# Patient Record
Sex: MALE | Race: WHITE | NOT HISPANIC OR LATINO | Employment: FULL TIME | URBAN - METROPOLITAN AREA
[De-identification: names, ages, dates, MRNs, and addresses within clinical notes are randomized per-mention and may not be internally consistent; named-entity substitution may affect disease eponyms.]

---

## 2017-02-17 ENCOUNTER — HOSPITAL ENCOUNTER (OUTPATIENT)
Dept: ONCOLOGY | Facility: CLINIC | Age: 67
Discharge: HOME | End: 2017-02-17
Attending: INTERNAL MEDICINE | Admitting: INTERNAL MEDICINE

## 2017-03-07 ENCOUNTER — HOSPITAL ENCOUNTER (OUTPATIENT)
Dept: ONCOLOGY | Facility: CLINIC | Age: 67
Discharge: HOME | End: 2017-03-07
Attending: INTERNAL MEDICINE | Admitting: INTERNAL MEDICINE

## 2017-04-04 ENCOUNTER — HOSPITAL ENCOUNTER (OUTPATIENT)
Dept: ONCOLOGY | Facility: CLINIC | Age: 67
Discharge: HOME | End: 2017-04-04
Attending: INTERNAL MEDICINE | Admitting: INTERNAL MEDICINE

## 2017-05-05 ENCOUNTER — HOSPITAL ENCOUNTER (OUTPATIENT)
Dept: ONCOLOGY | Facility: CLINIC | Age: 67
Discharge: HOME | End: 2017-05-05
Attending: NURSE PRACTITIONER | Admitting: NURSE PRACTITIONER

## 2017-05-05 LAB
ALBUMIN SERPL-MCNC: 4.1 G/DL (ref 3.5–4.8)
ALBUMIN/GLOB SERPL: 1.8 {RATIO} (ref 1–1.7)
ALP SERPL-CCNC: 80 IU/L (ref 32–91)
ALT SERPL-CCNC: 55 IU/L (ref 17–63)
ANION GAP SERPL CALC-SCNC: 15.1 MMOL/L (ref 10–20)
AST SERPL-CCNC: 32 IU/L (ref 15–41)
BILIRUB SERPL-MCNC: 1.1 MG/DL (ref 0.3–1.2)
BUN SERPL-MCNC: 32 MG/DL (ref 8–20)
BUN/CREAT SERPL: 22.9 (ref 6.2–20.3)
CALCIUM SERPL-MCNC: 9.5 MG/DL (ref 8.9–10.3)
CHLORIDE SERPL-SCNC: 104 MMOL/L (ref 101–111)
CONV CO2: 25 MMOL/L (ref 22–32)
CONV TOTAL PROTEIN: 6.4 G/DL (ref 6.1–7.9)
CREAT UR-MCNC: 1.4 MG/DL (ref 0.7–1.2)
GLOBULIN UR ELPH-MCNC: 2.3 G/DL (ref 2.5–3.8)
GLUCOSE SERPL-MCNC: 93 MG/DL (ref 65–99)
POTASSIUM SERPL-SCNC: 4.1 MMOL/L (ref 3.6–5.1)
SODIUM SERPL-SCNC: 140 MMOL/L (ref 136–144)

## 2017-08-04 ENCOUNTER — HOSPITAL ENCOUNTER (OUTPATIENT)
Dept: ONCOLOGY | Facility: CLINIC | Age: 67
Discharge: HOME | End: 2017-08-04
Attending: NURSE PRACTITIONER | Admitting: NURSE PRACTITIONER

## 2017-08-11 ENCOUNTER — HOSPITAL ENCOUNTER (OUTPATIENT)
Dept: ONCOLOGY | Facility: CLINIC | Age: 67
Discharge: HOME | End: 2017-08-11
Attending: INTERNAL MEDICINE | Admitting: INTERNAL MEDICINE

## 2017-08-18 ENCOUNTER — HOSPITAL ENCOUNTER (OUTPATIENT)
Dept: ONCOLOGY | Facility: CLINIC | Age: 67
Discharge: HOME | End: 2017-08-18
Attending: INTERNAL MEDICINE | Admitting: INTERNAL MEDICINE

## 2017-11-09 ENCOUNTER — HOSPITAL ENCOUNTER (OUTPATIENT)
Dept: ONCOLOGY | Facility: CLINIC | Age: 67
Setting detail: INFUSION SERIES
Discharge: HOME | End: 2017-11-09
Attending: INTERNAL MEDICINE | Admitting: INTERNAL MEDICINE

## 2017-11-09 ENCOUNTER — CLINICAL SUPPORT (OUTPATIENT)
Dept: ONCOLOGY | Facility: HOSPITAL | Age: 67
End: 2017-11-09

## 2017-11-09 ENCOUNTER — HOSPITAL ENCOUNTER (OUTPATIENT)
Dept: ONCOLOGY | Facility: HOSPITAL | Age: 67
Discharge: HOME | End: 2017-11-09
Attending: INTERNAL MEDICINE | Admitting: INTERNAL MEDICINE

## 2017-11-09 NOTE — PROGRESS NOTES
PATIENTS ONCOLOGY RECORD LOCATED IN [facility]      Subjective     Name:  MELVIN DUNCAN JR     Date:  2017  Address:  [address] [place] [address] [place] [facility]  Home: [phone]  :  1950 AGE:  67 y.o.        RECORDS OBTAINED:  Patients Oncology Record is located in [facility]

## 2017-11-16 ENCOUNTER — HOSPITAL ENCOUNTER (OUTPATIENT)
Dept: ONCOLOGY | Facility: CLINIC | Age: 67
Setting detail: INFUSION SERIES
Discharge: HOME | End: 2017-11-16
Attending: INTERNAL MEDICINE | Admitting: INTERNAL MEDICINE

## 2017-11-16 ENCOUNTER — HOSPITAL ENCOUNTER (OUTPATIENT)
Dept: ONCOLOGY | Facility: HOSPITAL | Age: 67
Discharge: HOME | End: 2017-11-16
Attending: INTERNAL MEDICINE | Admitting: INTERNAL MEDICINE

## 2017-11-16 ENCOUNTER — CLINICAL SUPPORT (OUTPATIENT)
Dept: ONCOLOGY | Facility: HOSPITAL | Age: 67
End: 2017-11-16

## 2017-11-16 NOTE — PROGRESS NOTES
PATIENTS ONCOLOGY RECORD LOCATED IN [facility]      Subjective     Name:  MELVIN DUNCAN JR     Date:  2017  Address:  [address] [place] [address] [place] [facility]  Home: [phone]  :  1950 AGE:  67 y.o.        RECORDS OBTAINED:  Patients Oncology Record is located in [facility]

## 2017-11-21 ENCOUNTER — HOSPITAL ENCOUNTER (OUTPATIENT)
Dept: ONCOLOGY | Facility: HOSPITAL | Age: 67
Discharge: HOME | End: 2017-11-21
Attending: INTERNAL MEDICINE | Admitting: INTERNAL MEDICINE

## 2017-11-21 ENCOUNTER — HOSPITAL ENCOUNTER (OUTPATIENT)
Dept: ONCOLOGY | Facility: CLINIC | Age: 67
Setting detail: INFUSION SERIES
Discharge: HOME | End: 2017-11-21
Attending: INTERNAL MEDICINE | Admitting: INTERNAL MEDICINE

## 2017-11-21 ENCOUNTER — CLINICAL SUPPORT (OUTPATIENT)
Dept: ONCOLOGY | Facility: HOSPITAL | Age: 67
End: 2017-11-21

## 2017-11-21 NOTE — PROGRESS NOTES
PATIENTS ONCOLOGY RECORD LOCATED IN [facility]      Subjective     Name:  MELVIN DUNCAN JR     Date:  2017  Address:  [address] [place] [address] [place] [facility]  Home: [phone]  :  1950 AGE:  67 y.o.        RECORDS OBTAINED:  Patients Oncology Record is located in [facility]

## 2017-12-21 ENCOUNTER — HOSPITAL ENCOUNTER (OUTPATIENT)
Dept: ONCOLOGY | Facility: CLINIC | Age: 67
Setting detail: INFUSION SERIES
Discharge: HOME | End: 2017-12-21
Attending: INTERNAL MEDICINE | Admitting: INTERNAL MEDICINE

## 2017-12-21 ENCOUNTER — HOSPITAL ENCOUNTER (OUTPATIENT)
Dept: ONCOLOGY | Facility: HOSPITAL | Age: 67
Discharge: HOME | End: 2017-12-21
Attending: INTERNAL MEDICINE | Admitting: INTERNAL MEDICINE

## 2017-12-21 ENCOUNTER — CLINICAL SUPPORT (OUTPATIENT)
Dept: ONCOLOGY | Facility: HOSPITAL | Age: 67
End: 2017-12-21

## 2017-12-21 NOTE — PROGRESS NOTES
PATIENTS ONCOLOGY RECORD LOCATED IN [facility]      Subjective     Name:  MELVIN DUNCAN JR     Date:  2017  Address:  [address] [place] [address] [place] [facility]  Home: [phone]  :  1950 AGE:  67 y.o.        RECORDS OBTAINED:  Patients Oncology Record is located in [facility]

## 2018-04-19 ENCOUNTER — HOSPITAL ENCOUNTER (OUTPATIENT)
Dept: ONCOLOGY | Facility: CLINIC | Age: 68
Setting detail: INFUSION SERIES
Discharge: HOME | End: 2018-04-19
Attending: INTERNAL MEDICINE | Admitting: INTERNAL MEDICINE

## 2018-04-19 ENCOUNTER — CLINICAL SUPPORT (OUTPATIENT)
Dept: ONCOLOGY | Facility: HOSPITAL | Age: 68
End: 2018-04-19

## 2018-04-19 NOTE — PROGRESS NOTES
PATIENTS ONCOLOGY RECORD LOCATED IN [facility]      Subjective     Name:  MELVIN DUNCAN JR     Date:  2018  Address:  [address] [place] [address] [place] [facility]  Home: [phone]  :  1950 AGE:  68 y.o.        RECORDS OBTAINED:  Patients Oncology Record is located in [facility]

## 2018-04-26 ENCOUNTER — HOSPITAL ENCOUNTER (OUTPATIENT)
Dept: ONCOLOGY | Facility: CLINIC | Age: 68
Setting detail: INFUSION SERIES
Discharge: HOME | End: 2018-04-26
Attending: INTERNAL MEDICINE | Admitting: INTERNAL MEDICINE

## 2018-04-26 ENCOUNTER — CLINICAL SUPPORT (OUTPATIENT)
Dept: ONCOLOGY | Facility: HOSPITAL | Age: 68
End: 2018-04-26

## 2018-05-03 ENCOUNTER — CLINICAL SUPPORT (OUTPATIENT)
Dept: ONCOLOGY | Facility: HOSPITAL | Age: 68
End: 2018-05-03

## 2018-05-03 ENCOUNTER — HOSPITAL ENCOUNTER (OUTPATIENT)
Dept: ONCOLOGY | Facility: CLINIC | Age: 68
Setting detail: INFUSION SERIES
Discharge: HOME | End: 2018-05-03
Attending: INTERNAL MEDICINE | Admitting: INTERNAL MEDICINE

## 2018-05-03 NOTE — PROGRESS NOTES
PATIENTS ONCOLOGY RECORD LOCATED IN [facility]      Subjective     Name:  MELVIN DUNCAN JR     Date:  2018  Address:  [address] [place] [address] [place] [facility]  Home: [phone]  :  1950 AGE:  68 y.o.        RECORDS OBTAINED:  Patients Oncology Record is located in [facility]

## 2018-05-22 ENCOUNTER — HOSPITAL ENCOUNTER (OUTPATIENT)
Dept: ONCOLOGY | Facility: CLINIC | Age: 68
Setting detail: INFUSION SERIES
Discharge: HOME | End: 2018-05-22
Attending: NURSE PRACTITIONER | Admitting: NURSE PRACTITIONER

## 2018-05-22 ENCOUNTER — HOSPITAL ENCOUNTER (OUTPATIENT)
Dept: ONCOLOGY | Facility: HOSPITAL | Age: 68
Discharge: HOME | End: 2018-05-22
Attending: NURSE PRACTITIONER | Admitting: NURSE PRACTITIONER

## 2018-05-22 ENCOUNTER — CLINICAL SUPPORT (OUTPATIENT)
Dept: ONCOLOGY | Facility: HOSPITAL | Age: 68
End: 2018-05-22

## 2018-05-22 LAB
ALBUMIN SERPL-MCNC: 3.9 G/DL (ref 3.5–4.8)
ALBUMIN/GLOB SERPL: 1.6 {RATIO} (ref 1–1.7)
ALP SERPL-CCNC: 79 IU/L (ref 32–91)
ALT SERPL-CCNC: 37 IU/L (ref 17–63)
ANION GAP SERPL CALC-SCNC: 11.2 MMOL/L (ref 10–20)
AST SERPL-CCNC: 28 IU/L (ref 15–41)
BILIRUB SERPL-MCNC: 1 MG/DL (ref 0.3–1.2)
BUN SERPL-MCNC: 28 MG/DL (ref 8–20)
BUN/CREAT SERPL: 18.7 (ref 6.2–20.3)
CALCIUM SERPL-MCNC: 9.3 MG/DL (ref 8.9–10.3)
CHLORIDE SERPL-SCNC: 105 MMOL/L (ref 101–111)
CONV CO2: 23 MMOL/L (ref 22–32)
CONV TOTAL PROTEIN: 6.3 G/DL (ref 6.1–7.9)
CREAT UR-MCNC: 1.5 MG/DL (ref 0.7–1.2)
GLOBULIN UR ELPH-MCNC: 2.4 G/DL (ref 2.5–3.8)
GLUCOSE SERPL-MCNC: 114 MG/DL (ref 65–99)
POTASSIUM SERPL-SCNC: 4.2 MMOL/L (ref 3.6–5.1)
SODIUM SERPL-SCNC: 135 MMOL/L (ref 136–144)

## 2018-05-22 NOTE — PROGRESS NOTES
PATIENTS ONCOLOGY RECORD LOCATED IN [facility]      Subjective     Name:  MELVIN DUNCAN JR     Date:  2018  Address:  [address] [place] [address] [place] [facility]  Home: [phone]  :  1950 AGE:  68 y.o.        RECORDS OBTAINED:  Patients Oncology Record is located in [facility]

## 2018-06-19 ENCOUNTER — CLINICAL SUPPORT (OUTPATIENT)
Dept: ONCOLOGY | Facility: HOSPITAL | Age: 68
End: 2018-06-19

## 2018-06-19 ENCOUNTER — HOSPITAL ENCOUNTER (OUTPATIENT)
Dept: ONCOLOGY | Facility: CLINIC | Age: 68
Setting detail: INFUSION SERIES
Discharge: HOME | End: 2018-06-19
Attending: INTERNAL MEDICINE | Admitting: INTERNAL MEDICINE

## 2018-06-19 NOTE — PROGRESS NOTES
PATIENTS ONCOLOGY RECORD LOCATED IN [facility]      Subjective     Name:  MELVIN DUNCAN JR     Date:  2018  Address:  [address] [place] [address] [place] [facility]  Home: [phone]  :  1950 AGE:  68 y.o.        RECORDS OBTAINED:  Patients Oncology Record is located in [facility]

## 2018-07-19 ENCOUNTER — HOSPITAL ENCOUNTER (OUTPATIENT)
Dept: ONCOLOGY | Facility: CLINIC | Age: 68
Setting detail: INFUSION SERIES
Discharge: HOME | End: 2018-07-19
Attending: INTERNAL MEDICINE | Admitting: INTERNAL MEDICINE

## 2018-07-19 ENCOUNTER — CLINICAL SUPPORT (OUTPATIENT)
Dept: ONCOLOGY | Facility: HOSPITAL | Age: 68
End: 2018-07-19

## 2018-07-19 NOTE — PROGRESS NOTES
PATIENTS ONCOLOGY RECORD LOCATED IN [facility]      Subjective     Name:  MELVIN DUNCAN JR     Date:  2018  Address:  [address] [place] [address] [place] [facility]  Home: [phone]  :  1950 AGE:  68 y.o.        RECORDS OBTAINED:  Patients Oncology Record is located in [facility]

## 2018-07-31 ENCOUNTER — HOSPITAL ENCOUNTER (OUTPATIENT)
Dept: ONCOLOGY | Facility: CLINIC | Age: 68
Setting detail: INFUSION SERIES
Discharge: HOME | End: 2018-07-31
Attending: INTERNAL MEDICINE | Admitting: INTERNAL MEDICINE

## 2018-07-31 ENCOUNTER — CLINICAL SUPPORT (OUTPATIENT)
Dept: ONCOLOGY | Facility: HOSPITAL | Age: 68
End: 2018-07-31

## 2018-07-31 NOTE — PROGRESS NOTES
PATIENTS ONCOLOGY RECORD LOCATED IN [facility]      Subjective     Name:  MELVIN DUNCAN JR     Date:  2018  Address:  [address] [place] [address] [place] [facility]  Home: [phone]  :  1950 AGE:  68 y.o.        RECORDS OBTAINED:  Patients Oncology Record is located in [facility]

## 2018-08-07 ENCOUNTER — HOSPITAL ENCOUNTER (OUTPATIENT)
Dept: ONCOLOGY | Facility: CLINIC | Age: 68
Setting detail: INFUSION SERIES
Discharge: HOME | End: 2018-08-07
Attending: INTERNAL MEDICINE | Admitting: INTERNAL MEDICINE

## 2018-08-07 ENCOUNTER — CLINICAL SUPPORT (OUTPATIENT)
Dept: ONCOLOGY | Facility: HOSPITAL | Age: 68
End: 2018-08-07

## 2018-08-07 NOTE — PROGRESS NOTES
PATIENTS ONCOLOGY RECORD LOCATED IN [facility]      Subjective     Name:  MELVIN DUNCAN JR     Date:  2018  Address:  [address] [place] [address] [place] [facility]  Home: [phone]  :  1950 AGE:  68 y.o.        RECORDS OBTAINED:  Patients Oncology Record is located in [facility]

## 2018-09-04 ENCOUNTER — CLINICAL SUPPORT (OUTPATIENT)
Dept: ONCOLOGY | Facility: HOSPITAL | Age: 68
End: 2018-09-04

## 2018-09-04 ENCOUNTER — HOSPITAL ENCOUNTER (OUTPATIENT)
Dept: ONCOLOGY | Facility: CLINIC | Age: 68
Setting detail: INFUSION SERIES
Discharge: HOME | End: 2018-09-04
Attending: INTERNAL MEDICINE | Admitting: INTERNAL MEDICINE

## 2018-09-04 NOTE — PROGRESS NOTES
PATIENTS ONCOLOGY RECORD LOCATED IN [facility]      Subjective     Name:  MELVIN DUNCAN JR     Date:  2018  Address:  [address] [place] [address] [place] [facility]  Home: [phone]  :  1950 AGE:  68 y.o.        RECORDS OBTAINED:  Patients Oncology Record is located in [facility]

## 2018-09-13 ENCOUNTER — HOSPITAL ENCOUNTER (OUTPATIENT)
Dept: ONCOLOGY | Facility: CLINIC | Age: 68
Setting detail: INFUSION SERIES
Discharge: HOME | End: 2018-09-13
Attending: INTERNAL MEDICINE | Admitting: INTERNAL MEDICINE

## 2018-09-13 ENCOUNTER — CLINICAL SUPPORT (OUTPATIENT)
Dept: ONCOLOGY | Facility: HOSPITAL | Age: 68
End: 2018-09-13

## 2018-09-18 ENCOUNTER — HOSPITAL ENCOUNTER (OUTPATIENT)
Dept: ONCOLOGY | Facility: CLINIC | Age: 68
Setting detail: INFUSION SERIES
Discharge: HOME | End: 2018-09-18
Attending: INTERNAL MEDICINE | Admitting: INTERNAL MEDICINE

## 2018-09-18 ENCOUNTER — CLINICAL SUPPORT (OUTPATIENT)
Dept: ONCOLOGY | Facility: HOSPITAL | Age: 68
End: 2018-09-18

## 2018-09-18 NOTE — PROGRESS NOTES
PATIENTS ONCOLOGY RECORD LOCATED IN [facility]      Subjective     Name:  MELVIN DUNCAN JR     Date:  2018  Address:  [address] [place] [address] [place] [facility]  Home: [phone]  :  1950 AGE:  68 y.o.        RECORDS OBTAINED:  Patients Oncology Record is located in [email or web address]]

## 2018-09-21 ENCOUNTER — HOSPITAL ENCOUNTER (OUTPATIENT)
Dept: CARDIOLOGY | Facility: HOSPITAL | Age: 68
Discharge: HOME | End: 2018-09-21
Attending: INTERNAL MEDICINE | Admitting: INTERNAL MEDICINE

## 2018-10-16 ENCOUNTER — HOSPITAL ENCOUNTER (OUTPATIENT)
Dept: ONCOLOGY | Facility: CLINIC | Age: 68
Setting detail: INFUSION SERIES
Discharge: HOME | End: 2018-10-16
Attending: INTERNAL MEDICINE | Admitting: INTERNAL MEDICINE

## 2018-10-16 ENCOUNTER — CLINICAL SUPPORT (OUTPATIENT)
Dept: ONCOLOGY | Facility: HOSPITAL | Age: 68
End: 2018-10-16

## 2018-10-16 NOTE — PROGRESS NOTES
PATIENTS ONCOLOGY RECORD LOCATED IN [facility]      Subjective     Name:  MELVIN DUNCAN JR     Date:  10/16/2018  Address:  [address] [place] [address] [place] [facility]  Home: [phone]  :  1950 AGE:  68 y.o.        RECORDS OBTAINED:  Patients Oncology Record is located in [facility]

## 2018-12-18 ENCOUNTER — CLINICAL SUPPORT (OUTPATIENT)
Dept: ONCOLOGY | Facility: HOSPITAL | Age: 68
End: 2018-12-18

## 2018-12-18 ENCOUNTER — HOSPITAL ENCOUNTER (OUTPATIENT)
Dept: ONCOLOGY | Facility: CLINIC | Age: 68
Setting detail: INFUSION SERIES
Discharge: HOME | End: 2018-12-18
Attending: INTERNAL MEDICINE | Admitting: INTERNAL MEDICINE

## 2018-12-18 NOTE — PROGRESS NOTES
PATIENTS ONCOLOGY RECORD LOCATED IN [facility]      Subjective     Name:  MELVIN DUNCAN JR     Date:  2018  Address:  [facility] [place] [facility]  Home: [unfilled]  :  1950 AGE:  68 y.o.        RECORDS OBTAINED:  Patients Oncology Record is located in [facility]

## 2019-01-15 ENCOUNTER — CLINICAL SUPPORT (OUTPATIENT)
Dept: ONCOLOGY | Facility: HOSPITAL | Age: 69
End: 2019-01-15

## 2019-01-15 ENCOUNTER — HOSPITAL ENCOUNTER (OUTPATIENT)
Dept: ONCOLOGY | Facility: CLINIC | Age: 69
Setting detail: INFUSION SERIES
Discharge: HOME | End: 2019-01-15
Attending: INTERNAL MEDICINE | Admitting: INTERNAL MEDICINE

## 2019-01-15 NOTE — PROGRESS NOTES
PATIENTS ONCOLOGY RECORD LOCATED IN [facility]      Subjective     Name:  MELVIN DUNCAN JR     Date:  01/15/2019  Address:  [address] [place] [address] [place] [facility]  Home: [unfilled]  :  1950 AGE:  68 y.o.        RECORDS OBTAINED:  Patients Oncology Record is located in [facility]

## 2019-01-22 ENCOUNTER — HOSPITAL ENCOUNTER (OUTPATIENT)
Dept: ONCOLOGY | Facility: CLINIC | Age: 69
Setting detail: INFUSION SERIES
Discharge: HOME | End: 2019-01-22
Attending: INTERNAL MEDICINE | Admitting: INTERNAL MEDICINE

## 2019-01-22 ENCOUNTER — CLINICAL SUPPORT (OUTPATIENT)
Dept: ONCOLOGY | Facility: HOSPITAL | Age: 69
End: 2019-01-22

## 2019-01-22 NOTE — PROGRESS NOTES
PATIENTS ONCOLOGY RECORD LOCATED IN [facility]      Subjective     Name:  MELVIN DUNCAN JR     Date:  2019  Address:  [facility] [place] [facility]  Home: [unfilled]  :  1950 AGE:  68 y.o.        RECORDS OBTAINED:  Patients Oncology Record is located in [facility]

## 2019-01-29 ENCOUNTER — HOSPITAL ENCOUNTER (OUTPATIENT)
Dept: ONCOLOGY | Facility: CLINIC | Age: 69
Setting detail: INFUSION SERIES
Discharge: HOME | End: 2019-01-29
Attending: INTERNAL MEDICINE | Admitting: INTERNAL MEDICINE

## 2019-01-29 ENCOUNTER — CLINICAL SUPPORT (OUTPATIENT)
Dept: ONCOLOGY | Facility: HOSPITAL | Age: 69
End: 2019-01-29

## 2019-01-29 NOTE — PROGRESS NOTES
PATIENTS ONCOLOGY RECORD LOCATED IN [facility]      Subjective     Name:  MELVIN DUNCAN JR     Date:  2019  Address:  [address] [place] [address] [place] [facility]  Home: [unfilled]  :  1950 AGE:  68 y.o.        RECORDS OBTAINED:  Patients Oncology Record is located in [facility]

## 2019-02-25 ENCOUNTER — HOSPITAL ENCOUNTER (OUTPATIENT)
Dept: ONCOLOGY | Facility: CLINIC | Age: 69
Setting detail: INFUSION SERIES
Discharge: HOME | End: 2019-02-25
Attending: INTERNAL MEDICINE | Admitting: INTERNAL MEDICINE

## 2019-02-25 ENCOUNTER — CLINICAL SUPPORT (OUTPATIENT)
Dept: ONCOLOGY | Facility: HOSPITAL | Age: 69
End: 2019-02-25

## 2019-02-25 NOTE — PROGRESS NOTES
PATIENTS ONCOLOGY RECORD LOCATED IN [facility]      Subjective     Name:  MELVIN DUNCAN JR     Date:  2019  Address:  [address] [place] [address] [place] [facility]  Home: [unfilled]  :  1950 AGE:  68 y.o.        RECORDS OBTAINED:  Patients Oncology Record is located in [facility]

## 2019-03-25 ENCOUNTER — CLINICAL SUPPORT (OUTPATIENT)
Dept: ONCOLOGY | Facility: HOSPITAL | Age: 69
End: 2019-03-25

## 2019-03-25 ENCOUNTER — HOSPITAL ENCOUNTER (OUTPATIENT)
Dept: ONCOLOGY | Facility: CLINIC | Age: 69
Setting detail: INFUSION SERIES
Discharge: HOME | End: 2019-03-25
Attending: INTERNAL MEDICINE | Admitting: INTERNAL MEDICINE

## 2019-03-25 NOTE — PROGRESS NOTES
PATIENTS ONCOLOGY RECORD LOCATED IN [facility]      Subjective     Name:  MELVIN DUNCAN JR     Date:  2019  Address:  [address] [place] [address] [place] [facility]  Home: [unfilled]  :  1950 AGE:  69 y.o.        RECORDS OBTAINED:  Patients Oncology Record is located in [facility]

## 2019-04-22 ENCOUNTER — CLINICAL SUPPORT (OUTPATIENT)
Dept: ONCOLOGY | Facility: HOSPITAL | Age: 69
End: 2019-04-22

## 2019-04-22 ENCOUNTER — HOSPITAL ENCOUNTER (OUTPATIENT)
Dept: ONCOLOGY | Facility: CLINIC | Age: 69
Setting detail: INFUSION SERIES
Discharge: HOME | End: 2019-04-22
Attending: INTERNAL MEDICINE | Admitting: INTERNAL MEDICINE

## 2019-04-22 NOTE — PROGRESS NOTES
PATIENTS ONCOLOGY RECORD LOCATED IN [facility]      Subjective     Name:  MELVIN DUNCAN JR     Date:  2019  Address:  [facility] [place] [facility]  Home: [unfilled]  :  1950 AGE:  69 y.o.        RECORDS OBTAINED:  Patients Oncology Record is located in [facility]

## 2019-04-29 ENCOUNTER — CLINICAL SUPPORT (OUTPATIENT)
Dept: ONCOLOGY | Facility: HOSPITAL | Age: 69
End: 2019-04-29

## 2019-04-29 ENCOUNTER — HOSPITAL ENCOUNTER (OUTPATIENT)
Dept: ONCOLOGY | Facility: CLINIC | Age: 69
Setting detail: INFUSION SERIES
Discharge: HOME | End: 2019-04-29
Attending: INTERNAL MEDICINE | Admitting: INTERNAL MEDICINE

## 2019-04-29 NOTE — PROGRESS NOTES
PATIENTS ONCOLOGY RECORD LOCATED IN [facility]      Subjective     Name:  MELVIN DUNCAN JR     Date:  2019  Address:  [address] [place] [address] [place] [facility]  Home: [unfilled]  :  1950 AGE:  69 y.o.        RECORDS OBTAINED:  Patients Oncology Record is located in [facility]

## 2019-05-02 ENCOUNTER — CLINICAL SUPPORT (OUTPATIENT)
Dept: ONCOLOGY | Facility: HOSPITAL | Age: 69
End: 2019-05-02

## 2019-05-02 ENCOUNTER — HOSPITAL ENCOUNTER (OUTPATIENT)
Dept: ONCOLOGY | Facility: HOSPITAL | Age: 69
Discharge: HOME | End: 2019-05-02
Attending: INTERNAL MEDICINE | Admitting: INTERNAL MEDICINE

## 2019-05-02 ENCOUNTER — HOSPITAL ENCOUNTER (OUTPATIENT)
Dept: ONCOLOGY | Facility: CLINIC | Age: 69
Setting detail: INFUSION SERIES
Discharge: HOME | End: 2019-05-02
Attending: INTERNAL MEDICINE | Admitting: INTERNAL MEDICINE

## 2019-05-02 NOTE — PROGRESS NOTES
PATIENTS ONCOLOGY RECORD LOCATED IN [facility]      Subjective     Name:  MELVIN DUNCAN JR     Date:  2019  Address:  [address] [place] [address] [place] [facility]  Home: [unfilled]  :  1950 AGE:  69 y.o.        RECORDS OBTAINED:  Patients Oncology Record is located in [facility]

## 2019-06-06 ENCOUNTER — HOSPITAL ENCOUNTER (OUTPATIENT)
Dept: ONCOLOGY | Facility: CLINIC | Age: 69
Setting detail: INFUSION SERIES
Discharge: HOME | End: 2019-06-06
Attending: INTERNAL MEDICINE | Admitting: INTERNAL MEDICINE

## 2019-06-27 RX ORDER — CYANOCOBALAMIN/FOLIC AC/VIT B6 1-2.2-25MG
TABLET ORAL
Qty: 60 TABLET | Refills: 1 | Status: SHIPPED | OUTPATIENT
Start: 2019-06-27 | End: 2019-09-05 | Stop reason: SDUPTHER

## 2019-07-05 DIAGNOSIS — I82.5Y3 CHRONIC DEEP VEIN THROMBOSIS (DVT) OF PROXIMAL VEIN OF BOTH LOWER EXTREMITIES (HCC): ICD-10-CM

## 2019-07-05 DIAGNOSIS — I26.99 PULMONARY THROMBOSIS (HCC): Primary | ICD-10-CM

## 2019-07-09 ENCOUNTER — LAB (OUTPATIENT)
Dept: LAB | Facility: HOSPITAL | Age: 69
End: 2019-07-09

## 2019-07-09 ENCOUNTER — HOSPITAL ENCOUNTER (OUTPATIENT)
Dept: ONCOLOGY | Facility: HOSPITAL | Age: 69
Discharge: HOME | End: 2019-07-09
Admitting: NURSE PRACTITIONER

## 2019-07-09 VITALS
WEIGHT: 205 LBS | BODY MASS INDEX: 32.95 KG/M2 | SYSTOLIC BLOOD PRESSURE: 76 MMHG | TEMPERATURE: 97.8 F | HEIGHT: 66 IN | RESPIRATION RATE: 18 BRPM | DIASTOLIC BLOOD PRESSURE: 58 MMHG | HEART RATE: 76 BPM

## 2019-07-09 DIAGNOSIS — I82.5Y3 CHRONIC DEEP VEIN THROMBOSIS (DVT) OF PROXIMAL VEIN OF BOTH LOWER EXTREMITIES (HCC): Primary | ICD-10-CM

## 2019-07-09 DIAGNOSIS — I26.99 PULMONARY THROMBOSIS (HCC): ICD-10-CM

## 2019-07-09 DIAGNOSIS — I82.5Y3 CHRONIC DEEP VEIN THROMBOSIS (DVT) OF PROXIMAL VEIN OF BOTH LOWER EXTREMITIES (HCC): ICD-10-CM

## 2019-07-09 LAB
INR PPP: 1.4
PROTHROMBIN TIME: 17.3 SECONDS (ref 11–15)

## 2019-07-09 PROCEDURE — 85610 PROTHROMBIN TIME: CPT

## 2019-07-16 ENCOUNTER — LAB (OUTPATIENT)
Dept: LAB | Facility: HOSPITAL | Age: 69
End: 2019-07-16

## 2019-07-16 ENCOUNTER — HOSPITAL ENCOUNTER (OUTPATIENT)
Dept: ONCOLOGY | Facility: HOSPITAL | Age: 69
Discharge: HOME | End: 2019-07-16
Admitting: NURSE PRACTITIONER

## 2019-07-16 VITALS
HEART RATE: 58 BPM | BODY MASS INDEX: 33.59 KG/M2 | WEIGHT: 209 LBS | HEIGHT: 66 IN | DIASTOLIC BLOOD PRESSURE: 66 MMHG | TEMPERATURE: 98 F | SYSTOLIC BLOOD PRESSURE: 117 MMHG | RESPIRATION RATE: 18 BRPM

## 2019-07-16 DIAGNOSIS — I82.5Y3 CHRONIC DEEP VEIN THROMBOSIS (DVT) OF PROXIMAL VEIN OF BOTH LOWER EXTREMITIES (HCC): ICD-10-CM

## 2019-07-16 DIAGNOSIS — I26.99 PULMONARY THROMBOSIS (HCC): ICD-10-CM

## 2019-07-16 DIAGNOSIS — I26.99 PULMONARY THROMBOSIS (HCC): Primary | ICD-10-CM

## 2019-07-16 LAB
INR PPP: 3.3
PROTHROMBIN TIME: 42.1 SECONDS (ref 11–15)

## 2019-07-16 PROCEDURE — 85610 PROTHROMBIN TIME: CPT

## 2019-07-23 ENCOUNTER — LAB (OUTPATIENT)
Dept: LAB | Facility: HOSPITAL | Age: 69
End: 2019-07-23

## 2019-07-23 ENCOUNTER — HOSPITAL ENCOUNTER (OUTPATIENT)
Dept: ONCOLOGY | Facility: HOSPITAL | Age: 69
Discharge: HOME | End: 2019-07-23

## 2019-07-23 VITALS
RESPIRATION RATE: 18 BRPM | BODY MASS INDEX: 33.11 KG/M2 | DIASTOLIC BLOOD PRESSURE: 97 MMHG | SYSTOLIC BLOOD PRESSURE: 131 MMHG | HEART RATE: 61 BPM | TEMPERATURE: 97.9 F | WEIGHT: 206 LBS | HEIGHT: 66 IN

## 2019-07-23 DIAGNOSIS — I26.99 PULMONARY THROMBOSIS (HCC): Primary | ICD-10-CM

## 2019-07-23 DIAGNOSIS — I26.99 PULMONARY THROMBOSIS (HCC): ICD-10-CM

## 2019-07-23 DIAGNOSIS — I82.5Y3 CHRONIC DEEP VEIN THROMBOSIS (DVT) OF PROXIMAL VEIN OF BOTH LOWER EXTREMITIES (HCC): ICD-10-CM

## 2019-07-23 LAB
INR PPP: 5.9
PROTHROMBIN TIME: 61.7 SECONDS (ref 11–15)

## 2019-07-23 PROCEDURE — 85610 PROTHROMBIN TIME: CPT

## 2019-07-23 RX ORDER — WARFARIN SODIUM 1 MG/1
TABLET ORAL
Qty: 90 TABLET | Refills: 2 | Status: SHIPPED | OUTPATIENT
Start: 2019-07-23 | End: 2019-11-22 | Stop reason: SDUPTHER

## 2019-07-25 ENCOUNTER — LAB (OUTPATIENT)
Dept: LAB | Facility: HOSPITAL | Age: 69
End: 2019-07-25

## 2019-07-25 ENCOUNTER — HOSPITAL ENCOUNTER (OUTPATIENT)
Dept: ONCOLOGY | Facility: HOSPITAL | Age: 69
Discharge: HOME | End: 2019-07-25
Admitting: NURSE PRACTITIONER

## 2019-07-25 VITALS
HEART RATE: 68 BPM | SYSTOLIC BLOOD PRESSURE: 128 MMHG | RESPIRATION RATE: 18 BRPM | TEMPERATURE: 98 F | DIASTOLIC BLOOD PRESSURE: 64 MMHG | WEIGHT: 203 LBS | HEIGHT: 66 IN | BODY MASS INDEX: 32.62 KG/M2

## 2019-07-25 DIAGNOSIS — I82.5Y3 CHRONIC DEEP VEIN THROMBOSIS (DVT) OF PROXIMAL VEIN OF BOTH LOWER EXTREMITIES (HCC): ICD-10-CM

## 2019-07-25 DIAGNOSIS — I26.99 PULMONARY THROMBOSIS (HCC): ICD-10-CM

## 2019-07-25 DIAGNOSIS — I26.99 PULMONARY THROMBOSIS (HCC): Primary | ICD-10-CM

## 2019-07-25 LAB
INR PPP: 2
PROTHROMBIN TIME: 24.1 SECONDS (ref 11–15)

## 2019-07-25 PROCEDURE — 85610 PROTHROMBIN TIME: CPT

## 2019-08-01 ENCOUNTER — HOSPITAL ENCOUNTER (OUTPATIENT)
Dept: ONCOLOGY | Facility: HOSPITAL | Age: 69
Discharge: HOME | End: 2019-08-01
Admitting: NURSE PRACTITIONER

## 2019-08-01 ENCOUNTER — LAB (OUTPATIENT)
Dept: LAB | Facility: HOSPITAL | Age: 69
End: 2019-08-01

## 2019-08-01 VITALS
RESPIRATION RATE: 18 BRPM | HEIGHT: 66 IN | HEART RATE: 115 BPM | TEMPERATURE: 98 F | SYSTOLIC BLOOD PRESSURE: 131 MMHG | WEIGHT: 211 LBS | BODY MASS INDEX: 33.91 KG/M2 | DIASTOLIC BLOOD PRESSURE: 68 MMHG

## 2019-08-01 DIAGNOSIS — I26.99 PULMONARY THROMBOSIS (HCC): ICD-10-CM

## 2019-08-01 DIAGNOSIS — I26.99 PULMONARY THROMBOSIS (HCC): Primary | ICD-10-CM

## 2019-08-01 DIAGNOSIS — I82.5Y3 CHRONIC DEEP VEIN THROMBOSIS (DVT) OF PROXIMAL VEIN OF BOTH LOWER EXTREMITIES (HCC): ICD-10-CM

## 2019-08-01 LAB
INR PPP: 3.4
PROTHROMBIN TIME: 38.2 SECONDS (ref 11–15)

## 2019-08-01 PROCEDURE — 85610 PROTHROMBIN TIME: CPT

## 2019-08-08 ENCOUNTER — LAB (OUTPATIENT)
Dept: LAB | Facility: HOSPITAL | Age: 69
End: 2019-08-08

## 2019-08-08 ENCOUNTER — HOSPITAL ENCOUNTER (OUTPATIENT)
Dept: ONCOLOGY | Facility: HOSPITAL | Age: 69
Discharge: HOME | End: 2019-08-08
Admitting: NURSE PRACTITIONER

## 2019-08-08 VITALS
HEART RATE: 76 BPM | RESPIRATION RATE: 18 BRPM | WEIGHT: 206 LBS | TEMPERATURE: 98 F | HEIGHT: 66 IN | DIASTOLIC BLOOD PRESSURE: 70 MMHG | BODY MASS INDEX: 33.11 KG/M2 | SYSTOLIC BLOOD PRESSURE: 110 MMHG

## 2019-08-08 DIAGNOSIS — I26.99 PULMONARY THROMBOSIS (HCC): Primary | ICD-10-CM

## 2019-08-08 DIAGNOSIS — I26.99 PULMONARY THROMBOSIS (HCC): ICD-10-CM

## 2019-08-08 DIAGNOSIS — I82.5Y3 CHRONIC DEEP VEIN THROMBOSIS (DVT) OF PROXIMAL VEIN OF BOTH LOWER EXTREMITIES (HCC): ICD-10-CM

## 2019-08-08 LAB
INR PPP: 2.5
PROTHROMBIN TIME: 30.6 SECONDS (ref 11–15)

## 2019-08-08 PROCEDURE — 85610 PROTHROMBIN TIME: CPT

## 2019-08-15 ENCOUNTER — HOSPITAL ENCOUNTER (OUTPATIENT)
Dept: ONCOLOGY | Facility: HOSPITAL | Age: 69
Discharge: HOME | End: 2019-08-15
Admitting: NURSE PRACTITIONER

## 2019-08-15 ENCOUNTER — LAB (OUTPATIENT)
Dept: LAB | Facility: HOSPITAL | Age: 69
End: 2019-08-15

## 2019-08-15 VITALS
HEART RATE: 78 BPM | WEIGHT: 207 LBS | BODY MASS INDEX: 33.27 KG/M2 | DIASTOLIC BLOOD PRESSURE: 76 MMHG | HEIGHT: 66 IN | SYSTOLIC BLOOD PRESSURE: 117 MMHG | TEMPERATURE: 97.8 F | RESPIRATION RATE: 18 BRPM

## 2019-08-15 DIAGNOSIS — I82.5Y3 CHRONIC DEEP VEIN THROMBOSIS (DVT) OF PROXIMAL VEIN OF BOTH LOWER EXTREMITIES (HCC): ICD-10-CM

## 2019-08-15 DIAGNOSIS — I26.99 PULMONARY THROMBOSIS (HCC): Primary | ICD-10-CM

## 2019-08-15 DIAGNOSIS — I26.99 PULMONARY THROMBOSIS (HCC): ICD-10-CM

## 2019-08-15 LAB
INR PPP: 2.1
PROTHROMBIN TIME: 25.9 SECONDS (ref 11–15)

## 2019-08-15 PROCEDURE — 85610 PROTHROMBIN TIME: CPT

## 2019-08-29 RX ORDER — WARFARIN SODIUM 5 MG/1
TABLET ORAL
Qty: 30 TABLET | Refills: 1 | Status: SHIPPED | OUTPATIENT
Start: 2019-08-29 | End: 2019-11-22 | Stop reason: SDUPTHER

## 2019-08-30 PROBLEM — D69.6 DECREASED PLATELET COUNT (HCC): Status: ACTIVE | Noted: 2019-08-30

## 2019-08-30 PROBLEM — N42.31 PIN (PROSTATIC INTRAEPITHELIAL NEOPLASIA): Status: ACTIVE | Noted: 2019-08-30

## 2019-08-30 PROBLEM — E72.11 HYPERHOMOCYSTEINEMIA: Status: ACTIVE | Noted: 2019-08-30

## 2019-08-30 PROBLEM — I82.501 CHRONIC DEEP VEIN THROMBOSIS (DVT) OF RIGHT LOWER EXTREMITY (HCC): Status: ACTIVE | Noted: 2019-08-30

## 2019-08-30 PROBLEM — N18.30 CKD (CHRONIC KIDNEY DISEASE), STAGE III: Status: ACTIVE | Noted: 2019-08-30

## 2019-08-30 PROBLEM — I26.99 PULMONARY EMBOLI: Chronic | Status: ACTIVE | Noted: 2019-08-30

## 2019-08-30 PROBLEM — R79.1 ELEVATED FACTOR VIII LEVEL: Status: ACTIVE | Noted: 2019-08-30

## 2019-08-30 PROBLEM — D75.1 POLYCYTHEMIA: Status: ACTIVE | Noted: 2019-08-30

## 2019-08-30 PROBLEM — I47.1 SVT (SUPRAVENTRICULAR TACHYCARDIA): Status: ACTIVE | Noted: 2019-08-30

## 2019-08-30 PROBLEM — I47.10 SVT (SUPRAVENTRICULAR TACHYCARDIA): Status: ACTIVE | Noted: 2019-08-30

## 2019-08-30 PROBLEM — I26.99 PULMONARY EMBOLI (HCC): Status: ACTIVE | Noted: 2019-08-30

## 2019-08-30 PROBLEM — Z15.89 HETEROZYGOUS MTHFR MUTATION C677T: Status: ACTIVE | Noted: 2019-08-30

## 2019-09-05 ENCOUNTER — HOSPITAL ENCOUNTER (OUTPATIENT)
Dept: ONCOLOGY | Facility: HOSPITAL | Age: 69
Discharge: HOME | End: 2019-09-05
Admitting: NURSE PRACTITIONER

## 2019-09-05 ENCOUNTER — APPOINTMENT (OUTPATIENT)
Dept: LAB | Facility: HOSPITAL | Age: 69
End: 2019-09-05

## 2019-09-05 ENCOUNTER — OFFICE VISIT (OUTPATIENT)
Dept: ONCOLOGY | Facility: CLINIC | Age: 69
End: 2019-09-05

## 2019-09-05 VITALS
DIASTOLIC BLOOD PRESSURE: 67 MMHG | RESPIRATION RATE: 20 BRPM | BODY MASS INDEX: 33.46 KG/M2 | HEIGHT: 66 IN | HEART RATE: 60 BPM | SYSTOLIC BLOOD PRESSURE: 123 MMHG | WEIGHT: 208.2 LBS | TEMPERATURE: 97.8 F

## 2019-09-05 VITALS
SYSTOLIC BLOOD PRESSURE: 111 MMHG | HEART RATE: 50 BPM | DIASTOLIC BLOOD PRESSURE: 76 MMHG | RESPIRATION RATE: 18 BRPM | TEMPERATURE: 98.3 F | HEIGHT: 66 IN | WEIGHT: 208.1 LBS | BODY MASS INDEX: 33.44 KG/M2

## 2019-09-05 DIAGNOSIS — I26.99 OTHER PULMONARY EMBOLISM WITHOUT ACUTE COR PULMONALE, UNSPECIFIED CHRONICITY (HCC): Primary | ICD-10-CM

## 2019-09-05 DIAGNOSIS — L98.9 LESION OF NECK: ICD-10-CM

## 2019-09-05 DIAGNOSIS — Z15.89 HETEROZYGOUS MTHFR MUTATION C677T: ICD-10-CM

## 2019-09-05 DIAGNOSIS — D75.1 POLYCYTHEMIA: ICD-10-CM

## 2019-09-05 DIAGNOSIS — N18.30 CKD (CHRONIC KIDNEY DISEASE), STAGE III (HCC): ICD-10-CM

## 2019-09-05 DIAGNOSIS — E72.11 HYPERHOMOCYSTEINEMIA (HCC): ICD-10-CM

## 2019-09-05 DIAGNOSIS — I82.5Y1 CHRONIC DEEP VEIN THROMBOSIS (DVT) OF PROXIMAL VEIN OF RIGHT LOWER EXTREMITY (HCC): ICD-10-CM

## 2019-09-05 DIAGNOSIS — I47.1 SVT (SUPRAVENTRICULAR TACHYCARDIA) (HCC): ICD-10-CM

## 2019-09-05 DIAGNOSIS — I26.99 PULMONARY THROMBOSIS (HCC): ICD-10-CM

## 2019-09-05 DIAGNOSIS — D69.6 THROMBOCYTOPENIA (HCC): ICD-10-CM

## 2019-09-05 DIAGNOSIS — D69.6 DECREASED PLATELET COUNT (HCC): ICD-10-CM

## 2019-09-05 DIAGNOSIS — N42.31 PIN (PROSTATIC INTRAEPITHELIAL NEOPLASIA): ICD-10-CM

## 2019-09-05 DIAGNOSIS — R79.1 ELEVATED FACTOR VIII LEVEL: ICD-10-CM

## 2019-09-05 DIAGNOSIS — I82.5Y3 CHRONIC DEEP VEIN THROMBOSIS (DVT) OF PROXIMAL VEIN OF BOTH LOWER EXTREMITIES (HCC): ICD-10-CM

## 2019-09-05 LAB
ALBUMIN SERPL-MCNC: 3.8 G/DL (ref 3.5–4.8)
ALBUMIN/GLOB SERPL: 1.5 G/DL (ref 1–1.7)
ALP SERPL-CCNC: 91 U/L (ref 32–91)
ALT SERPL W P-5'-P-CCNC: 43 U/L (ref 17–63)
ANION GAP SERPL CALCULATED.3IONS-SCNC: 12.7 MMOL/L (ref 5–15)
AST SERPL-CCNC: 26 U/L (ref 15–41)
BASOPHILS # BLD AUTO: 0.03 10*3/MM3 (ref 0–0.2)
BASOPHILS NFR BLD AUTO: 0.4 % (ref 0–1.5)
BILIRUB SERPL-MCNC: 1 MG/DL (ref 0.3–1.2)
BUN BLD-MCNC: 24 MG/DL (ref 8–20)
BUN/CREAT SERPL: 16 (ref 6.2–20.3)
CALCIUM SPEC-SCNC: 9.4 MG/DL (ref 8.9–10.3)
CHLORIDE SERPL-SCNC: 108 MMOL/L (ref 101–111)
CO2 SERPL-SCNC: 26 MMOL/L (ref 22–32)
CREAT BLD-MCNC: 1.5 MG/DL (ref 0.7–1.2)
DEPRECATED RDW RBC AUTO: 44.7 FL (ref 37–54)
EOSINOPHIL # BLD AUTO: 0.16 10*3/MM3 (ref 0–0.4)
EOSINOPHIL NFR BLD AUTO: 2.3 % (ref 0.3–6.2)
ERYTHROCYTE [DISTWIDTH] IN BLOOD BY AUTOMATED COUNT: 13.7 % (ref 12.3–15.4)
GFR SERPL CREATININE-BSD FRML MDRD: 46 ML/MIN/1.73
GLOBULIN UR ELPH-MCNC: 2.6 GM/DL (ref 2.5–3.8)
GLUCOSE BLD-MCNC: 100 MG/DL (ref 65–99)
HCT VFR BLD AUTO: 47.4 % (ref 37.5–51)
HGB BLD-MCNC: 16.1 G/DL (ref 13–17.7)
INR PPP: 3
LYMPHOCYTES # BLD AUTO: 1.39 10*3/MM3 (ref 0.7–3.1)
LYMPHOCYTES NFR BLD AUTO: 20.3 % (ref 19.6–45.3)
MCH RBC QN AUTO: 31.3 PG (ref 26.6–33)
MCHC RBC AUTO-ENTMCNC: 34 G/DL (ref 31.5–35.7)
MCV RBC AUTO: 92 FL (ref 79–97)
MONOCYTES # BLD AUTO: 0.71 10*3/MM3 (ref 0.1–0.9)
MONOCYTES NFR BLD AUTO: 10.4 % (ref 5–12)
NEUTROPHILS # BLD AUTO: 4.55 10*3/MM3 (ref 1.7–7)
NEUTROPHILS NFR BLD AUTO: 66.6 % (ref 42.7–76)
PLATELET # BLD AUTO: 120 10*3/MM3 (ref 140–450)
PMV BLD AUTO: 10.4 FL (ref 6–12)
POTASSIUM BLD-SCNC: 4.7 MMOL/L (ref 3.6–5.1)
PROT SERPL-MCNC: 6.4 G/DL (ref 6.1–7.9)
RBC # BLD AUTO: 5.15 10*6/MM3 (ref 4.14–5.8)
SODIUM BLD-SCNC: 142 MMOL/L (ref 136–144)
WBC NRBC COR # BLD: 6.84 10*3/MM3 (ref 3.4–10.8)

## 2019-09-05 PROCEDURE — 80053 COMPREHEN METABOLIC PANEL: CPT | Performed by: NURSE PRACTITIONER

## 2019-09-05 PROCEDURE — 85610 PROTHROMBIN TIME: CPT | Performed by: NURSE PRACTITIONER

## 2019-09-05 PROCEDURE — 99213 OFFICE O/P EST LOW 20 MIN: CPT | Performed by: NURSE PRACTITIONER

## 2019-09-05 PROCEDURE — 85025 COMPLETE CBC W/AUTO DIFF WBC: CPT | Performed by: NURSE PRACTITIONER

## 2019-09-05 PROCEDURE — 36415 COLL VENOUS BLD VENIPUNCTURE: CPT | Performed by: NURSE PRACTITIONER

## 2019-09-05 RX ORDER — HYDRALAZINE HYDROCHLORIDE 10 MG/1
TABLET, FILM COATED ORAL
COMMUNITY
Start: 2014-09-24

## 2019-09-05 RX ORDER — LISINOPRIL AND HYDROCHLOROTHIAZIDE 20; 12.5 MG/1; MG/1
TABLET ORAL
COMMUNITY
Start: 2014-09-24

## 2019-09-05 RX ORDER — ATORVASTATIN CALCIUM 20 MG/1
TABLET, FILM COATED ORAL
COMMUNITY
Start: 2014-09-24

## 2019-10-03 ENCOUNTER — LAB (OUTPATIENT)
Dept: LAB | Facility: HOSPITAL | Age: 69
End: 2019-10-03

## 2019-10-03 ENCOUNTER — HOSPITAL ENCOUNTER (OUTPATIENT)
Dept: ONCOLOGY | Facility: HOSPITAL | Age: 69
Discharge: HOME | End: 2019-10-03
Admitting: NURSE PRACTITIONER

## 2019-10-03 VITALS
HEART RATE: 64 BPM | RESPIRATION RATE: 18 BRPM | TEMPERATURE: 98 F | SYSTOLIC BLOOD PRESSURE: 131 MMHG | WEIGHT: 205 LBS | HEIGHT: 66 IN | DIASTOLIC BLOOD PRESSURE: 91 MMHG | BODY MASS INDEX: 32.95 KG/M2

## 2019-10-03 DIAGNOSIS — I26.99 PULMONARY THROMBOSIS (HCC): Primary | ICD-10-CM

## 2019-10-03 DIAGNOSIS — I82.5Y3 CHRONIC DEEP VEIN THROMBOSIS (DVT) OF PROXIMAL VEIN OF BOTH LOWER EXTREMITIES (HCC): ICD-10-CM

## 2019-10-03 DIAGNOSIS — I26.99 PULMONARY THROMBOSIS (HCC): ICD-10-CM

## 2019-10-03 LAB
INR PPP: 3
INR PPP: 3 (ref 2–3)

## 2019-10-03 PROCEDURE — 85610 PROTHROMBIN TIME: CPT | Performed by: INTERNAL MEDICINE

## 2019-10-03 PROCEDURE — 85610 PROTHROMBIN TIME: CPT

## 2019-10-03 PROCEDURE — 36416 COLLJ CAPILLARY BLOOD SPEC: CPT | Performed by: INTERNAL MEDICINE

## 2019-10-30 ENCOUNTER — TELEPHONE (OUTPATIENT)
Dept: ONCOLOGY | Facility: CLINIC | Age: 69
End: 2019-10-30

## 2019-10-30 NOTE — TELEPHONE ENCOUNTER
Eun with Dr. Lord office called stating that pt has melanoma on his chest and needs surgery. Asking if okay to hold Coumadin 5 days prior to surgery and if Lovenox bridge is needed. Discussed with Dr. Arredondo. Informed Eun that it is okay for pt to hold Coumadin 5 days prior to surgery and Lovenox bridge is not needed and she v/u. mh

## 2019-10-31 ENCOUNTER — LAB (OUTPATIENT)
Dept: LAB | Facility: HOSPITAL | Age: 69
End: 2019-10-31

## 2019-10-31 ENCOUNTER — HOSPITAL ENCOUNTER (OUTPATIENT)
Dept: ONCOLOGY | Facility: HOSPITAL | Age: 69
Discharge: HOME | End: 2019-10-31
Admitting: NURSE PRACTITIONER

## 2019-10-31 VITALS
TEMPERATURE: 97.8 F | DIASTOLIC BLOOD PRESSURE: 92 MMHG | HEIGHT: 66 IN | WEIGHT: 210 LBS | BODY MASS INDEX: 33.75 KG/M2 | RESPIRATION RATE: 18 BRPM | SYSTOLIC BLOOD PRESSURE: 122 MMHG | HEART RATE: 73 BPM

## 2019-10-31 DIAGNOSIS — I82.5Y3 CHRONIC DEEP VEIN THROMBOSIS (DVT) OF PROXIMAL VEIN OF BOTH LOWER EXTREMITIES (HCC): ICD-10-CM

## 2019-10-31 DIAGNOSIS — I26.99 PULMONARY THROMBOSIS (HCC): Primary | ICD-10-CM

## 2019-10-31 DIAGNOSIS — I26.99 PULMONARY THROMBOSIS (HCC): ICD-10-CM

## 2019-10-31 LAB
INR PPP: 3.3
INR PPP: 3.3 (ref 2–3)

## 2019-10-31 PROCEDURE — 85610 PROTHROMBIN TIME: CPT

## 2019-10-31 PROCEDURE — 36416 COLLJ CAPILLARY BLOOD SPEC: CPT

## 2019-11-07 ENCOUNTER — LAB (OUTPATIENT)
Dept: LAB | Facility: HOSPITAL | Age: 69
End: 2019-11-07

## 2019-11-07 ENCOUNTER — HOSPITAL ENCOUNTER (OUTPATIENT)
Dept: ONCOLOGY | Facility: HOSPITAL | Age: 69
Discharge: HOME | End: 2019-11-07
Admitting: NURSE PRACTITIONER

## 2019-11-07 VITALS
HEART RATE: 89 BPM | TEMPERATURE: 97.7 F | RESPIRATION RATE: 18 BRPM | SYSTOLIC BLOOD PRESSURE: 123 MMHG | WEIGHT: 214 LBS | DIASTOLIC BLOOD PRESSURE: 89 MMHG | BODY MASS INDEX: 34.39 KG/M2 | HEIGHT: 66 IN

## 2019-11-07 DIAGNOSIS — I82.5Y3 CHRONIC DEEP VEIN THROMBOSIS (DVT) OF PROXIMAL VEIN OF BOTH LOWER EXTREMITIES (HCC): ICD-10-CM

## 2019-11-07 DIAGNOSIS — I82.5Y3 CHRONIC DEEP VEIN THROMBOSIS (DVT) OF PROXIMAL VEIN OF BOTH LOWER EXTREMITIES (HCC): Primary | ICD-10-CM

## 2019-11-07 DIAGNOSIS — I26.99 PULMONARY THROMBOSIS (HCC): ICD-10-CM

## 2019-11-07 LAB
INR PPP: 3.3
INR PPP: 3.3 (ref 2–3)

## 2019-11-07 PROCEDURE — 85610 PROTHROMBIN TIME: CPT

## 2019-11-07 PROCEDURE — 36416 COLLJ CAPILLARY BLOOD SPEC: CPT

## 2019-11-18 ENCOUNTER — HOSPITAL ENCOUNTER (OUTPATIENT)
Dept: ONCOLOGY | Facility: HOSPITAL | Age: 69
Discharge: HOME | End: 2019-11-18
Admitting: NURSE PRACTITIONER

## 2019-11-18 ENCOUNTER — LAB (OUTPATIENT)
Dept: LAB | Facility: HOSPITAL | Age: 69
End: 2019-11-18

## 2019-11-18 VITALS — BODY MASS INDEX: 34.72 KG/M2 | RESPIRATION RATE: 18 BRPM | HEIGHT: 66 IN | TEMPERATURE: 97.9 F | WEIGHT: 216 LBS

## 2019-11-18 DIAGNOSIS — I82.5Y3 CHRONIC DEEP VEIN THROMBOSIS (DVT) OF PROXIMAL VEIN OF BOTH LOWER EXTREMITIES (HCC): ICD-10-CM

## 2019-11-18 DIAGNOSIS — I26.99 PULMONARY THROMBOSIS (HCC): ICD-10-CM

## 2019-11-18 DIAGNOSIS — I82.5Y3 CHRONIC DEEP VEIN THROMBOSIS (DVT) OF PROXIMAL VEIN OF BOTH LOWER EXTREMITIES (HCC): Primary | ICD-10-CM

## 2019-11-18 LAB
INR PPP: 1.3
INR PPP: 1.3 (ref 2–3)
INR PPP: 1.3 (ref 2–3)

## 2019-11-18 PROCEDURE — 36416 COLLJ CAPILLARY BLOOD SPEC: CPT

## 2019-11-18 PROCEDURE — 85610 PROTHROMBIN TIME: CPT

## 2019-11-22 ENCOUNTER — LAB (OUTPATIENT)
Dept: LAB | Facility: HOSPITAL | Age: 69
End: 2019-11-22

## 2019-11-22 ENCOUNTER — HOSPITAL ENCOUNTER (OUTPATIENT)
Dept: ONCOLOGY | Facility: HOSPITAL | Age: 69
Discharge: HOME | End: 2019-11-22
Admitting: NURSE PRACTITIONER

## 2019-11-22 VITALS
SYSTOLIC BLOOD PRESSURE: 108 MMHG | DIASTOLIC BLOOD PRESSURE: 73 MMHG | RESPIRATION RATE: 18 BRPM | HEART RATE: 73 BPM | TEMPERATURE: 97.8 F | WEIGHT: 214 LBS | HEIGHT: 66 IN | BODY MASS INDEX: 34.39 KG/M2

## 2019-11-22 DIAGNOSIS — I26.99 PULMONARY THROMBOSIS (HCC): ICD-10-CM

## 2019-11-22 DIAGNOSIS — I82.5Y3 CHRONIC DEEP VEIN THROMBOSIS (DVT) OF PROXIMAL VEIN OF BOTH LOWER EXTREMITIES (HCC): Primary | ICD-10-CM

## 2019-11-22 DIAGNOSIS — I82.5Y3 CHRONIC DEEP VEIN THROMBOSIS (DVT) OF PROXIMAL VEIN OF BOTH LOWER EXTREMITIES (HCC): ICD-10-CM

## 2019-11-22 LAB — INR PPP: 2

## 2019-11-22 PROCEDURE — 85610 PROTHROMBIN TIME: CPT

## 2019-11-22 RX ORDER — WARFARIN SODIUM 5 MG/1
TABLET ORAL
Qty: 30 TABLET | Refills: 0 | Status: SHIPPED | OUTPATIENT
Start: 2019-11-22 | End: 2019-12-27

## 2019-11-22 RX ORDER — WARFARIN SODIUM 1 MG/1
TABLET ORAL
Qty: 90 TABLET | Refills: 2 | Status: SHIPPED | OUTPATIENT
Start: 2019-11-22 | End: 2020-04-28 | Stop reason: SDUPTHER

## 2019-11-27 ENCOUNTER — LAB (OUTPATIENT)
Dept: LAB | Facility: HOSPITAL | Age: 69
End: 2019-11-27

## 2019-11-27 ENCOUNTER — HOSPITAL ENCOUNTER (OUTPATIENT)
Dept: ONCOLOGY | Facility: HOSPITAL | Age: 69
Discharge: HOME | End: 2019-11-27
Admitting: NURSE PRACTITIONER

## 2019-11-27 VITALS
TEMPERATURE: 97.7 F | RESPIRATION RATE: 18 BRPM | HEIGHT: 66 IN | HEART RATE: 61 BPM | WEIGHT: 218 LBS | BODY MASS INDEX: 35.03 KG/M2 | DIASTOLIC BLOOD PRESSURE: 78 MMHG | SYSTOLIC BLOOD PRESSURE: 119 MMHG

## 2019-11-27 DIAGNOSIS — I26.99 PULMONARY THROMBOSIS (HCC): ICD-10-CM

## 2019-11-27 DIAGNOSIS — I82.5Y3 CHRONIC DEEP VEIN THROMBOSIS (DVT) OF PROXIMAL VEIN OF BOTH LOWER EXTREMITIES (HCC): ICD-10-CM

## 2019-11-27 DIAGNOSIS — I82.5Y3 CHRONIC DEEP VEIN THROMBOSIS (DVT) OF PROXIMAL VEIN OF BOTH LOWER EXTREMITIES (HCC): Primary | ICD-10-CM

## 2019-11-27 LAB — INR PPP: 3.1

## 2019-11-27 PROCEDURE — 85610 PROTHROMBIN TIME: CPT

## 2019-12-05 ENCOUNTER — HOSPITAL ENCOUNTER (OUTPATIENT)
Dept: ONCOLOGY | Facility: HOSPITAL | Age: 69
Discharge: HOME | End: 2019-12-05
Admitting: NURSE PRACTITIONER

## 2019-12-05 ENCOUNTER — LAB (OUTPATIENT)
Dept: LAB | Facility: HOSPITAL | Age: 69
End: 2019-12-05

## 2019-12-05 VITALS
TEMPERATURE: 97.8 F | HEIGHT: 66 IN | WEIGHT: 215 LBS | SYSTOLIC BLOOD PRESSURE: 114 MMHG | BODY MASS INDEX: 34.55 KG/M2 | HEART RATE: 61 BPM | DIASTOLIC BLOOD PRESSURE: 76 MMHG | RESPIRATION RATE: 18 BRPM

## 2019-12-05 DIAGNOSIS — I82.5Y3 CHRONIC DEEP VEIN THROMBOSIS (DVT) OF PROXIMAL VEIN OF BOTH LOWER EXTREMITIES (HCC): Primary | ICD-10-CM

## 2019-12-05 DIAGNOSIS — I26.99 PULMONARY THROMBOSIS (HCC): ICD-10-CM

## 2019-12-05 DIAGNOSIS — I82.5Y3 CHRONIC DEEP VEIN THROMBOSIS (DVT) OF PROXIMAL VEIN OF BOTH LOWER EXTREMITIES (HCC): ICD-10-CM

## 2019-12-05 LAB — INR PPP: 2.5

## 2019-12-05 PROCEDURE — 85610 PROTHROMBIN TIME: CPT

## 2019-12-12 ENCOUNTER — APPOINTMENT (OUTPATIENT)
Dept: LAB | Facility: HOSPITAL | Age: 69
End: 2019-12-12

## 2019-12-12 ENCOUNTER — HOSPITAL ENCOUNTER (OUTPATIENT)
Dept: ONCOLOGY | Facility: HOSPITAL | Age: 69
Discharge: HOME | End: 2019-12-12
Admitting: NURSE PRACTITIONER

## 2019-12-12 DIAGNOSIS — I82.5Y3 CHRONIC DEEP VEIN THROMBOSIS (DVT) OF PROXIMAL VEIN OF BOTH LOWER EXTREMITIES (HCC): ICD-10-CM

## 2019-12-12 DIAGNOSIS — I26.99 PULMONARY THROMBOSIS (HCC): ICD-10-CM

## 2019-12-12 LAB
INR PPP: 1.6
INR PPP: 1.6 (ref 0.9–1.1)

## 2019-12-12 PROCEDURE — 85610 PROTHROMBIN TIME: CPT

## 2019-12-12 PROCEDURE — 85610 PROTHROMBIN TIME: CPT | Performed by: NURSE PRACTITIONER

## 2019-12-12 PROCEDURE — 36416 COLLJ CAPILLARY BLOOD SPEC: CPT

## 2019-12-19 ENCOUNTER — LAB (OUTPATIENT)
Dept: LAB | Facility: HOSPITAL | Age: 69
End: 2019-12-19

## 2019-12-19 ENCOUNTER — HOSPITAL ENCOUNTER (OUTPATIENT)
Dept: ONCOLOGY | Facility: HOSPITAL | Age: 69
Discharge: HOME | End: 2019-12-19
Admitting: NURSE PRACTITIONER

## 2019-12-19 VITALS — BODY MASS INDEX: 34.72 KG/M2 | TEMPERATURE: 98 F | WEIGHT: 216 LBS | RESPIRATION RATE: 18 BRPM | HEIGHT: 66 IN

## 2019-12-19 DIAGNOSIS — I82.5Y3 CHRONIC DEEP VEIN THROMBOSIS (DVT) OF PROXIMAL VEIN OF BOTH LOWER EXTREMITIES (HCC): ICD-10-CM

## 2019-12-19 DIAGNOSIS — I26.99 PULMONARY THROMBOSIS (HCC): Primary | ICD-10-CM

## 2019-12-19 DIAGNOSIS — I26.99 PULMONARY THROMBOSIS (HCC): ICD-10-CM

## 2019-12-19 LAB — INR PPP: 2.2

## 2019-12-19 PROCEDURE — 85610 PROTHROMBIN TIME: CPT

## 2019-12-26 ENCOUNTER — HOSPITAL ENCOUNTER (OUTPATIENT)
Dept: ONCOLOGY | Facility: HOSPITAL | Age: 69
Discharge: HOME | End: 2019-12-26
Admitting: NURSE PRACTITIONER

## 2019-12-26 ENCOUNTER — LAB (OUTPATIENT)
Dept: LAB | Facility: HOSPITAL | Age: 69
End: 2019-12-26

## 2019-12-26 VITALS
TEMPERATURE: 97.6 F | DIASTOLIC BLOOD PRESSURE: 88 MMHG | WEIGHT: 218 LBS | HEIGHT: 66 IN | BODY MASS INDEX: 35.03 KG/M2 | SYSTOLIC BLOOD PRESSURE: 146 MMHG | HEART RATE: 65 BPM | RESPIRATION RATE: 18 BRPM

## 2019-12-26 DIAGNOSIS — I26.99 PULMONARY THROMBOSIS (HCC): ICD-10-CM

## 2019-12-26 DIAGNOSIS — I82.5Y3 CHRONIC DEEP VEIN THROMBOSIS (DVT) OF PROXIMAL VEIN OF BOTH LOWER EXTREMITIES (HCC): ICD-10-CM

## 2019-12-26 DIAGNOSIS — I26.99 PULMONARY THROMBOSIS (HCC): Primary | ICD-10-CM

## 2019-12-26 LAB — INR PPP: 2.2

## 2019-12-26 PROCEDURE — 85610 PROTHROMBIN TIME: CPT

## 2019-12-27 RX ORDER — WARFARIN SODIUM 5 MG/1
TABLET ORAL
Qty: 30 TABLET | Refills: 0 | Status: SHIPPED | OUTPATIENT
Start: 2019-12-27 | End: 2020-02-04

## 2020-01-08 DIAGNOSIS — I82.5Y3 CHRONIC DEEP VEIN THROMBOSIS (DVT) OF PROXIMAL VEIN OF BOTH LOWER EXTREMITIES (HCC): Primary | ICD-10-CM

## 2020-01-08 DIAGNOSIS — Z15.89 HETEROZYGOUS MTHFR MUTATION C677T: ICD-10-CM

## 2020-01-08 NOTE — PROGRESS NOTES
Hematology/Oncology Outpatient Follow Up    PATIENT NAME:Jett Doe  :1950  MRN: [id number]  PRIMARY CARE PHYSICIAN: Malachi Davenport MD  REFERRING PHYSICIAN: Malachi Davenport MD    Chief Complaint   Patient presents with   • Follow-up        HISTORY OF PRESENT ILLNESS:   1. Bilateral PE, right lower extremity DVT and bilateral SVTs diagnosis established in 2014 and MTHFR C677T heterozygosity diagnosis established and 2014.  · The patient presented to [facility] on 2014 where was admitted through 2014 with complaints of chest pain and shortness of air.  He was seen per EMS and had associated diaphoresis as well as some low sternal chest pressure.  His troponin was elevated and cardiology was consulted.  He was started on nitroglycerin drip and heparin drip.  He was given diuresis and admitted to the [facility].  It was planned for him to undergo a cardiac catheterization.  A VQ scan was done to evaluate for bilateral pulmonary emboli and renal is consulted for his elevated creatinine.  The VQ scan showed multiple abnormalities in both lungs with a high probably for pulmonary emboli.  His chest x-ray showed no acute cardiopulmonary abnormality.  We were consulted for his Thrombopenia and his pulmonology emboli.  His risk factors included obesity, sedentary lifestyle, history of stroke and hyperlipidemia.  He has been on Plavix and aspirin as an outpatient and when his pulmonary emboli were found, his heparin was changed to renal dust Lovenox therapy 1 mg/kg per day.  He was not started on Coumadin initially with the plan that cardiology would perform a cardiac catheterization shortly after admission.  A thrombophilia workup was performed and results were; his AT3 was 89.9% normal, protein C was 101.4% normal and protein S was 101% normal, Fibrinogen 416 normal, factor V Leiden screen is negative, prothrombin gene mutation is negative.  MTHFR showed heterozygosity  for C677 T mutation, but negative for [id number] mutation.  Phosphate lipid antibodies were done.  Beta II glycoprotein is negative.  Phosphatidylserine antibody is negative.  Cardiolipin antibodies are all negative.  Bilateral lower extremity Dopplers was performed that showed a right lower extremity DVT and bilateral SVTs in his small saphenous vein.  On 08/20/2014, an IVC filter was placed with plans to hold starting his Coumadinization until after his cardiac catheterization was performed, which was going to be scheduled as an outpatient per Dr. Jurado.  It was noted that he had thrombocytopenia on admission with the platelet count of 125,000.  His platelet count slowly felt to 92,000 and it discharge with [id number].  His LFTs were noted to be abnormal with an AST of 88 and ALT of 103.  His coags were normal with PT of 11.9, INR 1.1 and PTT 35.5.  A thrombocytopenia workup was performed as seen below.  TSH of 1.3, His LFTs are normalized prior to discharge.  Nephrology was consulted for his AMADEO with a creatinine of 2.0 on admission.  Dr. Howard performed some renal studies.  His urinalysis was normal.  His renal ultrasound showed a 2 cm mass extended of the upper pole of the right kidney, which appeared to be solid with recommendations follow up with the CT or an MRI of his abdomen.  There was no hydronephrosis to indicate obstructive uropathy and the urinary bladder was unremarkable.  His creatinine improved with medication adjustments and was 1.4 and discharged with plans to perform an MRI of his afternoon when his creatinine improved to further evaluate his right renal mass.  An echocardiogram was performed that showed 45-55% ejection fraction and no other abnormalities were identified.  · 8/17/15 - Comprehensive metabolic panel with creatinine 1.6.  · 9/8/14 - patient was seen in the office as a hospital follow-up. Homocysteine level 18.4 (H)  · 9/10/14 - orders written to start Folgard daily due to the elevated  homocystine level.  · 10/7/14 - The patient reports that he has not started taking the Folgard and did not remember having a prescription called into the pharmacy.  Advised the patient again to start taking Folgard daily and gave the patient a prescription today  · 11/13/14 - homocystine 17.7 (H)  · 11/19/14 - advised patient to increase Folgard 2 twice a day.  · 8/17/15 - Homocysteine 16.8 (5-12).   · 3/8/16 - Patient does not recall taking Folgard. Prescription written for Folgard 1 p.o. q. d.  Patient claims that he has not taken Coumadin for three days as he was out of town. INR today 1.4. Instructed to resume Coumadin at same dose and follow INR protocol.   · 6/30/16 - Comprehensive metabolic panel with creatinine 1.3 (0.7-1.2). Serum homocysteine 12.2 (5-12).     · 3/7/17 - INR 2.7 on 8 mg of Warfarin daily. Patient asked to increase Folgard to twice a day.   · 5/5/17 - INR 2.4, therapeutic. Patient is to continue the INR protocol and continue with Folgard one tablet twice daily as well as Coumadin 8 mg daily. Homocysteine 11.2 normal (5.0-12.0).   · 8/4/17 - INR today is 1.6. Patient states that he missed his dose of Coumadin Monday and Tuesday of this week. He denied any diet changes. I will leave the patient on his current dose of 8 mg daily and have him return to the office for an INR in one week as he had missed two consecutive doses of Coumadin earlier. He is to continue to follow the INR protocol and he is to continue the Folgard one tablet twice daily as it is controlling his homocysteine level.   · 11/9/17 - Patient claims to be missing occasional Warfarin doses. Counseled.   · 5/22/18 - Hemoglobin 16.2, MCV 89.6, hematocrit 45.9. Serum homocysteine 14.5 (<15).    · 9/18/18 - Discussed possibly stopping Coumadin if workup negative. PT PTT 30.5 and 36.5 with correction of PTT on 1:1 mix with normal plasma. Lupus anticoagulant absent. Factor VIII activity 125 (). D-dimer >0.22 (<0.45).      · 9/21/18 - Venous Doppler study bilateral lower extremity with chronic deep venous thrombosis involving the right popliteal vein. Chronic superficial venous thrombosis involving the right small saphenous vein and a lymph node noted in the right groin.   · 12/18/18 - Discussed venous Doppler results. Recommended continuation of anticoagulation either with Coumadin or maintenance DOAC’s or Aspirin alone, which I did not recommend. Patient decided to continue on Warfarin.   ? 5/2/2019- homocystine 8.5 (N), factor VIII level 133% (H).  2. Polycythemia diagnosis established in January 2015.  · 1/14/15 - Discussed with the patient the need for a bone marrow biopsy along with additional lab work for further evaluation of the thrombocytopenia and polycythemia.  The patient states that he does not wish to proceed with the blood work or the bone marrow biopsy at this time. Hemoglobin 17.0  · 3/7/17 - WBC 7.17, hemoglobin 17.2, platelet count 120,000. Discussed bone marrow evaluation again with the patient. He will think about it.   · 12/18/18 - WBC 10.4 with 84% neutrophils, 9% lymphocytes, 6% monocytes, hemoglobin 16.6, hematocrit 47.1, platelet count 121,000. BCR/ABL negative. [employer] NGS JAK2 panel negative.   ? 9/5/2019- hemoglobin 16.1, hematocrit 47.4.  3. Thrombocytopenia diagnosis established in October 2014  · The patient presented to [facility] on August 19, 2014 where was admitted through 08/22/2014 with complaints of chest pain and shortness of air.  It was noted that he had thrombocytopenia on admission with the platelet count of 125,000.  His platelet count slowly fell to 92,000 and it discharge with 12,000.  PT 11.9, INR 1.1, PTT 35.5.  A thrombocytopenia workup was performed that showed a folate of greater than 24.8 (H), and vitamin B12 331(N), , CMV, IgM was negative and EBV IgM was positive.  TAMIA screen is negative.  Platelet antibody screen is negative.  His PF4 was negative at  0.035.  It was noted that in the past, he has had some slightly low platelet counts in 125-140 range.    · 9/8/14 - platelet count 265,000.  EBV IgG positive, EBV IgM negative.  · 10/7/14 - platelet count 101,000.  · 11/17/14 - reviewed medication list for thrombocytopenia.  Metoprolol was known to cause thrombocytopenia purpura.  Lisinopril was known to rarely cause thrombocytopenia and less than 0.01% of the patients.  Plavix was known to cause thrombotic thrombocytopenia purpura (TTP) and thrombocytopenia and less than 1% of the patients.  Crestor was known to cause TTP and thrombocytopenia.  ? 9/5/2019-patient reports moderate alcohol use-16 ounces beer 3 times per week.  4. Elevated LFTs and liver mass diagnosis established in September 2014.  · 9/8/14 - ALT 52 (H), AST 29 (N),   · 9/10/14 - advised the patient to increase his hydration and we’ll order a CT scan of the abdomen to be done for further evaluation of the elevated LFTs.  · 9/16/14 - ALT 49 (H), AST 29 (N)  · 9/23/14 - CT of the abdomen showed no evidence of renal mass.  There was an approximately 2 cm non-cystic appearing hypodense lesion on the posterior medial aspect of the right hepatic lobe.  This was in close proximity to the upper pole of the right kidney.  This could conceivably represent the mass seen on ultrasound study.  However it was definitely raising from the liver and not the upper pole of the right kidney.  · 10/7/14 - Will order a MRI for evaluation of the liver mass as well as lab work for further evaluation of the elevated LFTs.  · 10/7/14 - Hepatitis screening negative, A1AT 133 (N), AFP 9.2 (H), ceruloplasmin 22 (N), ferritin 133.9 (N),   · 10/13/14 - MRI of the abdomen showed liver lesion within the posterior right lobe of the liver is most consistent with hemangioma   · 8/17/15 - ALT 38 (N), AST 26 (N), alkaline phosphatase 26 (N). Elevated LFT’s resolved.   · 3/8/16 - AFP 8 (0-9).    · 5/5/17 - CMP revealed an AST of 32  normal (15-41), ALT 55 normal (17-63), creatinine 1.4 high (0.7-1.2) and BUN 32 high (8-20). Otherwise within normal limits.      · 11/9/17 - Patient claims his PCP has recommended evaluation for his high liver enzymes by a liver doctor, but he has not followed up on it.     Past Medical History:   Diagnosis Date   • Cardiomyopathy (CMS/HCC) 2012   • CVA (cerebral vascular accident) (CMS/HCC) 2012   • Hyperlipemia 2012   • Hypertension 2012       Past Surgical History:   Procedure Laterality Date   • TURP / TRANSURETHRAL INCISION / DRAINAGE PROSTATE  2016         Current Outpatient Medications:   •  atorvastatin (LIPITOR) 20 MG tablet, ATORVASTATIN CALCIUM 20 MG TABS, Disp: , Rfl:   •  clopidogrel (PLAVIX) 75 MG tablet, Take 75 mg by mouth Daily., Disp: , Rfl:   •  FOLBEE 2.5-25-1 MG tablet tablet, TAKE ONE TABLET BY MOUTH TWICE A DAY, Disp: 60 tablet, Rfl: 0  •  folic acid-vit B6-vit B12 (FOLGARD) 2.2-25-1 MG tablet tablet, Take 1 tablet by mouth Daily., Disp: , Rfl:   •  hydrALAZINE (APRESOLINE) 10 MG tablet, HYDRALAZINE HCL 10 MG TABS, Disp: , Rfl:   •  lisinopril-hydrochlorothiazide (PRINZIDE,ZESTORETIC) 20-12.5 MG per tablet, LISINOPRIL-HYDROCHLOROTHIAZIDE 20-12.5 MG TABS, Disp: , Rfl:   •  metoprolol tartrate (LOPRESSOR) 25 MG tablet, METOPROLOL TARTRATE 25 MG TABS, Disp: , Rfl:   •  warfarin (COUMADIN) 1 MG tablet, TAKE THREE TABLETS BY MOUTH DAILY AS DIRECTED BY DR. AMADOR, Disp: 90 tablet, Rfl: 2  •  warfarin (COUMADIN) 5 MG tablet, TAKE ONE TABLET BY MOUTH DAILY, Disp: 30 tablet, Rfl: 0  •  WARFARIN SODIUM PO, Take  by mouth Daily. Alternating 6mg and 7mg, Disp: , Rfl:     No Known Allergies    Family History   Problem Relation Age of Onset   • Lung cancer Mother        Cancer-related family history includes Lung cancer in his mother.    Social History     Tobacco Use   • Smoking status: Never Smoker   • Smokeless tobacco: Current User     Types: Chew   Substance Use Topics   • Alcohol use: Yes      "Frequency: 4 or more times a week     Drinks per session: 1 or 2   • Drug use: No       I have reviewed the history of present illness, past medical history, family history, social history, lab results, all notes and other records since the patient was last seen on 1/6/2020.    SUBJECTIVE:  The patient is here for a follow up appointment.  The patient states that since starting the Folbee that he has noticed a rash on his shoulders.  He states that he is currently alternating 7mg and 6mg of coumadin every other day.  The patient denies any signs of blood loss.          REVIEW OF SYSTEMS:  Review of Systems   Constitutional: Negative for chills and fever.   HENT: Negative for ear pain, mouth sores, nosebleeds and sore throat.    Eyes: Negative for photophobia and visual disturbance.   Respiratory: Negative for wheezing and stridor.    Cardiovascular: Negative for chest pain and palpitations.   Gastrointestinal: Negative for abdominal pain, diarrhea, nausea and vomiting.   Endocrine: Negative for cold intolerance and heat intolerance.   Genitourinary: Negative for dysuria and hematuria.   Musculoskeletal: Negative for joint swelling and neck stiffness.   Skin: Positive for rash (on shoulders). Negative for color change.   Neurological: Negative for seizures and syncope.   Hematological: Negative for adenopathy.        No obvious bleeding   Psychiatric/Behavioral: Negative for agitation, confusion and hallucinations.       OBJECTIVE:    Vitals:    01/09/20 0816   BP: 107/68   Pulse: (!) 49   Resp: 18   Temp: 97.7 °F (36.5 °C)   Weight: 98.7 kg (217 lb 9.6 oz)   Height: 167.6 cm (66\")   PainSc: 0-No pain       ECOG  (0) Fully active, able to carry on all predisease performance without restriction    Physical Exam   Constitutional: He is oriented to person, place, and time. No distress.   HENT:   Head: Normocephalic and atraumatic.   Eyes: Conjunctivae and EOM are normal. Right eye exhibits no discharge. Left eye " exhibits no discharge. No scleral icterus.   Neck: Normal range of motion. Neck supple. No thyromegaly present.   Cardiovascular: Normal rate, regular rhythm and normal heart sounds. Exam reveals no gallop and no friction rub.   Pulmonary/Chest: Effort normal. No stridor. No respiratory distress. He has no wheezes.   Abdominal: Soft. Bowel sounds are normal. He exhibits no mass. There is no tenderness. There is no rebound and no guarding.   Musculoskeletal: Normal range of motion. He exhibits no tenderness.   Lymphadenopathy:     He has no cervical adenopathy.   Neurological: He is alert and oriented to person, place, and time. He exhibits normal muscle tone.   Skin: Skin is warm. No rash noted. He is not diaphoretic. No erythema.   Psychiatric: He has a normal mood and affect. His behavior is normal.   Nursing note and vitals reviewed.      RECENT LABS  WBC   Date Value Ref Range Status   01/09/2020 6.37 3.40 - 10.80 10*3/mm3 Final     RBC   Date Value Ref Range Status   01/09/2020 5.17 4.14 - 5.80 10*6/mm3 Final     Hemoglobin   Date Value Ref Range Status   01/09/2020 16.4 13.0 - 17.7 g/dL Final     Hematocrit   Date Value Ref Range Status   01/09/2020 47.2 37.5 - 51.0 % Final     MCV   Date Value Ref Range Status   01/09/2020 91.3 79.0 - 97.0 fL Final     MCH   Date Value Ref Range Status   01/09/2020 31.7 26.6 - 33.0 pg Final     MCHC   Date Value Ref Range Status   01/09/2020 34.7 31.5 - 35.7 g/dL Final     RDW   Date Value Ref Range Status   01/09/2020 13.9 12.3 - 15.4 % Final     RDW-SD   Date Value Ref Range Status   01/09/2020 44.8 37.0 - 54.0 fl Final     MPV   Date Value Ref Range Status   01/09/2020 9.9 6.0 - 12.0 fL Final     Platelets   Date Value Ref Range Status   01/09/2020 122 (L) 140 - 450 10*3/mm3 Final     Neutrophil %   Date Value Ref Range Status   01/09/2020 69.4 42.7 - 76.0 % Final     Lymphocyte %   Date Value Ref Range Status   01/09/2020 17.1 (L) 19.6 - 45.3 % Final     Monocyte %   Date  Value Ref Range Status   01/09/2020 9.4 5.0 - 12.0 % Final     Eosinophil %   Date Value Ref Range Status   01/09/2020 3.6 0.3 - 6.2 % Final     Basophil %   Date Value Ref Range Status   01/09/2020 0.5 0.0 - 1.5 % Final     Neutrophils, Absolute   Date Value Ref Range Status   01/09/2020 4.42 1.70 - 7.00 10*3/mm3 Final     Lymphocytes, Absolute   Date Value Ref Range Status   01/09/2020 1.09 0.70 - 3.10 10*3/mm3 Final     Monocytes, Absolute   Date Value Ref Range Status   01/09/2020 0.60 0.10 - 0.90 10*3/mm3 Final     Eosinophils, Absolute   Date Value Ref Range Status   01/09/2020 0.23 0.00 - 0.40 10*3/mm3 Final     Basophils, Absolute   Date Value Ref Range Status   01/09/2020 0.03 0.00 - 0.20 10*3/mm3 Final       Lab Results   Component Value Date    GLUCOSE 100 (H) 09/05/2019    BUN 24 (H) 09/05/2019    CREATININE 1.50 (H) 09/05/2019    EGFRIFNONA 46 (L) 09/05/2019    BCR 16.0 09/05/2019    K 4.7 09/05/2019    CO2 26.0 09/05/2019    CALCIUM 9.4 09/05/2019    ALBUMIN 3.80 09/05/2019    LABIL2 1.6 05/22/2018    AST 26 09/05/2019    ALT 43 09/05/2019         Assessment/Plan     Hyperhomocysteinemia (CMS/HCC)    Other pulmonary embolism without acute cor pulmonale, unspecified chronicity (CMS/HCC)      ASSESSMENT:    1. Bilateral Pulmonary emboli  2. Chronic DVT of proximal vein of right lower extremity  3. SVT  4. Hyperhomocysteinemia  5. Heterozygous MTHFR mutation  6. CKD, stage III  7. Elevated Factor VIII level  8. Polycythemia  9. Thrombocytopenia: Chronic  10. Lesion of the Neck being followed by Dr. Kapoor: Diagnosed with skin cancer    He denies heavy alcohol use so likely not contributing to his thrombocytopenia.  His hemoglobin is stable and normal at 16.  Factor VIII level remains elevated homocystine level normal on folgard.  Ordered CMP to monitor his CKD and LFTs.      PLANS:     Continue Coumadin 7 mg 5 days a week and 6 mg  For 2 days. INR in one week.   Continue Folgard  Daily  Homocysteine level  today  Follow up with Dr. Gee.  Follow up with  Dr. Kapoor.         I have reviewed labs results, imaging, vitals, and medications with the patient today. Will follow up in 4  months with me.    Patient verbalized understanding and is in agreement of the above plan.    Part of this document was scribed by Lauryn Guillaume RN, BSN.

## 2020-01-09 ENCOUNTER — HOSPITAL ENCOUNTER (OUTPATIENT)
Dept: ONCOLOGY | Facility: HOSPITAL | Age: 70
Discharge: HOME | End: 2020-01-09
Admitting: NURSE PRACTITIONER

## 2020-01-09 ENCOUNTER — OFFICE VISIT (OUTPATIENT)
Dept: LAB | Facility: HOSPITAL | Age: 70
End: 2020-01-09

## 2020-01-09 ENCOUNTER — OFFICE VISIT (OUTPATIENT)
Dept: ONCOLOGY | Facility: CLINIC | Age: 70
End: 2020-01-09

## 2020-01-09 VITALS
RESPIRATION RATE: 18 BRPM | DIASTOLIC BLOOD PRESSURE: 68 MMHG | TEMPERATURE: 97.7 F | WEIGHT: 217.6 LBS | BODY MASS INDEX: 34.97 KG/M2 | HEIGHT: 66 IN | HEART RATE: 49 BPM | SYSTOLIC BLOOD PRESSURE: 107 MMHG

## 2020-01-09 DIAGNOSIS — Z86.711 PERSONAL HISTORY OF PULMONARY EMBOLISM: ICD-10-CM

## 2020-01-09 DIAGNOSIS — I26.99 OTHER PULMONARY EMBOLISM WITHOUT ACUTE COR PULMONALE, UNSPECIFIED CHRONICITY (HCC): Chronic | ICD-10-CM

## 2020-01-09 DIAGNOSIS — Z15.89 HETEROZYGOUS MTHFR MUTATION C677T: ICD-10-CM

## 2020-01-09 DIAGNOSIS — I82.5Y3 CHRONIC DEEP VEIN THROMBOSIS (DVT) OF PROXIMAL VEIN OF BOTH LOWER EXTREMITIES (HCC): Primary | ICD-10-CM

## 2020-01-09 DIAGNOSIS — E72.11 HYPERHOMOCYSTEINEMIA (HCC): Primary | ICD-10-CM

## 2020-01-09 DIAGNOSIS — I26.99 PULMONARY THROMBOSIS (HCC): ICD-10-CM

## 2020-01-09 DIAGNOSIS — D69.6 THROMBOCYTOPENIA (HCC): ICD-10-CM

## 2020-01-09 DIAGNOSIS — I82.5Y3 CHRONIC DEEP VEIN THROMBOSIS (DVT) OF PROXIMAL VEIN OF BOTH LOWER EXTREMITIES (HCC): ICD-10-CM

## 2020-01-09 LAB
BASOPHILS # BLD AUTO: 0.03 10*3/MM3 (ref 0–0.2)
BASOPHILS NFR BLD AUTO: 0.5 % (ref 0–1.5)
DEPRECATED RDW RBC AUTO: 44.8 FL (ref 37–54)
EOSINOPHIL # BLD AUTO: 0.23 10*3/MM3 (ref 0–0.4)
EOSINOPHIL NFR BLD AUTO: 3.6 % (ref 0.3–6.2)
ERYTHROCYTE [DISTWIDTH] IN BLOOD BY AUTOMATED COUNT: 13.9 % (ref 12.3–15.4)
HCT VFR BLD AUTO: 47.2 % (ref 37.5–51)
HCYS SERPL-MCNC: 9.7 UMOL/L (ref 0–15)
HGB BLD-MCNC: 16.4 G/DL (ref 13–17.7)
INR PPP: 1.6
LYMPHOCYTES # BLD AUTO: 1.09 10*3/MM3 (ref 0.7–3.1)
LYMPHOCYTES NFR BLD AUTO: 17.1 % (ref 19.6–45.3)
MCH RBC QN AUTO: 31.7 PG (ref 26.6–33)
MCHC RBC AUTO-ENTMCNC: 34.7 G/DL (ref 31.5–35.7)
MCV RBC AUTO: 91.3 FL (ref 79–97)
MONOCYTES # BLD AUTO: 0.6 10*3/MM3 (ref 0.1–0.9)
MONOCYTES NFR BLD AUTO: 9.4 % (ref 5–12)
NEUTROPHILS # BLD AUTO: 4.42 10*3/MM3 (ref 1.7–7)
NEUTROPHILS NFR BLD AUTO: 69.4 % (ref 42.7–76)
PLATELET # BLD AUTO: 122 10*3/MM3 (ref 140–450)
PMV BLD AUTO: 9.9 FL (ref 6–12)
RBC # BLD AUTO: 5.17 10*6/MM3 (ref 4.14–5.8)
VIT B12 BLD-MCNC: 982 PG/ML (ref 211–946)
WBC NRBC COR # BLD: 6.37 10*3/MM3 (ref 3.4–10.8)

## 2020-01-09 PROCEDURE — 83090 ASSAY OF HOMOCYSTEINE: CPT | Performed by: INTERNAL MEDICINE

## 2020-01-09 PROCEDURE — 85025 COMPLETE CBC W/AUTO DIFF WBC: CPT

## 2020-01-09 PROCEDURE — 99214 OFFICE O/P EST MOD 30 MIN: CPT | Performed by: INTERNAL MEDICINE

## 2020-01-09 PROCEDURE — 85610 PROTHROMBIN TIME: CPT

## 2020-01-09 PROCEDURE — 82607 VITAMIN B-12: CPT | Performed by: NURSE PRACTITIONER

## 2020-01-09 PROCEDURE — 36415 COLL VENOUS BLD VENIPUNCTURE: CPT

## 2020-01-09 RX ORDER — CLOPIDOGREL BISULFATE 75 MG/1
75 TABLET ORAL DAILY
COMMUNITY

## 2020-01-09 RX ORDER — CYANOCOBALAMIN/FOLIC AC/VIT B6 1-2.5-25MG
TABLET ORAL
Qty: 60 TABLET | Refills: 0 | Status: SHIPPED | OUTPATIENT
Start: 2020-01-09 | End: 2020-02-24 | Stop reason: SDUPTHER

## 2020-01-16 ENCOUNTER — HOSPITAL ENCOUNTER (OUTPATIENT)
Dept: ONCOLOGY | Facility: HOSPITAL | Age: 70
Discharge: HOME | End: 2020-01-16
Admitting: NURSE PRACTITIONER

## 2020-01-16 ENCOUNTER — LAB (OUTPATIENT)
Dept: LAB | Facility: HOSPITAL | Age: 70
End: 2020-01-16

## 2020-01-16 VITALS
WEIGHT: 217 LBS | SYSTOLIC BLOOD PRESSURE: 114 MMHG | BODY MASS INDEX: 34.87 KG/M2 | HEART RATE: 57 BPM | HEIGHT: 66 IN | RESPIRATION RATE: 18 BRPM | DIASTOLIC BLOOD PRESSURE: 71 MMHG | TEMPERATURE: 97.9 F

## 2020-01-16 DIAGNOSIS — I82.5Y3 CHRONIC DEEP VEIN THROMBOSIS (DVT) OF PROXIMAL VEIN OF BOTH LOWER EXTREMITIES (HCC): ICD-10-CM

## 2020-01-16 DIAGNOSIS — I26.99 PULMONARY THROMBOSIS (HCC): ICD-10-CM

## 2020-01-16 DIAGNOSIS — Z15.89 HETEROZYGOUS MTHFR MUTATION C677T: Primary | ICD-10-CM

## 2020-01-16 LAB — INR PPP: 1.6

## 2020-01-16 PROCEDURE — 85610 PROTHROMBIN TIME: CPT

## 2020-01-17 ENCOUNTER — TELEPHONE (OUTPATIENT)
Dept: ONCOLOGY | Facility: CLINIC | Age: 70
End: 2020-01-17

## 2020-01-17 NOTE — TELEPHONE ENCOUNTER
----- Message from Lauryn Guillaume RN sent at 1/15/2020  3:16 PM EST -----      ----- Message -----  From: Shweta Ayala MD  Sent: 1/15/2020  11:00 AM EST  To: Lauryn Guillaume RN    Let's make sure patient is following up with dermatology/ surgeon for melanoma in situ for complete surgical excision.

## 2020-01-23 ENCOUNTER — HOSPITAL ENCOUNTER (OUTPATIENT)
Dept: ONCOLOGY | Facility: HOSPITAL | Age: 70
Discharge: HOME | End: 2020-01-23
Admitting: NURSE PRACTITIONER

## 2020-01-23 ENCOUNTER — LAB (OUTPATIENT)
Dept: LAB | Facility: HOSPITAL | Age: 70
End: 2020-01-23

## 2020-01-23 VITALS — WEIGHT: 220 LBS | BODY MASS INDEX: 35.36 KG/M2 | TEMPERATURE: 98 F | HEIGHT: 66 IN | RESPIRATION RATE: 18 BRPM

## 2020-01-23 DIAGNOSIS — I26.99 PULMONARY THROMBOSIS (HCC): ICD-10-CM

## 2020-01-23 DIAGNOSIS — Z79.01 LONG TERM (CURRENT) USE OF ANTICOAGULANTS: Primary | ICD-10-CM

## 2020-01-23 DIAGNOSIS — I82.5Y3 CHRONIC DEEP VEIN THROMBOSIS (DVT) OF PROXIMAL VEIN OF BOTH LOWER EXTREMITIES (HCC): ICD-10-CM

## 2020-01-23 LAB — INR PPP: 1.8

## 2020-01-23 PROCEDURE — 85610 PROTHROMBIN TIME: CPT

## 2020-01-30 ENCOUNTER — HOSPITAL ENCOUNTER (OUTPATIENT)
Dept: ONCOLOGY | Facility: HOSPITAL | Age: 70
Discharge: HOME | End: 2020-01-30
Admitting: NURSE PRACTITIONER

## 2020-01-30 ENCOUNTER — LAB (OUTPATIENT)
Dept: LAB | Facility: HOSPITAL | Age: 70
End: 2020-01-30

## 2020-01-30 VITALS
HEART RATE: 74 BPM | DIASTOLIC BLOOD PRESSURE: 65 MMHG | TEMPERATURE: 97.5 F | WEIGHT: 215 LBS | SYSTOLIC BLOOD PRESSURE: 140 MMHG | HEIGHT: 66 IN | RESPIRATION RATE: 18 BRPM | BODY MASS INDEX: 34.55 KG/M2

## 2020-01-30 DIAGNOSIS — I82.5Y3 CHRONIC DEEP VEIN THROMBOSIS (DVT) OF PROXIMAL VEIN OF BOTH LOWER EXTREMITIES (HCC): ICD-10-CM

## 2020-01-30 DIAGNOSIS — Z79.01 LONG TERM (CURRENT) USE OF ANTICOAGULANTS: Primary | ICD-10-CM

## 2020-01-30 DIAGNOSIS — I26.99 PULMONARY THROMBOSIS (HCC): ICD-10-CM

## 2020-01-30 LAB — INR PPP: 1.9

## 2020-01-30 PROCEDURE — 85610 PROTHROMBIN TIME: CPT

## 2020-02-04 DIAGNOSIS — I26.99 OTHER PULMONARY EMBOLISM WITHOUT ACUTE COR PULMONALE, UNSPECIFIED CHRONICITY (HCC): Primary | Chronic | ICD-10-CM

## 2020-02-04 RX ORDER — WARFARIN SODIUM 5 MG/1
TABLET ORAL
Qty: 30 TABLET | Refills: 0 | Status: SHIPPED | OUTPATIENT
Start: 2020-02-04 | End: 2020-03-23

## 2020-02-06 ENCOUNTER — HOSPITAL ENCOUNTER (OUTPATIENT)
Dept: ONCOLOGY | Facility: HOSPITAL | Age: 70
Discharge: HOME | End: 2020-02-06
Admitting: NURSE PRACTITIONER

## 2020-02-06 ENCOUNTER — LAB (OUTPATIENT)
Dept: LAB | Facility: HOSPITAL | Age: 70
End: 2020-02-06

## 2020-02-06 VITALS
HEART RATE: 100 BPM | RESPIRATION RATE: 18 BRPM | TEMPERATURE: 98 F | DIASTOLIC BLOOD PRESSURE: 73 MMHG | SYSTOLIC BLOOD PRESSURE: 146 MMHG | BODY MASS INDEX: 34.87 KG/M2 | WEIGHT: 217 LBS | HEIGHT: 66 IN

## 2020-02-06 DIAGNOSIS — I82.5Y3 CHRONIC DEEP VEIN THROMBOSIS (DVT) OF PROXIMAL VEIN OF BOTH LOWER EXTREMITIES (HCC): ICD-10-CM

## 2020-02-06 DIAGNOSIS — Z79.01 LONG TERM (CURRENT) USE OF ANTICOAGULANTS: Primary | ICD-10-CM

## 2020-02-06 DIAGNOSIS — I26.99 PULMONARY THROMBOSIS (HCC): ICD-10-CM

## 2020-02-06 LAB — INR PPP: 2.1

## 2020-02-06 PROCEDURE — 85610 PROTHROMBIN TIME: CPT

## 2020-02-06 PROCEDURE — G0463 HOSPITAL OUTPT CLINIC VISIT: HCPCS

## 2020-02-11 ENCOUNTER — TELEPHONE (OUTPATIENT)
Dept: ONCOLOGY | Facility: CLINIC | Age: 70
End: 2020-02-11

## 2020-02-11 NOTE — TELEPHONE ENCOUNTER
Received call from the patient that he received a certified letter.  Chart reviewed.  Patient states that he is seeing his dermatologist and surgeon regularly.

## 2020-02-13 ENCOUNTER — HOSPITAL ENCOUNTER (OUTPATIENT)
Dept: ONCOLOGY | Facility: HOSPITAL | Age: 70
Discharge: HOME | End: 2020-02-13
Admitting: NURSE PRACTITIONER

## 2020-02-13 ENCOUNTER — LAB (OUTPATIENT)
Dept: LAB | Facility: HOSPITAL | Age: 70
End: 2020-02-13

## 2020-02-13 VITALS
TEMPERATURE: 97.7 F | RESPIRATION RATE: 18 BRPM | DIASTOLIC BLOOD PRESSURE: 83 MMHG | BODY MASS INDEX: 35.2 KG/M2 | HEART RATE: 80 BPM | HEIGHT: 66 IN | SYSTOLIC BLOOD PRESSURE: 127 MMHG | WEIGHT: 219 LBS

## 2020-02-13 DIAGNOSIS — Z15.89 HETEROZYGOUS MTHFR MUTATION C677T: ICD-10-CM

## 2020-02-13 DIAGNOSIS — I26.99 PULMONARY THROMBOSIS (HCC): ICD-10-CM

## 2020-02-13 DIAGNOSIS — I82.5Y3 CHRONIC DEEP VEIN THROMBOSIS (DVT) OF PROXIMAL VEIN OF BOTH LOWER EXTREMITIES (HCC): ICD-10-CM

## 2020-02-13 DIAGNOSIS — Z79.01 LONG TERM (CURRENT) USE OF ANTICOAGULANTS: Primary | ICD-10-CM

## 2020-02-13 LAB — INR PPP: 2.1

## 2020-02-13 PROCEDURE — 85610 PROTHROMBIN TIME: CPT

## 2020-02-13 PROCEDURE — 36416 COLLJ CAPILLARY BLOOD SPEC: CPT

## 2020-02-24 RX ORDER — CYANOCOBALAMIN/FOLIC AC/VIT B6 1-2.2-25MG
TABLET ORAL
Qty: 60 TABLET | Refills: 2 | Status: SHIPPED | OUTPATIENT
Start: 2020-02-24 | End: 2020-06-22

## 2020-03-12 ENCOUNTER — HOSPITAL ENCOUNTER (OUTPATIENT)
Dept: ONCOLOGY | Facility: HOSPITAL | Age: 70
Discharge: HOME | End: 2020-03-12
Admitting: NURSE PRACTITIONER

## 2020-03-12 ENCOUNTER — LAB (OUTPATIENT)
Dept: LAB | Facility: HOSPITAL | Age: 70
End: 2020-03-12

## 2020-03-12 VITALS
DIASTOLIC BLOOD PRESSURE: 84 MMHG | HEIGHT: 66 IN | WEIGHT: 217 LBS | RESPIRATION RATE: 18 BRPM | SYSTOLIC BLOOD PRESSURE: 130 MMHG | TEMPERATURE: 97.8 F | BODY MASS INDEX: 34.87 KG/M2 | HEART RATE: 80 BPM

## 2020-03-12 DIAGNOSIS — I26.99 PULMONARY THROMBOSIS (HCC): ICD-10-CM

## 2020-03-12 DIAGNOSIS — I82.5Y3 CHRONIC DEEP VEIN THROMBOSIS (DVT) OF PROXIMAL VEIN OF BOTH LOWER EXTREMITIES (HCC): ICD-10-CM

## 2020-03-12 DIAGNOSIS — Z79.01 LONG TERM (CURRENT) USE OF ANTICOAGULANTS: Primary | ICD-10-CM

## 2020-03-12 LAB — INR PPP: 1.9

## 2020-03-12 PROCEDURE — 85610 PROTHROMBIN TIME: CPT

## 2020-03-12 PROCEDURE — 36416 COLLJ CAPILLARY BLOOD SPEC: CPT

## 2020-03-18 ENCOUNTER — TELEPHONE (OUTPATIENT)
Dept: ONCOLOGY | Facility: HOSPITAL | Age: 70
End: 2020-03-18

## 2020-03-19 ENCOUNTER — HOSPITAL ENCOUNTER (OUTPATIENT)
Dept: ONCOLOGY | Facility: HOSPITAL | Age: 70
Discharge: HOME | End: 2020-03-19
Admitting: NURSE PRACTITIONER

## 2020-03-19 ENCOUNTER — LAB (OUTPATIENT)
Dept: LAB | Facility: HOSPITAL | Age: 70
End: 2020-03-19

## 2020-03-19 VITALS
HEART RATE: 80 BPM | TEMPERATURE: 97.8 F | RESPIRATION RATE: 18 BRPM | WEIGHT: 223 LBS | HEIGHT: 66 IN | SYSTOLIC BLOOD PRESSURE: 130 MMHG | BODY MASS INDEX: 35.84 KG/M2 | DIASTOLIC BLOOD PRESSURE: 84 MMHG

## 2020-03-19 DIAGNOSIS — I26.99 PULMONARY THROMBOSIS (HCC): ICD-10-CM

## 2020-03-19 DIAGNOSIS — Z79.01 LONG TERM (CURRENT) USE OF ANTICOAGULANTS: Primary | ICD-10-CM

## 2020-03-19 DIAGNOSIS — I82.5Y3 CHRONIC DEEP VEIN THROMBOSIS (DVT) OF PROXIMAL VEIN OF BOTH LOWER EXTREMITIES (HCC): ICD-10-CM

## 2020-03-19 LAB — INR PPP: 1.4

## 2020-03-19 PROCEDURE — 85610 PROTHROMBIN TIME: CPT

## 2020-03-19 PROCEDURE — 36416 COLLJ CAPILLARY BLOOD SPEC: CPT

## 2020-03-21 DIAGNOSIS — I26.99 OTHER PULMONARY EMBOLISM WITHOUT ACUTE COR PULMONALE, UNSPECIFIED CHRONICITY (HCC): Chronic | ICD-10-CM

## 2020-03-23 RX ORDER — WARFARIN SODIUM 5 MG/1
TABLET ORAL
Qty: 30 TABLET | Refills: 0 | Status: SHIPPED | OUTPATIENT
Start: 2020-03-23 | End: 2020-04-22

## 2020-03-25 ENCOUNTER — TELEPHONE (OUTPATIENT)
Dept: ONCOLOGY | Facility: HOSPITAL | Age: 70
End: 2020-03-25

## 2020-03-26 ENCOUNTER — LAB (OUTPATIENT)
Dept: LAB | Facility: HOSPITAL | Age: 70
End: 2020-03-26

## 2020-03-26 ENCOUNTER — HOSPITAL ENCOUNTER (OUTPATIENT)
Dept: ONCOLOGY | Facility: HOSPITAL | Age: 70
Discharge: HOME | End: 2020-03-26
Admitting: NURSE PRACTITIONER

## 2020-03-26 VITALS — BODY MASS INDEX: 35.68 KG/M2 | TEMPERATURE: 97.4 F | RESPIRATION RATE: 18 BRPM | HEIGHT: 66 IN | WEIGHT: 222 LBS

## 2020-03-26 DIAGNOSIS — Z79.01 LONG TERM (CURRENT) USE OF ANTICOAGULANTS: Primary | ICD-10-CM

## 2020-03-26 DIAGNOSIS — I82.5Y3 CHRONIC DEEP VEIN THROMBOSIS (DVT) OF PROXIMAL VEIN OF BOTH LOWER EXTREMITIES (HCC): ICD-10-CM

## 2020-03-26 DIAGNOSIS — I26.99 PULMONARY THROMBOSIS (HCC): ICD-10-CM

## 2020-03-26 LAB — INR PPP: 2.6

## 2020-03-26 PROCEDURE — 85610 PROTHROMBIN TIME: CPT

## 2020-03-26 PROCEDURE — 36416 COLLJ CAPILLARY BLOOD SPEC: CPT

## 2020-04-02 ENCOUNTER — HOSPITAL ENCOUNTER (OUTPATIENT)
Dept: ONCOLOGY | Facility: HOSPITAL | Age: 70
Discharge: HOME | End: 2020-04-02
Admitting: NURSE PRACTITIONER

## 2020-04-02 ENCOUNTER — LAB (OUTPATIENT)
Dept: LAB | Facility: HOSPITAL | Age: 70
End: 2020-04-02

## 2020-04-02 VITALS
WEIGHT: 221 LBS | HEIGHT: 66 IN | BODY MASS INDEX: 35.52 KG/M2 | HEART RATE: 80 BPM | RESPIRATION RATE: 18 BRPM | DIASTOLIC BLOOD PRESSURE: 86 MMHG | SYSTOLIC BLOOD PRESSURE: 132 MMHG

## 2020-04-02 DIAGNOSIS — I26.99 PULMONARY THROMBOSIS (HCC): ICD-10-CM

## 2020-04-02 DIAGNOSIS — Z79.01 LONG TERM (CURRENT) USE OF ANTICOAGULANTS: Primary | ICD-10-CM

## 2020-04-02 DIAGNOSIS — I82.5Y3 CHRONIC DEEP VEIN THROMBOSIS (DVT) OF PROXIMAL VEIN OF BOTH LOWER EXTREMITIES (HCC): ICD-10-CM

## 2020-04-02 LAB — INR PPP: 2.8

## 2020-04-02 PROCEDURE — 36416 COLLJ CAPILLARY BLOOD SPEC: CPT

## 2020-04-02 PROCEDURE — 85610 PROTHROMBIN TIME: CPT

## 2020-04-22 DIAGNOSIS — I26.99 OTHER PULMONARY EMBOLISM WITHOUT ACUTE COR PULMONALE, UNSPECIFIED CHRONICITY (HCC): Chronic | ICD-10-CM

## 2020-04-22 RX ORDER — WARFARIN SODIUM 5 MG/1
TABLET ORAL
Qty: 30 TABLET | Refills: 0 | Status: SHIPPED | OUTPATIENT
Start: 2020-04-22 | End: 2020-06-08

## 2020-04-28 RX ORDER — WARFARIN SODIUM 1 MG/1
1 TABLET ORAL 3 TIMES DAILY
Qty: 90 TABLET | Refills: 2 | Status: SHIPPED | OUTPATIENT
Start: 2020-04-28 | End: 2020-10-05

## 2020-05-01 ENCOUNTER — TELEPHONE (OUTPATIENT)
Dept: ONCOLOGY | Facility: CLINIC | Age: 70
End: 2020-05-01

## 2020-05-04 NOTE — TELEPHONE ENCOUNTER
Tried to return patient's call. No answer, left vm to call back.     Trying to switch patients appointment to tele/video visit. Would also need to change time or day so he can still come in as planned for INR.

## 2020-05-05 DIAGNOSIS — E72.11 HYPERHOMOCYSTEINEMIA (HCC): Primary | ICD-10-CM

## 2020-05-07 ENCOUNTER — APPOINTMENT (OUTPATIENT)
Dept: ONCOLOGY | Facility: HOSPITAL | Age: 70
End: 2020-05-07

## 2020-05-07 ENCOUNTER — LAB (OUTPATIENT)
Dept: LAB | Facility: HOSPITAL | Age: 70
End: 2020-05-07

## 2020-05-07 ENCOUNTER — OFFICE VISIT (OUTPATIENT)
Dept: ONCOLOGY | Facility: CLINIC | Age: 70
End: 2020-05-07

## 2020-05-07 ENCOUNTER — HOSPITAL ENCOUNTER (OUTPATIENT)
Dept: ONCOLOGY | Facility: HOSPITAL | Age: 70
Discharge: HOME | End: 2020-05-07
Admitting: NURSE PRACTITIONER

## 2020-05-07 ENCOUNTER — APPOINTMENT (OUTPATIENT)
Dept: LAB | Facility: HOSPITAL | Age: 70
End: 2020-05-07

## 2020-05-07 VITALS
DIASTOLIC BLOOD PRESSURE: 73 MMHG | HEART RATE: 62 BPM | SYSTOLIC BLOOD PRESSURE: 123 MMHG | TEMPERATURE: 98.7 F | RESPIRATION RATE: 16 BRPM

## 2020-05-07 DIAGNOSIS — I26.99 OTHER PULMONARY EMBOLISM WITHOUT ACUTE COR PULMONALE, UNSPECIFIED CHRONICITY (HCC): Primary | ICD-10-CM

## 2020-05-07 DIAGNOSIS — E72.11 HYPERHOMOCYSTEINEMIA (HCC): ICD-10-CM

## 2020-05-07 DIAGNOSIS — I26.99 PULMONARY THROMBOSIS (HCC): ICD-10-CM

## 2020-05-07 DIAGNOSIS — I82.501 CHRONIC DEEP VEIN THROMBOSIS (DVT) OF RIGHT LOWER EXTREMITY, UNSPECIFIED VEIN (HCC): Primary | ICD-10-CM

## 2020-05-07 DIAGNOSIS — I82.5Y3 CHRONIC DEEP VEIN THROMBOSIS (DVT) OF PROXIMAL VEIN OF BOTH LOWER EXTREMITIES (HCC): ICD-10-CM

## 2020-05-07 DIAGNOSIS — Z86.718 PERSONAL HISTORY OF OTHER VENOUS THROMBOSIS AND EMBOLISM: ICD-10-CM

## 2020-05-07 LAB
BASOPHILS # BLD AUTO: 0.03 10*3/MM3 (ref 0–0.2)
BASOPHILS NFR BLD AUTO: 0.4 % (ref 0–1.5)
DEPRECATED RDW RBC AUTO: 44.7 FL (ref 37–54)
EOSINOPHIL # BLD AUTO: 0.16 10*3/MM3 (ref 0–0.4)
EOSINOPHIL NFR BLD AUTO: 2 % (ref 0.3–6.2)
ERYTHROCYTE [DISTWIDTH] IN BLOOD BY AUTOMATED COUNT: 13.9 % (ref 12.3–15.4)
HCT VFR BLD AUTO: 46.8 % (ref 37.5–51)
HGB BLD-MCNC: 16.6 G/DL (ref 13–17.7)
INR PPP: 1.7
LYMPHOCYTES # BLD AUTO: 1.39 10*3/MM3 (ref 0.7–3.1)
LYMPHOCYTES NFR BLD AUTO: 17.1 % (ref 19.6–45.3)
MCH RBC QN AUTO: 32 PG (ref 26.6–33)
MCHC RBC AUTO-ENTMCNC: 35.5 G/DL (ref 31.5–35.7)
MCV RBC AUTO: 90.2 FL (ref 79–97)
MONOCYTES # BLD AUTO: 0.72 10*3/MM3 (ref 0.1–0.9)
MONOCYTES NFR BLD AUTO: 8.9 % (ref 5–12)
NEUTROPHILS # BLD AUTO: 5.83 10*3/MM3 (ref 1.7–7)
NEUTROPHILS NFR BLD AUTO: 71.6 % (ref 42.7–76)
PLATELET # BLD AUTO: 118 10*3/MM3 (ref 140–450)
PMV BLD AUTO: 10.6 FL (ref 6–12)
RBC # BLD AUTO: 5.19 10*6/MM3 (ref 4.14–5.8)
WBC NRBC COR # BLD: 8.13 10*3/MM3 (ref 3.4–10.8)

## 2020-05-07 PROCEDURE — 36416 COLLJ CAPILLARY BLOOD SPEC: CPT

## 2020-05-07 PROCEDURE — 85025 COMPLETE CBC W/AUTO DIFF WBC: CPT

## 2020-05-07 PROCEDURE — 99441 PR PHYS/QHP TELEPHONE EVALUATION 5-10 MIN: CPT | Performed by: INTERNAL MEDICINE

## 2020-05-07 PROCEDURE — 85610 PROTHROMBIN TIME: CPT

## 2020-05-07 NOTE — PROGRESS NOTES
Hematology/Oncology Outpatient Follow Up    PATIENT NAME:Jett Doe  :1950  MRN: [id number]  PRIMARY CARE PHYSICIAN: Malachi Davenport MD  REFERRING PHYSICIAN: Malachi Davenport MD    Chief Complaint   Patient presents with   • Follow-up     Thrombophilia     You have chosen to receive care through a telephone visit. Do you consent to use a telephone visit for your medical care today? Yes    HISTORY OF PRESENT ILLNESS:     1. Bilateral PE, right lower extremity DVT and bilateral SVTs diagnosis established in 2014 and MTHFR C677T heterozygosity diagnosis established and 2014.  · The patient presented to [facility] on 2014 where was admitted through 2014 with complaints of chest pain and shortness of air.  He was seen per EMS and had associated diaphoresis as well as some low sternal chest pressure.  His troponin was elevated and cardiology was consulted.  He was started on nitroglycerin drip and heparin drip.  He was given diuresis and admitted to the [facility]U.  It was planned for him to undergo a cardiac catheterization.  A VQ scan was done to evaluate for bilateral pulmonary emboli and renal is consulted for his elevated creatinine.  The VQ scan showed multiple abnormalities in both lungs with a high probably for pulmonary emboli.  His chest x-ray showed no acute cardiopulmonary abnormality.  We were consulted for his Thrombopenia and his pulmonology emboli.  His risk factors included obesity, sedentary lifestyle, history of stroke and hyperlipidemia.  He has been on Plavix and aspirin as an outpatient and when his pulmonary emboli were found, his heparin was changed to renal dust Lovenox therapy 1 mg/kg per day.  He was not started on Coumadin initially with the plan that cardiology would perform a cardiac catheterization shortly after admission.  A thrombophilia workup was performed and results were; his AT3 was 89.9% normal, protein C was 101.4% normal and  protein S was 101% normal, Fibrinogen 416 normal, factor V Leiden screen is negative, prothrombin gene mutation is negative.  MTHFR showed heterozygosity for C677 T mutation, but negative for [id number] mutation.  Phosphate lipid antibodies were done.  Beta II glycoprotein is negative.  Phosphatidylserine antibody is negative.  Cardiolipin antibodies are all negative.  Bilateral lower extremity Dopplers was performed that showed a right lower extremity DVT and bilateral SVTs in his small saphenous vein.  On 08/20/2014, an IVC filter was placed with plans to hold starting his Coumadinization until after his cardiac catheterization was performed, which was going to be scheduled as an outpatient per Dr. Jurado.  It was noted that he had thrombocytopenia on admission with the platelet count of 125,000.  His platelet count slowly felt to 92,000 and it discharge with [id number].  His LFTs were noted to be abnormal with an AST of 88 and ALT of 103.  His coags were normal with PT of 11.9, INR 1.1 and PTT 35.5.  A thrombocytopenia workup was performed as seen below.  TSH of 1.3, His LFTs are normalized prior to discharge.  Nephrology was consulted for his AMADEO with a creatinine of 2.0 on admission.  Dr. Howard performed some renal studies.  His urinalysis was normal.  His renal ultrasound showed a 2 cm mass extended of the upper pole of the right kidney, which appeared to be solid with recommendations follow up with the CT or an MRI of his abdomen.  There was no hydronephrosis to indicate obstructive uropathy and the urinary bladder was unremarkable.  His creatinine improved with medication adjustments and was 1.4 and discharged with plans to perform an MRI of his afternoon when his creatinine improved to further evaluate his right renal mass.  An echocardiogram was performed that showed 45-55% ejection fraction and no other abnormalities were identified.  · 8/17/15 - Comprehensive metabolic panel with creatinine 1.6.  · 9/8/14 -  patient was seen in the office as a hospital follow-up. Homocysteine level 18.4 (H)  · 9/10/14 - orders written to start Folgard daily due to the elevated homocystine level.  · 10/7/14 - The patient reports that he has not started taking the Folgard and did not remember having a prescription called into the pharmacy.  Advised the patient again to start taking Folgard daily and gave the patient a prescription today  · 11/13/14 - homocystine 17.7 (H)  · 11/19/14 - advised patient to increase Folgard 2 twice a day.  · 8/17/15 - Homocysteine 16.8 (5-12).   · 3/8/16 - Patient does not recall taking Folgard. Prescription written for Folgard 1 p.o. q. d.  Patient claims that he has not taken Coumadin for three days as he was out of town. INR today 1.4. Instructed to resume Coumadin at same dose and follow INR protocol.   · 6/30/16 - Comprehensive metabolic panel with creatinine 1.3 (0.7-1.2). Serum homocysteine 12.2 (5-12).     · 3/7/17 - INR 2.7 on 8 mg of Warfarin daily. Patient asked to increase Folgard to twice a day.   · 5/5/17 - INR 2.4, therapeutic. Patient is to continue the INR protocol and continue with Folgard one tablet twice daily as well as Coumadin 8 mg daily. Homocysteine 11.2 normal (5.0-12.0).   · 8/4/17 - INR today is 1.6. Patient states that he missed his dose of Coumadin Monday and Tuesday of this week. He denied any diet changes. I will leave the patient on his current dose of 8 mg daily and have him return to the office for an INR in one week as he had missed two consecutive doses of Coumadin earlier. He is to continue to follow the INR protocol and he is to continue the Folgard one tablet twice daily as it is controlling his homocysteine level.   · 11/9/17 - Patient claims to be missing occasional Warfarin doses. Counseled.   · 5/22/18 - Hemoglobin 16.2, MCV 89.6, hematocrit 45.9. Serum homocysteine 14.5 (<15).    · 9/18/18 - Discussed possibly stopping Coumadin if workup negative. PT PTT 30.5 and  36.5 with correction of PTT on 1:1 mix with normal plasma. Lupus anticoagulant absent. Factor VIII activity 125 (). D-dimer >0.22 (<0.45).     · 9/21/18 - Venous Doppler study bilateral lower extremity with chronic deep venous thrombosis involving the right popliteal vein. Chronic superficial venous thrombosis involving the right small saphenous vein and a lymph node noted in the right groin.   · 12/18/18 - Discussed venous Doppler results. Recommended continuation of anticoagulation either with Coumadin or maintenance DOAC’s or Aspirin alone, which I did not recommend. Patient decided to continue on Warfarin.   ? 5/2/2019- homocystine 8.5 (N), factor VIII level 133% (H).  2. Polycythemia diagnosis established in January 2015.  · 1/14/15 - Discussed with the patient the need for a bone marrow biopsy along with additional lab work for further evaluation of the thrombocytopenia and polycythemia.  The patient states that he does not wish to proceed with the blood work or the bone marrow biopsy at this time. Hemoglobin 17.0  · 3/7/17 - WBC 7.17, hemoglobin 17.2, platelet count 120,000. Discussed bone marrow evaluation again with the patient. He will think about it.   · 12/18/18 - WBC 10.4 with 84% neutrophils, 9% lymphocytes, 6% monocytes, hemoglobin 16.6, hematocrit 47.1, platelet count 121,000. BCR/ABL negative. [employer] NGS JAK2 panel negative.   ? 9/5/2019- hemoglobin 16.1, hematocrit 47.4.  3. Thrombocytopenia diagnosis established in October 2014  · The patient presented to [facility] on August 19, 2014 where was admitted through 08/22/2014 with complaints of chest pain and shortness of air.  It was noted that he had thrombocytopenia on admission with the platelet count of 125,000.  His platelet count slowly fell to 92,000 and it discharge with 12,000.  PT 11.9, INR 1.1, PTT 35.5.  A thrombocytopenia workup was performed that showed a folate of greater than 24.8 (H), and vitamin B12 331(N), MMA  126, CMV, IgM was negative and EBV IgM was positive.  TAMIA screen is negative.  Platelet antibody screen is negative.  His PF4 was negative at 0.035.  It was noted that in the past, he has had some slightly low platelet counts in 125-140 range.    · 9/8/14 - platelet count 265,000.  EBV IgG positive, EBV IgM negative.  · 10/7/14 - platelet count 101,000.  · 11/17/14 - reviewed medication list for thrombocytopenia.  Metoprolol was known to cause thrombocytopenia purpura.  Lisinopril was known to rarely cause thrombocytopenia and less than 0.01% of the patients.  Plavix was known to cause thrombotic thrombocytopenia purpura (TTP) and thrombocytopenia and less than 1% of the patients.  Crestor was known to cause TTP and thrombocytopenia.  ? 9/5/2019-patient reports moderate alcohol use-16 ounces beer 3 times per week.  4. Elevated LFTs and liver mass diagnosis established in September 2014.  · 9/8/14 - ALT 52 (H), AST 29 (N),   · 9/10/14 - advised the patient to increase his hydration and we’ll order a CT scan of the abdomen to be done for further evaluation of the elevated LFTs.  · 9/16/14 - ALT 49 (H), AST 29 (N)  · 9/23/14 - CT of the abdomen showed no evidence of renal mass.  There was an approximately 2 cm non-cystic appearing hypodense lesion on the posterior medial aspect of the right hepatic lobe.  This was in close proximity to the upper pole of the right kidney.  This could conceivably represent the mass seen on ultrasound study.  However it was definitely raising from the liver and not the upper pole of the right kidney.  · 10/7/14 - Will order a MRI for evaluation of the liver mass as well as lab work for further evaluation of the elevated LFTs.  · 10/7/14 - Hepatitis screening negative, A1AT 133 (N), AFP 9.2 (H), ceruloplasmin 22 (N), ferritin 133.9 (N),   · 10/13/14 - MRI of the abdomen showed liver lesion within the posterior right lobe of the liver is most consistent with hemangioma   · 8/17/15 - ALT 38  (N), AST 26 (N), alkaline phosphatase 26 (N). Elevated LFT’s resolved.   · 3/8/16 - AFP 8 (0-9).    · 5/5/17 - CMP revealed an AST of 32 normal (15-41), ALT 55 normal (17-63), creatinine 1.4 high (0.7-1.2) and BUN 32 high (8-20). Otherwise within normal limits.      · 11/9/17 - Patient claims his PCP has recommended evaluation for his high liver enzymes by a liver doctor, but he has not followed up on it.     Past Medical History:   Diagnosis Date   • Cardiomyopathy (CMS/HCC) 2012   • CVA (cerebral vascular accident) (CMS/HCC) 2012   • Hyperlipemia 2012   • Hypertension 2012       Past Surgical History:   Procedure Laterality Date   • TURP / TRANSURETHRAL INCISION / DRAINAGE PROSTATE  2016         Current Outpatient Medications:   •  atorvastatin (LIPITOR) 20 MG tablet, ATORVASTATIN CALCIUM 20 MG TABS, Disp: , Rfl:   •  clopidogrel (PLAVIX) 75 MG tablet, Take 75 mg by mouth Daily., Disp: , Rfl:   •  hydrALAZINE (APRESOLINE) 10 MG tablet, HYDRALAZINE HCL 10 MG TABS, Disp: , Rfl:   •  lisinopril-hydrochlorothiazide (PRINZIDE,ZESTORETIC) 20-12.5 MG per tablet, LISINOPRIL-HYDROCHLOROTHIAZIDE 20-12.5 MG TABS, Disp: , Rfl:   •  metoprolol tartrate (LOPRESSOR) 25 MG tablet, METOPROLOL TARTRATE 25 MG TABS, Disp: , Rfl:   •  VIRT-ALYCIA 2.2-25-1 MG tablet tablet, TAKE ONE TABLET BY MOUTH TWICE A DAY, Disp: 60 tablet, Rfl: 2  •  warfarin (COUMADIN) 1 MG tablet, Take 1 tablet by mouth 3 (Three) Times a Day., Disp: 90 tablet, Rfl: 2  •  warfarin (COUMADIN) 5 MG tablet, TAKE ONE TABLET BY MOUTH DAILY, Disp: 30 tablet, Rfl: 0  •  WARFARIN SODIUM PO, Take  by mouth Daily. Alternating 6mg and 7mg, Disp: , Rfl:     No Known Allergies    Family History   Problem Relation Age of Onset   • Lung cancer Mother        Cancer-related family history includes Lung cancer in his mother.    Social History     Tobacco Use   • Smoking status: Never Smoker   • Smokeless tobacco: Current User     Types: Chew   Substance Use Topics   • Alcohol use:  Yes     Frequency: 4 or more times a week     Drinks per session: 1 or 2   • Drug use: No       I have reviewed and confirmed the accuracy of the patient's history: as entered by the MA/LPN/RN. Shweta Ayala MD 05/07/20     SUBJECTIVE:    The patient is here for a follow up appointment. Feels well. No infections. INR noted. No bleeding issues.  There are no planned surgeries          REVIEW OF SYSTEMS:  Review of Systems   Constitutional: Negative for chills and fever.   HENT: Negative for ear pain, mouth sores, nosebleeds and sore throat.    Eyes: Negative for photophobia and visual disturbance.   Respiratory: Negative for wheezing and stridor.    Cardiovascular: Negative for chest pain and palpitations.   Gastrointestinal: Negative for abdominal pain, diarrhea, nausea and vomiting.   Endocrine: Negative for cold intolerance and heat intolerance.   Genitourinary: Negative for dysuria and hematuria.   Musculoskeletal: Negative for joint swelling and neck stiffness.   Skin: Negative for color change and rash.   Neurological: Negative for seizures and syncope.   Hematological: Negative for adenopathy.        No obvious bleeding   Psychiatric/Behavioral: Negative for agitation, confusion and hallucinations.       OBJECTIVE:    There were no vitals filed for this visit.    ECOG  (0) Fully active, able to carry on all predisease performance without restriction    Physical Exam   Constitutional: He is oriented to person, place, and time.   Neurological: He is alert and oriented to person, place, and time.   Psychiatric: He has a normal mood and affect. His behavior is normal. Judgment and thought content normal.       RECENT LABS  WBC   Date Value Ref Range Status   05/07/2020 8.13 3.40 - 10.80 10*3/mm3 Final     RBC   Date Value Ref Range Status   05/07/2020 5.19 4.14 - 5.80 10*6/mm3 Final     Hemoglobin   Date Value Ref Range Status   05/07/2020 16.6 13.0 - 17.7 g/dL Final     Hematocrit   Date Value Ref Range  Status   05/07/2020 46.8 37.5 - 51.0 % Final     MCV   Date Value Ref Range Status   05/07/2020 90.2 79.0 - 97.0 fL Final     MCH   Date Value Ref Range Status   05/07/2020 32.0 26.6 - 33.0 pg Final     MCHC   Date Value Ref Range Status   05/07/2020 35.5 31.5 - 35.7 g/dL Final     RDW   Date Value Ref Range Status   05/07/2020 13.9 12.3 - 15.4 % Final     RDW-SD   Date Value Ref Range Status   05/07/2020 44.7 37.0 - 54.0 fl Final     MPV   Date Value Ref Range Status   05/07/2020 10.6 6.0 - 12.0 fL Final     Platelets   Date Value Ref Range Status   05/07/2020 118 (L) 140 - 450 10*3/mm3 Final     Neutrophil %   Date Value Ref Range Status   05/07/2020 71.6 42.7 - 76.0 % Final     Lymphocyte %   Date Value Ref Range Status   05/07/2020 17.1 (L) 19.6 - 45.3 % Final     Monocyte %   Date Value Ref Range Status   05/07/2020 8.9 5.0 - 12.0 % Final     Eosinophil %   Date Value Ref Range Status   05/07/2020 2.0 0.3 - 6.2 % Final     Basophil %   Date Value Ref Range Status   05/07/2020 0.4 0.0 - 1.5 % Final     Neutrophils, Absolute   Date Value Ref Range Status   05/07/2020 5.83 1.70 - 7.00 10*3/mm3 Final     Lymphocytes, Absolute   Date Value Ref Range Status   05/07/2020 1.39 0.70 - 3.10 10*3/mm3 Final     Monocytes, Absolute   Date Value Ref Range Status   05/07/2020 0.72 0.10 - 0.90 10*3/mm3 Final     Eosinophils, Absolute   Date Value Ref Range Status   05/07/2020 0.16 0.00 - 0.40 10*3/mm3 Final     Basophils, Absolute   Date Value Ref Range Status   05/07/2020 0.03 0.00 - 0.20 10*3/mm3 Final       Lab Results   Component Value Date    GLUCOSE 100 (H) 09/05/2019    BUN 24 (H) 09/05/2019    CREATININE 1.50 (H) 09/05/2019    EGFRIFNONA 46 (L) 09/05/2019    BCR 16.0 09/05/2019    K 4.7 09/05/2019    CO2 26.0 09/05/2019    CALCIUM 9.4 09/05/2019    ALBUMIN 3.80 09/05/2019    LABIL2 1.6 05/22/2018    AST 26 09/05/2019    ALT 43 09/05/2019         Assessment/Plan     There are no diagnoses linked to this  encounter.    ASSESSMENT:    1. Bilateral Pulmonary emboli: Ongoing management  2. Chronic DVT of proximal vein of right lower extremity  3. SVT  4. Hyperhomocysteinemia: Ongoing management  5. Heterozygous MTHFR mutation  6. CKD, stage III  7. Elevated Factor VIII level  8. Polycythemia: Stable  9. Thrombocytopenia: Chronic: Stable  10. Lesion of the Neck being followed by Dr. Kapoor: Diagnosed with skin cancer    He denies heavy alcohol use so likely not contributing to his thrombocytopenia.  His hemoglobin is stable and normal at 16.  Factor VIII level remains elevated homocystine level normal on folgard.  Ordered CMP to monitor his CKD and LFTs.      PLANS:     Continue Coumadin .  His dose was adjusted today.  Patient will follow-up in the Coumadin clinic  Continue Folgard  Daily  Homocysteine level with his next INR in 1 week           I have reviewed labs results, imaging, vitals, and medications with the patient today. Will follow up in 4  months with me.    Patient verbalized understanding and is in agreement of the above plan.    I spent more than 10 minutes discussing with patient management recommendations, reviewing imaging studies, lab results, progress notes and formulating management decisions.  Time was also spent answering all his/ her questions to the best of my abilities.

## 2020-05-07 NOTE — PROGRESS NOTES
Patient here for INR and is 1.7 today with a range of 2-3. We will increase his dose from 7mg to 8mg daily and recheck in 1 week. Patient currently takes a 5mg and 2mg. He has 1mg tabs to add to that to equal 8mg.

## 2020-05-14 ENCOUNTER — HOSPITAL ENCOUNTER (OUTPATIENT)
Dept: ONCOLOGY | Facility: HOSPITAL | Age: 70
Discharge: HOME | End: 2020-05-14
Admitting: NURSE PRACTITIONER

## 2020-05-14 ENCOUNTER — LAB (OUTPATIENT)
Dept: LAB | Facility: HOSPITAL | Age: 70
End: 2020-05-14

## 2020-05-14 VITALS
HEIGHT: 66 IN | DIASTOLIC BLOOD PRESSURE: 81 MMHG | RESPIRATION RATE: 18 BRPM | HEART RATE: 62 BPM | SYSTOLIC BLOOD PRESSURE: 109 MMHG | BODY MASS INDEX: 34.72 KG/M2 | WEIGHT: 216 LBS | TEMPERATURE: 97.1 F

## 2020-05-14 DIAGNOSIS — E72.11 HYPERHOMOCYSTEINEMIA (HCC): ICD-10-CM

## 2020-05-14 DIAGNOSIS — I82.501 CHRONIC DEEP VEIN THROMBOSIS (DVT) OF RIGHT LOWER EXTREMITY, UNSPECIFIED VEIN (HCC): ICD-10-CM

## 2020-05-14 DIAGNOSIS — Z86.718 PERSONAL HISTORY OF OTHER VENOUS THROMBOSIS AND EMBOLISM: ICD-10-CM

## 2020-05-14 DIAGNOSIS — Z79.01 LONG TERM (CURRENT) USE OF ANTICOAGULANTS: Primary | ICD-10-CM

## 2020-05-14 DIAGNOSIS — I26.99 PULMONARY THROMBOSIS (HCC): ICD-10-CM

## 2020-05-14 DIAGNOSIS — I82.5Y3 CHRONIC DEEP VEIN THROMBOSIS (DVT) OF PROXIMAL VEIN OF BOTH LOWER EXTREMITIES (HCC): ICD-10-CM

## 2020-05-14 LAB
BASOPHILS # BLD AUTO: 0.03 10*3/MM3 (ref 0–0.2)
BASOPHILS NFR BLD AUTO: 0.5 % (ref 0–1.5)
DEPRECATED RDW RBC AUTO: 45.5 FL (ref 37–54)
EOSINOPHIL # BLD AUTO: 0.15 10*3/MM3 (ref 0–0.4)
EOSINOPHIL NFR BLD AUTO: 2.3 % (ref 0.3–6.2)
ERYTHROCYTE [DISTWIDTH] IN BLOOD BY AUTOMATED COUNT: 14 % (ref 12.3–15.4)
HCT VFR BLD AUTO: 49.4 % (ref 37.5–51)
HCYS SERPL-MCNC: 11.7 UMOL/L (ref 0–15)
HGB BLD-MCNC: 17.4 G/DL (ref 13–17.7)
INR PPP: 2.8
LYMPHOCYTES # BLD AUTO: 1.16 10*3/MM3 (ref 0.7–3.1)
LYMPHOCYTES NFR BLD AUTO: 17.4 % (ref 19.6–45.3)
MCH RBC QN AUTO: 31.7 PG (ref 26.6–33)
MCHC RBC AUTO-ENTMCNC: 35.2 G/DL (ref 31.5–35.7)
MCV RBC AUTO: 90 FL (ref 79–97)
MONOCYTES # BLD AUTO: 0.74 10*3/MM3 (ref 0.1–0.9)
MONOCYTES NFR BLD AUTO: 11.1 % (ref 5–12)
NEUTROPHILS # BLD AUTO: 4.57 10*3/MM3 (ref 1.7–7)
NEUTROPHILS NFR BLD AUTO: 68.7 % (ref 42.7–76)
PLATELET # BLD AUTO: 133 10*3/MM3 (ref 140–450)
PMV BLD AUTO: 10 FL (ref 6–12)
RBC # BLD AUTO: 5.49 10*6/MM3 (ref 4.14–5.8)
WBC NRBC COR # BLD: 6.65 10*3/MM3 (ref 3.4–10.8)

## 2020-05-14 PROCEDURE — 36415 COLL VENOUS BLD VENIPUNCTURE: CPT

## 2020-05-14 PROCEDURE — 85025 COMPLETE CBC W/AUTO DIFF WBC: CPT

## 2020-05-14 PROCEDURE — 36416 COLLJ CAPILLARY BLOOD SPEC: CPT

## 2020-05-14 PROCEDURE — 83090 ASSAY OF HOMOCYSTEINE: CPT | Performed by: NURSE PRACTITIONER

## 2020-05-14 PROCEDURE — 85610 PROTHROMBIN TIME: CPT

## 2020-05-21 ENCOUNTER — HOSPITAL ENCOUNTER (OUTPATIENT)
Dept: ONCOLOGY | Facility: HOSPITAL | Age: 70
Discharge: HOME | End: 2020-05-21
Admitting: NURSE PRACTITIONER

## 2020-05-21 ENCOUNTER — LAB (OUTPATIENT)
Dept: LAB | Facility: HOSPITAL | Age: 70
End: 2020-05-21

## 2020-05-21 VITALS
RESPIRATION RATE: 18 BRPM | HEART RATE: 90 BPM | HEIGHT: 66 IN | WEIGHT: 215 LBS | DIASTOLIC BLOOD PRESSURE: 70 MMHG | TEMPERATURE: 97.5 F | BODY MASS INDEX: 34.55 KG/M2 | SYSTOLIC BLOOD PRESSURE: 129 MMHG

## 2020-05-21 DIAGNOSIS — Z79.01 LONG TERM (CURRENT) USE OF ANTICOAGULANTS: Primary | ICD-10-CM

## 2020-05-21 DIAGNOSIS — I82.5Y3 CHRONIC DEEP VEIN THROMBOSIS (DVT) OF PROXIMAL VEIN OF BOTH LOWER EXTREMITIES (HCC): ICD-10-CM

## 2020-05-21 DIAGNOSIS — I26.99 PULMONARY THROMBOSIS (HCC): ICD-10-CM

## 2020-05-21 LAB — INR PPP: 2.2

## 2020-05-21 PROCEDURE — 85610 PROTHROMBIN TIME: CPT

## 2020-05-21 PROCEDURE — 36416 COLLJ CAPILLARY BLOOD SPEC: CPT

## 2020-06-08 DIAGNOSIS — I26.99 OTHER PULMONARY EMBOLISM WITHOUT ACUTE COR PULMONALE, UNSPECIFIED CHRONICITY (HCC): Chronic | ICD-10-CM

## 2020-06-08 RX ORDER — WARFARIN SODIUM 5 MG/1
TABLET ORAL
Qty: 30 TABLET | Refills: 0 | Status: SHIPPED | OUTPATIENT
Start: 2020-06-08 | End: 2020-07-08

## 2020-06-18 ENCOUNTER — HOSPITAL ENCOUNTER (OUTPATIENT)
Dept: ONCOLOGY | Facility: HOSPITAL | Age: 70
Setting detail: INFUSION SERIES
Discharge: HOME | End: 2020-06-18

## 2020-06-18 ENCOUNTER — LAB (OUTPATIENT)
Dept: LAB | Facility: HOSPITAL | Age: 70
End: 2020-06-18

## 2020-06-18 VITALS
WEIGHT: 215 LBS | DIASTOLIC BLOOD PRESSURE: 78 MMHG | RESPIRATION RATE: 18 BRPM | TEMPERATURE: 97.1 F | BODY MASS INDEX: 34.55 KG/M2 | HEART RATE: 73 BPM | SYSTOLIC BLOOD PRESSURE: 128 MMHG | HEIGHT: 66 IN

## 2020-06-18 DIAGNOSIS — Z79.01 LONG TERM (CURRENT) USE OF ANTICOAGULANTS: Primary | ICD-10-CM

## 2020-06-18 DIAGNOSIS — I82.5Y3 CHRONIC DEEP VEIN THROMBOSIS (DVT) OF PROXIMAL VEIN OF BOTH LOWER EXTREMITIES (HCC): ICD-10-CM

## 2020-06-18 DIAGNOSIS — I26.99 PULMONARY THROMBOSIS (HCC): ICD-10-CM

## 2020-06-18 LAB — INR PPP: 2.4

## 2020-06-18 PROCEDURE — 36416 COLLJ CAPILLARY BLOOD SPEC: CPT

## 2020-06-18 PROCEDURE — 85610 PROTHROMBIN TIME: CPT

## 2020-06-22 RX ORDER — CYANOCOBALAMIN/FOLIC AC/VIT B6 1-2.2-25MG
TABLET ORAL
Qty: 60 TABLET | Refills: 1 | Status: SHIPPED | OUTPATIENT
Start: 2020-06-22 | End: 2020-09-14

## 2020-07-08 DIAGNOSIS — I26.99 OTHER PULMONARY EMBOLISM WITHOUT ACUTE COR PULMONALE, UNSPECIFIED CHRONICITY (HCC): Chronic | ICD-10-CM

## 2020-07-08 RX ORDER — WARFARIN SODIUM 5 MG/1
TABLET ORAL
Qty: 30 TABLET | Refills: 0 | Status: SHIPPED | OUTPATIENT
Start: 2020-07-08 | End: 2020-08-13

## 2020-07-16 ENCOUNTER — HOSPITAL ENCOUNTER (OUTPATIENT)
Dept: ONCOLOGY | Facility: HOSPITAL | Age: 70
Setting detail: INFUSION SERIES
Discharge: HOME | End: 2020-07-16

## 2020-07-16 ENCOUNTER — LAB (OUTPATIENT)
Dept: LAB | Facility: HOSPITAL | Age: 70
End: 2020-07-16

## 2020-07-16 DIAGNOSIS — I26.99 PULMONARY THROMBOSIS (HCC): Primary | ICD-10-CM

## 2020-07-16 DIAGNOSIS — Z79.01 LONG TERM (CURRENT) USE OF ANTICOAGULANTS: Primary | ICD-10-CM

## 2020-07-16 LAB — INR PPP: 2

## 2020-07-16 PROCEDURE — 85610 PROTHROMBIN TIME: CPT

## 2020-07-16 PROCEDURE — 36416 COLLJ CAPILLARY BLOOD SPEC: CPT

## 2020-08-13 ENCOUNTER — LAB (OUTPATIENT)
Dept: LAB | Facility: HOSPITAL | Age: 70
End: 2020-08-13

## 2020-08-13 ENCOUNTER — HOSPITAL ENCOUNTER (OUTPATIENT)
Dept: ONCOLOGY | Facility: HOSPITAL | Age: 70
Setting detail: INFUSION SERIES
Discharge: HOME | End: 2020-08-13

## 2020-08-13 DIAGNOSIS — Z79.01 LONG TERM (CURRENT) USE OF ANTICOAGULANTS: Primary | ICD-10-CM

## 2020-08-13 DIAGNOSIS — I26.99 OTHER PULMONARY EMBOLISM WITHOUT ACUTE COR PULMONALE, UNSPECIFIED CHRONICITY (HCC): Chronic | ICD-10-CM

## 2020-08-13 DIAGNOSIS — I26.99 PULMONARY THROMBOSIS (HCC): Primary | ICD-10-CM

## 2020-08-13 LAB — INR PPP: 3.6

## 2020-08-13 PROCEDURE — 36416 COLLJ CAPILLARY BLOOD SPEC: CPT

## 2020-08-13 PROCEDURE — 85610 PROTHROMBIN TIME: CPT

## 2020-08-13 RX ORDER — WARFARIN SODIUM 5 MG/1
TABLET ORAL
Qty: 30 TABLET | Refills: 0 | Status: SHIPPED | OUTPATIENT
Start: 2020-08-13 | End: 2020-10-05

## 2020-08-17 ENCOUNTER — HOSPITAL ENCOUNTER (OUTPATIENT)
Dept: ONCOLOGY | Facility: HOSPITAL | Age: 70
Setting detail: INFUSION SERIES
Discharge: HOME | End: 2020-08-17

## 2020-08-17 ENCOUNTER — LAB (OUTPATIENT)
Dept: LAB | Facility: HOSPITAL | Age: 70
End: 2020-08-17

## 2020-08-17 DIAGNOSIS — I82.501 CHRONIC DEEP VEIN THROMBOSIS (DVT) OF RIGHT LOWER EXTREMITY, UNSPECIFIED VEIN (HCC): Primary | ICD-10-CM

## 2020-08-17 DIAGNOSIS — I26.99 PULMONARY THROMBOSIS (HCC): Primary | ICD-10-CM

## 2020-08-17 LAB — INR PPP: 3.2

## 2020-08-17 PROCEDURE — 85610 PROTHROMBIN TIME: CPT

## 2020-08-17 PROCEDURE — 36416 COLLJ CAPILLARY BLOOD SPEC: CPT

## 2020-08-25 ENCOUNTER — LAB (OUTPATIENT)
Dept: LAB | Facility: HOSPITAL | Age: 70
End: 2020-08-25

## 2020-08-25 ENCOUNTER — HOSPITAL ENCOUNTER (OUTPATIENT)
Dept: ONCOLOGY | Facility: HOSPITAL | Age: 70
Setting detail: INFUSION SERIES
Discharge: HOME | End: 2020-08-25

## 2020-08-25 DIAGNOSIS — I82.501 CHRONIC DEEP VEIN THROMBOSIS (DVT) OF RIGHT LOWER EXTREMITY, UNSPECIFIED VEIN (HCC): Primary | ICD-10-CM

## 2020-08-25 DIAGNOSIS — I26.99 PULMONARY THROMBOSIS (HCC): Primary | ICD-10-CM

## 2020-08-25 LAB — INR PPP: 2.6

## 2020-08-25 PROCEDURE — 85610 PROTHROMBIN TIME: CPT

## 2020-08-25 PROCEDURE — 36416 COLLJ CAPILLARY BLOOD SPEC: CPT

## 2020-08-25 NOTE — PROGRESS NOTES
Pt is here for INR and is 2.6.  Pt states that he has been taking 6.5 mg daily and not 6 mg daily.  Instructed pt to go ahead and continue 6.5 mg daily as he is in range today.  Pt scheduled for next Tuesday 9/1/2020.  Pt v/u

## 2020-09-01 ENCOUNTER — HOSPITAL ENCOUNTER (OUTPATIENT)
Dept: ONCOLOGY | Facility: HOSPITAL | Age: 70
Setting detail: INFUSION SERIES
Discharge: HOME | End: 2020-09-01

## 2020-09-01 ENCOUNTER — LAB (OUTPATIENT)
Dept: LAB | Facility: HOSPITAL | Age: 70
End: 2020-09-01

## 2020-09-01 DIAGNOSIS — I26.99 PULMONARY THROMBOSIS (HCC): Primary | ICD-10-CM

## 2020-09-01 DIAGNOSIS — Z79.01 LONG TERM (CURRENT) USE OF ANTICOAGULANTS: Primary | ICD-10-CM

## 2020-09-01 LAB — INR PPP: 2.9

## 2020-09-01 PROCEDURE — 85610 PROTHROMBIN TIME: CPT

## 2020-09-01 PROCEDURE — 36416 COLLJ CAPILLARY BLOOD SPEC: CPT

## 2020-09-14 RX ORDER — CYANOCOBALAMIN/FOLIC AC/VIT B6 1-2.2-25MG
TABLET ORAL
Qty: 60 TABLET | Refills: 0 | Status: SHIPPED | OUTPATIENT
Start: 2020-09-14 | End: 2020-10-29

## 2020-09-29 ENCOUNTER — HOSPITAL ENCOUNTER (OUTPATIENT)
Dept: ONCOLOGY | Facility: HOSPITAL | Age: 70
Setting detail: INFUSION SERIES
Discharge: HOME | End: 2020-09-29

## 2020-09-29 ENCOUNTER — APPOINTMENT (OUTPATIENT)
Dept: LAB | Facility: HOSPITAL | Age: 70
End: 2020-09-29

## 2020-09-29 DIAGNOSIS — I26.99 PULMONARY THROMBOSIS (HCC): Primary | ICD-10-CM

## 2020-09-29 DIAGNOSIS — Z79.01 LONG TERM (CURRENT) USE OF ANTICOAGULANTS: Primary | ICD-10-CM

## 2020-09-29 LAB — INR PPP: 1.4

## 2020-09-29 PROCEDURE — 85610 PROTHROMBIN TIME: CPT

## 2020-09-29 PROCEDURE — 36416 COLLJ CAPILLARY BLOOD SPEC: CPT

## 2020-10-02 ENCOUNTER — LAB (OUTPATIENT)
Dept: LAB | Facility: HOSPITAL | Age: 70
End: 2020-10-02

## 2020-10-02 ENCOUNTER — HOSPITAL ENCOUNTER (OUTPATIENT)
Dept: ONCOLOGY | Facility: HOSPITAL | Age: 70
Setting detail: INFUSION SERIES
Discharge: HOME | End: 2020-10-02

## 2020-10-02 DIAGNOSIS — I26.99 PULMONARY THROMBOSIS (HCC): Primary | ICD-10-CM

## 2020-10-02 DIAGNOSIS — Z79.01 LONG TERM (CURRENT) USE OF ANTICOAGULANTS: Primary | ICD-10-CM

## 2020-10-02 LAB — INR PPP: 2.4

## 2020-10-02 PROCEDURE — 36416 COLLJ CAPILLARY BLOOD SPEC: CPT

## 2020-10-02 PROCEDURE — 85610 PROTHROMBIN TIME: CPT

## 2020-10-04 DIAGNOSIS — I26.99 OTHER PULMONARY EMBOLISM WITHOUT ACUTE COR PULMONALE, UNSPECIFIED CHRONICITY (HCC): Chronic | ICD-10-CM

## 2020-10-05 RX ORDER — WARFARIN SODIUM 1 MG/1
1 TABLET ORAL NIGHTLY
Qty: 90 TABLET | Refills: 1 | Status: SHIPPED | OUTPATIENT
Start: 2020-10-05 | End: 2021-02-12

## 2020-10-05 RX ORDER — WARFARIN SODIUM 5 MG/1
5 TABLET ORAL NIGHTLY
Qty: 30 TABLET | Refills: 0 | Status: SHIPPED | OUTPATIENT
Start: 2020-10-05 | End: 2020-11-09

## 2020-10-05 NOTE — TELEPHONE ENCOUNTER
Must make appt with MD for further refills.   Message sent to  to call pt.   Electronically signed by SOLOMON Parker, 10/05/20, 8:44 AM EDT.

## 2020-10-08 ENCOUNTER — LAB (OUTPATIENT)
Dept: LAB | Facility: HOSPITAL | Age: 70
End: 2020-10-08

## 2020-10-08 ENCOUNTER — HOSPITAL ENCOUNTER (OUTPATIENT)
Dept: ONCOLOGY | Facility: HOSPITAL | Age: 70
Setting detail: INFUSION SERIES
Discharge: HOME | End: 2020-10-08

## 2020-10-08 DIAGNOSIS — Z79.01 LONG TERM (CURRENT) USE OF ANTICOAGULANTS: Primary | ICD-10-CM

## 2020-10-08 DIAGNOSIS — E72.11 HYPERHOMOCYSTEINEMIA (HCC): ICD-10-CM

## 2020-10-08 DIAGNOSIS — I26.99 PULMONARY THROMBOSIS (HCC): Primary | ICD-10-CM

## 2020-10-08 LAB — INR PPP: 1.5

## 2020-10-08 PROCEDURE — 36416 COLLJ CAPILLARY BLOOD SPEC: CPT

## 2020-10-08 PROCEDURE — 85610 PROTHROMBIN TIME: CPT

## 2020-10-08 NOTE — PROGRESS NOTES
Patient is here for INR check, increased by one mg and we will recheck next Thursday. Patient states he has plenty of tablets at home.

## 2020-10-15 ENCOUNTER — LAB (OUTPATIENT)
Dept: LAB | Facility: HOSPITAL | Age: 70
End: 2020-10-15

## 2020-10-15 ENCOUNTER — HOSPITAL ENCOUNTER (OUTPATIENT)
Dept: ONCOLOGY | Facility: HOSPITAL | Age: 70
Setting detail: INFUSION SERIES
Discharge: HOME | End: 2020-10-15

## 2020-10-15 DIAGNOSIS — I82.5Y3 CHRONIC DEEP VEIN THROMBOSIS (DVT) OF PROXIMAL VEIN OF BOTH LOWER EXTREMITIES (HCC): Primary | ICD-10-CM

## 2020-10-15 LAB — INR PPP: 2.6

## 2020-10-15 PROCEDURE — 36416 COLLJ CAPILLARY BLOOD SPEC: CPT

## 2020-10-15 PROCEDURE — 85610 PROTHROMBIN TIME: CPT

## 2020-10-22 ENCOUNTER — LAB (OUTPATIENT)
Dept: LAB | Facility: HOSPITAL | Age: 70
End: 2020-10-22

## 2020-10-22 ENCOUNTER — HOSPITAL ENCOUNTER (OUTPATIENT)
Dept: ONCOLOGY | Facility: HOSPITAL | Age: 70
Setting detail: INFUSION SERIES
Discharge: HOME | End: 2020-10-22

## 2020-10-22 DIAGNOSIS — Z79.01 LONG TERM (CURRENT) USE OF ANTICOAGULANTS: Primary | ICD-10-CM

## 2020-10-22 DIAGNOSIS — I82.5Y3 CHRONIC DEEP VEIN THROMBOSIS (DVT) OF PROXIMAL VEIN OF BOTH LOWER EXTREMITIES (HCC): Primary | ICD-10-CM

## 2020-10-22 LAB — INR PPP: 1.8

## 2020-10-22 PROCEDURE — 85610 PROTHROMBIN TIME: CPT

## 2020-10-22 PROCEDURE — 36416 COLLJ CAPILLARY BLOOD SPEC: CPT

## 2020-10-26 NOTE — PROGRESS NOTES
Hematology/Oncology Outpatient Follow Up    PATIENT NAME:Jett Doe  :1950  MRN: [id number]  PRIMARY CARE PHYSICIAN: Malachi Davenport MD  REFERRING PHYSICIAN: Malachi Davenport MD    Chief Complaint   Patient presents with   • Follow-up     chronic deep vein thrombosis of right lower extremity         HISTORY OF PRESENT ILLNESS:     1. Bilateral PE, right lower extremity DVT and bilateral SVTs diagnosis established in 2014 and MTHFR C677T heterozygosity diagnosis established and 2014.  · The patient presented to [facility] on 2014 where was admitted through 2014 with complaints of chest pain and shortness of air.  He was seen per EMS and had associated diaphoresis as well as some low sternal chest pressure.  His troponin was elevated and cardiology was consulted.  He was started on nitroglycerin drip and heparin drip.  He was given diuresis and admitted to the CBCU.  It was planned for him to undergo a cardiac catheterization.  A VQ scan was done to evaluate for bilateral pulmonary emboli and renal is consulted for his elevated creatinine.  The VQ scan showed multiple abnormalities in both lungs with a high probably for pulmonary emboli.  His chest x-ray showed no acute cardiopulmonary abnormality.  We were consulted for his Thrombopenia and his pulmonology emboli.  His risk factors included obesity, sedentary lifestyle, history of stroke and hyperlipidemia.  He has been on Plavix and aspirin as an outpatient and when his pulmonary emboli were found, his heparin was changed to renal dust Lovenox therapy 1 mg/kg per day.  He was not started on Coumadin initially with the plan that cardiology would perform a cardiac catheterization shortly after admission.  A thrombophilia workup was performed and results were; his AT3 was 89.9% normal, protein C was 101.4% normal and protein S was 101% normal, Fibrinogen 416 normal, factor V Leiden screen is negative,  prothrombin gene mutation is negative.  MTHFR showed heterozygosity for C677 T mutation, but negative for G4851D mutation.  Phosphate lipid antibodies were done.  Beta II glycoprotein is negative.  Phosphatidylserine antibody is negative.  Cardiolipin antibodies are all negative.  Bilateral lower extremity Dopplers was performed that showed a right lower extremity DVT and bilateral SVTs in his small saphenous vein.  On 08/20/2014, an IVC filter was placed with plans to hold starting his Coumadinization until after his cardiac catheterization was performed, which was going to be scheduled as an outpatient per Dr. Jurado.  It was noted that he had thrombocytopenia on admission with the platelet count of 125,000.  His platelet count slowly felt to 92,000 and it discharge with [id number].  His LFTs were noted to be abnormal with an AST of 88 and ALT of 103.  His coags were normal with PT of 11.9, INR 1.1 and PTT 35.5.  A thrombocytopenia workup was performed as seen below.  TSH of 1.3, His LFTs are normalized prior to discharge.  Nephrology was consulted for his AMADEO with a creatinine of 2.0 on admission.  Dr. Howard performed some renal studies.  His urinalysis was normal.  His renal ultrasound showed a 2 cm mass extended of the upper pole of the right kidney, which appeared to be solid with recommendations follow up with the CT or an MRI of his abdomen.  There was no hydronephrosis to indicate obstructive uropathy and the urinary bladder was unremarkable.  His creatinine improved with medication adjustments and was 1.4 and discharged with plans to perform an MRI of his afternoon when his creatinine improved to further evaluate his right renal mass.  An echocardiogram was performed that showed 45-55% ejection fraction and no other abnormalities were identified.  · 8/17/15 - Comprehensive metabolic panel with creatinine 1.6.  · 9/8/14 - patient was seen in the office as a hospital follow-up. Homocysteine level 18.4  (H)  · 9/10/14 - orders written to start Folgard daily due to the elevated homocystine level.  · 10/7/14 - The patient reports that he has not started taking the Folgard and did not remember having a prescription called into the pharmacy.  Advised the patient again to start taking Folgard daily and gave the patient a prescription today  · 11/13/14 - homocystine 17.7 (H)  · 11/19/14 - advised patient to increase Folgard 2 twice a day.  · 8/17/15 - Homocysteine 16.8 (5-12).   · 3/8/16 - Patient does not recall taking Folgard. Prescription written for Folgard 1 p.o. q. d.  Patient claims that he has not taken Coumadin for three days as he was out of town. INR today 1.4. Instructed to resume Coumadin at same dose and follow INR protocol.   · 6/30/16 - Comprehensive metabolic panel with creatinine 1.3 (0.7-1.2). Serum homocysteine 12.2 (5-12).     · 3/7/17 - INR 2.7 on 8 mg of Warfarin daily. Patient asked to increase Folgard to twice a day.   · 5/5/17 - INR 2.4, therapeutic. Patient is to continue the INR protocol and continue with Folgard one tablet twice daily as well as Coumadin 8 mg daily. Homocysteine 11.2 normal (5.0-12.0).   · 8/4/17 - INR today is 1.6. Patient states that he missed his dose of Coumadin Monday and Tuesday of this week. He denied any diet changes. I will leave the patient on his current dose of 8 mg daily and have him return to the office for an INR in one week as he had missed two consecutive doses of Coumadin earlier. He is to continue to follow the INR protocol and he is to continue the Folgard one tablet twice daily as it is controlling his homocysteine level.   · 11/9/17 - Patient claims to be missing occasional Warfarin doses. Counseled.   · 5/22/18 - Hemoglobin 16.2, MCV 89.6, hematocrit 45.9. Serum homocysteine 14.5 (<15).    · 9/18/18 - Discussed possibly stopping Coumadin if workup negative. PT PTT 30.5 and 36.5 with correction of PTT on 1:1 mix with normal plasma. Lupus anticoagulant  absent. Factor VIII activity 125 (). D-dimer >0.22 (<0.45).     · 9/21/18 - Venous Doppler study bilateral lower extremity with chronic deep venous thrombosis involving the right popliteal vein. Chronic superficial venous thrombosis involving the right small saphenous vein and a lymph node noted in the right groin.   · 12/18/18 - Discussed venous Doppler results. Recommended continuation of anticoagulation either with Coumadin or maintenance DOAC’s or Aspirin alone, which I did not recommend. Patient decided to continue on Warfarin.   ? 5/2/2019- homocystine 8.5 (N), factor VIII level 133% (H).  2. Polycythemia diagnosis established in January 2015.  · 1/14/15 - Discussed with the patient the need for a bone marrow biopsy along with additional lab work for further evaluation of the thrombocytopenia and polycythemia.  The patient states that he does not wish to proceed with the blood work or the bone marrow biopsy at this time. Hemoglobin 17.0  · 3/7/17 - WBC 7.17, hemoglobin 17.2, platelet count 120,000. Discussed bone marrow evaluation again with the patient. He will think about it.   · 12/18/18 - WBC 10.4 with 84% neutrophils, 9% lymphocytes, 6% monocytes, hemoglobin 16.6, hematocrit 47.1, platelet count 121,000. BCR/ABL negative. [employer] NGS JAK2 panel negative.   ? 9/5/2019- hemoglobin 16.1, hematocrit 47.4.  3. Thrombocytopenia diagnosis established in October 2014  · The patient presented to [facility] on August 19, 2014 where was admitted through 08/22/2014 with complaints of chest pain and shortness of air.  It was noted that he had thrombocytopenia on admission with the platelet count of 125,000.  His platelet count slowly fell to 92,000 and it discharge with 12,000.  PT 11.9, INR 1.1, PTT 35.5.  A thrombocytopenia workup was performed that showed a folate of greater than 24.8 (H), and vitamin B12 331(N), , CMV, IgM was negative and EBV IgM was positive.  TAMIA screen is negative.   Platelet antibody screen is negative.  His PF4 was negative at 0.035.  It was noted that in the past, he has had some slightly low platelet counts in 125-140 range.    · 9/8/14 - platelet count 265,000.  EBV IgG positive, EBV IgM negative.  · 10/7/14 - platelet count 101,000.  · 11/17/14 - reviewed medication list for thrombocytopenia.  Metoprolol was known to cause thrombocytopenia purpura.  Lisinopril was known to rarely cause thrombocytopenia and less than 0.01% of the patients.  Plavix was known to cause thrombotic thrombocytopenia purpura (TTP) and thrombocytopenia and less than 1% of the patients.  Crestor was known to cause TTP and thrombocytopenia.  ? 9/5/2019-patient reports moderate alcohol use-16 ounces beer 3 times per week.  4. Elevated LFTs and liver mass diagnosis established in September 2014.  · 9/8/14 - ALT 52 (H), AST 29 (N),   · 9/10/14 - advised the patient to increase his hydration and we’ll order a CT scan of the abdomen to be done for further evaluation of the elevated LFTs.  · 9/16/14 - ALT 49 (H), AST 29 (N)  · 9/23/14 - CT of the abdomen showed no evidence of renal mass.  There was an approximately 2 cm non-cystic appearing hypodense lesion on the posterior medial aspect of the right hepatic lobe.  This was in close proximity to the upper pole of the right kidney.  This could conceivably represent the mass seen on ultrasound study.  However it was definitely raising from the liver and not the upper pole of the right kidney.  · 10/7/14 - Will order a MRI for evaluation of the liver mass as well as lab work for further evaluation of the elevated LFTs.  · 10/7/14 - Hepatitis screening negative, A1AT 133 (N), AFP 9.2 (H), ceruloplasmin 22 (N), ferritin 133.9 (N),   · 10/13/14 - MRI of the abdomen showed liver lesion within the posterior right lobe of the liver is most consistent with hemangioma   · 8/17/15 - ALT 38 (N), AST 26 (N), alkaline phosphatase 26 (N). Elevated LFT’s resolved.    · 3/8/16 - AFP 8 (0-9).    · 5/5/17 - CMP revealed an AST of 32 normal (15-41), ALT 55 normal (17-63), creatinine 1.4 high (0.7-1.2) and BUN 32 high (8-20). Otherwise within normal limits.      · 11/9/17 - Patient claims his PCP has recommended evaluation for his high liver enzymes by a liver doctor, but he has not followed up on it.     Past Medical History:   Diagnosis Date   • Cardiomyopathy (CMS/HCC) 2012   • CVA (cerebral vascular accident) (CMS/HCC) 2012   • Hyperlipemia 2012   • Hypertension 2012       Past Surgical History:   Procedure Laterality Date   • TURP / TRANSURETHRAL INCISION / DRAINAGE PROSTATE  2016         Current Outpatient Medications:   •  atorvastatin (LIPITOR) 20 MG tablet, ATORVASTATIN CALCIUM 20 MG TABS, Disp: , Rfl:   •  clopidogrel (PLAVIX) 75 MG tablet, Take 75 mg by mouth Daily., Disp: , Rfl:   •  hydrALAZINE (APRESOLINE) 10 MG tablet, HYDRALAZINE HCL 10 MG TABS, Disp: , Rfl:   •  lisinopril-hydrochlorothiazide (PRINZIDE,ZESTORETIC) 20-12.5 MG per tablet, LISINOPRIL-HYDROCHLOROTHIAZIDE 20-12.5 MG TABS, Disp: , Rfl:   •  metoprolol tartrate (LOPRESSOR) 25 MG tablet, METOPROLOL TARTRATE 25 MG TABS, Disp: , Rfl:   •  Virt-Verito 2.2-25-1 MG tablet tablet, TAKE ONE TABLET BY MOUTH TWICE A DAY, Disp: 60 tablet, Rfl: 0  •  warfarin (COUMADIN) 1 MG tablet, Take 1 tablet by mouth Every Night. Or as directed.  Must make appt with MD for further refills., Disp: 90 tablet, Rfl: 1  •  warfarin (COUMADIN) 5 MG tablet, Take 1 tablet by mouth Every Night. Or as directed. Must make appt with MD for further refills., Disp: 30 tablet, Rfl: 0  •  WARFARIN SODIUM PO, Take  by mouth Daily. Taking 7 mg daily 10/28/20, Disp: , Rfl:     No Known Allergies    Family History   Problem Relation Age of Onset   • Lung cancer Mother        Cancer-related family history includes Lung cancer in his mother.    Social History     Tobacco Use   • Smoking status: Never Smoker   • Smokeless tobacco: Current User      "Types: Chew   Substance Use Topics   • Alcohol use: Yes     Frequency: 4 or more times a week     Drinks per session: 1 or 2   • Drug use: No       I have reviewed and confirmed the accuracy of the patient's history: as entered by the MA/TAQUERIA/JEREMY. Shweta Ayala MD 10/28/20     SUBJECTIVE:    The patient is here for a follow up appointment.  Patient does not have any specific complaints today there are no bleeding issues.        REVIEW OF SYSTEMS:  Review of Systems   Constitutional: Negative for chills and fever.   HENT: Negative for ear pain, mouth sores, nosebleeds and sore throat.    Eyes: Negative for photophobia and visual disturbance.   Respiratory: Negative for wheezing and stridor.    Cardiovascular: Negative for chest pain and palpitations.   Gastrointestinal: Negative for abdominal pain, diarrhea, nausea and vomiting.   Endocrine: Negative for cold intolerance and heat intolerance.   Genitourinary: Negative for dysuria and hematuria.   Musculoskeletal: Negative for joint swelling and neck stiffness.   Skin: Negative for color change and rash.   Neurological: Negative for seizures and syncope.   Hematological: Negative for adenopathy.        No obvious bleeding   Psychiatric/Behavioral: Negative for agitation, confusion and hallucinations.   I have reviewed and confirmed the accuracy of the ROS as documented by the MA/ALLISONN/RN Shweta Ayala MD      OBJECTIVE:    Vitals:    10/28/20 1202   BP: 113/73   Pulse: 60   Resp: 18   Temp: 97.1 °F (36.2 °C)   TempSrc: Temporal   Weight: 98.9 kg (218 lb)   Height: 167 cm (65.75\")   PainSc: 0-No pain       ECOG  (0) Fully active, able to carry on all predisease performance without restriction    Physical Exam   Constitutional: He is oriented to person, place, and time. No distress.   HENT:   Head: Normocephalic and atraumatic.   Eyes: Conjunctivae are normal. Right eye exhibits no discharge. Left eye exhibits no discharge. No scleral icterus.   Neck: " Normal range of motion. Neck supple. No thyromegaly present.   Cardiovascular: Normal rate, regular rhythm and normal heart sounds. Exam reveals no gallop and no friction rub.   Pulmonary/Chest: Effort normal. No stridor. No respiratory distress. He has no wheezes.   Abdominal: Soft. Bowel sounds are normal. He exhibits no mass. There is no abdominal tenderness. There is no rebound and no guarding.   Musculoskeletal: Normal range of motion. No tenderness.   Lymphadenopathy:     He has no cervical adenopathy.   Neurological: He is alert and oriented to person, place, and time. He exhibits normal muscle tone.   Skin: Skin is warm. No rash noted. He is not diaphoretic. No erythema.   Psychiatric: His behavior is normal. Judgment and thought content normal.   Nursing note and vitals reviewed.    I have reexamined the patient and the results are consistent with the previously documented exam. Shwetascott Ayala MD       RECENT LABS  WBC   Date Value Ref Range Status   10/28/2020 7.69 3.40 - 10.80 10*3/mm3 Final     RBC   Date Value Ref Range Status   10/28/2020 5.26 4.14 - 5.80 10*6/mm3 Final     Hemoglobin   Date Value Ref Range Status   10/28/2020 16.6 13.0 - 17.7 g/dL Final     Hematocrit   Date Value Ref Range Status   10/28/2020 47.6 37.5 - 51.0 % Final     MCV   Date Value Ref Range Status   10/28/2020 90.5 79.0 - 97.0 fL Final     MCH   Date Value Ref Range Status   10/28/2020 31.6 26.6 - 33.0 pg Final     MCHC   Date Value Ref Range Status   10/28/2020 34.9 31.5 - 35.7 g/dL Final     RDW   Date Value Ref Range Status   10/28/2020 14.4 12.3 - 15.4 % Final     RDW-SD   Date Value Ref Range Status   10/28/2020 46.3 37.0 - 54.0 fl Final     MPV   Date Value Ref Range Status   10/28/2020 10.5 6.0 - 12.0 fL Final     Platelets   Date Value Ref Range Status   10/28/2020 111 (L) 140 - 450 10*3/mm3 Final     Neutrophil %   Date Value Ref Range Status   10/28/2020 69.3 42.7 - 76.0 % Final     Lymphocyte %   Date Value  Ref Range Status   10/28/2020 17.6 (L) 19.6 - 45.3 % Final     Monocyte %   Date Value Ref Range Status   10/28/2020 9.9 5.0 - 12.0 % Final     Eosinophil %   Date Value Ref Range Status   10/28/2020 2.7 0.3 - 6.2 % Final     Basophil %   Date Value Ref Range Status   10/28/2020 0.5 0.0 - 1.5 % Final     Neutrophils, Absolute   Date Value Ref Range Status   10/28/2020 5.33 1.70 - 7.00 10*3/mm3 Final     Lymphocytes, Absolute   Date Value Ref Range Status   10/28/2020 1.35 0.70 - 3.10 10*3/mm3 Final     Monocytes, Absolute   Date Value Ref Range Status   10/28/2020 0.76 0.10 - 0.90 10*3/mm3 Final     Eosinophils, Absolute   Date Value Ref Range Status   10/28/2020 0.21 0.00 - 0.40 10*3/mm3 Final     Basophils, Absolute   Date Value Ref Range Status   10/28/2020 0.04 0.00 - 0.20 10*3/mm3 Final       Lab Results   Component Value Date    GLUCOSE 100 (H) 09/05/2019    BUN 24 (H) 09/05/2019    CREATININE 1.50 (H) 09/05/2019    EGFRIFNONA 46 (L) 09/05/2019    BCR 16.0 09/05/2019    K 4.7 09/05/2019    CO2 26.0 09/05/2019    CALCIUM 9.4 09/05/2019    ALBUMIN 3.80 09/05/2019    LABIL2 1.6 05/22/2018    AST 26 09/05/2019    ALT 43 09/05/2019           ASSESSMENT:    1. Bilateral Pulmonary emboli: Ongoing management on anticoagulation therapy  2. Chronic DVT of proximal vein of right lower extremity  3. SVT  4. Hyperhomocysteinemia: Reviewed homocystine level  5. Heterozygous MTHFR mutation  6. CKD, stage III  7. Elevated Factor VIII level  8. Polycythemia: Reviewed  9. Thrombocytopenia: Chronic: Reviewed  10. Lesion of the Neck being followed by Dr. Kapoor: Diagnosed with skin cancer    He denies heavy alcohol use so likely not contributing to his thrombocytopenia.  His hemoglobin is stable and normal at 16.  Factor VIII level remains elevated homocystine level normal on folgard.       PLANS:     Continue Coumadin .  His dose was adjusted today.  Patient will follow-up in the Coumadin clinic in 1 week  Continue Folgard   Daily  Homocysteine level 5/14/2020 was normal at 11.7  Follow-up 4 months           I have reviewed labs results, imaging, vitals, and medications with the patient today. Will follow up in 4  months with me.    Patient verbalized understanding and is in agreement of the above plan.

## 2020-10-28 ENCOUNTER — ANTICOAGULATION VISIT (OUTPATIENT)
Dept: ONCOLOGY | Facility: HOSPITAL | Age: 70
End: 2020-10-28

## 2020-10-28 ENCOUNTER — LAB (OUTPATIENT)
Dept: LAB | Facility: HOSPITAL | Age: 70
End: 2020-10-28

## 2020-10-28 ENCOUNTER — OFFICE VISIT (OUTPATIENT)
Dept: ONCOLOGY | Facility: CLINIC | Age: 70
End: 2020-10-28

## 2020-10-28 VITALS
HEART RATE: 60 BPM | WEIGHT: 218 LBS | DIASTOLIC BLOOD PRESSURE: 73 MMHG | HEIGHT: 66 IN | SYSTOLIC BLOOD PRESSURE: 113 MMHG | RESPIRATION RATE: 18 BRPM | TEMPERATURE: 97.1 F | BODY MASS INDEX: 35.03 KG/M2

## 2020-10-28 DIAGNOSIS — E72.11 HYPERHOMOCYSTEINEMIA (HCC): Primary | ICD-10-CM

## 2020-10-28 DIAGNOSIS — I82.5Y3 CHRONIC DEEP VEIN THROMBOSIS (DVT) OF PROXIMAL VEIN OF BOTH LOWER EXTREMITIES (HCC): Primary | ICD-10-CM

## 2020-10-28 DIAGNOSIS — E72.11 HYPERHOMOCYSTEINEMIA (HCC): ICD-10-CM

## 2020-10-28 LAB
BASOPHILS # BLD AUTO: 0.04 10*3/MM3 (ref 0–0.2)
BASOPHILS NFR BLD AUTO: 0.5 % (ref 0–1.5)
DEPRECATED RDW RBC AUTO: 46.3 FL (ref 37–54)
EOSINOPHIL # BLD AUTO: 0.21 10*3/MM3 (ref 0–0.4)
EOSINOPHIL NFR BLD AUTO: 2.7 % (ref 0.3–6.2)
ERYTHROCYTE [DISTWIDTH] IN BLOOD BY AUTOMATED COUNT: 14.4 % (ref 12.3–15.4)
HCT VFR BLD AUTO: 47.6 % (ref 37.5–51)
HGB BLD-MCNC: 16.6 G/DL (ref 13–17.7)
INR PPP: 1.9
LYMPHOCYTES # BLD AUTO: 1.35 10*3/MM3 (ref 0.7–3.1)
LYMPHOCYTES NFR BLD AUTO: 17.6 % (ref 19.6–45.3)
MCH RBC QN AUTO: 31.6 PG (ref 26.6–33)
MCHC RBC AUTO-ENTMCNC: 34.9 G/DL (ref 31.5–35.7)
MCV RBC AUTO: 90.5 FL (ref 79–97)
MONOCYTES # BLD AUTO: 0.76 10*3/MM3 (ref 0.1–0.9)
MONOCYTES NFR BLD AUTO: 9.9 % (ref 5–12)
NEUTROPHILS NFR BLD AUTO: 5.33 10*3/MM3 (ref 1.7–7)
NEUTROPHILS NFR BLD AUTO: 69.3 % (ref 42.7–76)
PLATELET # BLD AUTO: 111 10*3/MM3 (ref 140–450)
PMV BLD AUTO: 10.5 FL (ref 6–12)
RBC # BLD AUTO: 5.26 10*6/MM3 (ref 4.14–5.8)
WBC # BLD AUTO: 7.69 10*3/MM3 (ref 3.4–10.8)

## 2020-10-28 PROCEDURE — 99214 OFFICE O/P EST MOD 30 MIN: CPT | Performed by: INTERNAL MEDICINE

## 2020-10-28 PROCEDURE — 85610 PROTHROMBIN TIME: CPT

## 2020-10-28 PROCEDURE — 85025 COMPLETE CBC W/AUTO DIFF WBC: CPT

## 2020-10-29 RX ORDER — CYANOCOBALAMIN/FOLIC AC/VIT B6 1-2.2-25MG
TABLET ORAL
Qty: 60 TABLET | Refills: 5 | Status: SHIPPED | OUTPATIENT
Start: 2020-10-29 | End: 2021-06-29

## 2020-11-03 ENCOUNTER — HOSPITAL ENCOUNTER (OUTPATIENT)
Dept: ONCOLOGY | Facility: HOSPITAL | Age: 70
Setting detail: INFUSION SERIES
Discharge: HOME | End: 2020-11-03

## 2020-11-03 ENCOUNTER — LAB (OUTPATIENT)
Dept: LAB | Facility: HOSPITAL | Age: 70
End: 2020-11-03

## 2020-11-03 DIAGNOSIS — I82.5Y3 CHRONIC DEEP VEIN THROMBOSIS (DVT) OF PROXIMAL VEIN OF BOTH LOWER EXTREMITIES (HCC): Primary | ICD-10-CM

## 2020-11-03 DIAGNOSIS — Z79.01 LONG TERM (CURRENT) USE OF ANTICOAGULANTS: Primary | ICD-10-CM

## 2020-11-03 LAB
INR PPP: 3.2
INR PPP: 3.2 (ref 0.9–1.1)

## 2020-11-03 PROCEDURE — 36416 COLLJ CAPILLARY BLOOD SPEC: CPT

## 2020-11-03 PROCEDURE — 85610 PROTHROMBIN TIME: CPT

## 2020-11-09 DIAGNOSIS — I26.99 OTHER PULMONARY EMBOLISM WITHOUT ACUTE COR PULMONALE, UNSPECIFIED CHRONICITY (HCC): Chronic | ICD-10-CM

## 2020-11-09 RX ORDER — WARFARIN SODIUM 5 MG/1
5 TABLET ORAL NIGHTLY
Qty: 30 TABLET | Refills: 5 | Status: SHIPPED | OUTPATIENT
Start: 2020-11-09 | End: 2021-06-04

## 2020-11-10 ENCOUNTER — LAB (OUTPATIENT)
Dept: LAB | Facility: HOSPITAL | Age: 70
End: 2020-11-10

## 2020-11-10 ENCOUNTER — HOSPITAL ENCOUNTER (OUTPATIENT)
Dept: ONCOLOGY | Facility: HOSPITAL | Age: 70
Setting detail: INFUSION SERIES
Discharge: HOME | End: 2020-11-10

## 2020-11-10 DIAGNOSIS — Z79.01 LONG TERM (CURRENT) USE OF ANTICOAGULANTS: Primary | ICD-10-CM

## 2020-11-10 DIAGNOSIS — E72.11 HYPERHOMOCYSTEINEMIA (HCC): ICD-10-CM

## 2020-11-10 DIAGNOSIS — I26.99 PULMONARY THROMBOSIS (HCC): Primary | ICD-10-CM

## 2020-11-10 LAB — INR PPP: 4.8

## 2020-11-10 PROCEDURE — 85610 PROTHROMBIN TIME: CPT

## 2020-11-10 PROCEDURE — 36416 COLLJ CAPILLARY BLOOD SPEC: CPT

## 2020-11-12 ENCOUNTER — LAB (OUTPATIENT)
Dept: LAB | Facility: HOSPITAL | Age: 70
End: 2020-11-12

## 2020-11-12 ENCOUNTER — TELEPHONE (OUTPATIENT)
Dept: ONCOLOGY | Facility: CLINIC | Age: 70
End: 2020-11-12

## 2020-11-12 ENCOUNTER — HOSPITAL ENCOUNTER (OUTPATIENT)
Dept: ONCOLOGY | Facility: HOSPITAL | Age: 70
Setting detail: INFUSION SERIES
Discharge: HOME | End: 2020-11-12

## 2020-11-12 DIAGNOSIS — I82.5Y3 CHRONIC DEEP VEIN THROMBOSIS (DVT) OF PROXIMAL VEIN OF BOTH LOWER EXTREMITIES (HCC): Primary | ICD-10-CM

## 2020-11-12 DIAGNOSIS — Z79.01 LONG TERM (CURRENT) USE OF ANTICOAGULANTS: Primary | ICD-10-CM

## 2020-11-12 LAB — INR PPP: 2.5

## 2020-11-12 PROCEDURE — 85610 PROTHROMBIN TIME: CPT

## 2020-11-12 PROCEDURE — 36416 COLLJ CAPILLARY BLOOD SPEC: CPT

## 2020-11-19 ENCOUNTER — APPOINTMENT (OUTPATIENT)
Dept: ONCOLOGY | Facility: HOSPITAL | Age: 70
End: 2020-11-19

## 2020-11-19 ENCOUNTER — LAB (OUTPATIENT)
Dept: LAB | Facility: HOSPITAL | Age: 70
End: 2020-11-19

## 2020-11-19 ENCOUNTER — APPOINTMENT (OUTPATIENT)
Dept: LAB | Facility: HOSPITAL | Age: 70
End: 2020-11-19

## 2020-11-19 ENCOUNTER — HOSPITAL ENCOUNTER (OUTPATIENT)
Dept: ONCOLOGY | Facility: HOSPITAL | Age: 70
Setting detail: INFUSION SERIES
Discharge: HOME | End: 2020-11-19

## 2020-11-19 DIAGNOSIS — I82.5Y3 CHRONIC DEEP VEIN THROMBOSIS (DVT) OF PROXIMAL VEIN OF BOTH LOWER EXTREMITIES (HCC): Primary | ICD-10-CM

## 2020-11-19 DIAGNOSIS — Z79.01 LONG TERM (CURRENT) USE OF ANTICOAGULANTS: Primary | ICD-10-CM

## 2020-11-19 LAB — INR PPP: 2.9

## 2020-11-19 PROCEDURE — G0463 HOSPITAL OUTPT CLINIC VISIT: HCPCS

## 2020-11-19 PROCEDURE — 85610 PROTHROMBIN TIME: CPT

## 2020-11-19 NOTE — PROGRESS NOTES
Patient is here for INR. Therapeutic two weeks in a row. Will recheck in one month. Patient states he has plenty of tablets at home.

## 2020-12-17 ENCOUNTER — HOSPITAL ENCOUNTER (OUTPATIENT)
Dept: ONCOLOGY | Facility: HOSPITAL | Age: 70
Setting detail: INFUSION SERIES
Discharge: HOME | End: 2020-12-17

## 2020-12-17 ENCOUNTER — LAB (OUTPATIENT)
Dept: LAB | Facility: HOSPITAL | Age: 70
End: 2020-12-17

## 2020-12-17 DIAGNOSIS — Z79.01 LONG TERM (CURRENT) USE OF ANTICOAGULANTS: Primary | ICD-10-CM

## 2020-12-17 DIAGNOSIS — E72.11 HYPERHOMOCYSTEINEMIA (HCC): ICD-10-CM

## 2020-12-17 DIAGNOSIS — I82.5Y3 CHRONIC DEEP VEIN THROMBOSIS (DVT) OF PROXIMAL VEIN OF BOTH LOWER EXTREMITIES (HCC): Primary | ICD-10-CM

## 2020-12-17 LAB
BASOPHILS # BLD AUTO: 0.02 10*3/MM3 (ref 0–0.2)
BASOPHILS NFR BLD AUTO: 0.3 % (ref 0–1.5)
DEPRECATED RDW RBC AUTO: 44.2 FL (ref 37–54)
EOSINOPHIL # BLD AUTO: 0.19 10*3/MM3 (ref 0–0.4)
EOSINOPHIL NFR BLD AUTO: 3.2 % (ref 0.3–6.2)
ERYTHROCYTE [DISTWIDTH] IN BLOOD BY AUTOMATED COUNT: 13.7 % (ref 12.3–15.4)
HCT VFR BLD AUTO: 46.9 % (ref 37.5–51)
HGB BLD-MCNC: 16.6 G/DL (ref 13–17.7)
INR PPP: 3.5
LYMPHOCYTES # BLD AUTO: 1.25 10*3/MM3 (ref 0.7–3.1)
LYMPHOCYTES NFR BLD AUTO: 20.9 % (ref 19.6–45.3)
MCH RBC QN AUTO: 31.7 PG (ref 26.6–33)
MCHC RBC AUTO-ENTMCNC: 35.4 G/DL (ref 31.5–35.7)
MCV RBC AUTO: 89.7 FL (ref 79–97)
MONOCYTES # BLD AUTO: 0.56 10*3/MM3 (ref 0.1–0.9)
MONOCYTES NFR BLD AUTO: 9.3 % (ref 5–12)
NEUTROPHILS NFR BLD AUTO: 3.97 10*3/MM3 (ref 1.7–7)
NEUTROPHILS NFR BLD AUTO: 66.3 % (ref 42.7–76)
PLATELET # BLD AUTO: 103 10*3/MM3 (ref 140–450)
PMV BLD AUTO: 10 FL (ref 6–12)
RBC # BLD AUTO: 5.23 10*6/MM3 (ref 4.14–5.8)
WBC # BLD AUTO: 5.99 10*3/MM3 (ref 3.4–10.8)

## 2020-12-17 PROCEDURE — 85025 COMPLETE CBC W/AUTO DIFF WBC: CPT

## 2020-12-17 PROCEDURE — 85610 PROTHROMBIN TIME: CPT

## 2020-12-17 PROCEDURE — 36416 COLLJ CAPILLARY BLOOD SPEC: CPT

## 2020-12-29 ENCOUNTER — HOSPITAL ENCOUNTER (OUTPATIENT)
Dept: ONCOLOGY | Facility: HOSPITAL | Age: 70
Setting detail: INFUSION SERIES
Discharge: HOME | End: 2020-12-29

## 2020-12-29 ENCOUNTER — LAB (OUTPATIENT)
Dept: LAB | Facility: HOSPITAL | Age: 70
End: 2020-12-29

## 2020-12-29 DIAGNOSIS — Z79.01 LONG TERM (CURRENT) USE OF ANTICOAGULANTS: Primary | ICD-10-CM

## 2020-12-29 DIAGNOSIS — I82.5Y3 CHRONIC DEEP VEIN THROMBOSIS (DVT) OF PROXIMAL VEIN OF BOTH LOWER EXTREMITIES (HCC): Primary | ICD-10-CM

## 2020-12-29 LAB — INR PPP: 2.4

## 2020-12-29 PROCEDURE — 36416 COLLJ CAPILLARY BLOOD SPEC: CPT

## 2020-12-29 PROCEDURE — 85610 PROTHROMBIN TIME: CPT

## 2021-01-26 ENCOUNTER — HOSPITAL ENCOUNTER (OUTPATIENT)
Dept: ONCOLOGY | Facility: HOSPITAL | Age: 71
Setting detail: INFUSION SERIES
Discharge: HOME | End: 2021-01-26

## 2021-01-26 ENCOUNTER — LAB (OUTPATIENT)
Dept: LAB | Facility: HOSPITAL | Age: 71
End: 2021-01-26

## 2021-01-26 DIAGNOSIS — D69.6 THROMBOCYTOPENIA (HCC): ICD-10-CM

## 2021-01-26 DIAGNOSIS — Z79.01 LONG TERM (CURRENT) USE OF ANTICOAGULANTS: Primary | ICD-10-CM

## 2021-01-26 DIAGNOSIS — I82.5Y3 CHRONIC DEEP VEIN THROMBOSIS (DVT) OF PROXIMAL VEIN OF BOTH LOWER EXTREMITIES (HCC): Primary | ICD-10-CM

## 2021-01-26 DIAGNOSIS — I26.99 PULMONARY THROMBOSIS (HCC): ICD-10-CM

## 2021-01-26 LAB — INR PPP: 1.6

## 2021-01-26 PROCEDURE — 36416 COLLJ CAPILLARY BLOOD SPEC: CPT

## 2021-01-26 PROCEDURE — 85610 PROTHROMBIN TIME: CPT

## 2021-02-02 ENCOUNTER — LAB (OUTPATIENT)
Dept: LAB | Facility: HOSPITAL | Age: 71
End: 2021-02-02

## 2021-02-02 ENCOUNTER — HOSPITAL ENCOUNTER (OUTPATIENT)
Dept: ONCOLOGY | Facility: HOSPITAL | Age: 71
Setting detail: INFUSION SERIES
Discharge: HOME | End: 2021-02-02

## 2021-02-02 DIAGNOSIS — D69.6 THROMBOCYTOPENIA (HCC): ICD-10-CM

## 2021-02-02 DIAGNOSIS — I82.5Y3 CHRONIC DEEP VEIN THROMBOSIS (DVT) OF PROXIMAL VEIN OF BOTH LOWER EXTREMITIES (HCC): ICD-10-CM

## 2021-02-02 DIAGNOSIS — Z79.01 LONG TERM (CURRENT) USE OF ANTICOAGULANTS: Primary | ICD-10-CM

## 2021-02-02 DIAGNOSIS — I26.99 PULMONARY THROMBOSIS (HCC): ICD-10-CM

## 2021-02-02 LAB — INR PPP: 2.7

## 2021-02-02 PROCEDURE — 85610 PROTHROMBIN TIME: CPT

## 2021-02-02 PROCEDURE — 36416 COLLJ CAPILLARY BLOOD SPEC: CPT

## 2021-02-09 ENCOUNTER — LAB (OUTPATIENT)
Dept: LAB | Facility: HOSPITAL | Age: 71
End: 2021-02-09

## 2021-02-09 ENCOUNTER — HOSPITAL ENCOUNTER (OUTPATIENT)
Dept: ONCOLOGY | Facility: HOSPITAL | Age: 71
Setting detail: INFUSION SERIES
Discharge: HOME | End: 2021-02-09

## 2021-02-09 DIAGNOSIS — Z79.01 LONG TERM (CURRENT) USE OF ANTICOAGULANTS: Primary | ICD-10-CM

## 2021-02-09 DIAGNOSIS — I82.5Y3 CHRONIC DEEP VEIN THROMBOSIS (DVT) OF PROXIMAL VEIN OF BOTH LOWER EXTREMITIES (HCC): ICD-10-CM

## 2021-02-09 DIAGNOSIS — D69.6 THROMBOCYTOPENIA (HCC): ICD-10-CM

## 2021-02-09 DIAGNOSIS — I26.99 PULMONARY THROMBOSIS (HCC): ICD-10-CM

## 2021-02-09 LAB — INR PPP: 2.4

## 2021-02-09 PROCEDURE — 36416 COLLJ CAPILLARY BLOOD SPEC: CPT

## 2021-02-09 PROCEDURE — 85610 PROTHROMBIN TIME: CPT

## 2021-02-12 RX ORDER — WARFARIN SODIUM 1 MG/1
TABLET ORAL
Qty: 90 TABLET | Refills: 0 | Status: SHIPPED | OUTPATIENT
Start: 2021-02-12 | End: 2021-04-09 | Stop reason: SDUPTHER

## 2021-02-19 ENCOUNTER — TELEPHONE (OUTPATIENT)
Dept: ONCOLOGY | Facility: CLINIC | Age: 71
End: 2021-02-19

## 2021-02-19 NOTE — TELEPHONE ENCOUNTER
CALLED TO PUSH  PATIENT APPT BACK PER [facility] TEAM, WAS TRYING TO GIVE DATE 04/06 @ 1143 IF PATIENT CALLS BACK CAN WE GIVE THIS APPT AND TIME TO PATIENT

## 2021-03-01 NOTE — PROGRESS NOTES
Hematology/Oncology Outpatient Follow Up    PATIENT NAME:Jett Doe  :1950  MRN: [id number]  PRIMARY CARE PHYSICIAN: Malachi Davenport MD  REFERRING PHYSICIAN: Malachi Davenport MD    Chief Complaint   Patient presents with   • Appointment     Hyperhomocysteinemia (CMS/HCC)   • Follow-up         HISTORY OF PRESENT ILLNESS:     1. Bilateral PE, right lower extremity DVT and bilateral SVTs diagnosis established in 2014 and MTHFR C677T heterozygosity diagnosis established and 2014.  · The patient presented to [facility] on 2014 where was admitted through 2014 with complaints of chest pain and shortness of air.  He was seen per EMS and had associated diaphoresis as well as some low sternal chest pressure.  His troponin was elevated and cardiology was consulted.  He was started on nitroglycerin drip and heparin drip.  He was given diuresis and admitted to the CBCU.  It was planned for him to undergo a cardiac catheterization.  A VQ scan was done to evaluate for bilateral pulmonary emboli and renal is consulted for his elevated creatinine.  The VQ scan showed multiple abnormalities in both lungs with a high probably for pulmonary emboli.  His chest x-ray showed no acute cardiopulmonary abnormality.  We were consulted for his Thrombopenia and his pulmonology emboli.  His risk factors included obesity, sedentary lifestyle, history of stroke and hyperlipidemia.  He has been on Plavix and aspirin as an outpatient and when his pulmonary emboli were found, his heparin was changed to renal dust Lovenox therapy 1 mg/kg per day.  He was not started on Coumadin initially with the plan that cardiology would perform a cardiac catheterization shortly after admission.  A thrombophilia workup was performed and results were; his AT3 was 89.9% normal, protein C was 101.4% normal and protein S was 101% normal, Fibrinogen 416 normal, factor V Leiden screen is negative, prothrombin  gene mutation is negative.  MTHFR showed heterozygosity for C677 T mutation, but negative for [id number] mutation.  Phosphate lipid antibodies were done.  Beta II glycoprotein is negative.  Phosphatidylserine antibody is negative.  Cardiolipin antibodies are all negative.  Bilateral lower extremity Dopplers was performed that showed a right lower extremity DVT and bilateral SVTs in his small saphenous vein.  On 08/20/2014, an IVC filter was placed with plans to hold starting his Coumadinization until after his cardiac catheterization was performed, which was going to be scheduled as an outpatient per Dr. Jurado.  It was noted that he had thrombocytopenia on admission with the platelet count of 125,000.  His platelet count slowly felt to 92,000 and it discharge with 23095.  His LFTs were noted to be abnormal with an AST of 88 and ALT of 103.  His coags were normal with PT of 11.9, INR 1.1 and PTT 35.5.  A thrombocytopenia workup was performed as seen below.  TSH of 1.3, His LFTs are normalized prior to discharge.  Nephrology was consulted for his AMADEO with a creatinine of 2.0 on admission.  Dr. Howard performed some renal studies.  His urinalysis was normal.  His renal ultrasound showed a 2 cm mass extended of the upper pole of the right kidney, which appeared to be solid with recommendations follow up with the CT or an MRI of his abdomen.  There was no hydronephrosis to indicate obstructive uropathy and the urinary bladder was unremarkable.  His creatinine improved with medication adjustments and was 1.4 and discharged with plans to perform an MRI of his afternoon when his creatinine improved to further evaluate his right renal mass.  An echocardiogram was performed that showed 45-55% ejection fraction and no other abnormalities were identified.  · 8/17/15 - Comprehensive metabolic panel with creatinine 1.6.  · 9/8/14 - patient was seen in the office as a hospital follow-up. Homocysteine level 18.4 (H)  · 9/10/14 - orders  written to start Folgard daily due to the elevated homocystine level.  · 10/7/14 - The patient reports that he has not started taking the Folgard and did not remember having a prescription called into the pharmacy.  Advised the patient again to start taking Folgard daily and gave the patient a prescription today  · 11/13/14 - homocystine 17.7 (H)  · 11/19/14 - advised patient to increase Folgard 2 twice a day.  · 8/17/15 - Homocysteine 16.8 (5-12).   · 3/8/16 - Patient does not recall taking Folgard. Prescription written for Folgard 1 p.o. q. d.  Patient claims that he has not taken Coumadin for three days as he was out of town. INR today 1.4. Instructed to resume Coumadin at same dose and follow INR protocol.   · 6/30/16 - Comprehensive metabolic panel with creatinine 1.3 (0.7-1.2). Serum homocysteine 12.2 (5-12).     · 3/7/17 - INR 2.7 on 8 mg of Warfarin daily. Patient asked to increase Folgard to twice a day.   · 5/5/17 - INR 2.4, therapeutic. Patient is to continue the INR protocol and continue with Folgard one tablet twice daily as well as Coumadin 8 mg daily. Homocysteine 11.2 normal (5.0-12.0).   · 8/4/17 - INR today is 1.6. Patient states that he missed his dose of Coumadin Monday and Tuesday of this week. He denied any diet changes. I will leave the patient on his current dose of 8 mg daily and have him return to the office for an INR in one week as he had missed two consecutive doses of Coumadin earlier. He is to continue to follow the INR protocol and he is to continue the Folgard one tablet twice daily as it is controlling his homocysteine level.   · 11/9/17 - Patient claims to be missing occasional Warfarin doses. Counseled.   · 5/22/18 - Hemoglobin 16.2, MCV 89.6, hematocrit 45.9. Serum homocysteine 14.5 (<15).    · 9/18/18 - Discussed possibly stopping Coumadin if workup negative. PT PTT 30.5 and 36.5 with correction of PTT on 1:1 mix with normal plasma. Lupus anticoagulant absent. Factor VIII  activity 125 (). D-dimer >0.22 (<0.45).     · 9/21/18 - Venous Doppler study bilateral lower extremity with chronic deep venous thrombosis involving the right popliteal vein. Chronic superficial venous thrombosis involving the right small saphenous vein and a lymph node noted in the right groin.   · 12/18/18 - Discussed venous Doppler results. Recommended continuation of anticoagulation either with Coumadin or maintenance DOAC’s or Aspirin alone, which I did not recommend. Patient decided to continue on Warfarin.   ? 5/2/2019- homocystine 8.5 (N), factor VIII level 133% (H).  2. Polycythemia diagnosis established in January 2015.  · 1/14/15 - Discussed with the patient the need for a bone marrow biopsy along with additional lab work for further evaluation of the thrombocytopenia and polycythemia.  The patient states that he does not wish to proceed with the blood work or the bone marrow biopsy at this time. Hemoglobin 17.0  · 3/7/17 - WBC 7.17, hemoglobin 17.2, platelet count 120,000. Discussed bone marrow evaluation again with the patient. He will think about it.   · 12/18/18 - WBC 10.4 with 84% neutrophils, 9% lymphocytes, 6% monocytes, hemoglobin 16.6, hematocrit 47.1, platelet count 121,000. BCR/ABL negative. [employer] NGS JAK2 panel negative.   ? 9/5/2019- hemoglobin 16.1, hematocrit 47.4.  3. Thrombocytopenia diagnosis established in October 2014  · The patient presented to [facility] on August 19, 2014 where was admitted through 08/22/2014 with complaints of chest pain and shortness of air.  It was noted that he had thrombocytopenia on admission with the platelet count of 125,000.  His platelet count slowly fell to 92,000 and it discharge with 12,000.  PT 11.9, INR 1.1, PTT 35.5.  A thrombocytopenia workup was performed that showed a folate of greater than 24.8 (H), and vitamin B12 331(N), , CMV, IgM was negative and EBV IgM was positive.  TAMIA screen is negative.  Platelet antibody  screen is negative.  His PF4 was negative at 0.035.  It was noted that in the past, he has had some slightly low platelet counts in 125-140 range.    · 9/8/14 - platelet count 265,000.  EBV IgG positive, EBV IgM negative.  · 10/7/14 - platelet count 101,000.  · 11/17/14 - reviewed medication list for thrombocytopenia.  Metoprolol was known to cause thrombocytopenia purpura.  Lisinopril was known to rarely cause thrombocytopenia and less than 0.01% of the patients.  Plavix was known to cause thrombotic thrombocytopenia purpura (TTP) and thrombocytopenia and less than 1% of the patients.  Crestor was known to cause TTP and thrombocytopenia.  ? 9/5/2019-patient reports moderate alcohol use-16 ounces beer 3 times per week.  4. Elevated LFTs and liver mass diagnosis established in September 2014.  · 9/8/14 - ALT 52 (H), AST 29 (N),   · 9/10/14 - advised the patient to increase his hydration and we’ll order a CT scan of the abdomen to be done for further evaluation of the elevated LFTs.  · 9/16/14 - ALT 49 (H), AST 29 (N)  · 9/23/14 - CT of the abdomen showed no evidence of renal mass.  There was an approximately 2 cm non-cystic appearing hypodense lesion on the posterior medial aspect of the right hepatic lobe.  This was in close proximity to the upper pole of the right kidney.  This could conceivably represent the mass seen on ultrasound study.  However it was definitely raising from the liver and not the upper pole of the right kidney.  · 10/7/14 - Will order a MRI for evaluation of the liver mass as well as lab work for further evaluation of the elevated LFTs.  · 10/7/14 - Hepatitis screening negative, A1AT 133 (N), AFP 9.2 (H), ceruloplasmin 22 (N), ferritin 133.9 (N),   · 10/13/14 - MRI of the abdomen showed liver lesion within the posterior right lobe of the liver is most consistent with hemangioma   · 8/17/15 - ALT 38 (N), AST 26 (N), alkaline phosphatase 26 (N). Elevated LFT’s resolved.   · 3/8/16 - AFP 8 (0-9).     · 5/5/17 - CMP revealed an AST of 32 normal (15-41), ALT 55 normal (17-63), creatinine 1.4 high (0.7-1.2) and BUN 32 high (8-20). Otherwise within normal limits.      · 11/9/17 - Patient claims his PCP has recommended evaluation for his high liver enzymes by a liver doctor, but he has not followed up on it.     Past Medical History:   Diagnosis Date   • Cardiomyopathy (CMS/HCC) 2012   • CVA (cerebral vascular accident) (CMS/HCC) 2012   • Hyperlipemia 2012   • Hypertension 2012       Past Surgical History:   Procedure Laterality Date   • TURP / TRANSURETHRAL INCISION / DRAINAGE PROSTATE  2016         Current Outpatient Medications:   •  atorvastatin (LIPITOR) 20 MG tablet, ATORVASTATIN CALCIUM 20 MG TABS, Disp: , Rfl:   •  clopidogrel (PLAVIX) 75 MG tablet, Take 75 mg by mouth Daily., Disp: , Rfl:   •  hydrALAZINE (APRESOLINE) 10 MG tablet, HYDRALAZINE HCL 10 MG TABS, Disp: , Rfl:   •  lisinopril-hydrochlorothiazide (PRINZIDE,ZESTORETIC) 20-12.5 MG per tablet, LISINOPRIL-HYDROCHLOROTHIAZIDE 20-12.5 MG TABS, Disp: , Rfl:   •  metoprolol tartrate (LOPRESSOR) 25 MG tablet, METOPROLOL TARTRATE 25 MG TABS, Disp: , Rfl:   •  Virt-Verito 2.2-25-1 MG tablet tablet, TAKE ONE TABLET BY MOUTH TWICE A DAY, Disp: 60 tablet, Rfl: 5  •  warfarin (COUMADIN) 1 MG tablet, TAKE ONE TABLET BY MOUTH ONCE NIGHTLY OR AS DIRECTED   *MUST MAKE APPOINTMENT FOR FURTHER REFILLS*, Disp: 90 tablet, Rfl: 0  •  warfarin (COUMADIN) 5 MG tablet, Take 1 tablet by mouth Every Night. At 1700 or as directed., Disp: 30 tablet, Rfl: 5  •  WARFARIN SODIUM PO, Take  by mouth Daily. Taking 7 mg daily 10/28/20, Disp: , Rfl:     No Known Allergies    Family History   Problem Relation Age of Onset   • Lung cancer Mother        Cancer-related family history includes Lung cancer in his mother.    Social History     Tobacco Use   • Smoking status: Never Smoker   • Smokeless tobacco: Current User     Types: Chew   Substance Use Topics   • Alcohol use: Yes      "Frequency: 4 or more times a week     Drinks per session: 1 or 2   • Drug use: No       I have reviewed and confirmed the accuracy of the patient's history: as entered by the MA/TAQUERIA/JEREMY. Shweta Ayala MD 03/02/21     SUBJECTIVE:    The patient is here for a follow up appointment.She has no new issues.        REVIEW OF SYSTEMS:  Review of Systems   Constitutional: Negative for chills and fever.   HENT: Negative for ear pain, mouth sores, nosebleeds and sore throat.    Eyes: Negative for photophobia and visual disturbance.   Respiratory: Negative for wheezing and stridor.    Cardiovascular: Negative for chest pain and palpitations.   Gastrointestinal: Negative for abdominal pain, diarrhea, nausea and vomiting.   Endocrine: Negative for cold intolerance and heat intolerance.   Genitourinary: Negative for dysuria and hematuria.   Musculoskeletal: Negative for joint swelling and neck stiffness.   Skin: Negative for color change and rash.   Neurological: Negative for seizures and syncope.   Hematological: Negative for adenopathy.        No obvious bleeding   Psychiatric/Behavioral: Negative for agitation, confusion and hallucinations.   I have reviewed and confirmed the accuracy of the ROS as documented by the MA/ALLISONN/RN Shweta Ayala MD      OBJECTIVE:    Vitals:    03/02/21 0913   BP: 110/70   Pulse: 69   Resp: 20   Temp: 97.1 °F (36.2 °C)   Weight: 10.2 kg (22 lb 6.4 oz)   Height: 167 cm (65.75\")   PainSc: 0-No pain       ECOG  (0) Fully active, able to carry on all predisease performance without restriction    Physical Exam   Constitutional: He is oriented to person, place, and time. No distress.   HENT:   Head: Normocephalic and atraumatic.   Eyes: Conjunctivae are normal. Right eye exhibits no discharge. Left eye exhibits no discharge. No scleral icterus.   Neck: Normal range of motion. Neck supple. No thyromegaly present.   Cardiovascular: Normal rate, regular rhythm and normal heart sounds. Exam " reveals no gallop and no friction rub.   Pulmonary/Chest: Effort normal. No stridor. No respiratory distress. He has no wheezes.   Abdominal: Soft. Bowel sounds are normal. He exhibits no mass. There is no abdominal tenderness. There is no rebound and no guarding.   Musculoskeletal: Normal range of motion. No tenderness.   Lymphadenopathy:     He has no cervical adenopathy.   Neurological: He is alert and oriented to person, place, and time. He exhibits normal muscle tone.   Skin: Skin is warm. No rash noted. He is not diaphoretic. No erythema.   Psychiatric: His behavior is normal. Judgment and thought content normal.   Nursing note and vitals reviewed.    I have reexamined the patient and the results are consistent with the previously documented exam. Shweta Ayala MD       RECENT LABS    WBC   Date Value Ref Range Status   03/02/2021 8.19 3.40 - 10.80 10*3/mm3 Final     RBC   Date Value Ref Range Status   03/02/2021 5.52 4.14 - 5.80 10*6/mm3 Final     Hemoglobin   Date Value Ref Range Status   03/02/2021 17.2 13.0 - 17.7 g/dL Final     Hematocrit   Date Value Ref Range Status   03/02/2021 49.6 37.5 - 51.0 % Final     MCV   Date Value Ref Range Status   03/02/2021 89.9 79.0 - 97.0 fL Final     MCH   Date Value Ref Range Status   03/02/2021 31.2 26.6 - 33.0 pg Final     MCHC   Date Value Ref Range Status   03/02/2021 34.7 31.5 - 35.7 g/dL Final     RDW   Date Value Ref Range Status   03/02/2021 13.7 12.3 - 15.4 % Final     RDW-SD   Date Value Ref Range Status   03/02/2021 43.9 37.0 - 54.0 fl Final     MPV   Date Value Ref Range Status   03/02/2021 10.8 6.0 - 12.0 fL Final     Platelets   Date Value Ref Range Status   03/02/2021 105 (L) 140 - 450 10*3/mm3 Final     Neutrophil %   Date Value Ref Range Status   03/02/2021 74.9 42.7 - 76.0 % Final     Lymphocyte %   Date Value Ref Range Status   03/02/2021 14.8 (L) 19.6 - 45.3 % Final     Monocyte %   Date Value Ref Range Status   03/02/2021 7.3 5.0 - 12.0 %  Final     Eosinophil %   Date Value Ref Range Status   03/02/2021 2.6 0.3 - 6.2 % Final     Basophil %   Date Value Ref Range Status   03/02/2021 0.4 0.0 - 1.5 % Final     Neutrophils, Absolute   Date Value Ref Range Status   03/02/2021 6.14 1.70 - 7.00 10*3/mm3 Final     Lymphocytes, Absolute   Date Value Ref Range Status   03/02/2021 1.21 0.70 - 3.10 10*3/mm3 Final     Monocytes, Absolute   Date Value Ref Range Status   03/02/2021 0.60 0.10 - 0.90 10*3/mm3 Final     Eosinophils, Absolute   Date Value Ref Range Status   03/02/2021 0.21 0.00 - 0.40 10*3/mm3 Final     Basophils, Absolute   Date Value Ref Range Status   03/02/2021 0.03 0.00 - 0.20 10*3/mm3 Final       Lab Results   Component Value Date    GLUCOSE 100 (H) 09/05/2019    BUN 24 (H) 09/05/2019    CREATININE 1.50 (H) 09/05/2019    EGFRIFNONA 46 (L) 09/05/2019    BCR 16.0 09/05/2019    K 4.7 09/05/2019    CO2 26.0 09/05/2019    CALCIUM 9.4 09/05/2019    ALBUMIN 3.80 09/05/2019    LABIL2 1.6 05/22/2018    AST 26 09/05/2019    ALT 43 09/05/2019           ASSESSMENT:    1. Bilateral Pulmonary emboli: Ongoing management on anticoagulation therapy.  Pratima is on warfarin  2. Chronic DVT of proximal vein of right lower extremity  3. SVT  4. Hyperhomocysteinemia: Reviewed homocystine level  5. Heterozygous MTHFR mutation  6. CKD, stage III  7. Elevated Factor VIII level  8. Polycythemia: Reviewed  9. Thrombocytopenia: Chronic: Stable  10. Lesion of the Neck being followed by Dr. Kapoor: Diagnosed with skin cancer    He denies heavy alcohol use so likely not contributing to his thrombocytopenia.  His hemoglobin is stable and normal at 16.  Factor VIII level remains elevated homocystine level normal on folgard.       PLANS:     Continue Coumadin .  Repeat PT/INR in 3 weeks.  If stable then once a month  Continue Folgard  Daily  Homocysteine level 5/14/2020 was normal at 11.7  Follow-up 4 months.  Plan to check a fasting homocystine level at the next visit  All  questions answered           I have reviewed labs results, imaging, vitals, and medications with the patient today. Will follow up in 4  months with me.    Patient verbalized understanding and is in agreement of the above plan.

## 2021-03-02 ENCOUNTER — LAB (OUTPATIENT)
Dept: LAB | Facility: HOSPITAL | Age: 71
End: 2021-03-02

## 2021-03-02 ENCOUNTER — OFFICE VISIT (OUTPATIENT)
Dept: ONCOLOGY | Facility: CLINIC | Age: 71
End: 2021-03-02

## 2021-03-02 VITALS
DIASTOLIC BLOOD PRESSURE: 70 MMHG | WEIGHT: 22.4 LBS | TEMPERATURE: 97.1 F | RESPIRATION RATE: 20 BRPM | SYSTOLIC BLOOD PRESSURE: 110 MMHG | BODY MASS INDEX: 3.6 KG/M2 | HEART RATE: 69 BPM | HEIGHT: 66 IN

## 2021-03-02 DIAGNOSIS — E72.11 HYPERHOMOCYSTEINEMIA (HCC): ICD-10-CM

## 2021-03-02 DIAGNOSIS — I26.99 OTHER PULMONARY EMBOLISM WITHOUT ACUTE COR PULMONALE, UNSPECIFIED CHRONICITY (HCC): ICD-10-CM

## 2021-03-02 DIAGNOSIS — I26.99 OTHER PULMONARY EMBOLISM WITHOUT ACUTE COR PULMONALE, UNSPECIFIED CHRONICITY (HCC): Primary | ICD-10-CM

## 2021-03-02 LAB
BASOPHILS # BLD AUTO: 0.03 10*3/MM3 (ref 0–0.2)
BASOPHILS NFR BLD AUTO: 0.4 % (ref 0–1.5)
DEPRECATED RDW RBC AUTO: 43.9 FL (ref 37–54)
EOSINOPHIL # BLD AUTO: 0.21 10*3/MM3 (ref 0–0.4)
EOSINOPHIL NFR BLD AUTO: 2.6 % (ref 0.3–6.2)
ERYTHROCYTE [DISTWIDTH] IN BLOOD BY AUTOMATED COUNT: 13.7 % (ref 12.3–15.4)
HCT VFR BLD AUTO: 49.6 % (ref 37.5–51)
HGB BLD-MCNC: 17.2 G/DL (ref 13–17.7)
INR PPP: 2.7
LYMPHOCYTES # BLD AUTO: 1.21 10*3/MM3 (ref 0.7–3.1)
LYMPHOCYTES NFR BLD AUTO: 14.8 % (ref 19.6–45.3)
MCH RBC QN AUTO: 31.2 PG (ref 26.6–33)
MCHC RBC AUTO-ENTMCNC: 34.7 G/DL (ref 31.5–35.7)
MCV RBC AUTO: 89.9 FL (ref 79–97)
MONOCYTES # BLD AUTO: 0.6 10*3/MM3 (ref 0.1–0.9)
MONOCYTES NFR BLD AUTO: 7.3 % (ref 5–12)
NEUTROPHILS NFR BLD AUTO: 6.14 10*3/MM3 (ref 1.7–7)
NEUTROPHILS NFR BLD AUTO: 74.9 % (ref 42.7–76)
PLATELET # BLD AUTO: 105 10*3/MM3 (ref 140–450)
PMV BLD AUTO: 10.8 FL (ref 6–12)
RBC # BLD AUTO: 5.52 10*6/MM3 (ref 4.14–5.8)
WBC # BLD AUTO: 8.19 10*3/MM3 (ref 3.4–10.8)

## 2021-03-02 PROCEDURE — 85610 PROTHROMBIN TIME: CPT

## 2021-03-02 PROCEDURE — 85025 COMPLETE CBC W/AUTO DIFF WBC: CPT

## 2021-03-02 PROCEDURE — 99213 OFFICE O/P EST LOW 20 MIN: CPT | Performed by: INTERNAL MEDICINE

## 2021-03-19 ENCOUNTER — TELEPHONE (OUTPATIENT)
Dept: ONCOLOGY | Facility: HOSPITAL | Age: 71
End: 2021-03-19

## 2021-03-23 ENCOUNTER — LAB (OUTPATIENT)
Dept: LAB | Facility: HOSPITAL | Age: 71
End: 2021-03-23

## 2021-03-23 ENCOUNTER — HOSPITAL ENCOUNTER (OUTPATIENT)
Dept: ONCOLOGY | Facility: HOSPITAL | Age: 71
Setting detail: INFUSION SERIES
Discharge: HOME | End: 2021-03-23

## 2021-03-23 DIAGNOSIS — E72.11 HYPERHOMOCYSTEINEMIA (HCC): ICD-10-CM

## 2021-03-23 DIAGNOSIS — Z79.01 LONG TERM (CURRENT) USE OF ANTICOAGULANTS: Primary | ICD-10-CM

## 2021-03-23 DIAGNOSIS — I26.99 OTHER PULMONARY EMBOLISM WITHOUT ACUTE COR PULMONALE, UNSPECIFIED CHRONICITY (HCC): ICD-10-CM

## 2021-03-23 LAB — INR PPP: 1.2

## 2021-03-23 PROCEDURE — 85610 PROTHROMBIN TIME: CPT

## 2021-03-23 PROCEDURE — 36416 COLLJ CAPILLARY BLOOD SPEC: CPT

## 2021-03-23 NOTE — PROGRESS NOTES
Pt states that he forgot to take all his medications last night.  He asked to continue the 7.5mg as the level has been good for a couple months.  Advised him that was fine as long as the INR is rechecked on Friday.  He v/u, apt card given

## 2021-03-26 ENCOUNTER — LAB (OUTPATIENT)
Dept: LAB | Facility: HOSPITAL | Age: 71
End: 2021-03-26

## 2021-03-26 ENCOUNTER — HOSPITAL ENCOUNTER (OUTPATIENT)
Dept: ONCOLOGY | Facility: HOSPITAL | Age: 71
Setting detail: INFUSION SERIES
Discharge: HOME | End: 2021-03-26

## 2021-03-26 DIAGNOSIS — I82.5Y3 CHRONIC DEEP VEIN THROMBOSIS (DVT) OF PROXIMAL VEIN OF BOTH LOWER EXTREMITIES (HCC): ICD-10-CM

## 2021-03-26 DIAGNOSIS — D69.6 THROMBOCYTOPENIA (HCC): ICD-10-CM

## 2021-03-26 DIAGNOSIS — Z79.01 LONG TERM (CURRENT) USE OF ANTICOAGULANTS: Primary | ICD-10-CM

## 2021-03-26 DIAGNOSIS — I26.99 PULMONARY THROMBOSIS (HCC): ICD-10-CM

## 2021-03-26 LAB — INR PPP: 1.7

## 2021-03-26 PROCEDURE — 85610 PROTHROMBIN TIME: CPT

## 2021-03-26 PROCEDURE — 36416 COLLJ CAPILLARY BLOOD SPEC: CPT

## 2021-03-30 ENCOUNTER — HOSPITAL ENCOUNTER (OUTPATIENT)
Dept: ONCOLOGY | Facility: HOSPITAL | Age: 71
Setting detail: INFUSION SERIES
Discharge: HOME | End: 2021-03-30

## 2021-03-30 ENCOUNTER — LAB (OUTPATIENT)
Dept: LAB | Facility: HOSPITAL | Age: 71
End: 2021-03-30

## 2021-03-30 DIAGNOSIS — Z79.01 LONG TERM (CURRENT) USE OF ANTICOAGULANTS: Primary | ICD-10-CM

## 2021-03-30 DIAGNOSIS — I26.99 PULMONARY THROMBOSIS (HCC): ICD-10-CM

## 2021-03-30 DIAGNOSIS — D69.6 THROMBOCYTOPENIA (HCC): ICD-10-CM

## 2021-03-30 DIAGNOSIS — I82.5Y3 CHRONIC DEEP VEIN THROMBOSIS (DVT) OF PROXIMAL VEIN OF BOTH LOWER EXTREMITIES (HCC): ICD-10-CM

## 2021-03-30 LAB — INR PPP: 2.3

## 2021-03-30 PROCEDURE — 85610 PROTHROMBIN TIME: CPT

## 2021-03-30 PROCEDURE — 36416 COLLJ CAPILLARY BLOOD SPEC: CPT

## 2021-03-30 NOTE — PROGRESS NOTES
Patient INR is within range today, will recheck in two weeks. Patient states he has plenty of tablets at home, next appt given.

## 2021-04-10 RX ORDER — WARFARIN SODIUM 1 MG/1
1 TABLET ORAL NIGHTLY
Qty: 30 TABLET | Refills: 0 | Status: SHIPPED | OUTPATIENT
Start: 2021-04-10 | End: 2021-05-07

## 2021-04-13 ENCOUNTER — LAB (OUTPATIENT)
Dept: LAB | Facility: HOSPITAL | Age: 71
End: 2021-04-13

## 2021-04-13 ENCOUNTER — HOSPITAL ENCOUNTER (OUTPATIENT)
Dept: ONCOLOGY | Facility: HOSPITAL | Age: 71
Setting detail: INFUSION SERIES
Discharge: HOME | End: 2021-04-13

## 2021-04-13 DIAGNOSIS — D69.6 THROMBOCYTOPENIA (HCC): ICD-10-CM

## 2021-04-13 DIAGNOSIS — Z79.01 LONG TERM (CURRENT) USE OF ANTICOAGULANTS: Primary | ICD-10-CM

## 2021-04-13 DIAGNOSIS — I82.5Y3 CHRONIC DEEP VEIN THROMBOSIS (DVT) OF PROXIMAL VEIN OF BOTH LOWER EXTREMITIES (HCC): ICD-10-CM

## 2021-04-13 DIAGNOSIS — I26.99 PULMONARY THROMBOSIS (HCC): ICD-10-CM

## 2021-04-13 LAB — INR PPP: 1.9

## 2021-04-13 PROCEDURE — 85610 PROTHROMBIN TIME: CPT

## 2021-04-13 PROCEDURE — 36416 COLLJ CAPILLARY BLOOD SPEC: CPT

## 2021-04-20 ENCOUNTER — LAB (OUTPATIENT)
Dept: LAB | Facility: HOSPITAL | Age: 71
End: 2021-04-20

## 2021-04-20 ENCOUNTER — HOSPITAL ENCOUNTER (OUTPATIENT)
Dept: ONCOLOGY | Facility: HOSPITAL | Age: 71
Setting detail: INFUSION SERIES
Discharge: HOME | End: 2021-04-20

## 2021-04-20 DIAGNOSIS — Z79.01 LONG TERM (CURRENT) USE OF ANTICOAGULANTS: Primary | ICD-10-CM

## 2021-04-20 DIAGNOSIS — I26.99 PULMONARY THROMBOSIS (HCC): ICD-10-CM

## 2021-04-20 DIAGNOSIS — D69.6 THROMBOCYTOPENIA (HCC): ICD-10-CM

## 2021-04-20 DIAGNOSIS — I82.5Y3 CHRONIC DEEP VEIN THROMBOSIS (DVT) OF PROXIMAL VEIN OF BOTH LOWER EXTREMITIES (HCC): ICD-10-CM

## 2021-04-20 LAB — INR PPP: 2.6

## 2021-04-20 PROCEDURE — 85610 PROTHROMBIN TIME: CPT

## 2021-04-20 PROCEDURE — 36416 COLLJ CAPILLARY BLOOD SPEC: CPT

## 2021-04-28 DIAGNOSIS — Z79.01 LONG TERM (CURRENT) USE OF ANTICOAGULANTS: Primary | ICD-10-CM

## 2021-05-07 RX ORDER — WARFARIN SODIUM 1 MG/1
TABLET ORAL
Qty: 30 TABLET | Refills: 0 | Status: SHIPPED | OUTPATIENT
Start: 2021-05-07 | End: 2021-05-28

## 2021-05-18 ENCOUNTER — HOSPITAL ENCOUNTER (OUTPATIENT)
Dept: ONCOLOGY | Facility: HOSPITAL | Age: 71
Setting detail: INFUSION SERIES
Discharge: HOME | End: 2021-05-18

## 2021-05-18 ENCOUNTER — LAB (OUTPATIENT)
Dept: LAB | Facility: HOSPITAL | Age: 71
End: 2021-05-18

## 2021-05-18 DIAGNOSIS — I82.5Y3 CHRONIC DEEP VEIN THROMBOSIS (DVT) OF PROXIMAL VEIN OF BOTH LOWER EXTREMITIES (HCC): ICD-10-CM

## 2021-05-18 DIAGNOSIS — Z79.01 LONG TERM (CURRENT) USE OF ANTICOAGULANTS: Primary | ICD-10-CM

## 2021-05-18 DIAGNOSIS — D69.6 THROMBOCYTOPENIA (HCC): ICD-10-CM

## 2021-05-18 DIAGNOSIS — I26.99 PULMONARY THROMBOSIS (HCC): ICD-10-CM

## 2021-05-18 LAB
BASOPHILS # BLD AUTO: 0.03 10*3/MM3 (ref 0–0.2)
BASOPHILS NFR BLD AUTO: 0.4 % (ref 0–1.5)
DEPRECATED RDW RBC AUTO: 44.1 FL (ref 37–54)
EOSINOPHIL # BLD AUTO: 0.14 10*3/MM3 (ref 0–0.4)
EOSINOPHIL NFR BLD AUTO: 1.9 % (ref 0.3–6.2)
ERYTHROCYTE [DISTWIDTH] IN BLOOD BY AUTOMATED COUNT: 13.5 % (ref 12.3–15.4)
HCT VFR BLD AUTO: 48.1 % (ref 37.5–51)
HGB BLD-MCNC: 16.5 G/DL (ref 13–17.7)
INR PPP: 1.6 (ref 0.8–1.2)
LYMPHOCYTES # BLD AUTO: 1.29 10*3/MM3 (ref 0.7–3.1)
LYMPHOCYTES NFR BLD AUTO: 17.6 % (ref 19.6–45.3)
MCH RBC QN AUTO: 31.5 PG (ref 26.6–33)
MCHC RBC AUTO-ENTMCNC: 34.3 G/DL (ref 31.5–35.7)
MCV RBC AUTO: 91.8 FL (ref 79–97)
MONOCYTES # BLD AUTO: 0.7 10*3/MM3 (ref 0.1–0.9)
MONOCYTES NFR BLD AUTO: 9.5 % (ref 5–12)
NEUTROPHILS NFR BLD AUTO: 5.17 10*3/MM3 (ref 1.7–7)
NEUTROPHILS NFR BLD AUTO: 70.6 % (ref 42.7–76)
PLATELET # BLD AUTO: 120 10*3/MM3 (ref 140–450)
PMV BLD AUTO: 9.9 FL (ref 6–12)
RBC # BLD AUTO: 5.24 10*6/MM3 (ref 4.14–5.8)
WBC # BLD AUTO: 7.33 10*3/MM3 (ref 3.4–10.8)

## 2021-05-18 PROCEDURE — 85025 COMPLETE CBC W/AUTO DIFF WBC: CPT

## 2021-05-18 PROCEDURE — 85610 PROTHROMBIN TIME: CPT

## 2021-05-18 PROCEDURE — 36416 COLLJ CAPILLARY BLOOD SPEC: CPT

## 2021-05-18 NOTE — PROGRESS NOTES
Pt here today for lab collection. INR 1.6 today. Pt reports having difficulty getting warfarin refilled. He states he missed taking warfarin for 2-3 days due to running out of medication. Pt to increase warfarin to 8 mg daily and return for INR check in 1 week. He verbalized understanding.

## 2021-05-24 DIAGNOSIS — Z79.01 LONG TERM (CURRENT) USE OF ANTICOAGULANTS: ICD-10-CM

## 2021-05-25 ENCOUNTER — HOSPITAL ENCOUNTER (OUTPATIENT)
Dept: ONCOLOGY | Facility: HOSPITAL | Age: 71
Setting detail: INFUSION SERIES
Discharge: HOME | End: 2021-05-25

## 2021-05-25 ENCOUNTER — LAB (OUTPATIENT)
Dept: LAB | Facility: HOSPITAL | Age: 71
End: 2021-05-25

## 2021-05-25 DIAGNOSIS — D69.6 THROMBOCYTOPENIA (HCC): ICD-10-CM

## 2021-05-25 DIAGNOSIS — I82.5Y3 CHRONIC DEEP VEIN THROMBOSIS (DVT) OF PROXIMAL VEIN OF BOTH LOWER EXTREMITIES (HCC): ICD-10-CM

## 2021-05-25 DIAGNOSIS — I26.99 PULMONARY THROMBOSIS (HCC): ICD-10-CM

## 2021-05-25 LAB — INR PPP: 1.7 (ref 0.8–1.2)

## 2021-05-25 PROCEDURE — 36416 COLLJ CAPILLARY BLOOD SPEC: CPT

## 2021-05-25 PROCEDURE — 85610 PROTHROMBIN TIME: CPT

## 2021-05-25 NOTE — PROGRESS NOTES
Pt's INR 1.7.  Pt is currently on 8 mg daily.  Instructed pt to increase to 9 mg daily and recheck in one week.  Pt scheduled for Tuesday 6/1 at 0900.  Pt v/u.

## 2021-05-28 RX ORDER — WARFARIN SODIUM 1 MG/1
TABLET ORAL
Qty: 30 TABLET | Refills: 0 | Status: SHIPPED | OUTPATIENT
Start: 2021-05-28 | End: 2021-09-07 | Stop reason: SDUPTHER

## 2021-06-01 ENCOUNTER — HOSPITAL ENCOUNTER (OUTPATIENT)
Dept: ONCOLOGY | Facility: HOSPITAL | Age: 71
Setting detail: INFUSION SERIES
Discharge: HOME | End: 2021-06-01

## 2021-06-01 ENCOUNTER — LAB (OUTPATIENT)
Dept: LAB | Facility: HOSPITAL | Age: 71
End: 2021-06-01

## 2021-06-01 DIAGNOSIS — Z79.01 LONG TERM (CURRENT) USE OF ANTICOAGULANTS: Primary | ICD-10-CM

## 2021-06-01 DIAGNOSIS — I82.5Y3 CHRONIC DEEP VEIN THROMBOSIS (DVT) OF PROXIMAL VEIN OF BOTH LOWER EXTREMITIES (HCC): ICD-10-CM

## 2021-06-01 DIAGNOSIS — I26.99 PULMONARY THROMBOSIS (HCC): ICD-10-CM

## 2021-06-01 DIAGNOSIS — D69.6 THROMBOCYTOPENIA (HCC): ICD-10-CM

## 2021-06-01 LAB — INR PPP: 1.7 (ref 0.8–1.2)

## 2021-06-01 PROCEDURE — 85610 PROTHROMBIN TIME: CPT

## 2021-06-01 PROCEDURE — 36416 COLLJ CAPILLARY BLOOD SPEC: CPT

## 2021-06-01 NOTE — PROGRESS NOTES
Pt's INR today is 1.7.  Pt currently taking 9 mg daily.  Pt was 1.7 last week and was increased to 9 mg.  Pt states that he missed his dose on Sunday and had a pizza w/ green peppers.  Instructed pt to increase to 10 mg daily and recheck in one week.  Pt scheduled for 6/8 at 0915.  Instructed pt to call back today if he needs refills on his medication.  Pt v/u.

## 2021-06-04 DIAGNOSIS — I26.99 OTHER PULMONARY EMBOLISM WITHOUT ACUTE COR PULMONALE, UNSPECIFIED CHRONICITY (HCC): Chronic | ICD-10-CM

## 2021-06-04 RX ORDER — WARFARIN SODIUM 5 MG/1
TABLET ORAL
Qty: 30 TABLET | Refills: 4 | Status: SHIPPED | OUTPATIENT
Start: 2021-06-04 | End: 2021-09-07 | Stop reason: SDUPTHER

## 2021-06-08 ENCOUNTER — LAB (OUTPATIENT)
Dept: LAB | Facility: HOSPITAL | Age: 71
End: 2021-06-08

## 2021-06-08 ENCOUNTER — HOSPITAL ENCOUNTER (OUTPATIENT)
Dept: ONCOLOGY | Facility: HOSPITAL | Age: 71
Setting detail: INFUSION SERIES
Discharge: HOME | End: 2021-06-08

## 2021-06-08 DIAGNOSIS — I82.5Y3 CHRONIC DEEP VEIN THROMBOSIS (DVT) OF PROXIMAL VEIN OF BOTH LOWER EXTREMITIES (HCC): ICD-10-CM

## 2021-06-08 DIAGNOSIS — D69.6 THROMBOCYTOPENIA (HCC): ICD-10-CM

## 2021-06-08 DIAGNOSIS — Z79.01 LONG TERM (CURRENT) USE OF ANTICOAGULANTS: Primary | ICD-10-CM

## 2021-06-08 DIAGNOSIS — I26.99 PULMONARY THROMBOSIS (HCC): ICD-10-CM

## 2021-06-08 LAB
BASOPHILS # BLD AUTO: 0.04 10*3/MM3 (ref 0–0.2)
BASOPHILS NFR BLD AUTO: 0.6 % (ref 0–1.5)
DEPRECATED RDW RBC AUTO: 45.3 FL (ref 37–54)
EOSINOPHIL # BLD AUTO: 0.17 10*3/MM3 (ref 0–0.4)
EOSINOPHIL NFR BLD AUTO: 2.5 % (ref 0.3–6.2)
ERYTHROCYTE [DISTWIDTH] IN BLOOD BY AUTOMATED COUNT: 13.7 % (ref 12.3–15.4)
HCT VFR BLD AUTO: 47.5 % (ref 37.5–51)
HGB BLD-MCNC: 16.8 G/DL (ref 13–17.7)
INR PPP: 3 (ref 0.8–1.2)
LYMPHOCYTES # BLD AUTO: 1.34 10*3/MM3 (ref 0.7–3.1)
LYMPHOCYTES NFR BLD AUTO: 19.5 % (ref 19.6–45.3)
MCH RBC QN AUTO: 32.2 PG (ref 26.6–33)
MCHC RBC AUTO-ENTMCNC: 35.4 G/DL (ref 31.5–35.7)
MCV RBC AUTO: 91 FL (ref 79–97)
MONOCYTES # BLD AUTO: 0.69 10*3/MM3 (ref 0.1–0.9)
MONOCYTES NFR BLD AUTO: 10 % (ref 5–12)
NEUTROPHILS NFR BLD AUTO: 4.64 10*3/MM3 (ref 1.7–7)
NEUTROPHILS NFR BLD AUTO: 67.4 % (ref 42.7–76)
PLATELET # BLD AUTO: 120 10*3/MM3 (ref 140–450)
PMV BLD AUTO: 10.5 FL (ref 6–12)
RBC # BLD AUTO: 5.22 10*6/MM3 (ref 4.14–5.8)
WBC # BLD AUTO: 6.88 10*3/MM3 (ref 3.4–10.8)

## 2021-06-08 PROCEDURE — 85610 PROTHROMBIN TIME: CPT

## 2021-06-08 PROCEDURE — 36416 COLLJ CAPILLARY BLOOD SPEC: CPT

## 2021-06-08 PROCEDURE — 85025 COMPLETE CBC W/AUTO DIFF WBC: CPT

## 2021-06-08 NOTE — PROGRESS NOTES
Pt here for INR visit. He states that he has only been taking 8mg coumadin daily. I advised pt to continue dose and we will recheck INR in 1 week. Pt verbalized understanding.

## 2021-06-11 ENCOUNTER — TELEPHONE (OUTPATIENT)
Dept: ONCOLOGY | Facility: HOSPITAL | Age: 71
End: 2021-06-11

## 2021-06-11 NOTE — TELEPHONE ENCOUNTER
Pt called in stating that he has been waiting a week for his Coumadin 1 mg tablets to be refilled and he states that the pharmacy has sent over several requests and not heard anything back.  I called the pharmacy and gave the okay to refill pt's 1 mg tablets.  Called pt back and let him know that his prescription has been called in.  He v/u.

## 2021-06-15 ENCOUNTER — LAB (OUTPATIENT)
Dept: LAB | Facility: HOSPITAL | Age: 71
End: 2021-06-15

## 2021-06-15 ENCOUNTER — HOSPITAL ENCOUNTER (OUTPATIENT)
Dept: ONCOLOGY | Facility: HOSPITAL | Age: 71
Setting detail: INFUSION SERIES
Discharge: HOME | End: 2021-06-15

## 2021-06-15 DIAGNOSIS — D69.6 THROMBOCYTOPENIA (HCC): ICD-10-CM

## 2021-06-15 DIAGNOSIS — I26.99 PULMONARY THROMBOSIS (HCC): ICD-10-CM

## 2021-06-15 DIAGNOSIS — I82.5Y3 CHRONIC DEEP VEIN THROMBOSIS (DVT) OF PROXIMAL VEIN OF BOTH LOWER EXTREMITIES (HCC): ICD-10-CM

## 2021-06-15 DIAGNOSIS — Z79.01 LONG TERM (CURRENT) USE OF ANTICOAGULANTS: Primary | ICD-10-CM

## 2021-06-15 LAB — INR PPP: 3.8 (ref 0.8–1.2)

## 2021-06-15 PROCEDURE — 85610 PROTHROMBIN TIME: CPT

## 2021-06-15 PROCEDURE — 36416 COLLJ CAPILLARY BLOOD SPEC: CPT

## 2021-06-15 NOTE — PROGRESS NOTES
Pt here for INR and his level is 3.8 he was advised to decrease coumadin by 1 mg and retest next week.appt set up

## 2021-06-22 ENCOUNTER — LAB (OUTPATIENT)
Dept: LAB | Facility: HOSPITAL | Age: 71
End: 2021-06-22

## 2021-06-22 ENCOUNTER — HOSPITAL ENCOUNTER (OUTPATIENT)
Dept: ONCOLOGY | Facility: HOSPITAL | Age: 71
Setting detail: INFUSION SERIES
Discharge: HOME | End: 2021-06-22

## 2021-06-22 ENCOUNTER — APPOINTMENT (OUTPATIENT)
Dept: ONCOLOGY | Facility: HOSPITAL | Age: 71
End: 2021-06-22

## 2021-06-22 DIAGNOSIS — D69.6 THROMBOCYTOPENIA (HCC): ICD-10-CM

## 2021-06-22 DIAGNOSIS — I82.5Y3 CHRONIC DEEP VEIN THROMBOSIS (DVT) OF PROXIMAL VEIN OF BOTH LOWER EXTREMITIES (HCC): ICD-10-CM

## 2021-06-22 DIAGNOSIS — I26.99 PULMONARY THROMBOSIS (HCC): ICD-10-CM

## 2021-06-22 DIAGNOSIS — Z79.01 LONG TERM (CURRENT) USE OF ANTICOAGULANTS: Primary | ICD-10-CM

## 2021-06-22 LAB — INR PPP: 3.8 (ref 0.8–1.2)

## 2021-06-22 PROCEDURE — 85610 PROTHROMBIN TIME: CPT

## 2021-06-22 PROCEDURE — 36416 COLLJ CAPILLARY BLOOD SPEC: CPT

## 2021-06-22 NOTE — PROGRESS NOTES
Patient here for INR. He was 3.8 last week and was told to decrease dose to 7mg. Rechecked to day and he was still 3.8. I instructed patient to hold his coumadin for 2 nights and we will recheck his INR Thursday. Patient verbalized understanding and appt given.

## 2021-06-24 ENCOUNTER — HOSPITAL ENCOUNTER (OUTPATIENT)
Dept: ONCOLOGY | Facility: HOSPITAL | Age: 71
Setting detail: INFUSION SERIES
Discharge: HOME | End: 2021-06-24

## 2021-06-24 ENCOUNTER — LAB (OUTPATIENT)
Dept: LAB | Facility: HOSPITAL | Age: 71
End: 2021-06-24

## 2021-06-24 DIAGNOSIS — Z79.01 LONG TERM (CURRENT) USE OF ANTICOAGULANTS: Primary | ICD-10-CM

## 2021-06-24 DIAGNOSIS — D69.6 THROMBOCYTOPENIA (HCC): ICD-10-CM

## 2021-06-24 DIAGNOSIS — I82.5Y3 CHRONIC DEEP VEIN THROMBOSIS (DVT) OF PROXIMAL VEIN OF BOTH LOWER EXTREMITIES (HCC): ICD-10-CM

## 2021-06-24 DIAGNOSIS — I26.99 PULMONARY THROMBOSIS (HCC): ICD-10-CM

## 2021-06-24 LAB — INR PPP: 1.7 (ref 0.8–1.2)

## 2021-06-24 PROCEDURE — 85610 PROTHROMBIN TIME: CPT

## 2021-06-24 PROCEDURE — 36416 COLLJ CAPILLARY BLOOD SPEC: CPT

## 2021-06-25 ENCOUNTER — TELEPHONE (OUTPATIENT)
Dept: ONCOLOGY | Facility: CLINIC | Age: 71
End: 2021-06-25

## 2021-06-25 NOTE — TELEPHONE ENCOUNTER
Received Surgery clearance request from [facility] for pt and Dr. Ayala has a few questions before clearing him for surgery.    1.  Does he still have an IVC filter?  2. Has he taken any Lovenox in the past to bridge for any procedures?  3. What Doctor placed him on Plavix?    I called pt and left message to call us back regarding surgery clearance.

## 2021-06-29 RX ORDER — CYANOCOBALAMIN/FOLIC AC/VIT B6 1-2.2-25MG
TABLET ORAL
Qty: 60 TABLET | Refills: 4 | Status: SHIPPED | OUTPATIENT
Start: 2021-06-29 | End: 2022-01-03

## 2021-07-01 ENCOUNTER — HOSPITAL ENCOUNTER (OUTPATIENT)
Dept: ONCOLOGY | Facility: HOSPITAL | Age: 71
Setting detail: INFUSION SERIES
Discharge: HOME | End: 2021-07-01

## 2021-07-01 ENCOUNTER — LAB (OUTPATIENT)
Dept: LAB | Facility: HOSPITAL | Age: 71
End: 2021-07-01

## 2021-07-01 DIAGNOSIS — I82.5Y3 CHRONIC DEEP VEIN THROMBOSIS (DVT) OF PROXIMAL VEIN OF BOTH LOWER EXTREMITIES (HCC): ICD-10-CM

## 2021-07-01 DIAGNOSIS — I26.99 PULMONARY THROMBOSIS (HCC): ICD-10-CM

## 2021-07-01 DIAGNOSIS — D69.6 THROMBOCYTOPENIA (HCC): ICD-10-CM

## 2021-07-01 DIAGNOSIS — Z79.01 LONG TERM (CURRENT) USE OF ANTICOAGULANTS: Primary | ICD-10-CM

## 2021-07-01 LAB — INR PPP: 1.4 (ref 0.8–1.2)

## 2021-07-01 PROCEDURE — 36416 COLLJ CAPILLARY BLOOD SPEC: CPT

## 2021-07-01 PROCEDURE — 85610 PROTHROMBIN TIME: CPT

## 2021-07-01 NOTE — PROGRESS NOTES
Pt here today for INR check, 1.40. Pt states he's been taking warfarin 7 mg daily for the past week. Advised pt to start taking warfarin 8 mg daily and return in 1 week for INR check. Pt verbalized understanding.

## 2021-07-07 NOTE — PROGRESS NOTES
Hematology/Oncology Outpatient Follow Up    PATIENT NAME:Jett Doe  :1950  MRN: [id number]  PRIMARY CARE PHYSICIAN: Malachi Davenport MD  REFERRING PHYSICIAN: Malachi Davenport MD    Chief Complaint   Patient presents with   • Follow-up     Other pulmonary embolism without acute cor pulmonale         HISTORY OF PRESENT ILLNESS:     1. Bilateral PE, right lower extremity DVT and bilateral SVTs diagnosis established in 2014 and MTHFR C677T heterozygosity diagnosis established and 2014.  · The patient presented to [facility] on 2014 where was admitted through 2014 with complaints of chest pain and shortness of air.  He was seen per EMS and had associated diaphoresis as well as some low sternal chest pressure.  His troponin was elevated and cardiology was consulted.  He was started on nitroglycerin drip and heparin drip.  He was given diuresis and admitted to the CBCU.  It was planned for him to undergo a cardiac catheterization.  A VQ scan was done to evaluate for bilateral pulmonary emboli and renal is consulted for his elevated creatinine.  The VQ scan showed multiple abnormalities in both lungs with a high probably for pulmonary emboli.  His chest x-ray showed no acute cardiopulmonary abnormality.  We were consulted for his Thrombopenia and his pulmonology emboli.  His risk factors included obesity, sedentary lifestyle, history of stroke and hyperlipidemia.  He has been on Plavix and aspirin as an outpatient and when his pulmonary emboli were found, his heparin was changed to renal dust Lovenox therapy 1 mg/kg per day.  He was not started on Coumadin initially with the plan that cardiology would perform a cardiac catheterization shortly after admission.  A thrombophilia workup was performed and results were; his AT3 was 89.9% normal, protein C was 101.4% normal and protein S was 101% normal, Fibrinogen 416 normal, factor V Leiden screen is negative,  prothrombin gene mutation is negative.  MTHFR showed heterozygosity for C677 T mutation, but negative for U7176P mutation.  Phosphate lipid antibodies were done.  Beta II glycoprotein is negative.  Phosphatidylserine antibody is negative.  Cardiolipin antibodies are all negative.  Bilateral lower extremity Dopplers was performed that showed a right lower extremity DVT and bilateral SVTs in his small saphenous vein.  On 08/20/2014, an IVC filter was placed with plans to hold starting his Coumadinization until after his cardiac catheterization was performed, which was going to be scheduled as an outpatient per Dr. Jurado.  It was noted that he had thrombocytopenia on admission with the platelet count of 125,000.  His platelet count slowly felt to 92,000 and it discharge with [id number].  His LFTs were noted to be abnormal with an AST of 88 and ALT of 103.  His coags were normal with PT of 11.9, INR 1.1 and PTT 35.5.  A thrombocytopenia workup was performed as seen below.  TSH of 1.3, His LFTs are normalized prior to discharge.  Nephrology was consulted for his AMADEO with a creatinine of 2.0 on admission.  Dr. Howard performed some renal studies.  His urinalysis was normal.  His renal ultrasound showed a 2 cm mass extended of the upper pole of the right kidney, which appeared to be solid with recommendations follow up with the CT or an MRI of his abdomen.  There was no hydronephrosis to indicate obstructive uropathy and the urinary bladder was unremarkable.  His creatinine improved with medication adjustments and was 1.4 and discharged with plans to perform an MRI of his afternoon when his creatinine improved to further evaluate his right renal mass.  An echocardiogram was performed that showed 45-55% ejection fraction and no other abnormalities were identified.  · 8/17/15 - Comprehensive metabolic panel with creatinine 1.6.  · 9/8/14 - patient was seen in the office as a hospital follow-up. Homocysteine level 18.4  (H)  · 9/10/14 - orders written to start Folgard daily due to the elevated homocystine level.  · 10/7/14 - The patient reports that he has not started taking the Folgard and did not remember having a prescription called into the pharmacy.  Advised the patient again to start taking Folgard daily and gave the patient a prescription today  · 11/13/14 - homocystine 17.7 (H)  · 11/19/14 - advised patient to increase Folgard 2 twice a day.  · 8/17/15 - Homocysteine 16.8 (5-12).   · 3/8/16 - Patient does not recall taking Folgard. Prescription written for Folgard 1 p.o. q. d.  Patient claims that he has not taken Coumadin for three days as he was out of town. INR today 1.4. Instructed to resume Coumadin at same dose and follow INR protocol.   · 6/30/16 - Comprehensive metabolic panel with creatinine 1.3 (0.7-1.2). Serum homocysteine 12.2 (5-12).     · 3/7/17 - INR 2.7 on 8 mg of Warfarin daily. Patient asked to increase Folgard to twice a day.   · 5/5/17 - INR 2.4, therapeutic. Patient is to continue the INR protocol and continue with Folgard one tablet twice daily as well as Coumadin 8 mg daily. Homocysteine 11.2 normal (5.0-12.0).   · 8/4/17 - INR today is 1.6. Patient states that he missed his dose of Coumadin Monday and Tuesday of this week. He denied any diet changes. I will leave the patient on his current dose of 8 mg daily and have him return to the office for an INR in one week as he had missed two consecutive doses of Coumadin earlier. He is to continue to follow the INR protocol and he is to continue the Folgard one tablet twice daily as it is controlling his homocysteine level.   · 11/9/17 - Patient claims to be missing occasional Warfarin doses. Counseled.   · 5/22/18 - Hemoglobin 16.2, MCV 89.6, hematocrit 45.9. Serum homocysteine 14.5 (<15).    · 9/18/18 - Discussed possibly stopping Coumadin if workup negative. PT PTT 30.5 and 36.5 with correction of PTT on 1:1 mix with normal plasma. Lupus anticoagulant  absent. Factor VIII activity 125 (). D-dimer >0.22 (<0.45).     · 9/21/18 - Venous Doppler study bilateral lower extremity with chronic deep venous thrombosis involving the right popliteal vein. Chronic superficial venous thrombosis involving the right small saphenous vein and a lymph node noted in the right groin.   · 12/18/18 - Discussed venous Doppler results. Recommended continuation of anticoagulation either with Coumadin or maintenance DOAC’s or Aspirin alone, which I did not recommend. Patient decided to continue on Warfarin.   ? 5/2/2019- homocystine 8.5 (N), factor VIII level 133% (H).  ? 7/8/21 Coumadin and Folgard daily.  No missed doses. Factor VIII and Homocysteine level ordered.    2. Polycythemia diagnosis established in January 2015.  · 1/14/15 - Discussed with the patient the need for a bone marrow biopsy along with additional lab work for further evaluation of the thrombocytopenia and polycythemia.  The patient states that he does not wish to proceed with the blood work or the bone marrow biopsy at this time. Hemoglobin 17.0  · 3/7/17 - WBC 7.17, hemoglobin 17.2, platelet count 120,000. Discussed bone marrow evaluation again with the patient. He will think about it.   · 12/18/18 - WBC 10.4 with 84% neutrophils, 9% lymphocytes, 6% monocytes, hemoglobin 16.6, hematocrit 47.1, platelet count 121,000. BCR/ABL negative. [employer] NGS JAK2 panel negative.   ? 9/5/2019- hemoglobin 16.1, hematocrit 47.4.  ? 7/8/21 Hgb 16.3, Hct 47.6    3. Thrombocytopenia diagnosis established in October 2014  · The patient presented to [facility] on August 19, 2014 where was admitted through 08/22/2014 with complaints of chest pain and shortness of air.  It was noted that he had thrombocytopenia on admission with the platelet count of 125,000.  His platelet count slowly fell to 92,000 and it discharge with 12,000.  PT 11.9, INR 1.1, PTT 35.5.  A thrombocytopenia workup was performed that showed a folate  of greater than 24.8 (H), and vitamin B12 331(N), , CMV, IgM was negative and EBV IgM was positive.  TAMIA screen is negative.  Platelet antibody screen is negative.  His PF4 was negative at 0.035.  It was noted that in the past, he has had some slightly low platelet counts in 125-140 range.    · 9/8/14 - platelet count 265,000.  EBV IgG positive, EBV IgM negative.  · 10/7/14 - platelet count 101,000.  · 11/17/14 - reviewed medication list for thrombocytopenia.  Metoprolol was known to cause thrombocytopenia purpura.  Lisinopril was known to rarely cause thrombocytopenia and less than 0.01% of the patients.  Plavix was known to cause thrombotic thrombocytopenia purpura (TTP) and thrombocytopenia and less than 1% of the patients.  Crestor was known to cause TTP and thrombocytopenia.  ? 9/5/2019-patient reports moderate alcohol use-16 ounces beer 3 times per week.  ? Platelets 116    4. Elevated LFTs and liver mass diagnosis established in September 2014.  · 9/8/14 - ALT 52 (H), AST 29 (N),   · 9/10/14 - advised the patient to increase his hydration and we’ll order a CT scan of the abdomen to be done for further evaluation of the elevated LFTs.  · 9/16/14 - ALT 49 (H), AST 29 (N)  · 9/23/14 - CT of the abdomen showed no evidence of renal mass.  There was an approximately 2 cm non-cystic appearing hypodense lesion on the posterior medial aspect of the right hepatic lobe.  This was in close proximity to the upper pole of the right kidney.  This could conceivably represent the mass seen on ultrasound study.  However it was definitely raising from the liver and not the upper pole of the right kidney.  · 10/7/14 - Will order a MRI for evaluation of the liver mass as well as lab work for further evaluation of the elevated LFTs.  · 10/7/14 - Hepatitis screening negative, A1AT 133 (N), AFP 9.2 (H), ceruloplasmin 22 (N), ferritin 133.9 (N),   · 10/13/14 - MRI of the abdomen showed liver lesion within the posterior right  lobe of the liver is most consistent with hemangioma   · 8/17/15 - ALT 38 (N), AST 26 (N), alkaline phosphatase 26 (N). Elevated LFT’s resolved.   · 3/8/16 - AFP 8 (0-9).    · 5/5/17 - CMP revealed an AST of 32 normal (15-41), ALT 55 normal (17-63), creatinine 1.4 high (0.7-1.2) and BUN 32 high (8-20). Otherwise within normal limits.      · 11/9/17 - Patient claims his PCP has recommended evaluation for his high liver enzymes by a liver doctor, but he has not followed up on it.   · 7/6/21 CMP drawn    History of present illness was reviewed and is unchanged from the previous visit. 07/08/21        Past Medical History:   Diagnosis Date   • Cardiomyopathy (CMS/HCC) 2012   • CVA (cerebral vascular accident) (CMS/HCC) 2012   • Hyperlipemia 2012   • Hypertension 2012       Past Surgical History:   Procedure Laterality Date   • TURP / TRANSURETHRAL INCISION / DRAINAGE PROSTATE  2016         Current Outpatient Medications:   •  atorvastatin (LIPITOR) 20 MG tablet, ATORVASTATIN CALCIUM 20 MG TABS, Disp: , Rfl:   •  clopidogrel (PLAVIX) 75 MG tablet, Take 75 mg by mouth Daily., Disp: , Rfl:   •  hydrALAZINE (APRESOLINE) 10 MG tablet, HYDRALAZINE HCL 10 MG TABS, Disp: , Rfl:   •  lisinopril-hydrochlorothiazide (PRINZIDE,ZESTORETIC) 20-12.5 MG per tablet, LISINOPRIL-HYDROCHLOROTHIAZIDE 20-12.5 MG TABS, Disp: , Rfl:   •  metoprolol tartrate (LOPRESSOR) 25 MG tablet, METOPROLOL TARTRATE 25 MG TABS, Disp: , Rfl:   •  Virt-Verito 2.2-25-1 MG tablet tablet, TAKE ONE TABLET BY MOUTH TWICE A DAY, Disp: 60 tablet, Rfl: 4  •  warfarin (COUMADIN) 1 MG tablet, TAKE ONE TABLET BY MOUTH DAILY AT 5:00PM, Disp: 30 tablet, Rfl: 0  •  warfarin (COUMADIN) 5 MG tablet, TAKE ONE TABLET BY MOUTH ONCE NIGHTLY AT 5 IN THE EVENING OR AS DIRECTED, Disp: 30 tablet, Rfl: 4  •  WARFARIN SODIUM PO, Take  by mouth Daily. Taking 7 mg daily 10/28/20, Disp: , Rfl:     No Known Allergies    Family History   Problem Relation Age of Onset   • Lung cancer Mother   "      Cancer-related family history includes Lung cancer in his mother.    Social History     Tobacco Use   • Smoking status: Never Smoker   • Smokeless tobacco: Current User     Types: Chew   Substance Use Topics   • Alcohol use: Yes   • Drug use: No       I have reviewed and confirmed the accuracy of the patient's history: as entered by the SHAWNA/JEREMY. SOLOMON Dorsey 07/08/21     SUBJECTIVE:    The pateint presents today for follow up visit.  He denies pain, chest pain, or SOB.  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  He denies bleeding.  He states that he bruises more easily. Informed of thinning skin as we age and coumadin blood thinning contributes to this problem.  The patient has no issues or pain with RLE where he had clot previously.He has no other complaints.        REVIEW OF SYSTEMS:  Review of Systems   Constitutional: Negative for chills and fever.   HENT: Negative for ear pain, mouth sores, nosebleeds and sore throat.    Eyes: Negative for photophobia and visual disturbance.   Respiratory: Negative for wheezing and stridor.    Cardiovascular: Negative for chest pain and palpitations.   Gastrointestinal: Negative for abdominal pain, diarrhea, nausea and vomiting.   Endocrine: Negative for cold intolerance and heat intolerance.   Genitourinary: Negative for dysuria and hematuria.   Musculoskeletal: Negative for joint swelling and neck stiffness.   Skin: Negative for color change and rash.   Neurological: Negative for seizures and syncope.   Hematological: Negative for adenopathy.        No obvious bleeding   Psychiatric/Behavioral: Negative for agitation, confusion and hallucinations.   I have reviewed and confirmed the accuracy of the ROS as documented by the SHAWNA/JEREMY Hodge, APRN      OBJECTIVE:    Vitals:    07/08/21 0916   BP: 131/88   Pulse: 71   Resp: 18   Weight: 101 kg (223 lb)   Height: 165.1 cm (65\")   PainSc: 0-No pain "       ECOG  (0) Fully active, able to carry on all predisease performance without restriction    Physical Exam   Constitutional: He is oriented to person, place, and time. No distress.   HENT:   Head: Normocephalic and atraumatic.   Eyes: Conjunctivae are normal. Right eye exhibits no discharge. Left eye exhibits no discharge. No scleral icterus.   Neck: No thyromegaly present.   Cardiovascular: Normal rate, regular rhythm and normal heart sounds. Exam reveals no gallop and no friction rub.   Pulmonary/Chest: Effort normal. No stridor. No respiratory distress. He has no wheezes.   Abdominal: Soft. Bowel sounds are normal. He exhibits no mass. There is no abdominal tenderness. There is no rebound and no guarding.   Musculoskeletal: Normal range of motion. No tenderness.   Lymphadenopathy:     He has no cervical adenopathy.   Neurological: He is alert and oriented to person, place, and time. He exhibits normal muscle tone.   Skin: Skin is warm and dry. Bruising (bilateral forearms) noted. No rash noted. He is not diaphoretic. No erythema.   Psychiatric: His behavior is normal. Judgment and thought content normal.   Nursing note and vitals reviewed.    I have reexamined the patient and the results are consistent with the previously documented exam. Lydia Hodge, SOLOMON       RECENT LABS    WBC   Date Value Ref Range Status   07/08/2021 6.70 3.40 - 10.80 10*3/mm3 Final     RBC   Date Value Ref Range Status   07/08/2021 5.13 4.14 - 5.80 10*6/mm3 Final     Hemoglobin   Date Value Ref Range Status   07/08/2021 16.3 13.0 - 17.7 g/dL Final     Hematocrit   Date Value Ref Range Status   07/08/2021 47.6 37.5 - 51.0 % Final     MCV   Date Value Ref Range Status   07/08/2021 92.8 79.0 - 97.0 fL Final     MCH   Date Value Ref Range Status   07/08/2021 31.8 26.6 - 33.0 pg Final     MCHC   Date Value Ref Range Status   07/08/2021 34.2 31.5 - 35.7 g/dL Final     RDW   Date Value Ref Range Status   07/08/2021 13.9 12.3 - 15.4 %  Final     RDW-SD   Date Value Ref Range Status   07/08/2021 45.6 37.0 - 54.0 fl Final     MPV   Date Value Ref Range Status   07/08/2021 9.8 6.0 - 12.0 fL Final     Platelets   Date Value Ref Range Status   07/08/2021 116 (L) 140 - 450 10*3/mm3 Final     Neutrophil %   Date Value Ref Range Status   07/08/2021 66.5 42.7 - 76.0 % Final     Lymphocyte %   Date Value Ref Range Status   07/08/2021 20.1 19.6 - 45.3 % Final     Monocyte %   Date Value Ref Range Status   07/08/2021 9.6 5.0 - 12.0 % Final     Eosinophil %   Date Value Ref Range Status   07/08/2021 3.4 0.3 - 6.2 % Final     Basophil %   Date Value Ref Range Status   07/08/2021 0.4 0.0 - 1.5 % Final     Neutrophils, Absolute   Date Value Ref Range Status   07/08/2021 4.45 1.70 - 7.00 10*3/mm3 Final     Lymphocytes, Absolute   Date Value Ref Range Status   07/08/2021 1.35 0.70 - 3.10 10*3/mm3 Final     Monocytes, Absolute   Date Value Ref Range Status   07/08/2021 0.64 0.10 - 0.90 10*3/mm3 Final     Eosinophils, Absolute   Date Value Ref Range Status   07/08/2021 0.23 0.00 - 0.40 10*3/mm3 Final     Basophils, Absolute   Date Value Ref Range Status   07/08/2021 0.03 0.00 - 0.20 10*3/mm3 Final       Lab Results   Component Value Date    GLUCOSE 100 (H) 09/05/2019    BUN 24 (H) 09/05/2019    CREATININE 1.50 (H) 09/05/2019    EGFRIFNONA 46 (L) 09/05/2019    BCR 16.0 09/05/2019    K 4.7 09/05/2019    CO2 26.0 09/05/2019    CALCIUM 9.4 09/05/2019    ALBUMIN 3.80 09/05/2019    LABIL2 1.6 05/22/2018    AST 26 09/05/2019    ALT 43 09/05/2019           ASSESSMENT:    1. Bilateral Pulmonary emboli: Ongoing management on anticoagulation therapy.  Continue warfarin, no missed doses.  2. Chronic DVT of proximal vein of right lower extremity: Good cap refill, no pain or edema  3. SVT  4. Hyperhomocysteinemia: Reviewed homocystine level: Homocysteine level drawn today, continues Folgard with no missed doses.  5. Heterozygous MTHFR mutation  6. CKD, stage III  7. Elevated  Factor VIII level: Factor VIII rechecked today   8. Polycythemia: CBC reviewed with patient. Hgb 16.3, Hct 47.6  9. Thrombocytopenia: Chronic: Stable at 116 today  10. Lesion of the Neck being followed by Dr. Kapoor: Diagnosed with skin cancer    His hemoglobin is stable and normal at 16.3 today  Factor VIII level drawn, homocysteine level drawn. The patient states he is taking his Coumadin and Folgard daily.      PLANS:     Continue Coumadin .  Repeat PT/INR in 1 week and then monthly if stable on Coumadin 8 mg.    Continue Folgard  Daily  Homocysteine level today  Follow-up 4 months with NP.  Plan recheck of homocysteine level next visit if elevated today  All questions answered      Discussion:  The patient presents today for follow up.  He states that he feels well. No dizziness, SOB, chest pain, swelling or pain in extremities. He denies fatigue and states that appetite is good. Discussed importance of Coumadin daily and INR checks.  The patient verbalized understanding and states he takes Folgard and Coumadin daily without missing any doses. Informed to go to call the clinic or go to ER for any bleeding. Bruising noted on bilateral forearms. Discussed with the patient about thinning skin as we age, and easier bruising with blood thinner. He denies bleeding or any other problems at this time. The patient verbalizes intent to call for any bleeding. The patient verbalizes understanding of medication regimen, and agrees with plan of care.       Electronically signed by SOLOMON Dorsey, 07/08/21, 2:31 PM EDT.         I have reviewed labs results, imaging, vitals, and medications with the patient today. Will follow up in 4  months with NP.    Patient verbalized understanding and is in agreement of the above plan.

## 2021-07-08 ENCOUNTER — LAB (OUTPATIENT)
Dept: LAB | Facility: HOSPITAL | Age: 71
End: 2021-07-08

## 2021-07-08 ENCOUNTER — HOSPITAL ENCOUNTER (OUTPATIENT)
Dept: ONCOLOGY | Facility: HOSPITAL | Age: 71
Setting detail: INFUSION SERIES
Discharge: HOME | End: 2021-07-08

## 2021-07-08 ENCOUNTER — OFFICE VISIT (OUTPATIENT)
Dept: ONCOLOGY | Facility: CLINIC | Age: 71
End: 2021-07-08

## 2021-07-08 VITALS
BODY MASS INDEX: 37.15 KG/M2 | HEIGHT: 65 IN | WEIGHT: 223 LBS | SYSTOLIC BLOOD PRESSURE: 131 MMHG | HEART RATE: 71 BPM | RESPIRATION RATE: 18 BRPM | DIASTOLIC BLOOD PRESSURE: 88 MMHG

## 2021-07-08 DIAGNOSIS — I82.5Y3 CHRONIC DEEP VEIN THROMBOSIS (DVT) OF PROXIMAL VEIN OF BOTH LOWER EXTREMITIES (HCC): ICD-10-CM

## 2021-07-08 DIAGNOSIS — D69.6 THROMBOCYTOPENIA (HCC): ICD-10-CM

## 2021-07-08 DIAGNOSIS — I26.99 OTHER PULMONARY EMBOLISM WITHOUT ACUTE COR PULMONALE, UNSPECIFIED CHRONICITY (HCC): Primary | ICD-10-CM

## 2021-07-08 DIAGNOSIS — E72.11 HYPERHOMOCYSTEINEMIA (HCC): ICD-10-CM

## 2021-07-08 DIAGNOSIS — Z86.718 PERSONAL HISTORY OF OTHER VENOUS THROMBOSIS AND EMBOLISM: ICD-10-CM

## 2021-07-08 DIAGNOSIS — I26.99 OTHER PULMONARY EMBOLISM WITHOUT ACUTE COR PULMONALE, UNSPECIFIED CHRONICITY (HCC): ICD-10-CM

## 2021-07-08 DIAGNOSIS — I26.99 PULMONARY THROMBOSIS (HCC): ICD-10-CM

## 2021-07-08 LAB
BASOPHILS # BLD AUTO: 0.03 10*3/MM3 (ref 0–0.2)
BASOPHILS NFR BLD AUTO: 0.4 % (ref 0–1.5)
DEPRECATED RDW RBC AUTO: 45.6 FL (ref 37–54)
EOSINOPHIL # BLD AUTO: 0.23 10*3/MM3 (ref 0–0.4)
EOSINOPHIL NFR BLD AUTO: 3.4 % (ref 0.3–6.2)
ERYTHROCYTE [DISTWIDTH] IN BLOOD BY AUTOMATED COUNT: 13.9 % (ref 12.3–15.4)
HCT VFR BLD AUTO: 47.6 % (ref 37.5–51)
HCYS SERPL-MCNC: 11.6 UMOL/L (ref 0–15)
HGB BLD-MCNC: 16.3 G/DL (ref 13–17.7)
INR PPP: 2.3 (ref 0.8–1.2)
LYMPHOCYTES # BLD AUTO: 1.35 10*3/MM3 (ref 0.7–3.1)
LYMPHOCYTES NFR BLD AUTO: 20.1 % (ref 19.6–45.3)
MCH RBC QN AUTO: 31.8 PG (ref 26.6–33)
MCHC RBC AUTO-ENTMCNC: 34.2 G/DL (ref 31.5–35.7)
MCV RBC AUTO: 92.8 FL (ref 79–97)
MONOCYTES # BLD AUTO: 0.64 10*3/MM3 (ref 0.1–0.9)
MONOCYTES NFR BLD AUTO: 9.6 % (ref 5–12)
NEUTROPHILS NFR BLD AUTO: 4.45 10*3/MM3 (ref 1.7–7)
NEUTROPHILS NFR BLD AUTO: 66.5 % (ref 42.7–76)
PLATELET # BLD AUTO: 116 10*3/MM3 (ref 140–450)
PMV BLD AUTO: 9.8 FL (ref 6–12)
RBC # BLD AUTO: 5.13 10*6/MM3 (ref 4.14–5.8)
WBC # BLD AUTO: 6.7 10*3/MM3 (ref 3.4–10.8)

## 2021-07-08 PROCEDURE — 36415 COLL VENOUS BLD VENIPUNCTURE: CPT | Performed by: NURSE PRACTITIONER

## 2021-07-08 PROCEDURE — 85610 PROTHROMBIN TIME: CPT

## 2021-07-08 PROCEDURE — 83090 ASSAY OF HOMOCYSTEINE: CPT | Performed by: NURSE PRACTITIONER

## 2021-07-08 PROCEDURE — 85025 COMPLETE CBC W/AUTO DIFF WBC: CPT

## 2021-07-08 PROCEDURE — 85240 CLOT FACTOR VIII AHG 1 STAGE: CPT | Performed by: NURSE PRACTITIONER

## 2021-07-08 PROCEDURE — 99214 OFFICE O/P EST MOD 30 MIN: CPT | Performed by: NURSE PRACTITIONER

## 2021-07-09 ENCOUNTER — TELEPHONE (OUTPATIENT)
Dept: ONCOLOGY | Facility: CLINIC | Age: 71
End: 2021-07-09

## 2021-07-09 LAB — FACT VIII ACT/NOR PPP: 227 % ACTIVITY (ref 70–160)

## 2021-07-09 NOTE — TELEPHONE ENCOUNTER
Attempted to call pt to let him know that his homocysteine is normal. No answer or identifying VM. Callback number left.

## 2021-07-09 NOTE — TELEPHONE ENCOUNTER
----- Message from SOLOMON Dorsey sent at 7/9/2021  8:16 AM EDT -----  Regarding: normal lab homocysteine  Please notify Mr. Doe that his homocysteine level is normal.    TY    Electronically signed by SOLOMON Dorsey, 07/09/21, 8:17 AM EDT.

## 2021-07-13 ENCOUNTER — DOCUMENTATION (OUTPATIENT)
Dept: ONCOLOGY | Facility: HOSPITAL | Age: 71
End: 2021-07-13

## 2021-07-14 ENCOUNTER — LAB (OUTPATIENT)
Dept: LAB | Facility: HOSPITAL | Age: 71
End: 2021-07-14

## 2021-07-14 ENCOUNTER — HOSPITAL ENCOUNTER (OUTPATIENT)
Dept: ONCOLOGY | Facility: HOSPITAL | Age: 71
Setting detail: INFUSION SERIES
Discharge: HOME | End: 2021-07-14

## 2021-07-14 DIAGNOSIS — Z79.01 LONG TERM (CURRENT) USE OF ANTICOAGULANTS: Primary | ICD-10-CM

## 2021-07-14 DIAGNOSIS — Z86.718 PERSONAL HISTORY OF OTHER VENOUS THROMBOSIS AND EMBOLISM: ICD-10-CM

## 2021-07-14 DIAGNOSIS — E72.11 HYPERHOMOCYSTEINEMIA (HCC): ICD-10-CM

## 2021-07-14 DIAGNOSIS — I26.99 OTHER PULMONARY EMBOLISM WITHOUT ACUTE COR PULMONALE, UNSPECIFIED CHRONICITY (HCC): ICD-10-CM

## 2021-07-14 LAB — INR PPP: 2.6 (ref 0.8–1.2)

## 2021-07-14 PROCEDURE — 85610 PROTHROMBIN TIME: CPT

## 2021-07-14 PROCEDURE — 36416 COLLJ CAPILLARY BLOOD SPEC: CPT

## 2021-07-15 ENCOUNTER — TELEPHONE (OUTPATIENT)
Dept: ONCOLOGY | Facility: CLINIC | Age: 71
End: 2021-07-15

## 2021-07-15 ENCOUNTER — APPOINTMENT (OUTPATIENT)
Dept: LAB | Facility: HOSPITAL | Age: 71
End: 2021-07-15

## 2021-07-15 ENCOUNTER — APPOINTMENT (OUTPATIENT)
Dept: ONCOLOGY | Facility: HOSPITAL | Age: 71
End: 2021-07-15

## 2021-08-11 ENCOUNTER — TELEPHONE (OUTPATIENT)
Dept: ONCOLOGY | Facility: CLINIC | Age: 71
End: 2021-08-11

## 2021-08-11 NOTE — TELEPHONE ENCOUNTER
Caller: Jett Doe    Relationship to patient: Self    Best call back number:  [phone]    Chief complaint: NEEDS TO R/S LAB AND RN REVIEW     Type of visit: LAB AND RN REVIEW    Requested date:  NEXT TUES 08/17 OR NEXT THUR 08/19    If rescheduling, when is the original appointment: 08/12    Additional notes:       CAN LEAVE VOICEMAIL IF NO ANSWER

## 2021-08-12 ENCOUNTER — APPOINTMENT (OUTPATIENT)
Dept: LAB | Facility: HOSPITAL | Age: 71
End: 2021-08-12

## 2021-08-12 ENCOUNTER — APPOINTMENT (OUTPATIENT)
Dept: ONCOLOGY | Facility: HOSPITAL | Age: 71
End: 2021-08-12

## 2021-08-12 ENCOUNTER — TELEPHONE (OUTPATIENT)
Dept: ONCOLOGY | Facility: HOSPITAL | Age: 71
End: 2021-08-12

## 2021-08-13 ENCOUNTER — TELEPHONE (OUTPATIENT)
Dept: ONCOLOGY | Facility: CLINIC | Age: 71
End: 2021-08-13

## 2021-08-13 NOTE — TELEPHONE ENCOUNTER
Attempted to contact patient regarding lab/RN review. No answer.  LMV asking patient to call back.

## 2021-08-19 ENCOUNTER — DOCUMENTATION (OUTPATIENT)
Dept: ONCOLOGY | Facility: HOSPITAL | Age: 71
End: 2021-08-19

## 2021-08-24 ENCOUNTER — HOSPITAL ENCOUNTER (OUTPATIENT)
Dept: ONCOLOGY | Facility: HOSPITAL | Age: 71
Setting detail: INFUSION SERIES
Discharge: HOME | End: 2021-08-24

## 2021-08-24 ENCOUNTER — LAB (OUTPATIENT)
Dept: LAB | Facility: HOSPITAL | Age: 71
End: 2021-08-24

## 2021-08-24 DIAGNOSIS — I26.99 PULMONARY THROMBOSIS (HCC): ICD-10-CM

## 2021-08-24 DIAGNOSIS — Z79.01 LONG TERM (CURRENT) USE OF ANTICOAGULANTS: Primary | ICD-10-CM

## 2021-08-24 DIAGNOSIS — I82.5Y3 CHRONIC DEEP VEIN THROMBOSIS (DVT) OF PROXIMAL VEIN OF BOTH LOWER EXTREMITIES (HCC): ICD-10-CM

## 2021-08-24 DIAGNOSIS — D69.6 THROMBOCYTOPENIA (HCC): ICD-10-CM

## 2021-08-24 LAB — INR PPP: 1.9 (ref 0.8–1.2)

## 2021-08-24 PROCEDURE — 36416 COLLJ CAPILLARY BLOOD SPEC: CPT

## 2021-08-24 PROCEDURE — 85610 PROTHROMBIN TIME: CPT

## 2021-08-24 NOTE — PROGRESS NOTES
Pt states he missed a dose last Wednesday, I advised him to continue same dose and recheck in 1 week, pt verbalized understanding.

## 2021-08-25 ENCOUNTER — TELEPHONE (OUTPATIENT)
Dept: ONCOLOGY | Facility: CLINIC | Age: 71
End: 2021-08-25

## 2021-08-25 NOTE — TELEPHONE ENCOUNTER
Called Rachael to confirm it was warfarin clearance and directions she was wanting and advised her to address it as that and not cardiac clearance.

## 2021-08-25 NOTE — TELEPHONE ENCOUNTER
Caller: PAULINA    Relationship:     Best call back number: [phone]    What form or medical record are you requesting: CARDIAC CLEARANCE    Who is requesting this form or medical record from you: [facility]    How would you like to receive the form or medical records (pick-up, mail, fax): FAX  If fax, what is the fax number: [phone]  If mail, what is the address:   If pick-up, provide patient with address and location details    Timeframe paperwork needed: ASAP    Additional notes:

## 2021-08-31 ENCOUNTER — HOSPITAL ENCOUNTER (OUTPATIENT)
Dept: ONCOLOGY | Facility: HOSPITAL | Age: 71
Setting detail: INFUSION SERIES
Discharge: HOME | End: 2021-08-31

## 2021-08-31 ENCOUNTER — LAB (OUTPATIENT)
Dept: LAB | Facility: HOSPITAL | Age: 71
End: 2021-08-31

## 2021-08-31 DIAGNOSIS — D69.6 THROMBOCYTOPENIA (HCC): ICD-10-CM

## 2021-08-31 DIAGNOSIS — I26.99 PULMONARY THROMBOSIS (HCC): ICD-10-CM

## 2021-08-31 DIAGNOSIS — Z79.01 LONG TERM (CURRENT) USE OF ANTICOAGULANTS: Primary | ICD-10-CM

## 2021-08-31 DIAGNOSIS — I82.5Y3 CHRONIC DEEP VEIN THROMBOSIS (DVT) OF PROXIMAL VEIN OF BOTH LOWER EXTREMITIES (HCC): ICD-10-CM

## 2021-08-31 LAB — INR PPP: 3.9 (ref 0.8–1.2)

## 2021-08-31 PROCEDURE — 36416 COLLJ CAPILLARY BLOOD SPEC: CPT

## 2021-08-31 PROCEDURE — 85610 PROTHROMBIN TIME: CPT

## 2021-08-31 NOTE — PROGRESS NOTES
Pt advised to hold dose today and tomorrow, than restart back on 7.5mg daily. Pt verbalized understanding. Will recheck INR in 1 week.

## 2021-09-07 ENCOUNTER — HOSPITAL ENCOUNTER (OUTPATIENT)
Dept: ONCOLOGY | Facility: HOSPITAL | Age: 71
Setting detail: INFUSION SERIES
Discharge: HOME | End: 2021-09-07

## 2021-09-07 ENCOUNTER — LAB (OUTPATIENT)
Dept: LAB | Facility: HOSPITAL | Age: 71
End: 2021-09-07

## 2021-09-07 DIAGNOSIS — I82.5Y3 CHRONIC DEEP VEIN THROMBOSIS (DVT) OF PROXIMAL VEIN OF BOTH LOWER EXTREMITIES (HCC): ICD-10-CM

## 2021-09-07 DIAGNOSIS — Z79.01 LONG TERM (CURRENT) USE OF ANTICOAGULANTS: Primary | ICD-10-CM

## 2021-09-07 DIAGNOSIS — D69.6 THROMBOCYTOPENIA (HCC): ICD-10-CM

## 2021-09-07 DIAGNOSIS — I26.99 OTHER PULMONARY EMBOLISM WITHOUT ACUTE COR PULMONALE, UNSPECIFIED CHRONICITY (HCC): Chronic | ICD-10-CM

## 2021-09-07 DIAGNOSIS — I26.99 PULMONARY THROMBOSIS (HCC): ICD-10-CM

## 2021-09-07 DIAGNOSIS — Z79.01 LONG TERM (CURRENT) USE OF ANTICOAGULANTS: ICD-10-CM

## 2021-09-07 LAB — INR PPP: 2.4 (ref 0.8–1.2)

## 2021-09-07 PROCEDURE — 36416 COLLJ CAPILLARY BLOOD SPEC: CPT

## 2021-09-07 PROCEDURE — 85610 PROTHROMBIN TIME: CPT

## 2021-09-07 RX ORDER — WARFARIN SODIUM 5 MG/1
TABLET ORAL
Qty: 30 TABLET | Refills: 3 | Status: SHIPPED | OUTPATIENT
Start: 2021-09-07 | End: 2021-11-11 | Stop reason: SDUPTHER

## 2021-09-07 RX ORDER — WARFARIN SODIUM 1 MG/1
TABLET ORAL
Qty: 30 TABLET | Refills: 3 | Status: SHIPPED | OUTPATIENT
Start: 2021-09-07 | End: 2021-11-11 | Stop reason: SDUPTHER

## 2021-09-07 NOTE — PROGRESS NOTES
INR 2.4 today. Pt to continue to take warfarin 7.5 mg daily and return in 1 week for INR check. He brought in multivitamins that he takes daily so that I could view ingredients. Asked pt to hold multivitamin this week due to it containing vitamin K. Pt verbalized understanding. Will send refills for warfarin 5 mg and 1 mg tablets per pt request.

## 2021-09-14 ENCOUNTER — LAB (OUTPATIENT)
Dept: LAB | Facility: HOSPITAL | Age: 71
End: 2021-09-14

## 2021-09-14 ENCOUNTER — HOSPITAL ENCOUNTER (OUTPATIENT)
Dept: ONCOLOGY | Facility: HOSPITAL | Age: 71
Setting detail: INFUSION SERIES
Discharge: HOME | End: 2021-09-14

## 2021-09-14 DIAGNOSIS — Z79.01 LONG TERM (CURRENT) USE OF ANTICOAGULANTS: Primary | ICD-10-CM

## 2021-09-14 DIAGNOSIS — I26.99 PULMONARY THROMBOSIS (HCC): ICD-10-CM

## 2021-09-14 DIAGNOSIS — I82.5Y3 CHRONIC DEEP VEIN THROMBOSIS (DVT) OF PROXIMAL VEIN OF BOTH LOWER EXTREMITIES (HCC): ICD-10-CM

## 2021-09-14 DIAGNOSIS — D69.6 THROMBOCYTOPENIA (HCC): ICD-10-CM

## 2021-09-14 LAB — INR PPP: 3 (ref 0.8–1.2)

## 2021-09-14 PROCEDURE — 36416 COLLJ CAPILLARY BLOOD SPEC: CPT

## 2021-09-14 PROCEDURE — 85610 PROTHROMBIN TIME: CPT

## 2021-10-12 ENCOUNTER — LAB (OUTPATIENT)
Dept: LAB | Facility: HOSPITAL | Age: 71
End: 2021-10-12

## 2021-10-12 ENCOUNTER — HOSPITAL ENCOUNTER (OUTPATIENT)
Dept: ONCOLOGY | Facility: HOSPITAL | Age: 71
Setting detail: INFUSION SERIES
Discharge: HOME | End: 2021-10-12

## 2021-10-12 DIAGNOSIS — Z79.01 LONG TERM (CURRENT) USE OF ANTICOAGULANTS: Primary | ICD-10-CM

## 2021-10-12 DIAGNOSIS — I82.5Y3 CHRONIC DEEP VEIN THROMBOSIS (DVT) OF PROXIMAL VEIN OF BOTH LOWER EXTREMITIES (HCC): ICD-10-CM

## 2021-10-12 DIAGNOSIS — I26.99 PULMONARY THROMBOSIS (HCC): ICD-10-CM

## 2021-10-12 DIAGNOSIS — D69.6 THROMBOCYTOPENIA (HCC): ICD-10-CM

## 2021-10-12 LAB — INR PPP: 2 (ref 0.8–1.2)

## 2021-10-12 PROCEDURE — 36416 COLLJ CAPILLARY BLOOD SPEC: CPT

## 2021-10-12 PROCEDURE — 85610 PROTHROMBIN TIME: CPT

## 2021-11-11 ENCOUNTER — HOSPITAL ENCOUNTER (OUTPATIENT)
Dept: ONCOLOGY | Facility: HOSPITAL | Age: 71
Setting detail: INFUSION SERIES
Discharge: HOME | End: 2021-11-11

## 2021-11-11 ENCOUNTER — LAB (OUTPATIENT)
Dept: LAB | Facility: HOSPITAL | Age: 71
End: 2021-11-11

## 2021-11-11 ENCOUNTER — OFFICE VISIT (OUTPATIENT)
Dept: ONCOLOGY | Facility: CLINIC | Age: 71
End: 2021-11-11

## 2021-11-11 VITALS
HEIGHT: 65 IN | OXYGEN SATURATION: 95 % | SYSTOLIC BLOOD PRESSURE: 128 MMHG | WEIGHT: 229.8 LBS | BODY MASS INDEX: 38.29 KG/M2 | DIASTOLIC BLOOD PRESSURE: 84 MMHG | HEART RATE: 55 BPM | TEMPERATURE: 96.8 F

## 2021-11-11 DIAGNOSIS — Z79.01 LONG TERM (CURRENT) USE OF ANTICOAGULANTS: Primary | ICD-10-CM

## 2021-11-11 DIAGNOSIS — I26.99 OTHER PULMONARY EMBOLISM WITHOUT ACUTE COR PULMONALE, UNSPECIFIED CHRONICITY (HCC): Chronic | ICD-10-CM

## 2021-11-11 DIAGNOSIS — L57.8 SUN-DAMAGED SKIN: ICD-10-CM

## 2021-11-11 DIAGNOSIS — I26.99 PULMONARY THROMBOSIS (HCC): ICD-10-CM

## 2021-11-11 DIAGNOSIS — D69.6 THROMBOCYTOPENIA (HCC): ICD-10-CM

## 2021-11-11 DIAGNOSIS — I82.5Y3 CHRONIC DEEP VEIN THROMBOSIS (DVT) OF PROXIMAL VEIN OF BOTH LOWER EXTREMITIES (HCC): ICD-10-CM

## 2021-11-11 LAB
ALBUMIN SERPL-MCNC: 3.9 G/DL (ref 3.5–5.2)
ALBUMIN/GLOB SERPL: 1.6 G/DL
ALP SERPL-CCNC: 119 U/L (ref 39–117)
ALT SERPL W P-5'-P-CCNC: 42 U/L (ref 1–41)
ANION GAP SERPL CALCULATED.3IONS-SCNC: 13 MMOL/L (ref 5–15)
AST SERPL-CCNC: 27 U/L (ref 1–40)
BASOPHILS # BLD AUTO: 0.03 10*3/MM3 (ref 0–0.2)
BASOPHILS NFR BLD AUTO: 0.4 % (ref 0–1.5)
BILIRUB SERPL-MCNC: 0.4 MG/DL (ref 0–1.2)
BUN SERPL-MCNC: 14 MG/DL (ref 8–23)
BUN/CREAT SERPL: 12.7 (ref 7–25)
CALCIUM SPEC-SCNC: 8.4 MG/DL (ref 8.6–10.5)
CHLORIDE SERPL-SCNC: 105 MMOL/L (ref 98–107)
CO2 SERPL-SCNC: 22 MMOL/L (ref 22–29)
CREAT SERPL-MCNC: 1.1 MG/DL (ref 0.76–1.27)
DEPRECATED RDW RBC AUTO: 45.4 FL (ref 37–54)
EOSINOPHIL # BLD AUTO: 0.22 10*3/MM3 (ref 0–0.4)
EOSINOPHIL NFR BLD AUTO: 2.9 % (ref 0.3–6.2)
ERYTHROCYTE [DISTWIDTH] IN BLOOD BY AUTOMATED COUNT: 14.1 % (ref 12.3–15.4)
GFR SERPL CREATININE-BSD FRML MDRD: 66 ML/MIN/1.73
GLOBULIN UR ELPH-MCNC: 2.4 GM/DL
GLUCOSE SERPL-MCNC: 112 MG/DL (ref 65–99)
HCT VFR BLD AUTO: 46.3 % (ref 37.5–51)
HGB BLD-MCNC: 16.6 G/DL (ref 13–17.7)
INR PPP: 2 (ref 0.8–1.2)
LYMPHOCYTES # BLD AUTO: 1.41 10*3/MM3 (ref 0.7–3.1)
LYMPHOCYTES NFR BLD AUTO: 18.4 % (ref 19.6–45.3)
MCH RBC QN AUTO: 32.6 PG (ref 26.6–33)
MCHC RBC AUTO-ENTMCNC: 35.9 G/DL (ref 31.5–35.7)
MCV RBC AUTO: 91 FL (ref 79–97)
MONOCYTES # BLD AUTO: 0.61 10*3/MM3 (ref 0.1–0.9)
MONOCYTES NFR BLD AUTO: 8 % (ref 5–12)
NEUTROPHILS NFR BLD AUTO: 5.4 10*3/MM3 (ref 1.7–7)
NEUTROPHILS NFR BLD AUTO: 70.3 % (ref 42.7–76)
PLATELET # BLD AUTO: 129 10*3/MM3 (ref 140–450)
PMV BLD AUTO: 9.8 FL (ref 6–12)
POTASSIUM SERPL-SCNC: 4.2 MMOL/L (ref 3.5–5.2)
PROT SERPL-MCNC: 6.3 G/DL (ref 6–8.5)
RBC # BLD AUTO: 5.09 10*6/MM3 (ref 4.14–5.8)
SODIUM SERPL-SCNC: 140 MMOL/L (ref 136–145)
WBC # BLD AUTO: 7.67 10*3/MM3 (ref 3.4–10.8)

## 2021-11-11 PROCEDURE — 36415 COLL VENOUS BLD VENIPUNCTURE: CPT | Performed by: NURSE PRACTITIONER

## 2021-11-11 PROCEDURE — 99214 OFFICE O/P EST MOD 30 MIN: CPT | Performed by: NURSE PRACTITIONER

## 2021-11-11 PROCEDURE — 80053 COMPREHEN METABOLIC PANEL: CPT | Performed by: NURSE PRACTITIONER

## 2021-11-11 PROCEDURE — 85610 PROTHROMBIN TIME: CPT

## 2021-11-11 PROCEDURE — 85025 COMPLETE CBC W/AUTO DIFF WBC: CPT

## 2021-11-11 RX ORDER — WARFARIN SODIUM 5 MG/1
TABLET ORAL
Qty: 30 TABLET | Refills: 3 | Status: SHIPPED | OUTPATIENT
Start: 2021-11-11 | End: 2022-02-10 | Stop reason: SDUPTHER

## 2021-11-11 RX ORDER — WARFARIN SODIUM 1 MG/1
TABLET ORAL
Qty: 30 TABLET | Refills: 3 | Status: SHIPPED | OUTPATIENT
Start: 2021-11-11 | End: 2022-02-09

## 2021-11-11 NOTE — PATIENT INSTRUCTIONS
Continue Coumadin 7.5 mg daily  Monitor for any bleeding  Monitor for clots  Report any problems or unusual symptoms  Check INR monthly

## 2021-11-11 NOTE — PROGRESS NOTES
Hematology/Oncology Outpatient Follow Up    PATIENT NAME:Jett Doe  :1950  MRN: [id number]  PRIMARY CARE PHYSICIAN: Malachi Davenport MD  REFERRING PHYSICIAN: Malachi Davenport MD    Chief Complaint   Patient presents with   • Follow-up     Other pulmonary embolism without acute cor pulmonale, unspecified chronicity (CMS/HCC)         HISTORY OF PRESENT ILLNESS:     1. Bilateral PE, right lower extremity DVT and bilateral SVTs diagnosis established in 2014 and MTHFR C677T heterozygosity diagnosis established and 2014.  · The patient presented to [facility] on 2014 where was admitted through 2014 with complaints of chest pain and shortness of air.  He was seen per EMS and had associated diaphoresis as well as some low sternal chest pressure.  His troponin was elevated and cardiology was consulted.  He was started on nitroglycerin drip and heparin drip.  He was given diuresis and admitted to the CBCU.  It was planned for him to undergo a cardiac catheterization.  A VQ scan was done to evaluate for bilateral pulmonary emboli and renal is consulted for his elevated creatinine.  The VQ scan showed multiple abnormalities in both lungs with a high probably for pulmonary emboli.  His chest x-ray showed no acute cardiopulmonary abnormality.  We were consulted for his Thrombopenia and his pulmonology emboli.  His risk factors included obesity, sedentary lifestyle, history of stroke and hyperlipidemia.  He has been on Plavix and aspirin as an outpatient and when his pulmonary emboli were found, his heparin was changed to renal dust Lovenox therapy 1 mg/kg per day.  He was not started on Coumadin initially with the plan that cardiology would perform a cardiac catheterization shortly after admission.  A thrombophilia workup was performed and results were; his AT3 was 89.9% normal, protein C was 101.4% normal and protein S was 101% normal, Fibrinogen 416 normal, factor V  Leiden screen is negative, prothrombin gene mutation is negative.  MTHFR showed heterozygosity for C677 T mutation, but negative for [id number] mutation.  Phosphate lipid antibodies were done.  Beta II glycoprotein is negative.  Phosphatidylserine antibody is negative.  Cardiolipin antibodies are all negative.  Bilateral lower extremity Dopplers was performed that showed a right lower extremity DVT and bilateral SVTs in his small saphenous vein.  On 08/20/2014, an IVC filter was placed with plans to hold starting his Coumadinization until after his cardiac catheterization was performed, which was going to be scheduled as an outpatient per Dr. Jurdao.  It was noted that he had thrombocytopenia on admission with the platelet count of 125,000.  His platelet count slowly felt to 92,000 and it discharge with [id number].  His LFTs were noted to be abnormal with an AST of 88 and ALT of 103.  His coags were normal with PT of 11.9, INR 1.1 and PTT 35.5.  A thrombocytopenia workup was performed as seen below.  TSH of 1.3, His LFTs are normalized prior to discharge.  Nephrology was consulted for his AMADEO with a creatinine of 2.0 on admission.  Dr. Howard performed some renal studies.  His urinalysis was normal.  His renal ultrasound showed a 2 cm mass extended of the upper pole of the right kidney, which appeared to be solid with recommendations follow up with the CT or an MRI of his abdomen.  There was no hydronephrosis to indicate obstructive uropathy and the urinary bladder was unremarkable.  His creatinine improved with medication adjustments and was 1.4 and discharged with plans to perform an MRI of his afternoon when his creatinine improved to further evaluate his right renal mass.  An echocardiogram was performed that showed 45-55% ejection fraction and no other abnormalities were identified.  · 8/17/15 - Comprehensive metabolic panel with creatinine 1.6.  · 9/8/14 - patient was seen in the office as a hospital follow-up.  Homocysteine level 18.4 (H)  · 9/10/14 - orders written to start Folgard daily due to the elevated homocystine level.  · 10/7/14 - The patient reports that he has not started taking the Folgard and did not remember having a prescription called into the pharmacy.  Advised the patient again to start taking Folgard daily and gave the patient a prescription today  · 11/13/14 - homocystine 17.7 (H)  · 11/19/14 - advised patient to increase Folgard 2 twice a day.  · 8/17/15 - Homocysteine 16.8 (5-12).   · 3/8/16 - Patient does not recall taking Folgard. Prescription written for Folgard 1 p.o. q. d.  Patient claims that he has not taken Coumadin for three days as he was out of town. INR today 1.4. Instructed to resume Coumadin at same dose and follow INR protocol.   · 6/30/16 - Comprehensive metabolic panel with creatinine 1.3 (0.7-1.2). Serum homocysteine 12.2 (5-12).     · 3/7/17 - INR 2.7 on 8 mg of Warfarin daily. Patient asked to increase Folgard to twice a day.   · 5/5/17 - INR 2.4, therapeutic. Patient is to continue the INR protocol and continue with Folgard one tablet twice daily as well as Coumadin 8 mg daily. Homocysteine 11.2 normal (5.0-12.0).   · 8/4/17 - INR today is 1.6. Patient states that he missed his dose of Coumadin Monday and Tuesday of this week. He denied any diet changes. I will leave the patient on his current dose of 8 mg daily and have him return to the office for an INR in one week as he had missed two consecutive doses of Coumadin earlier. He is to continue to follow the INR protocol and he is to continue the Folgard one tablet twice daily as it is controlling his homocysteine level.   · 11/9/17 - Patient claims to be missing occasional Warfarin doses. Counseled.   · 5/22/18 - Hemoglobin 16.2, MCV 89.6, hematocrit 45.9. Serum homocysteine 14.5 (<15).    · 9/18/18 - Discussed possibly stopping Coumadin if workup negative. PT PTT 30.5 and 36.5 with correction of PTT on 1:1 mix with normal  plasma. Lupus anticoagulant absent. Factor VIII activity 125 (). D-dimer >0.22 (<0.45).     · 9/21/18 - Venous Doppler study bilateral lower extremity with chronic deep venous thrombosis involving the right popliteal vein. Chronic superficial venous thrombosis involving the right small saphenous vein and a lymph node noted in the right groin.   · 12/18/18 - Discussed venous Doppler results. Recommended continuation of anticoagulation either with Coumadin or maintenance DOAC’s or Aspirin alone, which I did not recommend. Patient decided to continue on Warfarin.   ? 5/2/2019- homocystine 8.5 (N), factor VIII level 133% (H).  ? 7/8/21 Coumadin and Folgard daily.  No missed doses. Factor VIII and Homocysteine level ordered.  ? 11/11/21: Follow up appt. Continue Folgard and Coumadin at 7.5 mg for INR of 2.0. Continue INR checks monthly. No signs of clots or PE    2. Polycythemia diagnosis established in January 2015.  · 1/14/15 - Discussed with the patient the need for a bone marrow biopsy along with additional lab work for further evaluation of the thrombocytopenia and polycythemia.  The patient states that he does not wish to proceed with the blood work or the bone marrow biopsy at this time. Hemoglobin 17.0  · 3/7/17 - WBC 7.17, hemoglobin 17.2, platelet count 120,000. Discussed bone marrow evaluation again with the patient. He will think about it.   · 12/18/18 - WBC 10.4 with 84% neutrophils, 9% lymphocytes, 6% monocytes, hemoglobin 16.6, hematocrit 47.1, platelet count 121,000. BCR/ABL negative. [employer] NGS JAK2 panel negative.   ? 9/5/2019- hemoglobin 16.1, hematocrit 47.4.  ? 7/8/21 Hgb 16.3, Hct 47.6  ? 11/11/21: Follow up appt. Hgb 16.6, Hct 46.3. Stable    3. Thrombocytopenia diagnosis established in October 2014  · The patient presented to [facility] on August 19, 2014 where was admitted through 08/22/2014 with complaints of chest pain and shortness of air.  It was noted that he had  thrombocytopenia on admission with the platelet count of 125,000.  His platelet count slowly fell to 92,000 and it discharge with 12,000.  PT 11.9, INR 1.1, PTT 35.5.  A thrombocytopenia workup was performed that showed a folate of greater than 24.8 (H), and vitamin B12 331(N), , CMV, IgM was negative and EBV IgM was positive.  TAMIA screen is negative.  Platelet antibody screen is negative.  His PF4 was negative at 0.035.  It was noted that in the past, he has had some slightly low platelet counts in 125-140 range.    · 9/8/14 - platelet count 265,000.  EBV IgG positive, EBV IgM negative.  · 10/7/14 - platelet count 101,000.  · 11/17/14 - reviewed medication list for thrombocytopenia.  Metoprolol was known to cause thrombocytopenia purpura.  Lisinopril was known to rarely cause thrombocytopenia and less than 0.01% of the patients.  Plavix was known to cause thrombotic thrombocytopenia purpura (TTP) and thrombocytopenia and less than 1% of the patients.  Crestor was known to cause TTP and thrombocytopenia.  ? 9/5/2019-patient reports moderate alcohol use-16 ounces beer 3 times per week.  ? Platelets 116  ? 11/11/21: Follow up appt. Platelets 129, stable. No bleeding    4. Elevated LFTs and liver mass diagnosis established in September 2014.  · 9/8/14 - ALT 52 (H), AST 29 (N),   · 9/10/14 - advised the patient to increase his hydration and we’ll order a CT scan of the abdomen to be done for further evaluation of the elevated LFTs.  · 9/16/14 - ALT 49 (H), AST 29 (N)  · 9/23/14 - CT of the abdomen showed no evidence of renal mass.  There was an approximately 2 cm non-cystic appearing hypodense lesion on the posterior medial aspect of the right hepatic lobe.  This was in close proximity to the upper pole of the right kidney.  This could conceivably represent the mass seen on ultrasound study.  However it was definitely raising from the liver and not the upper pole of the right kidney.  · 10/7/14 - Will order a MRI  for evaluation of the liver mass as well as lab work for further evaluation of the elevated LFTs.  · 10/7/14 - Hepatitis screening negative, A1AT 133 (N), AFP 9.2 (H), ceruloplasmin 22 (N), ferritin 133.9 (N),   · 10/13/14 - MRI of the abdomen showed liver lesion within the posterior right lobe of the liver is most consistent with hemangioma   · 8/17/15 - ALT 38 (N), AST 26 (N), alkaline phosphatase 26 (N). Elevated LFT’s resolved.   · 3/8/16 - AFP 8 (0-9).    · 5/5/17 - CMP revealed an AST of 32 normal (15-41), ALT 55 normal (17-63), creatinine 1.4 high (0.7-1.2) and BUN 32 high (8-20). Otherwise within normal limits.      · 11/9/17 - Patient claims his PCP has recommended evaluation for his high liver enzymes by a liver doctor, but he has not followed up on it.   · 7/6/21 CMP drawn  · 11/11/21:  Follow up appt. CMP drawn    History of present illness was reviewed and is unchanged from the previous visit. 11/11/21          Past Medical History:   Diagnosis Date   • Cardiomyopathy (HCC) 2012   • CVA (cerebral vascular accident) (HCC) 2012   • Hyperlipemia 2012   • Hypertension 2012       Past Surgical History:   Procedure Laterality Date   • TURP / TRANSURETHRAL INCISION / DRAINAGE PROSTATE  2016         Current Outpatient Medications:   •  atorvastatin (LIPITOR) 20 MG tablet, ATORVASTATIN CALCIUM 20 MG TABS, Disp: , Rfl:   •  clopidogrel (PLAVIX) 75 MG tablet, Take 75 mg by mouth Daily., Disp: , Rfl:   •  hydrALAZINE (APRESOLINE) 10 MG tablet, HYDRALAZINE HCL 10 MG TABS, Disp: , Rfl:   •  lisinopril-hydrochlorothiazide (PRINZIDE,ZESTORETIC) 20-12.5 MG per tablet, LISINOPRIL-HYDROCHLOROTHIAZIDE 20-12.5 MG TABS, Disp: , Rfl:   •  metoprolol tartrate (LOPRESSOR) 25 MG tablet, METOPROLOL TARTRATE 25 MG TABS, Disp: , Rfl:   •  Virt-Verito 2.2-25-1 MG tablet tablet, TAKE ONE TABLET BY MOUTH TWICE A DAY, Disp: 60 tablet, Rfl: 4  •  warfarin (COUMADIN) 1 MG tablet, Take 1 tablet by mouth daily at 5:00 PM, Disp: 30 tablet,  Rfl: 3  •  warfarin (COUMADIN) 5 MG tablet, Take 1 tablet daily at 5:00 PM, Disp: 30 tablet, Rfl: 3    No Known Allergies    Family History   Problem Relation Age of Onset   • Lung cancer Mother        Cancer-related family history includes Lung cancer in his mother.    Social History     Tobacco Use   • Smoking status: Never Smoker   • Smokeless tobacco: Current User     Types: Chew   Substance Use Topics   • Alcohol use: Yes   • Drug use: No       I have reviewed and confirmed the accuracy of the patient's history: as entered by the MA/ALLISONN/RN. Lydia Hodge, SOLOMON 11/11/21     SUBJECTIVE:    The pateint presents today for follow up visit.  He denies pain, chest pain, or SOB.  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  He denies bleeding in stool or urine.  He has no other complaints. Informed to keep taking Folgard and Comadin daily and report any signs of clots or bleeding discussed in appt. The patient verbalized understanding.        REVIEW OF SYSTEMS:  Review of Systems   Constitutional: Negative for chills and fever.   HENT: Negative for ear pain, mouth sores, nosebleeds and sore throat.    Eyes: Negative for photophobia and visual disturbance.   Respiratory: Negative for wheezing and stridor.    Cardiovascular: Negative for chest pain and palpitations.   Gastrointestinal: Negative for abdominal pain, diarrhea, nausea and vomiting.   Endocrine: Negative for cold intolerance and heat intolerance.   Genitourinary: Negative for dysuria and hematuria.   Musculoskeletal: Negative for joint swelling and neck stiffness.   Skin: Negative for color change and rash.   Neurological: Negative for seizures and syncope.   Hematological: Negative for adenopathy.        No obvious bleeding   Psychiatric/Behavioral: Negative for agitation, confusion and hallucinations.   I have reviewed and confirmed the accuracy of the ROS as documented by the MA/ALLISONN/RN Lydia Hodge,  "APRN      OBJECTIVE:    Vitals:    11/11/21 0857   BP: 128/84   Pulse: 55   Temp: 96.8 °F (36 °C)   SpO2: 95%   Weight: 104 kg (229 lb 12.8 oz)   Height: 165.1 cm (65\")   PainSc: 0-No pain       ECOG  (0) Fully active, able to carry on all predisease performance without restriction    Physical Exam   Constitutional: He is oriented to person, place, and time. No distress.   HENT:   Head: Normocephalic and atraumatic.   Eyes: Conjunctivae are normal. Right eye exhibits no discharge. Left eye exhibits no discharge. No scleral icterus.   Neck: No thyromegaly present.   Cardiovascular: Normal rate, regular rhythm and normal heart sounds. Exam reveals no gallop and no friction rub.   Pulmonary/Chest: Effort normal. No stridor. No respiratory distress. He has no wheezes.   Abdominal: Soft. Bowel sounds are normal. He exhibits no mass. There is no abdominal tenderness. There is no rebound and no guarding.   Musculoskeletal: Normal range of motion. No tenderness.   Lymphadenopathy:     He has no cervical adenopathy.   Neurological: He is alert and oriented to person, place, and time. He exhibits normal muscle tone.   Skin: Skin is warm and dry. Bruising (bilateral forearms) noted. No rash noted. He is not diaphoretic. No erythema.   Psychiatric: His behavior is normal. Judgment and thought content normal.   Nursing note and vitals reviewed.    I have reexamined the patient and the results are consistent with the previously documented exam. SOLOMON Dorsey       RECENT LABS    WBC   Date Value Ref Range Status   11/11/2021 7.67 3.40 - 10.80 10*3/mm3 Final     RBC   Date Value Ref Range Status   11/11/2021 5.09 4.14 - 5.80 10*6/mm3 Final     Hemoglobin   Date Value Ref Range Status   11/11/2021 16.6 13.0 - 17.7 g/dL Final     Hematocrit   Date Value Ref Range Status   11/11/2021 46.3 37.5 - 51.0 % Final     MCV   Date Value Ref Range Status   11/11/2021 91.0 79.0 - 97.0 fL Final     MCH   Date Value Ref Range Status "   11/11/2021 32.6 26.6 - 33.0 pg Final     MCHC   Date Value Ref Range Status   11/11/2021 35.9 (H) 31.5 - 35.7 g/dL Final     RDW   Date Value Ref Range Status   11/11/2021 14.1 12.3 - 15.4 % Final     RDW-SD   Date Value Ref Range Status   11/11/2021 45.4 37.0 - 54.0 fl Final     MPV   Date Value Ref Range Status   11/11/2021 9.8 6.0 - 12.0 fL Final     Platelets   Date Value Ref Range Status   11/11/2021 129 (L) 140 - 450 10*3/mm3 Final     Neutrophil %   Date Value Ref Range Status   11/11/2021 70.3 42.7 - 76.0 % Final     Lymphocyte %   Date Value Ref Range Status   11/11/2021 18.4 (L) 19.6 - 45.3 % Final     Monocyte %   Date Value Ref Range Status   11/11/2021 8.0 5.0 - 12.0 % Final     Eosinophil %   Date Value Ref Range Status   11/11/2021 2.9 0.3 - 6.2 % Final     Basophil %   Date Value Ref Range Status   11/11/2021 0.4 0.0 - 1.5 % Final     Neutrophils, Absolute   Date Value Ref Range Status   11/11/2021 5.40 1.70 - 7.00 10*3/mm3 Final     Lymphocytes, Absolute   Date Value Ref Range Status   11/11/2021 1.41 0.70 - 3.10 10*3/mm3 Final     Monocytes, Absolute   Date Value Ref Range Status   11/11/2021 0.61 0.10 - 0.90 10*3/mm3 Final     Eosinophils, Absolute   Date Value Ref Range Status   11/11/2021 0.22 0.00 - 0.40 10*3/mm3 Final     Basophils, Absolute   Date Value Ref Range Status   11/11/2021 0.03 0.00 - 0.20 10*3/mm3 Final       Lab Results   Component Value Date    GLUCOSE 100 (H) 09/05/2019    BUN 24 (H) 09/05/2019    CREATININE 1.50 (H) 09/05/2019    EGFRIFNONA 46 (L) 09/05/2019    BCR 16.0 09/05/2019    K 4.7 09/05/2019    CO2 26.0 09/05/2019    CALCIUM 9.4 09/05/2019    ALBUMIN 3.80 09/05/2019    LABIL2 1.6 05/22/2018    AST 26 09/05/2019    ALT 43 09/05/2019           ASSESSMENT:    1. Bilateral Pulmonary emboli: Ongoing management on anticoagulation therapy.  Continue warfarin with monthly INR checks and report signs of bleeding  2. Chronic DVT of proximal vein of right lower extremity: Good  cap refill, no pain or edema  3. SVT  4. Hyperhomocysteinemia: Reviewed homocystine level with patient, informed to continue Folgard  5. Heterozygous MTHFR mutation  6. CKD, stage III  7. Elevated Factor VIII level: Factor VIII reviewed with patient, elevated. Report signs of clots  8. Polycythemia: CBC reviewed with patient Normal levels for Hgb and Hct  9. Thrombocytopenia: Chronic: Stable at 129 today  10. Lesion of the Neck being followed by Dr. Kapoor: Diagnosed with skin cancer    The patient states he is taking his Coumadin and Folgard daily as ordered      PLANS:  Continue Coumadin 7.5 mg daily    Continue Folgard  Daily  Homocysteine level today  Follow-up 4 months, Dr Ayala  Report any signs of clots  Report any bleeding  Refill coumadin   Dermatology referral for sun damage on scalp    Discussion:  The patient presents today for follow up.  He states that he feels well. No dizziness, SOB, chest pain, swelling or pain in extremities. He denies fatigue and states that appetite is good. Discussed importance of Coumadin daily and INR checks continuing on a monthly basis.  The patient verbalized understanding and states he takes Folgard and Coumadin daily without missing any doses. Informed of importance of Folgard in keeping homocysteine level in normal range to prevent risk of clots.  Reviewed labs from last visit with homocysteine in normal range and elevated Factor VIII.  Reinforced coumadin to control risk of clots.  Informed to go to call the clinic or go to ER for any uncontrolled bleeding. Bruising noted on bilateral forearms. The patient verbalizes intent to call for any bleeding. The patient verbalizes understanding of medication regimen, and agrees with plan of care. He will follow up with Dr Ayala in 4 months with CBC.      I spent 30 minutes in diagnostic evaluation, physical assessment, review of labs, review of chart, future plan of care, and documentation.    Electronically signed by Lydia ORNELAS  Naveen, SOLOMON, 11/11/21, 9:34 AM EST.                    .

## 2021-12-09 ENCOUNTER — LAB (OUTPATIENT)
Dept: LAB | Facility: HOSPITAL | Age: 71
End: 2021-12-09

## 2021-12-09 ENCOUNTER — HOSPITAL ENCOUNTER (OUTPATIENT)
Dept: ONCOLOGY | Facility: HOSPITAL | Age: 71
Setting detail: INFUSION SERIES
Discharge: HOME | End: 2021-12-09

## 2021-12-09 DIAGNOSIS — D69.6 THROMBOCYTOPENIA (HCC): ICD-10-CM

## 2021-12-09 DIAGNOSIS — I26.99 PULMONARY THROMBOSIS (HCC): ICD-10-CM

## 2021-12-09 DIAGNOSIS — I82.5Y3 CHRONIC DEEP VEIN THROMBOSIS (DVT) OF PROXIMAL VEIN OF BOTH LOWER EXTREMITIES (HCC): ICD-10-CM

## 2021-12-09 DIAGNOSIS — Z79.01 LONG TERM (CURRENT) USE OF ANTICOAGULANTS: Primary | ICD-10-CM

## 2021-12-09 LAB — INR PPP: 2.5 (ref 0.8–1.2)

## 2021-12-09 PROCEDURE — 85610 PROTHROMBIN TIME: CPT

## 2021-12-09 PROCEDURE — 36416 COLLJ CAPILLARY BLOOD SPEC: CPT

## 2021-12-09 NOTE — PROGRESS NOTES
Pt's INR 2.50 today.   Pt. Instructed to continue warfarin 7.5mg daily and repeat INR in a month.   Pt. Verbalized understanding.

## 2022-01-03 RX ORDER — CYANOCOBALAMIN/FOLIC AC/VIT B6 1-2.2-25MG
TABLET ORAL
Qty: 60 TABLET | Refills: 4 | Status: SHIPPED | OUTPATIENT
Start: 2022-01-03 | End: 2022-08-03

## 2022-01-06 ENCOUNTER — HOSPITAL ENCOUNTER (OUTPATIENT)
Dept: ONCOLOGY | Facility: HOSPITAL | Age: 72
Setting detail: INFUSION SERIES
Discharge: HOME | End: 2022-01-06

## 2022-01-06 ENCOUNTER — LAB (OUTPATIENT)
Dept: LAB | Facility: HOSPITAL | Age: 72
End: 2022-01-06

## 2022-01-06 DIAGNOSIS — I26.99 PULMONARY THROMBOSIS: ICD-10-CM

## 2022-01-06 DIAGNOSIS — I82.5Y3 CHRONIC DEEP VEIN THROMBOSIS (DVT) OF PROXIMAL VEIN OF BOTH LOWER EXTREMITIES: ICD-10-CM

## 2022-01-06 DIAGNOSIS — Z79.01 LONG TERM (CURRENT) USE OF ANTICOAGULANTS: Primary | ICD-10-CM

## 2022-01-06 DIAGNOSIS — D69.6 THROMBOCYTOPENIA: ICD-10-CM

## 2022-01-06 LAB — INR PPP: 1.8 (ref 0.8–1.2)

## 2022-01-06 PROCEDURE — 85610 PROTHROMBIN TIME: CPT

## 2022-01-06 PROCEDURE — 36416 COLLJ CAPILLARY BLOOD SPEC: CPT

## 2022-01-06 NOTE — PROGRESS NOTES
INR 1.8. Pt denies any medication changes or diet changes. Advised pt to increase warfarin to 8mg daily and check in 1 week.  Pt v/u.

## 2022-01-13 ENCOUNTER — LAB (OUTPATIENT)
Dept: LAB | Facility: HOSPITAL | Age: 72
End: 2022-01-13

## 2022-01-13 ENCOUNTER — HOSPITAL ENCOUNTER (OUTPATIENT)
Dept: ONCOLOGY | Facility: HOSPITAL | Age: 72
Setting detail: INFUSION SERIES
Discharge: HOME | End: 2022-01-13

## 2022-01-13 DIAGNOSIS — I26.99 PULMONARY THROMBOSIS: ICD-10-CM

## 2022-01-13 DIAGNOSIS — D69.6 THROMBOCYTOPENIA: ICD-10-CM

## 2022-01-13 DIAGNOSIS — I82.5Y3 CHRONIC DEEP VEIN THROMBOSIS (DVT) OF PROXIMAL VEIN OF BOTH LOWER EXTREMITIES: ICD-10-CM

## 2022-01-13 DIAGNOSIS — Z79.01 LONG TERM (CURRENT) USE OF ANTICOAGULANTS: Primary | ICD-10-CM

## 2022-01-13 LAB — INR PPP: 2.7 (ref 0.8–1.2)

## 2022-01-13 PROCEDURE — 85610 PROTHROMBIN TIME: CPT

## 2022-01-13 PROCEDURE — 36416 COLLJ CAPILLARY BLOOD SPEC: CPT

## 2022-01-13 NOTE — PROGRESS NOTES
INR 2.7 today. Pt to continue taking warfarin 8 mg daily and return in 1 week for INR check. Her verbalized understanding.

## 2022-01-19 ENCOUNTER — HOSPITAL ENCOUNTER (OUTPATIENT)
Dept: ONCOLOGY | Facility: HOSPITAL | Age: 72
Setting detail: INFUSION SERIES
Discharge: HOME | End: 2022-01-19

## 2022-01-19 ENCOUNTER — LAB (OUTPATIENT)
Dept: LAB | Facility: HOSPITAL | Age: 72
End: 2022-01-19

## 2022-01-19 DIAGNOSIS — D69.6 THROMBOCYTOPENIA: ICD-10-CM

## 2022-01-19 DIAGNOSIS — Z79.01 LONG TERM (CURRENT) USE OF ANTICOAGULANTS: Primary | ICD-10-CM

## 2022-01-19 DIAGNOSIS — I26.99 PULMONARY THROMBOSIS: ICD-10-CM

## 2022-01-19 DIAGNOSIS — I82.5Y3 CHRONIC DEEP VEIN THROMBOSIS (DVT) OF PROXIMAL VEIN OF BOTH LOWER EXTREMITIES: ICD-10-CM

## 2022-01-19 LAB — INR PPP: 1.3 (ref 0.8–1.2)

## 2022-01-19 PROCEDURE — 36416 COLLJ CAPILLARY BLOOD SPEC: CPT

## 2022-01-19 PROCEDURE — 85610 PROTHROMBIN TIME: CPT

## 2022-01-19 NOTE — PROGRESS NOTES
INR 1.3. Pt missed last 2 days of warfarin.  Advised him to continue taking the 8mg daily and recheck in 1 week.

## 2022-01-20 ENCOUNTER — APPOINTMENT (OUTPATIENT)
Dept: LAB | Facility: HOSPITAL | Age: 72
End: 2022-01-20

## 2022-01-20 ENCOUNTER — APPOINTMENT (OUTPATIENT)
Dept: ONCOLOGY | Facility: HOSPITAL | Age: 72
End: 2022-01-20

## 2022-01-27 ENCOUNTER — HOSPITAL ENCOUNTER (OUTPATIENT)
Dept: ONCOLOGY | Facility: HOSPITAL | Age: 72
Setting detail: INFUSION SERIES
Discharge: HOME | End: 2022-01-27

## 2022-01-27 ENCOUNTER — LAB (OUTPATIENT)
Dept: LAB | Facility: HOSPITAL | Age: 72
End: 2022-01-27

## 2022-01-27 DIAGNOSIS — Z79.01 LONG TERM (CURRENT) USE OF ANTICOAGULANTS: Primary | ICD-10-CM

## 2022-01-27 DIAGNOSIS — Z79.01 LONG TERM (CURRENT) USE OF ANTICOAGULANTS: ICD-10-CM

## 2022-01-27 LAB — INR PPP: 1.8 (ref 0.8–1.2)

## 2022-01-27 PROCEDURE — 36416 COLLJ CAPILLARY BLOOD SPEC: CPT

## 2022-01-27 PROCEDURE — 85610 PROTHROMBIN TIME: CPT

## 2022-01-27 NOTE — PROGRESS NOTES
Patient came in with INR of 1.8. He states he missed two more days this week of his 8mg. Patient to continue 8mg and make sure he is taking his dose daily. Recheck next week, appt given.

## 2022-02-03 ENCOUNTER — TELEPHONE (OUTPATIENT)
Dept: ONCOLOGY | Facility: HOSPITAL | Age: 72
End: 2022-02-03

## 2022-02-03 NOTE — TELEPHONE ENCOUNTER
Attempted to call patient as he missed his INR appt this morning. Asked for a return call to reschedule.

## 2022-02-07 ENCOUNTER — TELEPHONE (OUTPATIENT)
Dept: ONCOLOGY | Facility: CLINIC | Age: 72
End: 2022-02-07

## 2022-02-09 ENCOUNTER — TELEPHONE (OUTPATIENT)
Dept: ONCOLOGY | Facility: HOSPITAL | Age: 72
End: 2022-02-09

## 2022-02-09 DIAGNOSIS — Z79.01 LONG TERM (CURRENT) USE OF ANTICOAGULANTS: ICD-10-CM

## 2022-02-09 RX ORDER — WARFARIN SODIUM 1 MG/1
TABLET ORAL
Qty: 60 TABLET | Refills: 0 | Status: SHIPPED | OUTPATIENT
Start: 2022-02-09 | End: 2022-03-30

## 2022-02-09 NOTE — TELEPHONE ENCOUNTER
Called pt to get him scheduled for INR check. Appointment scheduled for tomorrow, 2/10/22, at 0900. Pt verbalized understanding.

## 2022-02-10 ENCOUNTER — HOSPITAL ENCOUNTER (OUTPATIENT)
Dept: ONCOLOGY | Facility: HOSPITAL | Age: 72
Setting detail: INFUSION SERIES
Discharge: HOME | End: 2022-02-10

## 2022-02-10 ENCOUNTER — LAB (OUTPATIENT)
Dept: LAB | Facility: HOSPITAL | Age: 72
End: 2022-02-10

## 2022-02-10 DIAGNOSIS — Z79.01 LONG TERM (CURRENT) USE OF ANTICOAGULANTS: ICD-10-CM

## 2022-02-10 DIAGNOSIS — I26.99 OTHER PULMONARY EMBOLISM WITHOUT ACUTE COR PULMONALE, UNSPECIFIED CHRONICITY: Chronic | ICD-10-CM

## 2022-02-10 DIAGNOSIS — Z79.01 LONG TERM (CURRENT) USE OF ANTICOAGULANTS: Primary | ICD-10-CM

## 2022-02-10 LAB — INR PPP: 1.5 (ref 0.8–1.2)

## 2022-02-10 PROCEDURE — 85610 PROTHROMBIN TIME: CPT

## 2022-02-10 RX ORDER — WARFARIN SODIUM 5 MG/1
TABLET ORAL
Qty: 30 TABLET | Refills: 3 | Status: SHIPPED | OUTPATIENT
Start: 2022-02-10 | End: 2022-06-07 | Stop reason: SDUPTHER

## 2022-02-10 RX ORDER — WARFARIN SODIUM 4 MG/1
TABLET ORAL
Qty: 60 TABLET | Refills: 2 | Status: SHIPPED | OUTPATIENT
Start: 2022-02-10 | End: 2022-03-22 | Stop reason: SDUPTHER

## 2022-02-17 ENCOUNTER — HOSPITAL ENCOUNTER (OUTPATIENT)
Dept: ONCOLOGY | Facility: HOSPITAL | Age: 72
Setting detail: INFUSION SERIES
Discharge: HOME | End: 2022-02-17

## 2022-02-17 ENCOUNTER — LAB (OUTPATIENT)
Dept: LAB | Facility: HOSPITAL | Age: 72
End: 2022-02-17

## 2022-02-17 DIAGNOSIS — Z79.01 LONG TERM (CURRENT) USE OF ANTICOAGULANTS: Primary | ICD-10-CM

## 2022-02-17 DIAGNOSIS — Z79.01 LONG TERM (CURRENT) USE OF ANTICOAGULANTS: ICD-10-CM

## 2022-02-17 LAB — INR PPP: 4.2 (ref 0.8–1.2)

## 2022-02-17 PROCEDURE — 85610 PROTHROMBIN TIME: CPT

## 2022-02-17 PROCEDURE — 36416 COLLJ CAPILLARY BLOOD SPEC: CPT

## 2022-02-17 NOTE — PROGRESS NOTES
Patient here for INR 4.6 hold for one day and recheck 2/18/22 patient had some beers on 2/16/22. Patient was started on 9mg on last visit. Advised patient to hold today and no alcohol patient v/u

## 2022-02-18 ENCOUNTER — LAB (OUTPATIENT)
Dept: LAB | Facility: HOSPITAL | Age: 72
End: 2022-02-18

## 2022-02-18 ENCOUNTER — HOSPITAL ENCOUNTER (OUTPATIENT)
Dept: ONCOLOGY | Facility: HOSPITAL | Age: 72
Setting detail: INFUSION SERIES
Discharge: HOME | End: 2022-02-18

## 2022-02-18 DIAGNOSIS — Z79.01 LONG TERM (CURRENT) USE OF ANTICOAGULANTS: ICD-10-CM

## 2022-02-18 DIAGNOSIS — Z79.01 LONG TERM (CURRENT) USE OF ANTICOAGULANTS: Primary | ICD-10-CM

## 2022-02-18 LAB — INR PPP: 3.2 (ref 0.8–1.2)

## 2022-02-18 PROCEDURE — 85610 PROTHROMBIN TIME: CPT

## 2022-02-18 PROCEDURE — 36416 COLLJ CAPILLARY BLOOD SPEC: CPT

## 2022-02-18 NOTE — PROGRESS NOTES
Patient came in after holding warfarin last night at 3.2. Patient to hold again tonight and restart tomorrow at 8mg. Recheck Tuesday. Appt given.

## 2022-02-22 ENCOUNTER — HOSPITAL ENCOUNTER (OUTPATIENT)
Dept: ONCOLOGY | Facility: HOSPITAL | Age: 72
Setting detail: INFUSION SERIES
Discharge: HOME | End: 2022-02-22

## 2022-02-22 ENCOUNTER — LAB (OUTPATIENT)
Dept: LAB | Facility: HOSPITAL | Age: 72
End: 2022-02-22

## 2022-02-22 DIAGNOSIS — Z79.01 LONG TERM (CURRENT) USE OF ANTICOAGULANTS: ICD-10-CM

## 2022-02-22 DIAGNOSIS — Z79.01 LONG TERM (CURRENT) USE OF ANTICOAGULANTS: Primary | ICD-10-CM

## 2022-02-22 LAB — INR PPP: 1.5 (ref 0.8–1.2)

## 2022-02-22 PROCEDURE — 85610 PROTHROMBIN TIME: CPT

## 2022-02-22 PROCEDURE — 36416 COLLJ CAPILLARY BLOOD SPEC: CPT

## 2022-02-22 NOTE — PROGRESS NOTES
INR 1.5 today. Pt to increase warfarin to 8.5 mg daily and return to center in 1 week for INR check. Appointment card given to pt and he verbalized understanding.

## 2022-03-01 ENCOUNTER — LAB (OUTPATIENT)
Dept: LAB | Facility: HOSPITAL | Age: 72
End: 2022-03-01

## 2022-03-01 ENCOUNTER — HOSPITAL ENCOUNTER (OUTPATIENT)
Dept: ONCOLOGY | Facility: HOSPITAL | Age: 72
Setting detail: INFUSION SERIES
Discharge: HOME | End: 2022-03-01

## 2022-03-01 DIAGNOSIS — Z79.01 LONG TERM (CURRENT) USE OF ANTICOAGULANTS: Primary | ICD-10-CM

## 2022-03-01 DIAGNOSIS — Z79.01 LONG TERM (CURRENT) USE OF ANTICOAGULANTS: ICD-10-CM

## 2022-03-01 LAB — INR PPP: 2.9 (ref 0.8–1.2)

## 2022-03-01 PROCEDURE — 85610 PROTHROMBIN TIME: CPT

## 2022-03-01 PROCEDURE — 36416 COLLJ CAPILLARY BLOOD SPEC: CPT

## 2022-03-01 PROCEDURE — G0463 HOSPITAL OUTPT CLINIC VISIT: HCPCS

## 2022-03-01 NOTE — PROGRESS NOTES
Patient came in with INR of 2.9 today, he states he has been taking 8mg daily. Continue 8mg and recheck at NP follow up next week.

## 2022-03-08 ENCOUNTER — TELEPHONE (OUTPATIENT)
Dept: ONCOLOGY | Facility: CLINIC | Age: 72
End: 2022-03-08

## 2022-03-08 NOTE — TELEPHONE ENCOUNTER
Hub is instructed to read the documentation below to patient  ATTEMPTED TO CONTACT PATIENT REGARDING 3-10-22 MD F/U APPT.  NO ANSWER.  LMV INFORMING PATIENT THAT WE NEED TO CANCEL THIS APPT AND RESCHEDULE.  ASKED HIM TO CALL BACK.

## 2022-03-10 ENCOUNTER — APPOINTMENT (OUTPATIENT)
Dept: LAB | Facility: HOSPITAL | Age: 72
End: 2022-03-10

## 2022-03-21 NOTE — PROGRESS NOTES
Hematology/Oncology Outpatient Follow Up    PATIENT NAME:Jett Doe  :1950  MRN: [id number]  PRIMARY CARE PHYSICIAN: Jasmin Padilla APRN  REFERRING PHYSICIAN: Jasmin Padilla APRN    Chief Complaint   Patient presents with   • Follow-up     Long term (current) use of anticoagulants         HISTORY OF PRESENT ILLNESS:     1. Bilateral PE, right lower extremity DVT and bilateral SVTs diagnosis established in 2014 and MTHFR C677T heterozygosity diagnosis established and 2014.  · The patient presented to [facility] on 2014 where was admitted through 2014 with complaints of chest pain and shortness of air.  He was seen per EMS and had associated diaphoresis as well as some low sternal chest pressure.  His troponin was elevated and cardiology was consulted.  He was started on nitroglycerin drip and heparin drip.  He was given diuresis and admitted to the CBCU.  It was planned for him to undergo a cardiac catheterization.  A VQ scan was done to evaluate for bilateral pulmonary emboli and renal is consulted for his elevated creatinine.  The VQ scan showed multiple abnormalities in both lungs with a high probably for pulmonary emboli.  His chest x-ray showed no acute cardiopulmonary abnormality.  We were consulted for his Thrombopenia and his pulmonology emboli.  His risk factors included obesity, sedentary lifestyle, history of stroke and hyperlipidemia.  He has been on Plavix and aspirin as an outpatient and when his pulmonary emboli were found, his heparin was changed to renal dust Lovenox therapy 1 mg/kg per day.  He was not started on Coumadin initially with the plan that cardiology would perform a cardiac catheterization shortly after admission.  A thrombophilia workup was performed and results were; his AT3 was 89.9% normal, protein C was 101.4% normal and protein S was 101% normal, Fibrinogen 416 normal, factor V Leiden screen is negative, prothrombin gene  mutation is negative.  MTHFR showed heterozygosity for C677 T mutation, but negative for K4022D mutation.  Phosphate lipid antibodies were done.  Beta II glycoprotein is negative.  Phosphatidylserine antibody is negative.  Cardiolipin antibodies are all negative.  Bilateral lower extremity Dopplers was performed that showed a right lower extremity DVT and bilateral SVTs in his small saphenous vein.  On 08/20/2014, an IVC filter was placed with plans to hold starting his Coumadinization until after his cardiac catheterization was performed, which was going to be scheduled as an outpatient per Dr. Jurado.  It was noted that he had thrombocytopenia on admission with the platelet count of 125,000.  His platelet count slowly felt to 92,000 and it discharge with [id number].  His LFTs were noted to be abnormal with an AST of 88 and ALT of 103.  His coags were normal with PT of 11.9, INR 1.1 and PTT 35.5.  A thrombocytopenia workup was performed as seen below.  TSH of 1.3, His LFTs are normalized prior to discharge.  Nephrology was consulted for his AMADEO with a creatinine of 2.0 on admission.  Dr. Howard performed some renal studies.  His urinalysis was normal.  His renal ultrasound showed a 2 cm mass extended of the upper pole of the right kidney, which appeared to be solid with recommendations follow up with the CT or an MRI of his abdomen.  There was no hydronephrosis to indicate obstructive uropathy and the urinary bladder was unremarkable.  His creatinine improved with medication adjustments and was 1.4 and discharged with plans to perform an MRI of his afternoon when his creatinine improved to further evaluate his right renal mass.  An echocardiogram was performed that showed 45-55% ejection fraction and no other abnormalities were identified.  · 8/17/15 - Comprehensive metabolic panel with creatinine 1.6.  · 9/8/14 - patient was seen in the office as a hospital follow-up. Homocysteine level 18.4 (H)  · 9/10/14 - orders  written to start Folgard daily due to the elevated homocystine level.  · 10/7/14 - The patient reports that he has not started taking the Folgard and did not remember having a prescription called into the pharmacy.  Advised the patient again to start taking Folgard daily and gave the patient a prescription today  · 11/13/14 - homocystine 17.7 (H)  · 11/19/14 - advised patient to increase Folgard 2 twice a day.  · 8/17/15 - Homocysteine 16.8 (5-12).   · 3/8/16 - Patient does not recall taking Folgard. Prescription written for Folgard 1 p.o. q. d.  Patient claims that he has not taken Coumadin for three days as he was out of town. INR today 1.4. Instructed to resume Coumadin at same dose and follow INR protocol.   · 6/30/16 - Comprehensive metabolic panel with creatinine 1.3 (0.7-1.2). Serum homocysteine 12.2 (5-12).     · 3/7/17 - INR 2.7 on 8 mg of Warfarin daily. Patient asked to increase Folgard to twice a day.   · 5/5/17 - INR 2.4, therapeutic. Patient is to continue the INR protocol and continue with Folgard one tablet twice daily as well as Coumadin 8 mg daily. Homocysteine 11.2 normal (5.0-12.0).   · 8/4/17 - INR today is 1.6. Patient states that he missed his dose of Coumadin Monday and Tuesday of this week. He denied any diet changes. I will leave the patient on his current dose of 8 mg daily and have him return to the office for an INR in one week as he had missed two consecutive doses of Coumadin earlier. He is to continue to follow the INR protocol and he is to continue the Folgard one tablet twice daily as it is controlling his homocysteine level.   · 11/9/17 - Patient claims to be missing occasional Warfarin doses. Counseled.   · 5/22/18 - Hemoglobin 16.2, MCV 89.6, hematocrit 45.9. Serum homocysteine 14.5 (<15).    · 9/18/18 - Discussed possibly stopping Coumadin if workup negative. PT PTT 30.5 and 36.5 with correction of PTT on 1:1 mix with normal plasma. Lupus anticoagulant absent. Factor VIII  activity 125 (). D-dimer >0.22 (<0.45).     · 9/21/18 - Venous Doppler study bilateral lower extremity with chronic deep venous thrombosis involving the right popliteal vein. Chronic superficial venous thrombosis involving the right small saphenous vein and a lymph node noted in the right groin.   · 12/18/18 - Discussed venous Doppler results. Recommended continuation of anticoagulation either with Coumadin or maintenance DOAC’s or Aspirin alone, which I did not recommend. Patient decided to continue on Warfarin.   ? 5/2/2019- homocystine 8.5 (N), factor VIII level 133% (H).  ? 7/8/21 Coumadin and Folgard daily.  No missed doses. Factor VIII and Homocysteine level ordered.  ? 11/11/21: Follow up appt. Continue Folgard and Coumadin at 7.5 mg for INR of 2.0. Continue INR checks monthly. No signs of clots or PE    2. Polycythemia diagnosis established in January 2015.  · 1/14/15 - Discussed with the patient the need for a bone marrow biopsy along with additional lab work for further evaluation of the thrombocytopenia and polycythemia.  The patient states that he does not wish to proceed with the blood work or the bone marrow biopsy at this time. Hemoglobin 17.0  · 3/7/17 - WBC 7.17, hemoglobin 17.2, platelet count 120,000. Discussed bone marrow evaluation again with the patient. He will think about it.   · 12/18/18 - WBC 10.4 with 84% neutrophils, 9% lymphocytes, 6% monocytes, hemoglobin 16.6, hematocrit 47.1, platelet count 121,000. BCR/ABL negative. [employer] NGS JAK2 panel negative.   ? 9/5/2019- hemoglobin 16.1, hematocrit 47.4.  ? 7/8/21 Hgb 16.3, Hct 47.6  ? 11/11/21: Follow up appt. Hgb 16.6, Hct 46.3. Stable    3. Thrombocytopenia diagnosis established in October 2014  · The patient presented to [facility] on August 19, 2014 where was admitted through 08/22/2014 with complaints of chest pain and shortness of air.  It was noted that he had thrombocytopenia on admission with the platelet count  of 125,000.  His platelet count slowly fell to 92,000 and it discharge with 12,000.  PT 11.9, INR 1.1, PTT 35.5.  A thrombocytopenia workup was performed that showed a folate of greater than 24.8 (H), and vitamin B12 331(N), , CMV, IgM was negative and EBV IgM was positive.  TAMIA screen is negative.  Platelet antibody screen is negative.  His PF4 was negative at 0.035.  It was noted that in the past, he has had some slightly low platelet counts in 125-140 range.    · 9/8/14 - platelet count 265,000.  EBV IgG positive, EBV IgM negative.  · 10/7/14 - platelet count 101,000.  · 11/17/14 - reviewed medication list for thrombocytopenia.  Metoprolol was known to cause thrombocytopenia purpura.  Lisinopril was known to rarely cause thrombocytopenia and less than 0.01% of the patients.  Plavix was known to cause thrombotic thrombocytopenia purpura (TTP) and thrombocytopenia and less than 1% of the patients.  Crestor was known to cause TTP and thrombocytopenia.  ? 9/5/2019-patient reports moderate alcohol use-16 ounces beer 3 times per week.  ? Platelets 116  ? 11/11/21: Follow up appt. Platelets 129, stable. No bleeding    4. Elevated LFTs and liver mass diagnosis established in September 2014.  · 9/8/14 - ALT 52 (H), AST 29 (N),   · 9/10/14 - advised the patient to increase his hydration and we’ll order a CT scan of the abdomen to be done for further evaluation of the elevated LFTs.  · 9/16/14 - ALT 49 (H), AST 29 (N)  · 9/23/14 - CT of the abdomen showed no evidence of renal mass.  There was an approximately 2 cm non-cystic appearing hypodense lesion on the posterior medial aspect of the right hepatic lobe.  This was in close proximity to the upper pole of the right kidney.  This could conceivably represent the mass seen on ultrasound study.  However it was definitely raising from the liver and not the upper pole of the right kidney.  · 10/7/14 - Will order a MRI for evaluation of the liver mass as well as lab work  for further evaluation of the elevated LFTs.  · 10/7/14 - Hepatitis screening negative, A1AT 133 (N), AFP 9.2 (H), ceruloplasmin 22 (N), ferritin 133.9 (N),   · 10/13/14 - MRI of the abdomen showed liver lesion within the posterior right lobe of the liver is most consistent with hemangioma   · 8/17/15 - ALT 38 (N), AST 26 (N), alkaline phosphatase 26 (N). Elevated LFT’s resolved.   · 3/8/16 - AFP 8 (0-9).    · 5/5/17 - CMP revealed an AST of 32 normal (15-41), ALT 55 normal (17-63), creatinine 1.4 high (0.7-1.2) and BUN 32 high (8-20). Otherwise within normal limits.      · 11/9/17 - Patient claims his PCP has recommended evaluation for his high liver enzymes by a liver doctor, but he has not followed up on it.   · 7/6/21 CMP drawn  · 11/11/21:  Follow up appt. CMP drawn      3/22/22: Patient here for follow up appt. Patient is currently on Coumadin 8mg po daily with INR of 2.5 today. No bleeding issues.     History of present illness was reviewed and is unchanged from the previous visit. 3/22/2022          Past Medical History:   Diagnosis Date   • Cardiomyopathy (HCC) 2012   • CVA (cerebral vascular accident) (HCC) 2012   • Hyperlipemia 2012   • Hypertension 2012       Past Surgical History:   Procedure Laterality Date   • TURP / TRANSURETHRAL INCISION / DRAINAGE PROSTATE  2016         Current Outpatient Medications:   •  atorvastatin (LIPITOR) 20 MG tablet, ATORVASTATIN CALCIUM 20 MG TABS, Disp: , Rfl:   •  clopidogrel (PLAVIX) 75 MG tablet, Take 75 mg by mouth Daily., Disp: , Rfl:   •  hydrALAZINE (APRESOLINE) 10 MG tablet, HYDRALAZINE HCL 10 MG TABS, Disp: , Rfl:   •  lisinopril-hydrochlorothiazide (PRINZIDE,ZESTORETIC) 20-12.5 MG per tablet, LISINOPRIL-HYDROCHLOROTHIAZIDE 20-12.5 MG TABS, Disp: , Rfl:   •  metoprolol tartrate (LOPRESSOR) 25 MG tablet, METOPROLOL TARTRATE 25 MG TABS, Disp: , Rfl:   •  Virt-Verito 2.2-25-1 MG tablet tablet, TAKE ONE TABLET BY MOUTH TWICE A DAY, Disp: 60 tablet, Rfl: 4  •  warfarin  (COUMADIN) 1 MG tablet, TAKE ONE TABLET BY MOUTH DAILY AT 5:00PM, Disp: 60 tablet, Rfl: 0  •  warfarin (COUMADIN) 4 MG tablet, Take one 4 mg tablet with one 5mg tablet for a total of 9mg daily., Disp: 60 tablet, Rfl: 2  •  warfarin (COUMADIN) 5 MG tablet, Take 1 tablet daily at 5:00 PM, Disp: 30 tablet, Rfl: 3    No Known Allergies    Family History   Problem Relation Age of Onset   • Lung cancer Mother        Cancer-related family history includes Lung cancer in his mother.    Social History     Tobacco Use   • Smoking status: Never Smoker   • Smokeless tobacco: Current User     Types: Chew   Substance Use Topics   • Alcohol use: Yes   • Drug use: No       I have reviewed and confirmed the accuracy of the patient's history: as entered by the MA/ALLISONN/RN. Coco Kay, SOLOMON 03/22/22     SUBJECTIVE:    The pateint presents today for follow up visit.  He denies pain, chest pain, or SOB.  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  He denies bleeding in stool or urine. Patient was seen by  for melanoma that was removed on his upper middle chest and then lesions on his scalp.  Patient states that he quit going due to his appts becoming more sporadic.  States that he cannot afford to have multiple appts.  Patient would like to go to another dermatologist.  Referral for Dermatology,  placed.  Patient has right lower leg edema mainly behind his right knee.  No pain, warmth or redness or change in color.  Patient continues to take coumadin 8mg po daily with INR therapeutic at 2.5.  Patient does not wish to do INR home monitoring.  Continues to come to [facility] for his INR monitoring.  Informed to keep taking Folgard and Comadin daily and report any signs of clots or bleeding discussed in appt. The patient verbalized understanding.        REVIEW OF SYSTEMS:  Review of Systems   Constitutional: Negative for activity change, appetite change, chills,  fatigue and fever.   HENT: Negative for ear pain, mouth sores, nosebleeds, sinus pressure, sinus pain, sneezing, sore throat, tinnitus and trouble swallowing.    Eyes: Negative for photophobia and visual disturbance.   Respiratory: Negative for apnea, cough, choking, chest tightness, shortness of breath, wheezing and stridor.    Cardiovascular: Positive for leg swelling. Negative for chest pain and palpitations.        Right leg behind knee. States that his right leg swelling has been present since he had DVT. No pain   Gastrointestinal: Negative for abdominal pain, anal bleeding, blood in stool, constipation, diarrhea, nausea and vomiting.   Endocrine: Negative for cold intolerance and heat intolerance.   Genitourinary: Negative for decreased urine volume, dysuria, flank pain, frequency, genital sores, hematuria, penile discharge, penile pain, penile swelling, scrotal swelling, testicular pain and urgency.   Musculoskeletal: Negative for back pain, gait problem, joint swelling, myalgias, neck pain and neck stiffness.   Skin: Positive for wound. Negative for color change and rash.        Skin lesions- flaky and yellowish on scalp.  Brown round flat macule on right lower leg.  Patient denies any change in color.    Allergic/Immunologic: Negative.    Neurological: Negative for dizziness, tremors, seizures, syncope, facial asymmetry, weakness, light-headedness, numbness and headaches.   Hematological: Negative for adenopathy. Does not bruise/bleed easily.        No obvious bleeding   Psychiatric/Behavioral: Negative for agitation, behavioral problems, confusion, decreased concentration, dysphoric mood, hallucinations, self-injury and sleep disturbance. The patient is not nervous/anxious.    I have reviewed and confirmed the accuracy of the ROS as documented by the MA/LPN/RN SOLOMON Rincon      OBJECTIVE:    Vitals:    03/22/22 0847   BP: 137/88   Pulse: 80   Resp: 20   Temp: 96.4 °F (35.8 °C)   SpO2: 95%   Weight:  "103 kg (227 lb)   Height: 165.1 cm (65\")   PainSc: 0-No pain       ECOG  (0) Fully active, able to carry on all predisease performance without restriction    Physical Exam   Constitutional: He is oriented to person, place, and time. No distress.   HENT:   Head: Normocephalic and atraumatic.   Right Ear: External ear and ear canal normal.   Left Ear: External ear and ear canal normal.   Nose: Nose normal. No rhinorrhea or congestion.   Mouth/Throat: Mucous membranes are moist. No oropharyngeal exudate or posterior oropharyngeal erythema. Oropharynx is clear.   Eyes: Pupils are equal, round, and reactive to light. Conjunctivae are normal. Right eye exhibits no discharge. Left eye exhibits no discharge. No scleral icterus.   Wears glasses   Neck: Carotid bruit is not present. No thyromegaly present.   Cardiovascular: Normal rate, regular rhythm, normal heart sounds and normal pulses. Exam reveals no gallop and no friction rub.   Pulmonary/Chest: Effort normal. No stridor. No respiratory distress. He has no wheezes.   Abdominal: Soft. Normal appearance and bowel sounds are normal. He exhibits no mass. There is no abdominal tenderness. There is no rebound and no guarding.   Genitourinary:    Genitourinary Comments: deferred     Musculoskeletal: Normal range of motion. No tenderness.      Right lower leg: Edema present.      Comments: Edema in right leg behind knee- no warmth, pain or skin color changes.   Lymphadenopathy:     He has no cervical adenopathy.   Neurological: He is alert and oriented to person, place, and time. He displays no weakness and normal reflexes. No cranial nerve deficit or sensory deficit. He exhibits normal muscle tone. Coordination and gait normal.   Skin: Skin is warm and dry. Capillary refill takes less than 2 seconds. Bruising (bilateral forearms) and lesion noted. No rash noted. He is not diaphoretic. No erythema.   Skin lesions- multiple various shapes and sizes scalp lesions that are flaky " and yellowish in appearance.      Brown round flat macule on right lower leg.  Patient denies any change in color.      Psychiatric: His behavior is normal. Mood, judgment and thought content normal.   Nursing note and vitals reviewed.    I have performed a physical examination and re-evaluated current results are consistent with the previously documented exam. Coco Kay, SOLOMON       RECENT LABS    WBC   Date Value Ref Range Status   11/11/2021 7.67 3.40 - 10.80 10*3/mm3 Final     RBC   Date Value Ref Range Status   11/11/2021 5.09 4.14 - 5.80 10*6/mm3 Final     Hemoglobin   Date Value Ref Range Status   11/11/2021 16.6 13.0 - 17.7 g/dL Final     Hematocrit   Date Value Ref Range Status   11/11/2021 46.3 37.5 - 51.0 % Final     MCV   Date Value Ref Range Status   11/11/2021 91.0 79.0 - 97.0 fL Final     MCH   Date Value Ref Range Status   11/11/2021 32.6 26.6 - 33.0 pg Final     MCHC   Date Value Ref Range Status   11/11/2021 35.9 (H) 31.5 - 35.7 g/dL Final     RDW   Date Value Ref Range Status   11/11/2021 14.1 12.3 - 15.4 % Final     RDW-SD   Date Value Ref Range Status   11/11/2021 45.4 37.0 - 54.0 fl Final     MPV   Date Value Ref Range Status   11/11/2021 9.8 6.0 - 12.0 fL Final     Platelets   Date Value Ref Range Status   11/11/2021 129 (L) 140 - 450 10*3/mm3 Final     Neutrophil %   Date Value Ref Range Status   11/11/2021 70.3 42.7 - 76.0 % Final     Lymphocyte %   Date Value Ref Range Status   11/11/2021 18.4 (L) 19.6 - 45.3 % Final     Monocyte %   Date Value Ref Range Status   11/11/2021 8.0 5.0 - 12.0 % Final     Eosinophil %   Date Value Ref Range Status   11/11/2021 2.9 0.3 - 6.2 % Final     Basophil %   Date Value Ref Range Status   11/11/2021 0.4 0.0 - 1.5 % Final     Neutrophils, Absolute   Date Value Ref Range Status   11/11/2021 5.40 1.70 - 7.00 10*3/mm3 Final     Lymphocytes, Absolute   Date Value Ref Range Status   11/11/2021 1.41 0.70 - 3.10 10*3/mm3 Final     Monocytes, Absolute   Date  Value Ref Range Status   11/11/2021 0.61 0.10 - 0.90 10*3/mm3 Final     Eosinophils, Absolute   Date Value Ref Range Status   11/11/2021 0.22 0.00 - 0.40 10*3/mm3 Final     Basophils, Absolute   Date Value Ref Range Status   11/11/2021 0.03 0.00 - 0.20 10*3/mm3 Final       Lab Results   Component Value Date    GLUCOSE 112 (H) 11/11/2021    BUN 14 11/11/2021    CREATININE 1.10 11/11/2021    EGFRIFNONA 66 11/11/2021    BCR 12.7 11/11/2021    K 4.2 11/11/2021    CO2 22.0 11/11/2021    CALCIUM 8.4 (L) 11/11/2021    ALBUMIN 3.90 11/11/2021    LABIL2 1.6 05/22/2018    AST 27 11/11/2021    ALT 42 (H) 11/11/2021           ASSESSMENT:    1. Bilateral Pulmonary emboli: Ongoing management on anticoagulation therapy.  Continue warfarin with monthly INR checks and report signs of bleeding  2. Chronic DVT of proximal vein of right lower extremity: Good cap refill, no pain, warmth, or skin color changes.  There is mild edema behind the right knee. Patient states that edema has been present since his DVT. Edema has not lessened or changed.   3. SVT- Follows with cardiology  4. Hyperhomocysteinemia: Reviewed homocystine level with patient, informed to continue Folgard  5. Heterozygous MTHFR mutation  6. CKD, stage III  7. Elevated Factor VIII level: Factor VIII reviewed with patient, elevated. Reports no signs of DVT.  No shortness of breath, swelling, changes in skin color, pain or bleeding noted.   8. Polycythemia: CBC reviewed with patient Normal levels for Hgb and Hct  9. Thrombocytopenia: Chronic: Stable at 119 today  10. Melanoma on chest and scalp:  Patient has been seen by Dr. Kapoor but would like to go to [facility].  Patient has multiple scalp lesions in various shapes and sizes that are yellowish and flaky.  Has flat dime sized macule on right lower leg that is brownish in color.  Patient denies any changes in color of macule. Patient does not know how long macule has been there.  Will consult dermatology,  .  Educated completed regarding use of hats and sunscreen.   Patient voiced understanding.         PLANS:  · Continue Coumadin 8 mg daily or as instructed by Anticoagulation clinic- Currently INR therapeutic at 2.5.  INR goal of 2-3.  · Monitor for bleeding  · Continue Folgard  Daily  · Referral to dermatology for multiple scalp lesions and right lower extremity macule ()  · Educated regarding use of hats and daily application of sunscreen  · Report any signs of clots  · Report any bleeding  · Coumadin refilled  · Will follow up with PCP, cardiologist and other care team members  · Will follow up with  in 4 months with CBC/diff  · Continue Anticoagulation clinic monitoring of coumadin  · Patient refused home INR monitoring - is happy coming to anticoagulation clinic  · All questions answered, patient verbalized understanding and is agreeable to above plan.      Discussion: 3/22/2022:  The pateint presents today for follow up visit.  He denies pain, chest pain, or SOB.  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  He denies bleeding in stool or urine. He denies fatigue and states that appetite is good.  Patient was seen by  for melanoma that was removed on his upper middle chest and then lesions on his scalp.  Patient states that he quit going due to his appts becoming more sporadic.  States that he cannot afford to have multiple appts.  Patient would like to go to another dermatologist.  Referral for Dermatology,  placed.  Patient has right lower leg edema mainly behind his right knee.  No pain, warmth or redness or change in color.  Patient continues to take coumadin 8mg po daily with INR therapeutic at 2.5.  Patient does not wish to do INR home monitoring.  Continues to come to [facility] for his INR monitoring.  Informed to keep taking Folgard and Comadin daily and report any signs of clots or bleeding discussed in  appt.   Discussed importance of Coumadin daily and INR checks continuing on a monthly basis.  The patient verbalized understanding and states he takes Folgard and Coumadin daily without missing any doses. Informed of importance of Folgard in keeping homocysteine level in normal range to prevent risk of clots.  Reviewed labs from last visit with homocysteine in normal range and elevated Factor VIII.   Informed to go to ER for any shortness of breath, bleeding, changes in level of consciousness or any signs of DVT/PE.  Patient has bruising noted on bilateral forearms. Patient states that he has had bilateral forearm bruising for years.  Has not worsened.  All questions answered,  The patient verbalizes understanding of medication regimen, and agrees with plan of care. He will follow up with Dr Ayala in 4 months with CBC/diff or call for sooner appt.  .      I spent 30 minutes in diagnostic evaluation, physical assessment, review of labs, review of chart, future plan of care, and documentation.    .   Electronically signed by SOLOMON Rincon, 03/22/22, 2:40 PM EDT.                   .

## 2022-03-22 ENCOUNTER — HOSPITAL ENCOUNTER (OUTPATIENT)
Dept: ONCOLOGY | Facility: HOSPITAL | Age: 72
Setting detail: INFUSION SERIES
Discharge: HOME | End: 2022-03-22

## 2022-03-22 ENCOUNTER — OFFICE VISIT (OUTPATIENT)
Dept: ONCOLOGY | Facility: CLINIC | Age: 72
End: 2022-03-22

## 2022-03-22 ENCOUNTER — LAB (OUTPATIENT)
Dept: LAB | Facility: HOSPITAL | Age: 72
End: 2022-03-22

## 2022-03-22 VITALS
TEMPERATURE: 96.4 F | OXYGEN SATURATION: 95 % | WEIGHT: 227 LBS | BODY MASS INDEX: 37.82 KG/M2 | HEIGHT: 65 IN | SYSTOLIC BLOOD PRESSURE: 137 MMHG | RESPIRATION RATE: 20 BRPM | HEART RATE: 80 BPM | DIASTOLIC BLOOD PRESSURE: 88 MMHG

## 2022-03-22 DIAGNOSIS — R79.1 ELEVATED FACTOR VIII LEVEL: ICD-10-CM

## 2022-03-22 DIAGNOSIS — D03.59 MELANOMA IN SITU OF OTHER PART OF TRUNK: ICD-10-CM

## 2022-03-22 DIAGNOSIS — I82.501 CHRONIC DEEP VEIN THROMBOSIS (DVT) OF RIGHT LOWER EXTREMITY, UNSPECIFIED VEIN: Primary | ICD-10-CM

## 2022-03-22 DIAGNOSIS — I27.82 CHRONIC SADDLE PULMONARY EMBOLISM WITHOUT ACUTE COR PULMONALE: ICD-10-CM

## 2022-03-22 DIAGNOSIS — N42.31 PIN (PROSTATIC INTRAEPITHELIAL NEOPLASIA): ICD-10-CM

## 2022-03-22 DIAGNOSIS — D75.1 POLYCYTHEMIA: ICD-10-CM

## 2022-03-22 DIAGNOSIS — Z79.01 LONG TERM (CURRENT) USE OF ANTICOAGULANTS: ICD-10-CM

## 2022-03-22 DIAGNOSIS — N18.31 STAGE 3A CHRONIC KIDNEY DISEASE: ICD-10-CM

## 2022-03-22 DIAGNOSIS — I26.92 CHRONIC SADDLE PULMONARY EMBOLISM WITHOUT ACUTE COR PULMONALE: ICD-10-CM

## 2022-03-22 DIAGNOSIS — Z79.01 LONG TERM (CURRENT) USE OF ANTICOAGULANTS: Primary | ICD-10-CM

## 2022-03-22 DIAGNOSIS — D69.6 THROMBOCYTOPENIA: ICD-10-CM

## 2022-03-22 DIAGNOSIS — Z15.89 HETEROZYGOUS MTHFR MUTATION C677T: ICD-10-CM

## 2022-03-22 DIAGNOSIS — D66 HEREDITARY FACTOR VIII DEFICIENCY: ICD-10-CM

## 2022-03-22 DIAGNOSIS — I47.1 SVT (SUPRAVENTRICULAR TACHYCARDIA): ICD-10-CM

## 2022-03-22 DIAGNOSIS — E72.11 HYPERHOMOCYSTEINEMIA: ICD-10-CM

## 2022-03-22 LAB
BASOPHILS # BLD AUTO: 0.02 10*3/MM3 (ref 0–0.2)
BASOPHILS NFR BLD AUTO: 0.3 % (ref 0–1.5)
DEPRECATED RDW RBC AUTO: 44.6 FL (ref 37–54)
EOSINOPHIL # BLD AUTO: 0.17 10*3/MM3 (ref 0–0.4)
EOSINOPHIL NFR BLD AUTO: 2.5 % (ref 0.3–6.2)
ERYTHROCYTE [DISTWIDTH] IN BLOOD BY AUTOMATED COUNT: 13.9 % (ref 12.3–15.4)
HCT VFR BLD AUTO: 50 % (ref 37.5–51)
HGB BLD-MCNC: 17.6 G/DL (ref 13–17.7)
INR PPP: 2.5 (ref 0.8–1.2)
LYMPHOCYTES # BLD AUTO: 1.39 10*3/MM3 (ref 0.7–3.1)
LYMPHOCYTES NFR BLD AUTO: 20.5 % (ref 19.6–45.3)
MCH RBC QN AUTO: 31.6 PG (ref 26.6–33)
MCHC RBC AUTO-ENTMCNC: 35.2 G/DL (ref 31.5–35.7)
MCV RBC AUTO: 89.8 FL (ref 79–97)
MONOCYTES # BLD AUTO: 0.69 10*3/MM3 (ref 0.1–0.9)
MONOCYTES NFR BLD AUTO: 10.2 % (ref 5–12)
NEUTROPHILS NFR BLD AUTO: 4.5 10*3/MM3 (ref 1.7–7)
NEUTROPHILS NFR BLD AUTO: 66.5 % (ref 42.7–76)
PLATELET # BLD AUTO: 119 10*3/MM3 (ref 140–450)
PMV BLD AUTO: 10.9 FL (ref 6–12)
RBC # BLD AUTO: 5.57 10*6/MM3 (ref 4.14–5.8)
WBC NRBC COR # BLD: 6.77 10*3/MM3 (ref 3.4–10.8)

## 2022-03-22 PROCEDURE — 85610 PROTHROMBIN TIME: CPT

## 2022-03-22 PROCEDURE — 99214 OFFICE O/P EST MOD 30 MIN: CPT | Performed by: INTERNAL MEDICINE

## 2022-03-22 PROCEDURE — 85025 COMPLETE CBC W/AUTO DIFF WBC: CPT | Performed by: INTERNAL MEDICINE

## 2022-03-22 PROCEDURE — 36416 COLLJ CAPILLARY BLOOD SPEC: CPT

## 2022-03-22 RX ORDER — WARFARIN SODIUM 4 MG/1
TABLET ORAL
Qty: 60 TABLET | Refills: 2 | Status: SHIPPED | OUTPATIENT
Start: 2022-03-22

## 2022-03-28 DIAGNOSIS — Z79.01 LONG TERM (CURRENT) USE OF ANTICOAGULANTS: ICD-10-CM

## 2022-03-29 ENCOUNTER — APPOINTMENT (OUTPATIENT)
Dept: ONCOLOGY | Facility: HOSPITAL | Age: 72
End: 2022-03-29

## 2022-03-29 ENCOUNTER — APPOINTMENT (OUTPATIENT)
Dept: LAB | Facility: HOSPITAL | Age: 72
End: 2022-03-29

## 2022-03-30 RX ORDER — WARFARIN SODIUM 1 MG/1
TABLET ORAL
Qty: 60 TABLET | Refills: 0 | Status: SHIPPED | OUTPATIENT
Start: 2022-03-30 | End: 2022-06-07 | Stop reason: SDUPTHER

## 2022-04-27 ENCOUNTER — DOCUMENTATION (OUTPATIENT)
Dept: ONCOLOGY | Facility: HOSPITAL | Age: 72
End: 2022-04-27

## 2022-04-28 ENCOUNTER — LAB (OUTPATIENT)
Dept: LAB | Facility: HOSPITAL | Age: 72
End: 2022-04-28

## 2022-04-28 ENCOUNTER — HOSPITAL ENCOUNTER (OUTPATIENT)
Dept: ONCOLOGY | Facility: HOSPITAL | Age: 72
Setting detail: INFUSION SERIES
Discharge: HOME | End: 2022-04-28

## 2022-04-28 DIAGNOSIS — Z79.01 LONG TERM (CURRENT) USE OF ANTICOAGULANTS: Primary | ICD-10-CM

## 2022-04-28 DIAGNOSIS — Z79.01 LONG TERM (CURRENT) USE OF ANTICOAGULANTS: ICD-10-CM

## 2022-04-28 LAB — INR PPP: 2.1 (ref 0.8–1.2)

## 2022-04-28 PROCEDURE — 85610 PROTHROMBIN TIME: CPT

## 2022-04-28 PROCEDURE — 36416 COLLJ CAPILLARY BLOOD SPEC: CPT

## 2022-05-26 ENCOUNTER — APPOINTMENT (OUTPATIENT)
Dept: LAB | Facility: HOSPITAL | Age: 72
End: 2022-05-26

## 2022-05-26 ENCOUNTER — APPOINTMENT (OUTPATIENT)
Dept: ONCOLOGY | Facility: HOSPITAL | Age: 72
End: 2022-05-26

## 2022-05-26 ENCOUNTER — HOSPITAL ENCOUNTER (OUTPATIENT)
Dept: ONCOLOGY | Facility: HOSPITAL | Age: 72
Setting detail: INFUSION SERIES
Discharge: HOME | End: 2022-05-26

## 2022-05-26 ENCOUNTER — LAB (OUTPATIENT)
Dept: LAB | Facility: HOSPITAL | Age: 72
End: 2022-05-26

## 2022-05-26 ENCOUNTER — TELEPHONE (OUTPATIENT)
Dept: ONCOLOGY | Facility: CLINIC | Age: 72
End: 2022-05-26

## 2022-05-26 DIAGNOSIS — Z79.01 LONG TERM (CURRENT) USE OF ANTICOAGULANTS: Primary | ICD-10-CM

## 2022-05-26 DIAGNOSIS — Z79.01 LONG TERM (CURRENT) USE OF ANTICOAGULANTS: ICD-10-CM

## 2022-05-26 LAB — INR PPP: 3.7 (ref 0.8–1.2)

## 2022-05-26 PROCEDURE — 85610 PROTHROMBIN TIME: CPT

## 2022-05-26 PROCEDURE — 36416 COLLJ CAPILLARY BLOOD SPEC: CPT

## 2022-05-26 NOTE — TELEPHONE ENCOUNTER
Caller: Jett Doe    Relationship to patient: Self    Best call back number: [phone]    Chief complaint: PATIENT MISSED APPOINTMENT THIS MORNING, TRIED TO WARM TRANSFER BUT NO ANSWER AT THE OFFICE. HUB UNABLE TO RESCHEDULE SAME DAY    Type of visit: LAB AND RN REVIEW    Requested date: 5-26-22 AFTER 2:00 TODAY      If rescheduling, when is the original appointment: 5-26-22     Additional notes:PLEASE CALL PATIENT TO CONFIRM FOR THIS AFTERNOON, LEAVE A MESSAGE IF NO ANSWER

## 2022-05-26 NOTE — PROGRESS NOTES
Patient came in with INR of 3.7. He denies diet or med changes that would spike it. Patient to hold for two doses and then resume Saturday at 7mg. Patient does not want to go to the hospital for recheck this weekend. Apt given for Tuesday, patient to call with any issues.

## 2022-05-31 ENCOUNTER — HOSPITAL ENCOUNTER (OUTPATIENT)
Dept: ONCOLOGY | Facility: HOSPITAL | Age: 72
Setting detail: INFUSION SERIES
Discharge: HOME | End: 2022-05-31

## 2022-05-31 ENCOUNTER — LAB (OUTPATIENT)
Dept: LAB | Facility: HOSPITAL | Age: 72
End: 2022-05-31

## 2022-05-31 DIAGNOSIS — Z79.01 LONG TERM (CURRENT) USE OF ANTICOAGULANTS: ICD-10-CM

## 2022-05-31 DIAGNOSIS — I82.501 CHRONIC DEEP VEIN THROMBOSIS (DVT) OF RIGHT LOWER EXTREMITY, UNSPECIFIED VEIN: Primary | ICD-10-CM

## 2022-05-31 LAB — INR PPP: 2.3 (ref 0.8–1.2)

## 2022-05-31 PROCEDURE — 36416 COLLJ CAPILLARY BLOOD SPEC: CPT

## 2022-05-31 PROCEDURE — 85610 PROTHROMBIN TIME: CPT

## 2022-06-07 ENCOUNTER — HOSPITAL ENCOUNTER (OUTPATIENT)
Dept: ONCOLOGY | Facility: HOSPITAL | Age: 72
Setting detail: INFUSION SERIES
Discharge: HOME | End: 2022-06-07

## 2022-06-07 ENCOUNTER — LAB (OUTPATIENT)
Dept: LAB | Facility: HOSPITAL | Age: 72
End: 2022-06-07

## 2022-06-07 DIAGNOSIS — Z79.01 LONG TERM (CURRENT) USE OF ANTICOAGULANTS: ICD-10-CM

## 2022-06-07 DIAGNOSIS — Z79.01 LONG TERM (CURRENT) USE OF ANTICOAGULANTS: Primary | ICD-10-CM

## 2022-06-07 DIAGNOSIS — I26.99 OTHER PULMONARY EMBOLISM WITHOUT ACUTE COR PULMONALE, UNSPECIFIED CHRONICITY: Chronic | ICD-10-CM

## 2022-06-07 LAB — INR PPP: 2.2 (ref 0.8–1.2)

## 2022-06-07 PROCEDURE — 36416 COLLJ CAPILLARY BLOOD SPEC: CPT

## 2022-06-07 PROCEDURE — 85610 PROTHROMBIN TIME: CPT

## 2022-06-07 RX ORDER — WARFARIN SODIUM 5 MG/1
TABLET ORAL
Qty: 30 TABLET | Refills: 2 | Status: SHIPPED | OUTPATIENT
Start: 2022-06-07 | End: 2022-07-26 | Stop reason: SDUPTHER

## 2022-06-07 RX ORDER — WARFARIN SODIUM 1 MG/1
TABLET ORAL
Qty: 60 TABLET | Refills: 2 | Status: SHIPPED | OUTPATIENT
Start: 2022-06-07 | End: 2022-07-26 | Stop reason: SDUPTHER

## 2022-06-07 NOTE — PROGRESS NOTES
Patient came in with second INR in range. Continue 7mg and check in one month at Md visit, apt given. Refills sent for 1mg and 5mg tabs.

## 2022-06-13 ENCOUNTER — TELEPHONE (OUTPATIENT)
Dept: ONCOLOGY | Facility: CLINIC | Age: 72
End: 2022-06-13

## 2022-06-13 NOTE — TELEPHONE ENCOUNTER
Hub is instructed to read the documentation below to patient  ATTEMPTED TO CONTACT DR. NUÑEZ'S OFFICE TO SEE IF PATIENT HAD BEEN SCHEDULED YET.  NO ANSWER.  LMV ASKING PATIENT TO CALL BACK.

## 2022-07-19 ENCOUNTER — APPOINTMENT (OUTPATIENT)
Dept: LAB | Facility: HOSPITAL | Age: 72
End: 2022-07-19

## 2022-07-25 NOTE — PROGRESS NOTES
Hematology/Oncology Outpatient Follow Up    PATIENT NAME:Jett Doe  :1950  MRN: [id number]  PRIMARY CARE PHYSICIAN: Jasmin Padilla APRN  REFERRING PHYSICIAN: Jasmin Padilla APRN    Chief Complaint   Patient presents with   • Follow-up     Chronic deep vein thrombosis (DVT) of right lower extremity, unspecified vein (HCC)         HISTORY OF PRESENT ILLNESS:     1. Bilateral PE, right lower extremity DVT and bilateral SVTs diagnosis established in 2014 and MTHFR C677T heterozygosity diagnosis established and 2014.  · The patient presented to [facility] on 2014 where was admitted through 2014 with complaints of chest pain and shortness of air.  He was seen per EMS and had associated diaphoresis as well as some low sternal chest pressure.  His troponin was elevated and cardiology was consulted.  He was started on nitroglycerin drip and heparin drip.  He was given diuresis and admitted to the CBCU.  It was planned for him to undergo a cardiac catheterization.  A VQ scan was done to evaluate for bilateral pulmonary emboli and renal is consulted for his elevated creatinine.  The VQ scan showed multiple abnormalities in both lungs with a high probably for pulmonary emboli.  His chest x-ray showed no acute cardiopulmonary abnormality.  We were consulted for his Thrombopenia and his pulmonology emboli.  His risk factors included obesity, sedentary lifestyle, history of stroke and hyperlipidemia.  He has been on Plavix and aspirin as an outpatient and when his pulmonary emboli were found, his heparin was changed to renal dust Lovenox therapy 1 mg/kg per day.  He was not started on Coumadin initially with the plan that cardiology would perform a cardiac catheterization shortly after admission.  A thrombophilia workup was performed and results were; his AT3 was 89.9% normal, protein C was 101.4% normal and protein S was 101% normal, Fibrinogen 416 normal, factor V  Leiden screen is negative, prothrombin gene mutation is negative.  MTHFR showed heterozygosity for C677 T mutation, but negative for [id number] mutation.  Phosphate lipid antibodies were done.  Beta II glycoprotein is negative.  Phosphatidylserine antibody is negative.  Cardiolipin antibodies are all negative.  Bilateral lower extremity Dopplers was performed that showed a right lower extremity DVT and bilateral SVTs in his small saphenous vein.  On 08/20/2014, an IVC filter was placed with plans to hold starting his Coumadinization until after his cardiac catheterization was performed, which was going to be scheduled as an outpatient per Dr. Jurado.  It was noted that he had thrombocytopenia on admission with the platelet count of 125,000.  His platelet count slowly felt to 92,000 and it discharge with 70063.  His LFTs were noted to be abnormal with an AST of 88 and ALT of 103.  His coags were normal with PT of 11.9, INR 1.1 and PTT 35.5.  A thrombocytopenia workup was performed as seen below.  TSH of 1.3, His LFTs are normalized prior to discharge.  Nephrology was consulted for his AMADEO with a creatinine of 2.0 on admission.  Dr. Howard performed some renal studies.  His urinalysis was normal.  His renal ultrasound showed a 2 cm mass extended of the upper pole of the right kidney, which appeared to be solid with recommendations follow up with the CT or an MRI of his abdomen.  There was no hydronephrosis to indicate obstructive uropathy and the urinary bladder was unremarkable.  His creatinine improved with medication adjustments and was 1.4 and discharged with plans to perform an MRI of his afternoon when his creatinine improved to further evaluate his right renal mass.  An echocardiogram was performed that showed 45-55% ejection fraction and no other abnormalities were identified.  · 8/17/15 - Comprehensive metabolic panel with creatinine 1.6.  · 9/8/14 - patient was seen in the office as a hospital follow-up.  Homocysteine level 18.4 (H)  · 9/10/14 - orders written to start Folgard daily due to the elevated homocystine level.  · 10/7/14 - The patient reports that he has not started taking the Folgard and did not remember having a prescription called into the pharmacy.  Advised the patient again to start taking Folgard daily and gave the patient a prescription today  · 11/13/14 - homocystine 17.7 (H)  · 11/19/14 - advised patient to increase Folgard 2 twice a day.  · 8/17/15 - Homocysteine 16.8 (5-12).   · 3/8/16 - Patient does not recall taking Folgard. Prescription written for Folgard 1 p.o. q. d.  Patient claims that he has not taken Coumadin for three days as he was out of town. INR today 1.4. Instructed to resume Coumadin at same dose and follow INR protocol.   · 6/30/16 - Comprehensive metabolic panel with creatinine 1.3 (0.7-1.2). Serum homocysteine 12.2 (5-12).     · 3/7/17 - INR 2.7 on 8 mg of Warfarin daily. Patient asked to increase Folgard to twice a day.   · 5/5/17 - INR 2.4, therapeutic. Patient is to continue the INR protocol and continue with Folgard one tablet twice daily as well as Coumadin 8 mg daily. Homocysteine 11.2 normal (5.0-12.0).   · 8/4/17 - INR today is 1.6. Patient states that he missed his dose of Coumadin Monday and Tuesday of this week. He denied any diet changes. I will leave the patient on his current dose of 8 mg daily and have him return to the office for an INR in one week as he had missed two consecutive doses of Coumadin earlier. He is to continue to follow the INR protocol and he is to continue the Folgard one tablet twice daily as it is controlling his homocysteine level.   · 11/9/17 - Patient claims to be missing occasional Warfarin doses. Counseled.   · 5/22/18 - Hemoglobin 16.2, MCV 89.6, hematocrit 45.9. Serum homocysteine 14.5 (<15).    · 9/18/18 - Discussed possibly stopping Coumadin if workup negative. PT PTT 30.5 and 36.5 with correction of PTT on 1:1 mix with normal  plasma. Lupus anticoagulant absent. Factor VIII activity 125 (). D-dimer >0.22 (<0.45).     · 9/21/18 - Venous Doppler study bilateral lower extremity with chronic deep venous thrombosis involving the right popliteal vein. Chronic superficial venous thrombosis involving the right small saphenous vein and a lymph node noted in the right groin.   · 12/18/18 - Discussed venous Doppler results. Recommended continuation of anticoagulation either with Coumadin or maintenance DOAC’s or Aspirin alone, which I did not recommend. Patient decided to continue on Warfarin.   ? 5/2/2019- homocystine 8.5 (N), factor VIII level 133% (H).  ? 7/8/21 Coumadin and Folgard daily.  No missed doses. Factor VIII and Homocysteine level ordered.  ? 11/11/21: Follow up appt. Continue Folgard and Coumadin at 7.5 mg for INR of 2.0. Continue INR checks monthly. No signs of clots or PE    2. Polycythemia diagnosis established in January 2015.  · 1/14/15 - Discussed with the patient the need for a bone marrow biopsy along with additional lab work for further evaluation of the thrombocytopenia and polycythemia.  The patient states that he does not wish to proceed with the blood work or the bone marrow biopsy at this time. Hemoglobin 17.0  · 3/7/17 - WBC 7.17, hemoglobin 17.2, platelet count 120,000. Discussed bone marrow evaluation again with the patient. He will think about it.   · 12/18/18 - WBC 10.4 with 84% neutrophils, 9% lymphocytes, 6% monocytes, hemoglobin 16.6, hematocrit 47.1, platelet count 121,000. BCR/ABL negative. [employer] NGS JAK2 panel negative.   ? 9/5/2019- hemoglobin 16.1, hematocrit 47.4.  ? 7/8/21 Hgb 16.3, Hct 47.6  ? 11/11/21: Follow up appt. Hgb 16.6, Hct 46.3. Stable    3. Thrombocytopenia diagnosis established in October 2014  · The patient presented to [facility] on August 19, 2014 where was admitted through 08/22/2014 with complaints of chest pain and shortness of air.  It was noted that he had  thrombocytopenia on admission with the platelet count of 125,000.  His platelet count slowly fell to 92,000 and it discharge with 12,000.  PT 11.9, INR 1.1, PTT 35.5.  A thrombocytopenia workup was performed that showed a folate of greater than 24.8 (H), and vitamin B12 331(N), , CMV, IgM was negative and EBV IgM was positive.  TAMIA screen is negative.  Platelet antibody screen is negative.  His PF4 was negative at 0.035.  It was noted that in the past, he has had some slightly low platelet counts in 125-140 range.    · 9/8/14 - platelet count 265,000.  EBV IgG positive, EBV IgM negative.  · 10/7/14 - platelet count 101,000.  · 11/17/14 - reviewed medication list for thrombocytopenia.  Metoprolol was known to cause thrombocytopenia purpura.  Lisinopril was known to rarely cause thrombocytopenia and less than 0.01% of the patients.  Plavix was known to cause thrombotic thrombocytopenia purpura (TTP) and thrombocytopenia and less than 1% of the patients.  Crestor was known to cause TTP and thrombocytopenia.  ? 9/5/2019-patient reports moderate alcohol use-16 ounces beer 3 times per week.  ? Platelets 116  ? 11/11/21: Follow up appt. Platelets 129, stable. No bleeding    4. Elevated LFTs and liver mass diagnosis established in September 2014.  · 9/8/14 - ALT 52 (H), AST 29 (N),   · 9/10/14 - advised the patient to increase his hydration and we’ll order a CT scan of the abdomen to be done for further evaluation of the elevated LFTs.  · 9/16/14 - ALT 49 (H), AST 29 (N)  · 9/23/14 - CT of the abdomen showed no evidence of renal mass.  There was an approximately 2 cm non-cystic appearing hypodense lesion on the posterior medial aspect of the right hepatic lobe.  This was in close proximity to the upper pole of the right kidney.  This could conceivably represent the mass seen on ultrasound study.  However it was definitely raising from the liver and not the upper pole of the right kidney.  · 10/7/14 - Will order a MRI  for evaluation of the liver mass as well as lab work for further evaluation of the elevated LFTs.  · 10/7/14 - Hepatitis screening negative, A1AT 133 (N), AFP 9.2 (H), ceruloplasmin 22 (N), ferritin 133.9 (N),   · 10/13/14 - MRI of the abdomen showed liver lesion within the posterior right lobe of the liver is most consistent with hemangioma   · 8/17/15 - ALT 38 (N), AST 26 (N), alkaline phosphatase 26 (N). Elevated LFT’s resolved.   · 3/8/16 - AFP 8 (0-9).    · 5/5/17 - CMP revealed an AST of 32 normal (15-41), ALT 55 normal (17-63), creatinine 1.4 high (0.7-1.2) and BUN 32 high (8-20). Otherwise within normal limits.      · 11/9/17 - Patient claims his PCP has recommended evaluation for his high liver enzymes by a liver doctor, but he has not followed up on it.   · 7/6/21 CMP drawn  · 11/11/21:  Follow up appt. CMP drawn      3/22/22: Patient here for follow up appt. Patient is currently on Coumadin 8mg po daily with INR of 2.5 today. No bleeding issues.     History of present illness was reviewed and is unchanged from the previous visit. 3/22/2022          Past Medical History:   Diagnosis Date   • Cardiomyopathy (HCC) 2012   • CVA (cerebral vascular accident) (HCC) 2012   • Hyperlipemia 2012   • Hypertension 2012       Past Surgical History:   Procedure Laterality Date   • TURP / TRANSURETHRAL INCISION / DRAINAGE PROSTATE  2016         Current Outpatient Medications:   •  atorvastatin (LIPITOR) 20 MG tablet, ATORVASTATIN CALCIUM 20 MG TABS, Disp: , Rfl:   •  clopidogrel (PLAVIX) 75 MG tablet, Take 75 mg by mouth Daily., Disp: , Rfl:   •  hydrALAZINE (APRESOLINE) 10 MG tablet, HYDRALAZINE HCL 10 MG TABS, Disp: , Rfl:   •  lisinopril-hydrochlorothiazide (PRINZIDE,ZESTORETIC) 20-12.5 MG per tablet, LISINOPRIL-HYDROCHLOROTHIAZIDE 20-12.5 MG TABS, Disp: , Rfl:   •  metoprolol tartrate (LOPRESSOR) 25 MG tablet, METOPROLOL TARTRATE 25 MG TABS, Disp: , Rfl:   •  Waylon 2.2-25-1 MG tablet tablet, TAKE ONE TABLET BY  MOUTH TWICE A DAY, Disp: 60 tablet, Rfl: 4  •  warfarin (COUMADIN) 1 MG tablet, Take two 1mg tabs with 1 5mg tab for a total of 7mg daily., Disp: 60 tablet, Rfl: 2  •  warfarin (COUMADIN) 4 MG tablet, Take two  4 mg tablet for a total of 8mg by mouth daily. ., Disp: 60 tablet, Rfl: 2  •  warfarin (COUMADIN) 5 MG tablet, Take 1 tablet daily at 5:00 PM, Disp: 30 tablet, Rfl: 2    No Known Allergies    Family History   Problem Relation Age of Onset   • Lung cancer Mother        Cancer-related family history includes Lung cancer in his mother.    Social History     Tobacco Use   • Smoking status: Never Smoker   • Smokeless tobacco: Current User     Types: Chew   Substance Use Topics   • Alcohol use: Yes   • Drug use: No       I have reviewed and confirmed the accuracy of the patient's history: as entered by the MA/LPN/RN. Shweta Ayala MD 07/26/22     SUBJECTIVE:    Patient denies any bleeding issues.  He remains on warfarin 7.0 milligrams daily        REVIEW OF SYSTEMS:  Review of Systems   Constitutional: Negative for activity change, appetite change, chills, fatigue and fever.   HENT: Negative for ear pain, mouth sores, nosebleeds, sinus pressure, sinus pain, sneezing, sore throat, tinnitus and trouble swallowing.    Eyes: Negative for photophobia and visual disturbance.   Respiratory: Negative for apnea, cough, choking, chest tightness, shortness of breath, wheezing and stridor.    Cardiovascular: Positive for leg swelling. Negative for chest pain and palpitations.        Right leg behind knee. States that his right leg swelling has been present since he had DVT. No pain   Gastrointestinal: Negative for abdominal pain, anal bleeding, blood in stool, constipation, diarrhea, nausea and vomiting.   Endocrine: Negative for cold intolerance and heat intolerance.   Genitourinary: Negative for decreased urine volume, dysuria, flank pain, frequency, genital sores, hematuria, penile discharge, penile pain, penile  "swelling, scrotal swelling, testicular pain and urgency.   Musculoskeletal: Negative for back pain, gait problem, joint swelling, myalgias, neck pain and neck stiffness.   Skin: Positive for wound. Negative for color change and rash.        Skin lesions- flaky and yellowish on scalp.  Brown round flat macule on right lower leg.  Patient denies any change in color.    Allergic/Immunologic: Negative.    Neurological: Negative for dizziness, tremors, seizures, syncope, facial asymmetry, weakness, light-headedness, numbness and headaches.   Hematological: Negative for adenopathy. Does not bruise/bleed easily.        No obvious bleeding   Psychiatric/Behavioral: Negative for agitation, behavioral problems, confusion, decreased concentration, dysphoric mood, hallucinations, self-injury and sleep disturbance. The patient is not nervous/anxious.    I have reviewed and confirmed the accuracy of the ROS as documented by the MA/LPN/RN Shwetascott Ayala MD      OBJECTIVE:    Vitals:    07/26/22 1239   BP: 109/70   Pulse: 56   Temp: 96.9 °F (36.1 °C)   SpO2: 95%   Weight: 102 kg (225 lb)  Comment: verbal   Height: 165.1 cm (65\")   PainSc: 0-No pain       ECOG  (0) Fully active, able to carry on all predisease performance without restriction    Physical Exam   Constitutional: He is oriented to person, place, and time. No distress.   HENT:   Head: Normocephalic and atraumatic.   Eyes: Conjunctivae are normal. Right eye exhibits no discharge. Left eye exhibits no discharge. No scleral icterus.   Neck: No thyromegaly present.   Cardiovascular: Normal rate, regular rhythm and normal heart sounds. Exam reveals no gallop and no friction rub.   Pulmonary/Chest: Effort normal. No stridor. No respiratory distress. He has no wheezes.   Abdominal: Soft. Bowel sounds are normal. He exhibits no mass. There is no abdominal tenderness. There is no rebound and no guarding.   Musculoskeletal: Normal range of motion. No tenderness. "   Lymphadenopathy:     He has no cervical adenopathy.   Neurological: He is alert and oriented to person, place, and time. He exhibits normal muscle tone.   Skin: Skin is warm. No rash noted. He is not diaphoretic. No erythema.   Psychiatric: His behavior is normal.   Nursing note and vitals reviewed.    I have performed a physical examination and re-evaluated current results are consistent with the previously documented exam. Shwetascott Ayala MD       RECENT LABS    WBC   Date Value Ref Range Status   07/26/2022 6.23 3.40 - 10.80 10*3/mm3 Final     RBC   Date Value Ref Range Status   07/26/2022 5.15 4.14 - 5.80 10*6/mm3 Final     Hemoglobin   Date Value Ref Range Status   07/26/2022 16.6 13.0 - 17.7 g/dL Final     Hematocrit   Date Value Ref Range Status   07/26/2022 47.1 37.5 - 51.0 % Final     MCV   Date Value Ref Range Status   07/26/2022 91.5 79.0 - 97.0 fL Final     MCH   Date Value Ref Range Status   07/26/2022 32.2 26.6 - 33.0 pg Final     MCHC   Date Value Ref Range Status   07/26/2022 35.2 31.5 - 35.7 g/dL Final     RDW   Date Value Ref Range Status   07/26/2022 14.6 12.3 - 15.4 % Final     RDW-SD   Date Value Ref Range Status   07/26/2022 47.6 37.0 - 54.0 fl Final     MPV   Date Value Ref Range Status   07/26/2022 10.5 6.0 - 12.0 fL Final     Platelets   Date Value Ref Range Status   07/26/2022 119 (L) 140 - 450 10*3/mm3 Final     Neutrophil %   Date Value Ref Range Status   07/26/2022 68.1 42.7 - 76.0 % Final     Lymphocyte %   Date Value Ref Range Status   07/26/2022 21.3 19.6 - 45.3 % Final     Monocyte %   Date Value Ref Range Status   07/26/2022 8.5 5.0 - 12.0 % Final     Eosinophil %   Date Value Ref Range Status   07/26/2022 1.8 0.3 - 6.2 % Final     Basophil %   Date Value Ref Range Status   07/26/2022 0.3 0.0 - 1.5 % Final     Neutrophils, Absolute   Date Value Ref Range Status   07/26/2022 4.24 1.70 - 7.00 10*3/mm3 Final     Lymphocytes, Absolute   Date Value Ref Range Status   07/26/2022  1.33 0.70 - 3.10 10*3/mm3 Final     Monocytes, Absolute   Date Value Ref Range Status   07/26/2022 0.53 0.10 - 0.90 10*3/mm3 Final     Eosinophils, Absolute   Date Value Ref Range Status   07/26/2022 0.11 0.00 - 0.40 10*3/mm3 Final     Basophils, Absolute   Date Value Ref Range Status   07/26/2022 0.02 0.00 - 0.20 10*3/mm3 Final       Lab Results   Component Value Date    GLUCOSE 112 (H) 11/11/2021    BUN 14 11/11/2021    CREATININE 1.10 11/11/2021    EGFRIFNONA 66 11/11/2021    BCR 12.7 11/11/2021    K 4.2 11/11/2021    CO2 22.0 11/11/2021    CALCIUM 8.4 (L) 11/11/2021    ALBUMIN 3.90 11/11/2021    LABIL2 1.6 05/22/2018    AST 27 11/11/2021    ALT 42 (H) 11/11/2021           ASSESSMENT:    1. Bilateral Pulmonary emboli: Ongoing management on anticoagulation therapy.  Continue warfarin.  INR in 1 week.  Warfarin adjusted  2. Chronic DVT of proximal vein of right lower extremity: Good cap refill, no pain, warmth, or skin color changes.  There is mild edema behind the right knee. Patient states that edema has been present since his DVT. Edema has not lessened or changed.   3. SVT- Follows with cardiology  4. Hyperhomocysteinemia: Reviewed homocystine level with patient, informed to continue Folgard.  Check homocystine level when he comes in for INR next week  5. Heterozygous MTHFR mutation  6. CKD, stage III  7. Elevated Factor VIII level: Factor VIII reviewed with patient, elevated. Reports no signs of DVT.  No shortness of breath, swelling, changes in skin color, pain or bleeding noted.   8. Polycythemia: CBC reviewed with patient Normal levels for Hgb and Hct.  CBC reviewed  9. Thrombocytopenia: Chronic: Stable at 119 today.  No significant change  10. Melanoma on chest and scalp:  Patient has been seen by Dr. Kapoor but would like to go to [facility].  Patient has multiple scalp lesions in various shapes and sizes that are yellowish and flaky.  Has flat dime sized macule on right lower leg that is  brownish in color.  Patient denies any changes in color of macule. Patient does not know how long macule has been there.  Will consult dermatology, .  Educated completed regarding use of hats and sunscreen.   Patient voiced understanding.         PLANS:  · Continue warfarin dose adjusted to 7.5 mg p.o. daily.  INR goal of 2-3.  · Warfarin refilled both 5 mg tablets and 1 mg tablets 1 to 2 days of 7.5 daily.  Reviewed with patient  · Monitor for bleeding  · Continue Folgard  Daily  · Homocystine level as well as INR in 1 week  · Follow-up with me in 4 months or earlier as needed  · Patient refused home INR monitoring - is happy coming to anticoagulation clinic  · All questions answered, patient verbalized understanding and is agreeable to above plan.     .    I spent 30 total minutes, face-to-face, caring for Jett delgadillo.  90% of this time involved counseling and/or coordination of care as documented within this note.                 .

## 2022-07-26 ENCOUNTER — LAB (OUTPATIENT)
Dept: LAB | Facility: HOSPITAL | Age: 72
End: 2022-07-26

## 2022-07-26 ENCOUNTER — OFFICE VISIT (OUTPATIENT)
Dept: ONCOLOGY | Facility: CLINIC | Age: 72
End: 2022-07-26

## 2022-07-26 VITALS
WEIGHT: 225 LBS | DIASTOLIC BLOOD PRESSURE: 70 MMHG | TEMPERATURE: 96.9 F | SYSTOLIC BLOOD PRESSURE: 109 MMHG | HEIGHT: 65 IN | OXYGEN SATURATION: 95 % | HEART RATE: 56 BPM | BODY MASS INDEX: 37.49 KG/M2

## 2022-07-26 DIAGNOSIS — Z13.6 ENCOUNTER FOR SCREENING FOR CARDIOVASCULAR DISORDERS: ICD-10-CM

## 2022-07-26 DIAGNOSIS — Z15.89 HETEROZYGOUS MTHFR MUTATION C677T: ICD-10-CM

## 2022-07-26 DIAGNOSIS — I82.501 CHRONIC DEEP VEIN THROMBOSIS (DVT) OF RIGHT LOWER EXTREMITY, UNSPECIFIED VEIN: ICD-10-CM

## 2022-07-26 DIAGNOSIS — Z79.01 LONG TERM (CURRENT) USE OF ANTICOAGULANTS: ICD-10-CM

## 2022-07-26 DIAGNOSIS — I26.99 OTHER PULMONARY EMBOLISM WITHOUT ACUTE COR PULMONALE, UNSPECIFIED CHRONICITY: Chronic | ICD-10-CM

## 2022-07-26 DIAGNOSIS — D69.6 THROMBOCYTOPENIA: ICD-10-CM

## 2022-07-26 DIAGNOSIS — D75.1 POLYCYTHEMIA: ICD-10-CM

## 2022-07-26 DIAGNOSIS — I82.501 CHRONIC DEEP VEIN THROMBOSIS (DVT) OF RIGHT LOWER EXTREMITY, UNSPECIFIED VEIN: Primary | ICD-10-CM

## 2022-07-26 LAB
BASOPHILS # BLD AUTO: 0.02 10*3/MM3 (ref 0–0.2)
BASOPHILS NFR BLD AUTO: 0.3 % (ref 0–1.5)
DEPRECATED RDW RBC AUTO: 47.6 FL (ref 37–54)
EOSINOPHIL # BLD AUTO: 0.11 10*3/MM3 (ref 0–0.4)
EOSINOPHIL NFR BLD AUTO: 1.8 % (ref 0.3–6.2)
ERYTHROCYTE [DISTWIDTH] IN BLOOD BY AUTOMATED COUNT: 14.6 % (ref 12.3–15.4)
HCT VFR BLD AUTO: 47.1 % (ref 37.5–51)
HGB BLD-MCNC: 16.6 G/DL (ref 13–17.7)
INR PPP: 1.7 (ref 0.8–1.2)
LYMPHOCYTES # BLD AUTO: 1.33 10*3/MM3 (ref 0.7–3.1)
LYMPHOCYTES NFR BLD AUTO: 21.3 % (ref 19.6–45.3)
MCH RBC QN AUTO: 32.2 PG (ref 26.6–33)
MCHC RBC AUTO-ENTMCNC: 35.2 G/DL (ref 31.5–35.7)
MCV RBC AUTO: 91.5 FL (ref 79–97)
MONOCYTES # BLD AUTO: 0.53 10*3/MM3 (ref 0.1–0.9)
MONOCYTES NFR BLD AUTO: 8.5 % (ref 5–12)
NEUTROPHILS NFR BLD AUTO: 4.24 10*3/MM3 (ref 1.7–7)
NEUTROPHILS NFR BLD AUTO: 68.1 % (ref 42.7–76)
PLATELET # BLD AUTO: 119 10*3/MM3 (ref 140–450)
PMV BLD AUTO: 10.5 FL (ref 6–12)
RBC # BLD AUTO: 5.15 10*6/MM3 (ref 4.14–5.8)
WBC NRBC COR # BLD: 6.23 10*3/MM3 (ref 3.4–10.8)

## 2022-07-26 PROCEDURE — 85025 COMPLETE CBC W/AUTO DIFF WBC: CPT

## 2022-07-26 PROCEDURE — 85610 PROTHROMBIN TIME: CPT

## 2022-07-26 PROCEDURE — 99214 OFFICE O/P EST MOD 30 MIN: CPT | Performed by: INTERNAL MEDICINE

## 2022-07-26 RX ORDER — WARFARIN SODIUM 1 MG/1
TABLET ORAL
Qty: 75 TABLET | Refills: 2 | Status: SHIPPED | OUTPATIENT
Start: 2022-07-26 | End: 2023-03-15

## 2022-07-26 RX ORDER — WARFARIN SODIUM 5 MG/1
TABLET ORAL
Qty: 30 TABLET | Refills: 2 | Status: SHIPPED | OUTPATIENT
Start: 2022-07-26 | End: 2022-11-22 | Stop reason: SDUPTHER

## 2022-07-28 ENCOUNTER — DOCUMENTATION (OUTPATIENT)
Dept: ONCOLOGY | Facility: HOSPITAL | Age: 72
End: 2022-07-28

## 2022-08-02 ENCOUNTER — HOSPITAL ENCOUNTER (OUTPATIENT)
Dept: ONCOLOGY | Facility: HOSPITAL | Age: 72
Setting detail: INFUSION SERIES
Discharge: HOME | End: 2022-08-02

## 2022-08-02 ENCOUNTER — LAB (OUTPATIENT)
Dept: LAB | Facility: HOSPITAL | Age: 72
End: 2022-08-02

## 2022-08-02 DIAGNOSIS — D75.1 POLYCYTHEMIA: ICD-10-CM

## 2022-08-02 DIAGNOSIS — D69.6 THROMBOCYTOPENIA: ICD-10-CM

## 2022-08-02 DIAGNOSIS — I26.99 OTHER PULMONARY EMBOLISM WITHOUT ACUTE COR PULMONALE, UNSPECIFIED CHRONICITY: Chronic | ICD-10-CM

## 2022-08-02 DIAGNOSIS — I82.501 CHRONIC DEEP VEIN THROMBOSIS (DVT) OF RIGHT LOWER EXTREMITY, UNSPECIFIED VEIN: ICD-10-CM

## 2022-08-02 DIAGNOSIS — Z79.01 LONG TERM (CURRENT) USE OF ANTICOAGULANTS: Primary | ICD-10-CM

## 2022-08-02 DIAGNOSIS — Z15.89 HETEROZYGOUS MTHFR MUTATION C677T: ICD-10-CM

## 2022-08-02 DIAGNOSIS — Z13.6 ENCOUNTER FOR SCREENING FOR CARDIOVASCULAR DISORDERS: ICD-10-CM

## 2022-08-02 LAB
HCYS SERPL-MCNC: 9.4 UMOL/L (ref 0–15)
INR PPP: 2.6 (ref 0.8–1.2)

## 2022-08-02 PROCEDURE — 83090 ASSAY OF HOMOCYSTEINE: CPT

## 2022-08-02 PROCEDURE — 85610 PROTHROMBIN TIME: CPT

## 2022-08-02 PROCEDURE — 36415 COLL VENOUS BLD VENIPUNCTURE: CPT

## 2022-08-03 RX ORDER — CYANOCOBALAMIN/FOLIC AC/VIT B6 1-2.2-25MG
TABLET ORAL
Qty: 60 TABLET | Refills: 4 | Status: SHIPPED | OUTPATIENT
Start: 2022-08-03 | End: 2022-11-22 | Stop reason: SDUPTHER

## 2022-08-09 ENCOUNTER — LAB (OUTPATIENT)
Dept: LAB | Facility: HOSPITAL | Age: 72
End: 2022-08-09

## 2022-08-09 ENCOUNTER — HOSPITAL ENCOUNTER (OUTPATIENT)
Dept: ONCOLOGY | Facility: HOSPITAL | Age: 72
Setting detail: INFUSION SERIES
Discharge: HOME | End: 2022-08-09

## 2022-08-09 DIAGNOSIS — Z79.01 LONG TERM (CURRENT) USE OF ANTICOAGULANTS: Primary | ICD-10-CM

## 2022-08-09 DIAGNOSIS — Z79.01 LONG TERM (CURRENT) USE OF ANTICOAGULANTS: ICD-10-CM

## 2022-08-09 LAB — INR PPP: 2.4 (ref 0.8–1.2)

## 2022-08-09 PROCEDURE — 36416 COLLJ CAPILLARY BLOOD SPEC: CPT

## 2022-08-09 PROCEDURE — 85610 PROTHROMBIN TIME: CPT

## 2022-09-06 ENCOUNTER — HOSPITAL ENCOUNTER (OUTPATIENT)
Dept: ONCOLOGY | Facility: HOSPITAL | Age: 72
Setting detail: INFUSION SERIES
Discharge: HOME | End: 2022-09-06

## 2022-09-06 ENCOUNTER — LAB (OUTPATIENT)
Dept: LAB | Facility: HOSPITAL | Age: 72
End: 2022-09-06

## 2022-09-06 DIAGNOSIS — Z79.01 LONG TERM (CURRENT) USE OF ANTICOAGULANTS: Primary | ICD-10-CM

## 2022-09-06 DIAGNOSIS — Z79.01 LONG TERM (CURRENT) USE OF ANTICOAGULANTS: ICD-10-CM

## 2022-09-06 DIAGNOSIS — I82.501 CHRONIC DEEP VEIN THROMBOSIS (DVT) OF RIGHT LOWER EXTREMITY, UNSPECIFIED VEIN: ICD-10-CM

## 2022-09-06 LAB — INR PPP: 3 (ref 0.8–1.2)

## 2022-09-06 PROCEDURE — 36416 COLLJ CAPILLARY BLOOD SPEC: CPT

## 2022-09-06 PROCEDURE — 85610 PROTHROMBIN TIME: CPT

## 2022-09-06 NOTE — PROGRESS NOTES
Patient in for INR.  INR 3.0 today on 7.5 mg of Warfarin.  Patient denies any bruising or bleeding.  He has drank some wine recently.  Advised to continue same dose and repeat INR in one month but to contact the office for any bleeding or bruising.

## 2022-09-29 ENCOUNTER — TELEPHONE (OUTPATIENT)
Dept: ONCOLOGY | Facility: CLINIC | Age: 72
End: 2022-09-29

## 2022-09-29 NOTE — TELEPHONE ENCOUNTER
Caller: Jett Doe    Relationship to patient: Self    Best call back number: [phone]    Chief complaint: EITHER ON 10/4 LATER IN THE DAY. CALL TO [facility] HAS TO BE ON TUES OR THURS    Type of visit: LAB & INR CHECK    If rescheduling, when is the original appointment: 10/4

## 2022-09-30 ENCOUNTER — TELEPHONE (OUTPATIENT)
Dept: ONCOLOGY | Facility: CLINIC | Age: 72
End: 2022-09-30

## 2022-09-30 NOTE — TELEPHONE ENCOUNTER
Hub is instructed to read the documentation below to patient  ATTEMPTED TO CONTACT PATIENT REGARDING 10- INR APPT.  NO ANSWER.  LMV ASKING PATIENT TO CALL BACK.

## 2022-10-04 ENCOUNTER — HOSPITAL ENCOUNTER (OUTPATIENT)
Dept: ONCOLOGY | Facility: HOSPITAL | Age: 72
Setting detail: INFUSION SERIES
Discharge: HOME | End: 2022-10-04

## 2022-10-04 ENCOUNTER — LAB (OUTPATIENT)
Dept: LAB | Facility: HOSPITAL | Age: 72
End: 2022-10-04

## 2022-10-04 ENCOUNTER — APPOINTMENT (OUTPATIENT)
Dept: ONCOLOGY | Facility: HOSPITAL | Age: 72
End: 2022-10-04

## 2022-10-04 ENCOUNTER — APPOINTMENT (OUTPATIENT)
Dept: LAB | Facility: HOSPITAL | Age: 72
End: 2022-10-04

## 2022-10-04 DIAGNOSIS — Z79.01 LONG TERM (CURRENT) USE OF ANTICOAGULANTS: ICD-10-CM

## 2022-10-04 DIAGNOSIS — Z79.01 LONG TERM (CURRENT) USE OF ANTICOAGULANTS: Primary | ICD-10-CM

## 2022-10-04 LAB — INR PPP: 2.1 (ref 0.8–1.2)

## 2022-10-04 PROCEDURE — 85610 PROTHROMBIN TIME: CPT

## 2022-10-04 PROCEDURE — 36416 COLLJ CAPILLARY BLOOD SPEC: CPT

## 2022-11-01 ENCOUNTER — LAB (OUTPATIENT)
Dept: LAB | Facility: HOSPITAL | Age: 72
End: 2022-11-01

## 2022-11-01 ENCOUNTER — HOSPITAL ENCOUNTER (OUTPATIENT)
Dept: ONCOLOGY | Facility: HOSPITAL | Age: 72
Setting detail: INFUSION SERIES
Discharge: HOME | End: 2022-11-01

## 2022-11-01 DIAGNOSIS — Z79.01 LONG TERM (CURRENT) USE OF ANTICOAGULANTS: ICD-10-CM

## 2022-11-01 DIAGNOSIS — Z79.01 LONG TERM (CURRENT) USE OF ANTICOAGULANTS: Primary | ICD-10-CM

## 2022-11-01 LAB — INR PPP: 2.2 (ref 0.8–1.2)

## 2022-11-01 PROCEDURE — 85610 PROTHROMBIN TIME: CPT

## 2022-11-01 PROCEDURE — 36416 COLLJ CAPILLARY BLOOD SPEC: CPT

## 2022-11-21 NOTE — PROGRESS NOTES
Hematology/Oncology Outpatient Follow Up    PATIENT NAME:Jett Doe  :1950  MRN: [id number]  PRIMARY CARE PHYSICIAN: Jasmin Padilla APRN  REFERRING PHYSICIAN: Jasmin Padilla APRN    Chief Complaint   Patient presents with   • Follow-up     Chronic deep vein thrombosis (DVT) of right lower extremity, unspecified vein (HCC)         HISTORY OF PRESENT ILLNESS:     1. Bilateral PE, right lower extremity DVT and bilateral SVTs diagnosis established in 2014 and MTHFR C677T heterozygosity diagnosis established and 2014.  · The patient presented to [facility] on 2014 where was admitted through 2014 with complaints of chest pain and shortness of air.  He was seen per EMS and had associated diaphoresis as well as some low sternal chest pressure.  His troponin was elevated and cardiology was consulted.  He was started on nitroglycerin drip and heparin drip.  He was given diuresis and admitted to the CBCU.  It was planned for him to undergo a cardiac catheterization.  A VQ scan was done to evaluate for bilateral pulmonary emboli and renal is consulted for his elevated creatinine.  The VQ scan showed multiple abnormalities in both lungs with a high probably for pulmonary emboli.  His chest x-ray showed no acute cardiopulmonary abnormality.  We were consulted for his Thrombopenia and his pulmonology emboli.  His risk factors included obesity, sedentary lifestyle, history of stroke and hyperlipidemia.  He has been on Plavix and aspirin as an outpatient and when his pulmonary emboli were found, his heparin was changed to renal dust Lovenox therapy 1 mg/kg per day.  He was not started on Coumadin initially with the plan that cardiology would perform a cardiac catheterization shortly after admission.  A thrombophilia workup was performed and results were; his AT3 was 89.9% normal, protein C was 101.4% normal and protein S was 101% normal, Fibrinogen 416 normal, factor V  Leiden screen is negative, prothrombin gene mutation is negative.  MTHFR showed heterozygosity for C677 T mutation, but negative for V9033H mutation.  Phosphate lipid antibodies were done.  Beta II glycoprotein is negative.  Phosphatidylserine antibody is negative.  Cardiolipin antibodies are all negative.  Bilateral lower extremity Dopplers was performed that showed a right lower extremity DVT and bilateral SVTs in his small saphenous vein.  On 08/20/2014, an IVC filter was placed with plans to hold starting his Coumadinization until after his cardiac catheterization was performed, which was going to be scheduled as an outpatient per Dr. Jurado.  It was noted that he had thrombocytopenia on admission with the platelet count of 125,000.  His platelet count slowly felt to 92,000 and it discharge with [id number].  His LFTs were noted to be abnormal with an AST of 88 and ALT of 103.  His coags were normal with PT of 11.9, INR 1.1 and PTT 35.5.  A thrombocytopenia workup was performed as seen below.  TSH of 1.3, His LFTs are normalized prior to discharge.  Nephrology was consulted for his AMADEO with a creatinine of 2.0 on admission.  Dr. Howard performed some renal studies.  His urinalysis was normal.  His renal ultrasound showed a 2 cm mass extended of the upper pole of the right kidney, which appeared to be solid with recommendations follow up with the CT or an MRI of his abdomen.  There was no hydronephrosis to indicate obstructive uropathy and the urinary bladder was unremarkable.  His creatinine improved with medication adjustments and was 1.4 and discharged with plans to perform an MRI of his afternoon when his creatinine improved to further evaluate his right renal mass.  An echocardiogram was performed that showed 45-55% ejection fraction and no other abnormalities were identified.  · 8/17/15 - Comprehensive metabolic panel with creatinine 1.6.  · 9/8/14 - patient was seen in the office as a hospital follow-up.  Homocysteine level 18.4 (H)  · 9/10/14 - orders written to start Folgard daily due to the elevated homocystine level.  · 10/7/14 - The patient reports that he has not started taking the Folgard and did not remember having a prescription called into the pharmacy.  Advised the patient again to start taking Folgard daily and gave the patient a prescription today  · 11/13/14 - homocystine 17.7 (H)  · 11/19/14 - advised patient to increase Folgard 2 twice a day.  · 8/17/15 - Homocysteine 16.8 (5-12).   · 3/8/16 - Patient does not recall taking Folgard. Prescription written for Folgard 1 p.o. q. d.  Patient claims that he has not taken Coumadin for three days as he was out of town. INR today 1.4. Instructed to resume Coumadin at same dose and follow INR protocol.   · 6/30/16 - Comprehensive metabolic panel with creatinine 1.3 (0.7-1.2). Serum homocysteine 12.2 (5-12).     · 3/7/17 - INR 2.7 on 8 mg of Warfarin daily. Patient asked to increase Folgard to twice a day.   · 5/5/17 - INR 2.4, therapeutic. Patient is to continue the INR protocol and continue with Folgard one tablet twice daily as well as Coumadin 8 mg daily. Homocysteine 11.2 normal (5.0-12.0).   · 8/4/17 - INR today is 1.6. Patient states that he missed his dose of Coumadin Monday and Tuesday of this week. He denied any diet changes. I will leave the patient on his current dose of 8 mg daily and have him return to the office for an INR in one week as he had missed two consecutive doses of Coumadin earlier. He is to continue to follow the INR protocol and he is to continue the Folgard one tablet twice daily as it is controlling his homocysteine level.   · 11/9/17 - Patient claims to be missing occasional Warfarin doses. Counseled.   · 5/22/18 - Hemoglobin 16.2, MCV 89.6, hematocrit 45.9. Serum homocysteine 14.5 (<15).    · 9/18/18 - Discussed possibly stopping Coumadin if workup negative. PT PTT 30.5 and 36.5 with correction of PTT on 1:1 mix with normal  plasma. Lupus anticoagulant absent. Factor VIII activity 125 (). D-dimer >0.22 (<0.45).     · 9/21/18 - Venous Doppler study bilateral lower extremity with chronic deep venous thrombosis involving the right popliteal vein. Chronic superficial venous thrombosis involving the right small saphenous vein and a lymph node noted in the right groin.   · 12/18/18 - Discussed venous Doppler results. Recommended continuation of anticoagulation either with Coumadin or maintenance DOAC’s or Aspirin alone, which I did not recommend. Patient decided to continue on Warfarin.   ? 5/2/2019- homocystine 8.5 (N), factor VIII level 133% (H).  ? 7/8/21 Coumadin and Folgard daily.  No missed doses. Factor VIII and Homocysteine level ordered.  ? 11/11/21: Follow up appt. Continue Folgard and Coumadin at 7.5 mg for INR of 2.0. Continue INR checks monthly. No signs of clots or PE    2. Polycythemia diagnosis established in January 2015.  · 1/14/15 - Discussed with the patient the need for a bone marrow biopsy along with additional lab work for further evaluation of the thrombocytopenia and polycythemia.  The patient states that he does not wish to proceed with the blood work or the bone marrow biopsy at this time. Hemoglobin 17.0  · 3/7/17 - WBC 7.17, hemoglobin 17.2, platelet count 120,000. Discussed bone marrow evaluation again with the patient. He will think about it.   · 12/18/18 - WBC 10.4 with 84% neutrophils, 9% lymphocytes, 6% monocytes, hemoglobin 16.6, hematocrit 47.1, platelet count 121,000. BCR/ABL negative. [employer] NGS JAK2 panel negative.   ? 9/5/2019- hemoglobin 16.1, hematocrit 47.4.  ? 7/8/21 Hgb 16.3, Hct 47.6  ? 11/11/21: Follow up appt. Hgb 16.6, Hct 46.3. Stable    3. Thrombocytopenia diagnosis established in October 2014  · The patient presented to [facility] on August 19, 2014 where was admitted through 08/22/2014 with complaints of chest pain and shortness of air.  It was noted that he had  thrombocytopenia on admission with the platelet count of 125,000.  His platelet count slowly fell to 92,000 and it discharge with 12,000.  PT 11.9, INR 1.1, PTT 35.5.  A thrombocytopenia workup was performed that showed a folate of greater than 24.8 (H), and vitamin B12 331(N), , CMV, IgM was negative and EBV IgM was positive.  TAMIA screen is negative.  Platelet antibody screen is negative.  His PF4 was negative at 0.035.  It was noted that in the past, he has had some slightly low platelet counts in 125-140 range.    · 9/8/14 - platelet count 265,000.  EBV IgG positive, EBV IgM negative.  · 10/7/14 - platelet count 101,000.  · 11/17/14 - reviewed medication list for thrombocytopenia.  Metoprolol was known to cause thrombocytopenia purpura.  Lisinopril was known to rarely cause thrombocytopenia and less than 0.01% of the patients.  Plavix was known to cause thrombotic thrombocytopenia purpura (TTP) and thrombocytopenia and less than 1% of the patients.  Crestor was known to cause TTP and thrombocytopenia.  ? 9/5/2019-patient reports moderate alcohol use-16 ounces beer 3 times per week.  ? Platelets 116  ? 11/11/21: Follow up appt. Platelets 129, stable. No bleeding    4. Elevated LFTs and liver mass diagnosis established in September 2014.  · 9/8/14 - ALT 52 (H), AST 29 (N),   · 9/10/14 - advised the patient to increase his hydration and we’ll order a CT scan of the abdomen to be done for further evaluation of the elevated LFTs.  · 9/16/14 - ALT 49 (H), AST 29 (N)  · 9/23/14 - CT of the abdomen showed no evidence of renal mass.  There was an approximately 2 cm non-cystic appearing hypodense lesion on the posterior medial aspect of the right hepatic lobe.  This was in close proximity to the upper pole of the right kidney.  This could conceivably represent the mass seen on ultrasound study.  However it was definitely raising from the liver and not the upper pole of the right kidney.  · 10/7/14 - Will order a MRI  for evaluation of the liver mass as well as lab work for further evaluation of the elevated LFTs.  · 10/7/14 - Hepatitis screening negative, A1AT 133 (N), AFP 9.2 (H), ceruloplasmin 22 (N), ferritin 133.9 (N),   · 10/13/14 - MRI of the abdomen showed liver lesion within the posterior right lobe of the liver is most consistent with hemangioma   · 8/17/15 - ALT 38 (N), AST 26 (N), alkaline phosphatase 26 (N). Elevated LFT’s resolved.   · 3/8/16 - AFP 8 (0-9).    · 5/5/17 - CMP revealed an AST of 32 normal (15-41), ALT 55 normal (17-63), creatinine 1.4 high (0.7-1.2) and BUN 32 high (8-20). Otherwise within normal limits.      · 11/9/17 - Patient claims his PCP has recommended evaluation for his high liver enzymes by a liver doctor, but he has not followed up on it.   · 7/6/21 CMP drawn  · 11/11/21:  Follow up appt. CMP drawn      3/22/22: Patient here for follow up appt. Patient is currently on Coumadin 8mg po daily with INR of 2.5 today. No bleeding issues.               Past Medical History:   Diagnosis Date   • Cardiomyopathy (HCC) 2012   • CVA (cerebral vascular accident) (HCC) 2012   • Hyperlipemia 2012   • Hypertension 2012       Past Surgical History:   Procedure Laterality Date   • TURP / TRANSURETHRAL INCISION / DRAINAGE PROSTATE  2016         Current Outpatient Medications:   •  atorvastatin (LIPITOR) 20 MG tablet, ATORVASTATIN CALCIUM 20 MG TABS, Disp: , Rfl:   •  clopidogrel (PLAVIX) 75 MG tablet, Take 75 mg by mouth Daily., Disp: , Rfl:   •  folic acid-vit B6-vit B12 (Virt-Verito) 2.2-25-1 MG tablet tablet, Take 1 tablet by mouth Daily., Disp: 90 tablet, Rfl: 3  •  hydrALAZINE (APRESOLINE) 10 MG tablet, HYDRALAZINE HCL 10 MG TABS, Disp: , Rfl:   •  lisinopril-hydrochlorothiazide (PRINZIDE,ZESTORETIC) 20-12.5 MG per tablet, LISINOPRIL-HYDROCHLOROTHIAZIDE 20-12.5 MG TABS, Disp: , Rfl:   •  metoprolol tartrate (LOPRESSOR) 25 MG tablet, METOPROLOL TARTRATE 25 MG TABS, Disp: , Rfl:   •  warfarin (COUMADIN) 1 MG  tablet, Take two and a half 1mg tabs with 5mg tab for a total of 7.5mg daily., Disp: 75 tablet, Rfl: 2  •  warfarin (COUMADIN) 4 MG tablet, Take two  4 mg tablet for a total of 8mg by mouth daily. ., Disp: 60 tablet, Rfl: 2  •  warfarin (COUMADIN) 5 MG tablet, Take 1.5 tablets daily or as directed based on your INR., Disp: 135 tablet, Rfl: 3    No Known Allergies    Family History   Problem Relation Age of Onset   • Lung cancer Mother        Cancer-related family history includes Lung cancer in his mother.    Social History     Tobacco Use   • Smoking status: Never   • Smokeless tobacco: Current     Types: Chew   Substance Use Topics   • Alcohol use: Yes   • Drug use: No     I have reviewed and confirmed the accuracy of the patient's history: Chief complaint, HPI, ROS and Subjective as entered by the MA/LPN/RN. Shweta Ayala MD 11/22/22       SUBJECTIVE:    Patient denies bleeding issues.  He remains on 7.5 mg of warfarin.  His INR is therapeutic.        REVIEW OF SYSTEMS:  Review of Systems   Constitutional: Negative for activity change, appetite change, chills, fatigue and fever.   HENT: Negative for ear pain, mouth sores, nosebleeds, sinus pressure, sinus pain, sneezing, sore throat, tinnitus and trouble swallowing.    Eyes: Negative for photophobia and visual disturbance.   Respiratory: Negative for apnea, cough, choking, chest tightness, shortness of breath, wheezing and stridor.    Cardiovascular: Positive for leg swelling. Negative for chest pain and palpitations.        Right leg behind knee. States that his right leg swelling has been present since he had DVT. No pain   Gastrointestinal: Negative for abdominal pain, anal bleeding, blood in stool, constipation, diarrhea, nausea and vomiting.   Endocrine: Negative for cold intolerance and heat intolerance.   Genitourinary: Negative for decreased urine volume, dysuria, flank pain, frequency, genital sores, hematuria, penile discharge, penile pain,  "penile swelling, scrotal swelling, testicular pain and urgency.   Musculoskeletal: Negative for back pain, gait problem, joint swelling, myalgias, neck pain and neck stiffness.   Skin: Positive for wound. Negative for color change and rash.        Skin lesions- flaky and yellowish on scalp.  Brown round flat macule on right lower leg.  Patient denies any change in color.    Allergic/Immunologic: Negative.    Neurological: Negative for dizziness, tremors, seizures, syncope, facial asymmetry, weakness, light-headedness, numbness and headaches.   Hematological: Negative for adenopathy. Does not bruise/bleed easily.        No obvious bleeding   Psychiatric/Behavioral: Negative for agitation, behavioral problems, confusion, decreased concentration, dysphoric mood, hallucinations, self-injury and sleep disturbance. The patient is not nervous/anxious.        OBJECTIVE:    Vitals:    11/22/22 1016   BP: 120/73   Pulse: 63   Temp: 98.2 °F (36.8 °C)   TempSrc: Oral   SpO2: 95%   Weight: 99.8 kg (220 lb)   Height: 165.1 cm (65\")   PainSc: 0-No pain       ECOG  (0) Fully active, able to carry on all predisease performance without restriction    Physical Exam   Constitutional: He is oriented to person, place, and time. No distress.   HENT:   Head: Normocephalic and atraumatic.   Eyes: Conjunctivae are normal. Right eye exhibits no discharge. Left eye exhibits no discharge. No scleral icterus.   Neck: No thyromegaly present.   Cardiovascular: Normal rate, regular rhythm and normal heart sounds. Exam reveals no gallop and no friction rub.   Pulmonary/Chest: Effort normal. No stridor. No respiratory distress. He has no wheezes.   Abdominal: Soft. Bowel sounds are normal. He exhibits no mass. There is no abdominal tenderness. There is no rebound and no guarding.   Musculoskeletal: Normal range of motion. No tenderness.   Lymphadenopathy:     He has no cervical adenopathy.   Neurological: He is alert and oriented to person, place, " and time. He exhibits normal muscle tone.   Skin: Skin is warm. No rash noted. He is not diaphoretic. No erythema.   Psychiatric: His behavior is normal.   Nursing note and vitals reviewed.    I have reexamined the patient and the results are consistent with the previously documented exam. Shweta Ayala MD       RECENT LABS    WBC   Date Value Ref Range Status   11/22/2022 6.94 3.40 - 10.80 10*3/mm3 Final     RBC   Date Value Ref Range Status   11/22/2022 5.44 4.14 - 5.80 10*6/mm3 Final     Hemoglobin   Date Value Ref Range Status   11/22/2022 17.5 13.0 - 17.7 g/dL Final     Hematocrit   Date Value Ref Range Status   11/22/2022 49.8 37.5 - 51.0 % Final     MCV   Date Value Ref Range Status   11/22/2022 91.5 79.0 - 97.0 fL Final     MCH   Date Value Ref Range Status   11/22/2022 32.2 26.6 - 33.0 pg Final     MCHC   Date Value Ref Range Status   11/22/2022 35.1 31.5 - 35.7 g/dL Final     RDW   Date Value Ref Range Status   11/22/2022 13.6 12.3 - 15.4 % Final     RDW-SD   Date Value Ref Range Status   11/22/2022 44.6 37.0 - 54.0 fl Final     MPV   Date Value Ref Range Status   11/22/2022 9.9 6.0 - 12.0 fL Final     Platelets   Date Value Ref Range Status   11/22/2022 122 (L) 140 - 450 10*3/mm3 Final     Neutrophil %   Date Value Ref Range Status   11/22/2022 65.3 42.7 - 76.0 % Final     Lymphocyte %   Date Value Ref Range Status   11/22/2022 22.0 19.6 - 45.3 % Final     Monocyte %   Date Value Ref Range Status   11/22/2022 9.2 5.0 - 12.0 % Final     Eosinophil %   Date Value Ref Range Status   11/22/2022 2.9 0.3 - 6.2 % Final     Basophil %   Date Value Ref Range Status   11/22/2022 0.6 0.0 - 1.5 % Final     Neutrophils, Absolute   Date Value Ref Range Status   11/22/2022 4.53 1.70 - 7.00 10*3/mm3 Final     Lymphocytes, Absolute   Date Value Ref Range Status   11/22/2022 1.53 0.70 - 3.10 10*3/mm3 Final     Monocytes, Absolute   Date Value Ref Range Status   11/22/2022 0.64 0.10 - 0.90 10*3/mm3 Final      Eosinophils, Absolute   Date Value Ref Range Status   11/22/2022 0.20 0.00 - 0.40 10*3/mm3 Final     Basophils, Absolute   Date Value Ref Range Status   11/22/2022 0.04 0.00 - 0.20 10*3/mm3 Final       Lab Results   Component Value Date    GLUCOSE 112 (H) 11/11/2021    BUN 14 11/11/2021    CREATININE 1.10 11/11/2021    EGFRIFNONA 66 11/11/2021    BCR 12.7 11/11/2021    K 4.2 11/11/2021    CO2 22.0 11/11/2021    CALCIUM 8.4 (L) 11/11/2021    ALBUMIN 3.90 11/11/2021    LABIL2 1.6 05/22/2018    AST 27 11/11/2021    ALT 42 (H) 11/11/2021           ASSESSMENT:    1. Bilateral Pulmonary emboli: Ongoing management on anticoagulation therapy.  Continue warfarin.  Refills given today  2. Chronic DVT of proximal vein of right lower extremity: Good cap refill, no pain, warmth, or skin color changes.  There is mild edema behind the right knee. Patient states that edema has been present since his DVT. Edema has not lessened or changed.  Reviewed stable  3. SVT- Follows with cardiology  4. Hyperhomocysteinemia: Check homocystine level today.  Continue Folgard  5. Heterozygous MTHFR mutation  6. CKD, stage III  7. Elevated Factor VIII level: Factor VIII reviewed with patient, elevated. Reports no signs of DVT.  No shortness of breath, swelling, changes in skin color, pain or bleeding noted.   8. Polycythemia: CBC reviewed with patient Normal levels for Hgb and Hct.  CBC reviewed  9. Thrombocytopenia: Chronic: Stable at low 100s  10. Melanoma on chest and scalp:  Patient has been seen by Dr. Kapoor but would like to go to [facility].  Patient has multiple scalp lesions in various shapes and sizes that are yellowish and flaky.  Has flat dime sized macule on right lower leg that is brownish in color.  Patient denies any changes in color of macule. Patient does not know how long macule has been there.  Will consult dermatology, .  Educated completed regarding use of hats and sunscreen.   Patient voiced  understanding.         PLANS:  · Continue warfarin dose adjusted to 7.5 mg p.o. daily.  INR goal of 2-3.  · Refill warfarin today  · INR in 1 month  · Continue Folgard  Daily.  Refill today  · Fasting homocystine level today  · Follow-up with me in 4 months or earlier as needed  · Patient refused home INR monitoring - is happy coming to anticoagulation clinic  · All questions answered, patient verbalized understanding and is agreeable to above plan.     .    I spent 30 total minutes, face-to-face, caring for Jett delgadillo.  90% of this time involved counseling and/or coordination of care as documented within this note.                 .

## 2022-11-22 ENCOUNTER — OFFICE VISIT (OUTPATIENT)
Dept: ONCOLOGY | Facility: CLINIC | Age: 72
End: 2022-11-22

## 2022-11-22 ENCOUNTER — LAB (OUTPATIENT)
Dept: LAB | Facility: HOSPITAL | Age: 72
End: 2022-11-22

## 2022-11-22 VITALS
OXYGEN SATURATION: 95 % | WEIGHT: 220 LBS | HEART RATE: 63 BPM | HEIGHT: 65 IN | TEMPERATURE: 98.2 F | BODY MASS INDEX: 36.65 KG/M2 | DIASTOLIC BLOOD PRESSURE: 73 MMHG | SYSTOLIC BLOOD PRESSURE: 120 MMHG

## 2022-11-22 DIAGNOSIS — Z15.89 HETEROZYGOUS MTHFR MUTATION C677T: ICD-10-CM

## 2022-11-22 DIAGNOSIS — I26.99 OTHER PULMONARY EMBOLISM WITHOUT ACUTE COR PULMONALE, UNSPECIFIED CHRONICITY: Chronic | ICD-10-CM

## 2022-11-22 DIAGNOSIS — E72.11 HYPERHOMOCYSTEINEMIA: ICD-10-CM

## 2022-11-22 DIAGNOSIS — Z79.01 LONG TERM (CURRENT) USE OF ANTICOAGULANTS: ICD-10-CM

## 2022-11-22 DIAGNOSIS — Z79.01 ENCOUNTER FOR CURRENT LONG-TERM USE OF ANTICOAGULANTS: ICD-10-CM

## 2022-11-22 DIAGNOSIS — I82.501 CHRONIC DEEP VEIN THROMBOSIS (DVT) OF RIGHT LOWER EXTREMITY, UNSPECIFIED VEIN: ICD-10-CM

## 2022-11-22 DIAGNOSIS — Z86.718 PERSONAL HISTORY OF OTHER VENOUS THROMBOSIS AND EMBOLISM: ICD-10-CM

## 2022-11-22 DIAGNOSIS — I82.501 CHRONIC DEEP VEIN THROMBOSIS (DVT) OF RIGHT LOWER EXTREMITY, UNSPECIFIED VEIN: Primary | ICD-10-CM

## 2022-11-22 LAB
BASOPHILS # BLD AUTO: 0.04 10*3/MM3 (ref 0–0.2)
BASOPHILS NFR BLD AUTO: 0.6 % (ref 0–1.5)
DEPRECATED RDW RBC AUTO: 44.6 FL (ref 37–54)
EOSINOPHIL # BLD AUTO: 0.2 10*3/MM3 (ref 0–0.4)
EOSINOPHIL NFR BLD AUTO: 2.9 % (ref 0.3–6.2)
ERYTHROCYTE [DISTWIDTH] IN BLOOD BY AUTOMATED COUNT: 13.6 % (ref 12.3–15.4)
HCT VFR BLD AUTO: 49.8 % (ref 37.5–51)
HGB BLD-MCNC: 17.5 G/DL (ref 13–17.7)
INR PPP: 3 (ref 0.8–1.2)
LYMPHOCYTES # BLD AUTO: 1.53 10*3/MM3 (ref 0.7–3.1)
LYMPHOCYTES NFR BLD AUTO: 22 % (ref 19.6–45.3)
MCH RBC QN AUTO: 32.2 PG (ref 26.6–33)
MCHC RBC AUTO-ENTMCNC: 35.1 G/DL (ref 31.5–35.7)
MCV RBC AUTO: 91.5 FL (ref 79–97)
MONOCYTES # BLD AUTO: 0.64 10*3/MM3 (ref 0.1–0.9)
MONOCYTES NFR BLD AUTO: 9.2 % (ref 5–12)
NEUTROPHILS NFR BLD AUTO: 4.53 10*3/MM3 (ref 1.7–7)
NEUTROPHILS NFR BLD AUTO: 65.3 % (ref 42.7–76)
PLATELET # BLD AUTO: 122 10*3/MM3 (ref 140–450)
PMV BLD AUTO: 9.9 FL (ref 6–12)
RBC # BLD AUTO: 5.44 10*6/MM3 (ref 4.14–5.8)
WBC NRBC COR # BLD: 6.94 10*3/MM3 (ref 3.4–10.8)

## 2022-11-22 PROCEDURE — 99214 OFFICE O/P EST MOD 30 MIN: CPT | Performed by: INTERNAL MEDICINE

## 2022-11-22 PROCEDURE — 85610 PROTHROMBIN TIME: CPT

## 2022-11-22 PROCEDURE — 85025 COMPLETE CBC W/AUTO DIFF WBC: CPT

## 2022-11-22 RX ORDER — WARFARIN SODIUM 5 MG/1
TABLET ORAL
Qty: 135 TABLET | Refills: 3 | Status: SHIPPED | OUTPATIENT
Start: 2022-11-22

## 2022-11-22 RX ORDER — CYANOCOBALAMIN/FOLIC AC/VIT B6 1-2.2-25MG
1 TABLET ORAL DAILY
Qty: 90 TABLET | Refills: 3 | Status: SHIPPED | OUTPATIENT
Start: 2022-11-22

## 2022-12-22 ENCOUNTER — HOSPITAL ENCOUNTER (OUTPATIENT)
Dept: ONCOLOGY | Facility: HOSPITAL | Age: 72
Setting detail: INFUSION SERIES
Discharge: HOME | End: 2022-12-22

## 2022-12-22 ENCOUNTER — LAB (OUTPATIENT)
Dept: LAB | Facility: HOSPITAL | Age: 72
End: 2022-12-22

## 2022-12-22 DIAGNOSIS — Z79.01 LONG TERM (CURRENT) USE OF ANTICOAGULANTS: Primary | ICD-10-CM

## 2022-12-22 DIAGNOSIS — Z79.01 LONG TERM (CURRENT) USE OF ANTICOAGULANTS: ICD-10-CM

## 2022-12-22 LAB — INR PPP: 3.1 (ref 0.8–1.2)

## 2022-12-22 PROCEDURE — 36416 COLLJ CAPILLARY BLOOD SPEC: CPT

## 2022-12-22 PROCEDURE — 85610 PROTHROMBIN TIME: CPT

## 2022-12-22 NOTE — PROGRESS NOTES
INR 3.1,pt has been eating more cholocate than normal.  He has been on 7.5mg daily for a long time.  denies any bleeding or bruising. Pt wants to recheck in 1 month.  Discussed with Lydia was given the ok to recheck in 1 month.

## 2023-01-19 ENCOUNTER — LAB (OUTPATIENT)
Dept: LAB | Facility: HOSPITAL | Age: 73
End: 2023-01-19
Payer: MEDICARE

## 2023-01-19 ENCOUNTER — HOSPITAL ENCOUNTER (OUTPATIENT)
Dept: ONCOLOGY | Facility: HOSPITAL | Age: 73
Setting detail: INFUSION SERIES
Discharge: HOME | End: 2023-01-19
Payer: MEDICARE

## 2023-01-19 DIAGNOSIS — Z79.01 LONG TERM (CURRENT) USE OF ANTICOAGULANTS: Primary | ICD-10-CM

## 2023-01-19 DIAGNOSIS — Z79.01 LONG TERM (CURRENT) USE OF ANTICOAGULANTS: ICD-10-CM

## 2023-01-19 LAB — INR PPP: 3.1 (ref 0.8–1.2)

## 2023-01-19 PROCEDURE — 85610 PROTHROMBIN TIME: CPT

## 2023-01-19 PROCEDURE — G0463 HOSPITAL OUTPT CLINIC VISIT: HCPCS

## 2023-01-19 PROCEDURE — 36416 COLLJ CAPILLARY BLOOD SPEC: CPT

## 2023-01-19 NOTE — PROGRESS NOTES
INR 3.10 today. Pt to continue taking warfarin 7.5 mg daily and return in 1 month for INR check. Appt. card given and he verbalized understanding.

## 2023-02-15 DIAGNOSIS — Z79.01 LONG TERM (CURRENT) USE OF ANTICOAGULANTS: Primary | ICD-10-CM

## 2023-02-15 DIAGNOSIS — I82.501 CHRONIC DEEP VEIN THROMBOSIS (DVT) OF RIGHT LOWER EXTREMITY, UNSPECIFIED VEIN: ICD-10-CM

## 2023-02-16 ENCOUNTER — HOSPITAL ENCOUNTER (OUTPATIENT)
Dept: ONCOLOGY | Facility: HOSPITAL | Age: 73
Discharge: HOME | End: 2023-02-16
Payer: MEDICARE

## 2023-02-16 ENCOUNTER — LAB (OUTPATIENT)
Dept: LAB | Facility: HOSPITAL | Age: 73
End: 2023-02-16
Payer: MEDICARE

## 2023-02-16 ENCOUNTER — APPOINTMENT (OUTPATIENT)
Dept: LAB | Facility: HOSPITAL | Age: 73
End: 2023-02-16
Payer: MEDICARE

## 2023-02-16 DIAGNOSIS — I82.501 CHRONIC DEEP VEIN THROMBOSIS (DVT) OF RIGHT LOWER EXTREMITY, UNSPECIFIED VEIN: ICD-10-CM

## 2023-02-16 DIAGNOSIS — Z79.01 LONG TERM (CURRENT) USE OF ANTICOAGULANTS: Primary | ICD-10-CM

## 2023-02-16 DIAGNOSIS — Z79.01 LONG TERM (CURRENT) USE OF ANTICOAGULANTS: ICD-10-CM

## 2023-02-16 LAB — INR PPP: 2.8 (ref 0.8–1.2)

## 2023-02-16 PROCEDURE — 36416 COLLJ CAPILLARY BLOOD SPEC: CPT

## 2023-02-16 PROCEDURE — 85610 PROTHROMBIN TIME: CPT

## 2023-02-16 PROCEDURE — G0463 HOSPITAL OUTPT CLINIC VISIT: HCPCS

## 2023-02-16 NOTE — PROGRESS NOTES
INR 2.8, pt to continue current dose of warfarin and recheck in march with MD visit. appt card given.

## 2023-03-15 DIAGNOSIS — Z79.01 LONG TERM (CURRENT) USE OF ANTICOAGULANTS: ICD-10-CM

## 2023-03-15 RX ORDER — WARFARIN SODIUM 1 MG/1
TABLET ORAL
Qty: 225 TABLET | Refills: 1 | Status: SHIPPED | OUTPATIENT
Start: 2023-03-15

## 2023-03-23 ENCOUNTER — LAB (OUTPATIENT)
Dept: LAB | Facility: HOSPITAL | Age: 73
End: 2023-03-23
Payer: MEDICARE

## 2023-03-23 ENCOUNTER — OFFICE VISIT (OUTPATIENT)
Dept: ONCOLOGY | Facility: CLINIC | Age: 73
End: 2023-03-23
Payer: MEDICARE

## 2023-03-23 ENCOUNTER — ANTICOAGULATION VISIT (OUTPATIENT)
Dept: ONCOLOGY | Facility: HOSPITAL | Age: 73
End: 2023-03-23
Payer: MEDICARE

## 2023-03-23 VITALS
HEIGHT: 65 IN | WEIGHT: 224 LBS | RESPIRATION RATE: 18 BRPM | HEART RATE: 57 BPM | SYSTOLIC BLOOD PRESSURE: 114 MMHG | BODY MASS INDEX: 37.32 KG/M2 | TEMPERATURE: 97 F | DIASTOLIC BLOOD PRESSURE: 70 MMHG

## 2023-03-23 DIAGNOSIS — I26.99 OTHER PULMONARY EMBOLISM WITHOUT ACUTE COR PULMONALE, UNSPECIFIED CHRONICITY: ICD-10-CM

## 2023-03-23 DIAGNOSIS — E72.11 HYPERHOMOCYSTEINEMIA: ICD-10-CM

## 2023-03-23 DIAGNOSIS — I82.501 CHRONIC DEEP VEIN THROMBOSIS (DVT) OF RIGHT LOWER EXTREMITY, UNSPECIFIED VEIN: ICD-10-CM

## 2023-03-23 DIAGNOSIS — E88.89 OTHER SPECIFIED METABOLIC DISORDERS: ICD-10-CM

## 2023-03-23 DIAGNOSIS — Z79.01 LONG TERM (CURRENT) USE OF ANTICOAGULANTS: ICD-10-CM

## 2023-03-23 DIAGNOSIS — I82.501 CHRONIC DEEP VEIN THROMBOSIS (DVT) OF RIGHT LOWER EXTREMITY, UNSPECIFIED VEIN: Primary | ICD-10-CM

## 2023-03-23 LAB
BASOPHILS # BLD AUTO: 0.02 10*3/MM3 (ref 0–0.2)
BASOPHILS NFR BLD AUTO: 0.2 % (ref 0–1.5)
DEPRECATED RDW RBC AUTO: 45.7 FL (ref 37–54)
EOSINOPHIL # BLD AUTO: 0.15 10*3/MM3 (ref 0–0.4)
EOSINOPHIL NFR BLD AUTO: 1.8 % (ref 0.3–6.2)
ERYTHROCYTE [DISTWIDTH] IN BLOOD BY AUTOMATED COUNT: 14 % (ref 12.3–15.4)
HCT VFR BLD AUTO: 49.3 % (ref 37.5–51)
HCYS SERPL-MCNC: 11.1 UMOL/L (ref 0–15)
HGB BLD-MCNC: 17.2 G/DL (ref 13–17.7)
INR PPP: 4.3 (ref 0.8–1.2)
LYMPHOCYTES # BLD AUTO: 1.43 10*3/MM3 (ref 0.7–3.1)
LYMPHOCYTES NFR BLD AUTO: 16.9 % (ref 19.6–45.3)
MCH RBC QN AUTO: 31.6 PG (ref 26.6–33)
MCHC RBC AUTO-ENTMCNC: 34.9 G/DL (ref 31.5–35.7)
MCV RBC AUTO: 90.6 FL (ref 79–97)
MONOCYTES # BLD AUTO: 0.71 10*3/MM3 (ref 0.1–0.9)
MONOCYTES NFR BLD AUTO: 8.4 % (ref 5–12)
NEUTROPHILS NFR BLD AUTO: 6.15 10*3/MM3 (ref 1.7–7)
NEUTROPHILS NFR BLD AUTO: 72.7 % (ref 42.7–76)
PLATELET # BLD AUTO: 123 10*3/MM3 (ref 140–450)
PMV BLD AUTO: 10.1 FL (ref 6–12)
RBC # BLD AUTO: 5.44 10*6/MM3 (ref 4.14–5.8)
WBC NRBC COR # BLD: 8.46 10*3/MM3 (ref 3.4–10.8)

## 2023-03-23 PROCEDURE — 99214 OFFICE O/P EST MOD 30 MIN: CPT | Performed by: INTERNAL MEDICINE

## 2023-03-23 PROCEDURE — 85610 PROTHROMBIN TIME: CPT

## 2023-03-23 PROCEDURE — 83090 ASSAY OF HOMOCYSTEINE: CPT | Performed by: INTERNAL MEDICINE

## 2023-03-23 PROCEDURE — 1126F AMNT PAIN NOTED NONE PRSNT: CPT | Performed by: INTERNAL MEDICINE

## 2023-03-23 PROCEDURE — 85025 COMPLETE CBC W/AUTO DIFF WBC: CPT

## 2023-03-23 PROCEDURE — 36415 COLL VENOUS BLD VENIPUNCTURE: CPT | Performed by: INTERNAL MEDICINE

## 2023-03-23 NOTE — PROGRESS NOTES
Anticoagulation Clinic Progress Note    Patient's visit was held via telephone today.    Anticoagulation Summary  As of 3/23/2023    INR goal:  2.0-3.0   TTR:  61.9 % (3.7 y)   INR used for dosin.30 (3/23/2023)   Warfarin maintenance plan:  7.5 mg (5 mg x 1 and 1 mg x 2.5) every day; Starting 3/23/2023   Weekly warfarin total:  52.5 mg   Plan last modified:  Shavon Aquino RN (2022)   Next INR check:     Priority:  Maintenance   Target end date:           Anticoagulation Episode Summary     INR check location:  Clinic Lab    Preferred lab:      Send INR reminders to:  [facility]    Comments:            Drug interactions: has remained unchanged.  Diet: has remained unchanged.    Clinic Interview:  No pertinent clinical findings have been reported.    INR History:  Anticoagulation Monitoring 2023 2023 3/23/2023   INR 3.10 2.80 4.30   INR Date 2023 2023 3/23/2023   INR Goal 2.0-3.0 2.0-3.0 2.0-3.0   Trend Same Same Same   Last Week Total 52.5 mg 52.5 mg 52.5 mg   Next Week Total 52.5 mg 52.5 mg 52.5 mg   Sun 7.5 mg 7.5 mg 7.5 mg   Mon 7.5 mg 7.5 mg 7.5 mg   Tue 7.5 mg 7.5 mg 7.5 mg   Wed 7.5 mg 7.5 mg 7.5 mg   Thu 7.5 mg 7.5 mg 7.5 mg   Fri 7.5 mg 7.5 mg 7.5 mg   Sat 7.5 mg 7.5 mg 7.5 mg   Visit Report - - -   Some recent data might be hidden       Plan:  1. INR is Supratherapeutic today- see above in Anticoagulation Summary. No reason found for elevated INR.    Will instruct Jett Doe to HOLD warfarin dose today only, then continue their warfarin regimen- see above in Anticoagulation Summary.  2. Follow up in 1 week  3.They have been instructed to call if any changes in medications, doses, concerns, etc. Patient expresses understanding and has no further questions at this time.    Ronna Perry [facility]

## 2023-03-23 NOTE — PROGRESS NOTES
Hematology/Oncology Outpatient Follow Up    PATIENT NAME:Jett Doe  :1950  MRN: [id number]  PRIMARY CARE PHYSICIAN: Jasmin Padilla APRN  REFERRING PHYSICIAN: Jasmin Padilla APRN    Chief Complaint   Patient presents with   • Follow-up     Chronic deep vein thrombosis (DVT) of right lower extremity, unspecified vein (HCC)         HISTORY OF PRESENT ILLNESS:     1. Bilateral PE, right lower extremity DVT and bilateral SVTs diagnosis established in 2014 and MTHFR C677T heterozygosity diagnosis established and 2014.  · The patient presented to [facility] on 2014 where was admitted through 2014 with complaints of chest pain and shortness of air.  He was seen per EMS and had associated diaphoresis as well as some low sternal chest pressure.  His troponin was elevated and cardiology was consulted.  He was started on nitroglycerin drip and heparin drip.  He was given diuresis and admitted to the [facility]U.  It was planned for him to undergo a cardiac catheterization.  A VQ scan was done to evaluate for bilateral pulmonary emboli and renal is consulted for his elevated creatinine.  The VQ scan showed multiple abnormalities in both lungs with a high probably for pulmonary emboli.  His chest x-ray showed no acute cardiopulmonary abnormality.  We were consulted for his Thrombopenia and his pulmonology emboli.  His risk factors included obesity, sedentary lifestyle, history of stroke and hyperlipidemia.  He has been on Plavix and aspirin as an outpatient and when his pulmonary emboli were found, his heparin was changed to renal dust Lovenox therapy 1 mg/kg per day.  He was not started on Coumadin initially with the plan that cardiology would perform a cardiac catheterization shortly after admission.  A thrombophilia workup was performed and results were; his AT3 was 89.9% normal, protein C was 101.4% normal and protein S was 101% normal, Fibrinogen 416 normal, factor V  Leiden screen is negative, prothrombin gene mutation is negative.  MTHFR showed heterozygosity for C677 T mutation, but negative for C0392U mutation.  Phosphate lipid antibodies were done.  Beta II glycoprotein is negative.  Phosphatidylserine antibody is negative.  Cardiolipin antibodies are all negative.  Bilateral lower extremity Dopplers was performed that showed a right lower extremity DVT and bilateral SVTs in his small saphenous vein.  On 08/20/2014, an IVC filter was placed with plans to hold starting his Coumadinization until after his cardiac catheterization was performed, which was going to be scheduled as an outpatient per Dr. Jurado.  It was noted that he had thrombocytopenia on admission with the platelet count of 125,000.  His platelet count slowly felt to 92,000 and it discharge with [id number].  His LFTs were noted to be abnormal with an AST of 88 and ALT of 103.  His coags were normal with PT of 11.9, INR 1.1 and PTT 35.5.  A thrombocytopenia workup was performed as seen below.  TSH of 1.3, His LFTs are normalized prior to discharge.  Nephrology was consulted for his AMADEO with a creatinine of 2.0 on admission.  Dr. Howard performed some renal studies.  His urinalysis was normal.  His renal ultrasound showed a 2 cm mass extended of the upper pole of the right kidney, which appeared to be solid with recommendations follow up with the CT or an MRI of his abdomen.  There was no hydronephrosis to indicate obstructive uropathy and the urinary bladder was unremarkable.  His creatinine improved with medication adjustments and was 1.4 and discharged with plans to perform an MRI of his afternoon when his creatinine improved to further evaluate his right renal mass.  An echocardiogram was performed that showed 45-55% ejection fraction and no other abnormalities were identified.  · 8/17/15 - Comprehensive metabolic panel with creatinine 1.6.  · 9/8/14 - patient was seen in the office as a hospital follow-up.  Homocysteine level 18.4 (H)  · 9/10/14 - orders written to start Folgard daily due to the elevated homocystine level.  · 10/7/14 - The patient reports that he has not started taking the Folgard and did not remember having a prescription called into the pharmacy.  Advised the patient again to start taking Folgard daily and gave the patient a prescription today  · 11/13/14 - homocystine 17.7 (H)  · 11/19/14 - advised patient to increase Folgard 2 twice a day.  · 8/17/15 - Homocysteine 16.8 (5-12).   · 3/8/16 - Patient does not recall taking Folgard. Prescription written for Folgard 1 p.o. q. d.  Patient claims that he has not taken Coumadin for three days as he was out of town. INR today 1.4. Instructed to resume Coumadin at same dose and follow INR protocol.   · 6/30/16 - Comprehensive metabolic panel with creatinine 1.3 (0.7-1.2). Serum homocysteine 12.2 (5-12).     · 3/7/17 - INR 2.7 on 8 mg of Warfarin daily. Patient asked to increase Folgard to twice a day.   · 5/5/17 - INR 2.4, therapeutic. Patient is to continue the INR protocol and continue with Folgard one tablet twice daily as well as Coumadin 8 mg daily. Homocysteine 11.2 normal (5.0-12.0).   · 8/4/17 - INR today is 1.6. Patient states that he missed his dose of Coumadin Monday and Tuesday of this week. He denied any diet changes. I will leave the patient on his current dose of 8 mg daily and have him return to the office for an INR in one week as he had missed two consecutive doses of Coumadin earlier. He is to continue to follow the INR protocol and he is to continue the Folgard one tablet twice daily as it is controlling his homocysteine level.   · 11/9/17 - Patient claims to be missing occasional Warfarin doses. Counseled.   · 5/22/18 - Hemoglobin 16.2, MCV 89.6, hematocrit 45.9. Serum homocysteine 14.5 (<15).    · 9/18/18 - Discussed possibly stopping Coumadin if workup negative. PT PTT 30.5 and 36.5 with correction of PTT on 1:1 mix with normal  plasma. Lupus anticoagulant absent. Factor VIII activity 125 (). D-dimer >0.22 (<0.45).     · 9/21/18 - Venous Doppler study bilateral lower extremity with chronic deep venous thrombosis involving the right popliteal vein. Chronic superficial venous thrombosis involving the right small saphenous vein and a lymph node noted in the right groin.   · 12/18/18 - Discussed venous Doppler results. Recommended continuation of anticoagulation either with Coumadin or maintenance DOAC’s or Aspirin alone, which I did not recommend. Patient decided to continue on Warfarin.   ? 5/2/2019- homocystine 8.5 (N), factor VIII level 133% (H).  ? 7/8/21 Coumadin and Folgard daily.  No missed doses. Factor VIII and Homocysteine level ordered.  ? 11/11/21: Follow up appt. Continue Folgard and Coumadin at 7.5 mg for INR of 2.0. Continue INR checks monthly. No signs of clots or PE    2. Polycythemia diagnosis established in January 2015.  · 1/14/15 - Discussed with the patient the need for a bone marrow biopsy along with additional lab work for further evaluation of the thrombocytopenia and polycythemia.  The patient states that he does not wish to proceed with the blood work or the bone marrow biopsy at this time. Hemoglobin 17.0  · 3/7/17 - WBC 7.17, hemoglobin 17.2, platelet count 120,000. Discussed bone marrow evaluation again with the patient. He will think about it.   · 12/18/18 - WBC 10.4 with 84% neutrophils, 9% lymphocytes, 6% monocytes, hemoglobin 16.6, hematocrit 47.1, platelet count 121,000. BCR/ABL negative. [employer] NGS JAK2 panel negative.   ? 9/5/2019- hemoglobin 16.1, hematocrit 47.4.  ? 7/8/21 Hgb 16.3, Hct 47.6  ? 11/11/21: Follow up appt. Hgb 16.6, Hct 46.3. Stable    3. Thrombocytopenia diagnosis established in October 2014  · The patient presented to [facility] on August 19, 2014 where was admitted through 08/22/2014 with complaints of chest pain and shortness of air.  It was noted that he had  thrombocytopenia on admission with the platelet count of 125,000.  His platelet count slowly fell to 92,000 and it discharge with 12,000.  PT 11.9, INR 1.1, PTT 35.5.  A thrombocytopenia workup was performed that showed a folate of greater than 24.8 (H), and vitamin B12 331(N), , CMV, IgM was negative and EBV IgM was positive.  TAMIA screen is negative.  Platelet antibody screen is negative.  His PF4 was negative at 0.035.  It was noted that in the past, he has had some slightly low platelet counts in 125-140 range.    · 9/8/14 - platelet count 265,000.  EBV IgG positive, EBV IgM negative.  · 10/7/14 - platelet count 101,000.  · 11/17/14 - reviewed medication list for thrombocytopenia.  Metoprolol was known to cause thrombocytopenia purpura.  Lisinopril was known to rarely cause thrombocytopenia and less than 0.01% of the patients.  Plavix was known to cause thrombotic thrombocytopenia purpura (TTP) and thrombocytopenia and less than 1% of the patients.  Crestor was known to cause TTP and thrombocytopenia.  ? 9/5/2019-patient reports moderate alcohol use-16 ounces beer 3 times per week.  ? Platelets 116  ? 11/11/21: Follow up appt. Platelets 129, stable. No bleeding    4. Elevated LFTs and liver mass diagnosis established in September 2014.  · 9/8/14 - ALT 52 (H), AST 29 (N),   · 9/10/14 - advised the patient to increase his hydration and we’ll order a CT scan of the abdomen to be done for further evaluation of the elevated LFTs.  · 9/16/14 - ALT 49 (H), AST 29 (N)  · 9/23/14 - CT of the abdomen showed no evidence of renal mass.  There was an approximately 2 cm non-cystic appearing hypodense lesion on the posterior medial aspect of the right hepatic lobe.  This was in close proximity to the upper pole of the right kidney.  This could conceivably represent the mass seen on ultrasound study.  However it was definitely raising from the liver and not the upper pole of the right kidney.  · 10/7/14 - Will order a MRI  for evaluation of the liver mass as well as lab work for further evaluation of the elevated LFTs.  · 10/7/14 - Hepatitis screening negative, A1AT 133 (N), AFP 9.2 (H), ceruloplasmin 22 (N), ferritin 133.9 (N),   · 10/13/14 - MRI of the abdomen showed liver lesion within the posterior right lobe of the liver is most consistent with hemangioma   · 8/17/15 - ALT 38 (N), AST 26 (N), alkaline phosphatase 26 (N). Elevated LFT’s resolved.   · 3/8/16 - AFP 8 (0-9).    · 5/5/17 - CMP revealed an AST of 32 normal (15-41), ALT 55 normal (17-63), creatinine 1.4 high (0.7-1.2) and BUN 32 high (8-20). Otherwise within normal limits.      · 11/9/17 - Patient claims his PCP has recommended evaluation for his high liver enzymes by a liver doctor, but he has not followed up on it.   · 7/6/21 CMP drawn  · 11/11/21:  Follow up appt. CMP drawn      3/22/22: Patient here for follow up appt. Patient is currently on Coumadin 8mg po daily with INR of 2.5 today. No bleeding issues.               Past Medical History:   Diagnosis Date   • Cardiomyopathy (HCC) 2012   • CVA (cerebral vascular accident) (HCC) 2012   • Hyperlipemia 2012   • Hypertension 2012       Past Surgical History:   Procedure Laterality Date   • TURP / TRANSURETHRAL INCISION / DRAINAGE PROSTATE  2016         Current Outpatient Medications:   •  atorvastatin (LIPITOR) 20 MG tablet, ATORVASTATIN CALCIUM 20 MG TABS, Disp: , Rfl:   •  clopidogrel (PLAVIX) 75 MG tablet, Take 75 mg by mouth Daily., Disp: , Rfl:   •  folic acid-vit B6-vit B12 (Virt-Verito) 2.2-25-1 MG tablet tablet, Take 1 tablet by mouth Daily., Disp: 90 tablet, Rfl: 3  •  hydrALAZINE (APRESOLINE) 10 MG tablet, HYDRALAZINE HCL 10 MG TABS, Disp: , Rfl:   •  lisinopril-hydrochlorothiazide (PRINZIDE,ZESTORETIC) 20-12.5 MG per tablet, LISINOPRIL-HYDROCHLOROTHIAZIDE 20-12.5 MG TABS, Disp: , Rfl:   •  metoprolol tartrate (LOPRESSOR) 25 MG tablet, METOPROLOL TARTRATE 25 MG TABS, Disp: , Rfl:   •  warfarin (COUMADIN) 1 MG  tablet, TAKE 2 AND 1/2 TABLETS BY MOUTH DAILY WITH 5 MG TABLET FOR A TOTAL OF 7.5 MG DAILY, Disp: 225 tablet, Rfl: 1  •  warfarin (COUMADIN) 4 MG tablet, Take two  4 mg tablet for a total of 8mg by mouth daily. ., Disp: 60 tablet, Rfl: 2  •  warfarin (COUMADIN) 5 MG tablet, Take 1.5 tablets daily or as directed based on your INR., Disp: 135 tablet, Rfl: 3    No Known Allergies    Family History   Problem Relation Age of Onset   • Lung cancer Mother        Cancer-related family history includes Lung cancer in his mother.    Social History     Tobacco Use   • Smoking status: Never   • Smokeless tobacco: Current     Types: Chew   Substance Use Topics   • Alcohol use: Yes   • Drug use: No     I have reviewed and confirmed the accuracy of the patient's history: Chief complaint, HPI, ROS and Subjective as entered by the MA/LPN/RN. Shweta Ayala MD 03/23/23       SUBJECTIVE:      Patient is here today for routine follow-up and does not have any new issues.        REVIEW OF SYSTEMS:    Review of Systems   Constitutional: Negative for activity change, appetite change, chills, fatigue and fever.   HENT: Negative for ear pain, mouth sores, nosebleeds, sinus pressure, sinus pain, sneezing, sore throat, tinnitus and trouble swallowing.    Eyes: Negative for photophobia and visual disturbance.   Respiratory: Negative for apnea, cough, choking, chest tightness, shortness of breath, wheezing and stridor.    Cardiovascular: Positive for leg swelling. Negative for chest pain and palpitations.        Right leg behind knee. States that his right leg swelling has been present since he had DVT. No pain   Gastrointestinal: Negative for abdominal pain, anal bleeding, blood in stool, constipation, diarrhea, nausea and vomiting.   Endocrine: Negative for cold intolerance and heat intolerance.   Genitourinary: Negative for decreased urine volume, dysuria, flank pain, frequency, genital sores, hematuria, penile discharge, penile pain,  "penile swelling, scrotal swelling, testicular pain and urgency.   Musculoskeletal: Negative for back pain, gait problem, joint swelling, myalgias, neck pain and neck stiffness.   Skin: Positive for wound. Negative for color change and rash.        Skin lesions- flaky and yellowish on scalp.  Brown round flat macule on right lower leg.  Patient denies any change in color.    Allergic/Immunologic: Negative.    Neurological: Negative for dizziness, tremors, seizures, syncope, facial asymmetry, weakness, light-headedness, numbness and headaches.   Hematological: Negative for adenopathy. Does not bruise/bleed easily.        No obvious bleeding   Psychiatric/Behavioral: Negative for agitation, behavioral problems, confusion, decreased concentration, dysphoric mood, hallucinations, self-injury and sleep disturbance. The patient is not nervous/anxious.        OBJECTIVE:    Vitals:    03/23/23 1045   BP: 114/70   Pulse: 57   Resp: 18   Temp: 97 °F (36.1 °C)   TempSrc: Infrared   Weight: 102 kg (224 lb)   Height: 165.1 cm (65\")   PainSc: 0-No pain       ECOG  (0) Fully active, able to carry on all predisease performance without restriction    Physical Exam   Constitutional: He is oriented to person, place, and time. No distress.   HENT:   Head: Normocephalic and atraumatic.   Eyes: Conjunctivae are normal. Right eye exhibits no discharge. Left eye exhibits no discharge. No scleral icterus.   Neck: No thyromegaly present.   Cardiovascular: Normal rate, regular rhythm and normal heart sounds. Exam reveals no gallop and no friction rub.   Pulmonary/Chest: Effort normal. No stridor. No respiratory distress. He has no wheezes.   Abdominal: Soft. Bowel sounds are normal. He exhibits no mass. There is no abdominal tenderness. There is no rebound and no guarding.   Musculoskeletal: Normal range of motion. No tenderness.   Lymphadenopathy:     He has no cervical adenopathy.   Neurological: He is alert and oriented to person, place, " and time. He exhibits normal muscle tone.   Skin: Skin is warm. No rash noted. He is not diaphoretic. No erythema.   Psychiatric: His behavior is normal.   Nursing note and vitals reviewed.      I have reexamined the patient and the results are consistent with the previously documented exam. Shweta Ayala MD     RECENT LABS    WBC   Date Value Ref Range Status   03/23/2023 8.46 3.40 - 10.80 10*3/mm3 Final     RBC   Date Value Ref Range Status   03/23/2023 5.44 4.14 - 5.80 10*6/mm3 Final     Hemoglobin   Date Value Ref Range Status   03/23/2023 17.2 13.0 - 17.7 g/dL Final     Hematocrit   Date Value Ref Range Status   03/23/2023 49.3 37.5 - 51.0 % Final     MCV   Date Value Ref Range Status   03/23/2023 90.6 79.0 - 97.0 fL Final     MCH   Date Value Ref Range Status   03/23/2023 31.6 26.6 - 33.0 pg Final     MCHC   Date Value Ref Range Status   03/23/2023 34.9 31.5 - 35.7 g/dL Final     RDW   Date Value Ref Range Status   03/23/2023 14.0 12.3 - 15.4 % Final     RDW-SD   Date Value Ref Range Status   03/23/2023 45.7 37.0 - 54.0 fl Final     MPV   Date Value Ref Range Status   03/23/2023 10.1 6.0 - 12.0 fL Final     Platelets   Date Value Ref Range Status   03/23/2023 123 (L) 140 - 450 10*3/mm3 Final     Neutrophil %   Date Value Ref Range Status   03/23/2023 72.7 42.7 - 76.0 % Final     Lymphocyte %   Date Value Ref Range Status   03/23/2023 16.9 (L) 19.6 - 45.3 % Final     Monocyte %   Date Value Ref Range Status   03/23/2023 8.4 5.0 - 12.0 % Final     Eosinophil %   Date Value Ref Range Status   03/23/2023 1.8 0.3 - 6.2 % Final     Basophil %   Date Value Ref Range Status   03/23/2023 0.2 0.0 - 1.5 % Final     Neutrophils, Absolute   Date Value Ref Range Status   03/23/2023 6.15 1.70 - 7.00 10*3/mm3 Final     Lymphocytes, Absolute   Date Value Ref Range Status   03/23/2023 1.43 0.70 - 3.10 10*3/mm3 Final     Monocytes, Absolute   Date Value Ref Range Status   03/23/2023 0.71 0.10 - 0.90 10*3/mm3 Final      Eosinophils, Absolute   Date Value Ref Range Status   03/23/2023 0.15 0.00 - 0.40 10*3/mm3 Final     Basophils, Absolute   Date Value Ref Range Status   03/23/2023 0.02 0.00 - 0.20 10*3/mm3 Final       Lab Results   Component Value Date    GLUCOSE 112 (H) 11/11/2021    BUN 14 11/11/2021    CREATININE 1.10 11/11/2021    EGFRIFNONA 66 11/11/2021    BCR 12.7 11/11/2021    K 4.2 11/11/2021    CO2 22.0 11/11/2021    CALCIUM 8.4 (L) 11/11/2021    ALBUMIN 3.90 11/11/2021    LABIL2 1.6 05/22/2018    AST 27 11/11/2021    ALT 42 (H) 11/11/2021           ASSESSMENT:    1. Bilateral Pulmonary emboli: Anticoagulation therapy.  Patient to continue treatment.  2. Chronic DVT of proximal vein of right lower extremity: Good cap refill, no pain, warmth, or skin color changes.  There is mild edema behind the right knee. Patient states that edema has been present since his DVT. Edema has not lessened or changed.  Reviewed stable  3. SVT- Follows with cardiology  4. Hyperhomocysteinemia: Patient is on Folgard.  Check homocystine level today  5. Heterozygous MTHFR mutation  6. CKD, stage III.  I have requested for his labs from [facility]  7. Elevated Factor VIII level: Factor VIII reviewed with patient, elevated. Reports no signs of DVT.  No shortness of breath, swelling, changes in skin color, pain or bleeding noted.   8. Polycythemia: CBC reviewed with patient Normal levels for Hgb and Hct.  CBC reviewed  9. Thrombocytopenia: Chronic: Stable at low 100s  10. Melanoma on chest and scalp:  Patient has been seen by Dr. Kapoor but would like to go to [facility].  Patient has multiple scalp lesions in various shapes and sizes that are yellowish and flaky.  Has flat dime sized macule on right lower leg that is brownish in color.  Patient denies any changes in color of macule. Patient does not know how long macule has been there.  Will consult dermatology, .  Educated completed regarding use of hats and sunscreen.   Patient  voiced understanding.         PLANS:  · INR to be adjusted today by the pharmacist.  Patient asked not to take warfarin until he hears from the pharmacist today  · Continue to follow-up in the Coumadin clinic  · Continue Folgard  · Check fasting homocystine level today  · Follow-up with me in 4 months or earlier as needed  · Patient refused home INR monitoring - is happy coming to anticoagulation clinic  · All questions answered, patient verbalized understanding and is agreeable to above plan.     .    I spent 30 total minutes, face-to-face, caring for Jett today.  90% of this time involved counseling and/or coordination of care as documented within this note.                 .

## 2023-03-30 ENCOUNTER — ANTICOAGULATION VISIT (OUTPATIENT)
Dept: ONCOLOGY | Facility: HOSPITAL | Age: 73
End: 2023-03-30
Payer: MEDICARE

## 2023-03-30 ENCOUNTER — LAB (OUTPATIENT)
Dept: LAB | Facility: HOSPITAL | Age: 73
End: 2023-03-30
Payer: MEDICARE

## 2023-03-30 DIAGNOSIS — Z79.01 LONG TERM (CURRENT) USE OF ANTICOAGULANTS: ICD-10-CM

## 2023-03-30 DIAGNOSIS — I82.501 CHRONIC DEEP VEIN THROMBOSIS (DVT) OF RIGHT LOWER EXTREMITY, UNSPECIFIED VEIN: ICD-10-CM

## 2023-03-30 LAB — INR PPP: 2.6 (ref 0.8–1.2)

## 2023-03-30 PROCEDURE — 85610 PROTHROMBIN TIME: CPT

## 2023-03-30 PROCEDURE — 36416 COLLJ CAPILLARY BLOOD SPEC: CPT

## 2023-03-30 NOTE — PROGRESS NOTES
Anticoagulation Clinic Progress Note    Patient's visit was held via telephone today. Patient agreed to pharmacist care per [facility].      Anticoagulation Summary  As of 3/30/2023    INR goal:  2.0-3.0   TTR:  61.7 % (3.7 y)   INR used for dosin.60 (3/30/2023)   Warfarin maintenance plan:  7.5 mg (5 mg x 1 and 1 mg x 2.5) every day; Starting 3/30/2023   Weekly warfarin total:  52.5 mg   Plan last modified:  Shavon Aquino RN (2022)   Next INR check:  2023   Priority:  Maintenance   Target end date:           Anticoagulation Episode Summary     INR check location:  Clinic Lab    Preferred lab:      Send INR reminders to:   [facility] CBC ANTICOAG POOL    Comments:            Drug interactions: has remained unchanged.  Diet: has remained unchanged.    Clinic Interview:  No pertinent clinical findings have been reported.    INR History:  Anticoagulation Monitoring 2023 3/23/2023 3/30/2023   INR 2.80 4.30 2.60   INR Date 2023 3/23/2023 3/30/2023   INR Goal 2.0-3.0 2.0-3.0 2.0-3.0   Trend Same Same Same   Last Week Total 52.5 mg 52.5 mg 45 mg   Next Week Total 52.5 mg 45 mg 50 mg   Sun 7.5 mg 7.5 mg 7.5 mg   Mon 7.5 mg 7.5 mg 7.5 mg   Tue 7.5 mg 7.5 mg 7.5 mg   Wed 7.5 mg 7.5 mg 7.5 mg   Thu 7.5 mg Hold (3/23) 5 mg (3/30); Otherwise 7.5 mg   Fri 7.5 mg 7.5 mg 7.5 mg   Sat 7.5 mg 7.5 mg 7.5 mg   Visit Report - - -   Some recent data might be hidden       Plan:  1. INR is Therapeutic today- see above in Anticoagulation Summary.   Will instruct Jett Doe to Change their warfarin regimen to 5mg on Thurs and 7.5mg on all other days- see above in Anticoagulation Summary.  2. Follow up in 4 weeks  3.They have been instructed to call if any changes in medications, doses, concerns, etc. Patient expresses understanding and has no further questions at this time.    Ronna Perry [facility]

## 2023-04-27 ENCOUNTER — LAB (OUTPATIENT)
Dept: LAB | Facility: HOSPITAL | Age: 73
End: 2023-04-27
Payer: MEDICARE

## 2023-04-27 ENCOUNTER — ANTICOAGULATION VISIT (OUTPATIENT)
Dept: ONCOLOGY | Facility: HOSPITAL | Age: 73
End: 2023-04-27
Payer: MEDICARE

## 2023-04-27 DIAGNOSIS — Z79.01 LONG TERM (CURRENT) USE OF ANTICOAGULANTS: ICD-10-CM

## 2023-04-27 DIAGNOSIS — I82.501 CHRONIC DEEP VEIN THROMBOSIS (DVT) OF RIGHT LOWER EXTREMITY, UNSPECIFIED VEIN: ICD-10-CM

## 2023-04-27 LAB — INR PPP: 2.2 (ref 0.8–1.2)

## 2023-04-27 PROCEDURE — 85610 PROTHROMBIN TIME: CPT

## 2023-04-27 PROCEDURE — 36416 COLLJ CAPILLARY BLOOD SPEC: CPT

## 2023-04-28 NOTE — PROGRESS NOTES
Anticoagulation Clinic Progress Note    Patient's visit was held by phone today.    Anticoagulation Summary  As of 2023    INR goal:  2.0-3.0   TTR:  62.5 % (3.8 y)   INR used for dosin.20 (2023)   Warfarin maintenance plan:  7.5 mg (5 mg x 1 and 1 mg x 2.5) every day; Starting 2023   Weekly warfarin total:  52.5 mg   No change documented:  Ronna Perry RPH   Plan last modified:  Shavon Aquino RN (2022)   Next INR check:  2023   Priority:  Maintenance   Target end date:           Anticoagulation Episode Summary     INR check location:  Clinic Lab    Preferred lab:      Send INR reminders to:   [facility] CBC ANTICOAG POOL    Comments:            Drug interactions: has remained unchanged.  Diet: has remained unchanged.    Clinic Interview:  No pertinent clinical findings have been reported.    INR History:      Latest Ref Rng & Units 2023    10:30 AM 3/23/2023    10:27 AM 3/23/2023     1:00 PM 3/30/2023     9:19 AM 3/30/2023     9:45 AM 2023     9:02 AM 2023     9:30 AM   Anticoagulation Monitoring   INR    4.30  2.60  2.20   INR Date    3/23/2023  3/30/2023  2023   INR Goal    2.0-3.0  2.0-3.0  2.0-3.0   Trend    Same  Same  Same   Last Week Total    52.5 mg  45 mg  52.5 mg   Next Week Total    45 mg  50 mg  52.5 mg   Sun    7.5 mg  7.5 mg  7.5 mg   Mon    7.5 mg  7.5 mg  7.5 mg   Tue    7.5 mg  7.5 mg  7.5 mg   Wed    7.5 mg  7.5 mg  7.5 mg   Thu    Hold (3/23)  5 mg (3/30); Otherwise 7.5 mg  7.5 mg   Fri    7.5 mg  7.5 mg  7.5 mg   Sat    7.5 mg  7.5 mg  7.5 mg   Historical INR 0.80 - 1.20 2.80   4.30    2.60    2.20      Visit Report   Report Report           Plan:  1. INR is Therapeutic today- see above in Anticoagulation Summary.   Will instruct Jett Faustinoma to Continue their warfarin regimen- see above in Anticoagulation Summary.  2. Follow up in 4 weeks  3.They have been instructed to call if any changes in medications, doses, concerns, etc. Left VM  with instructions and to call back with updates or questions..    Ronna Perry RP

## 2023-05-25 ENCOUNTER — LAB (OUTPATIENT)
Dept: LAB | Facility: HOSPITAL | Age: 73
End: 2023-05-25
Payer: MEDICARE

## 2023-05-25 ENCOUNTER — ANTICOAGULATION VISIT (OUTPATIENT)
Dept: ONCOLOGY | Facility: HOSPITAL | Age: 73
End: 2023-05-25
Payer: MEDICARE

## 2023-05-25 DIAGNOSIS — Z79.01 LONG TERM (CURRENT) USE OF ANTICOAGULANTS: ICD-10-CM

## 2023-05-25 DIAGNOSIS — I82.501 CHRONIC DEEP VEIN THROMBOSIS (DVT) OF RIGHT LOWER EXTREMITY, UNSPECIFIED VEIN: ICD-10-CM

## 2023-05-25 LAB — INR PPP: 4 (ref 0.8–1.2)

## 2023-05-25 PROCEDURE — 85610 PROTHROMBIN TIME: CPT

## 2023-05-25 PROCEDURE — 36416 COLLJ CAPILLARY BLOOD SPEC: CPT

## 2023-05-25 NOTE — PROGRESS NOTES
Anticoagulation Clinic Progress Note    Patient's visit was held by phone today.    Anticoagulation Summary  As of 2023    INR goal:  2.0-3.0   TTR:  62.1 % (3.9 y)   INR used for dosin.00 (2023)   Warfarin maintenance plan:  7.5 mg (5 mg x 1 and 1 mg x 2.5) every day; Starting 2023   Weekly warfarin total:  52.5 mg   Plan last modified:  Shavon Aquino RN (2022)   Next INR check:  2023   Priority:  Maintenance   Target end date:           Anticoagulation Episode Summary     INR check location:  Clinic Lab    Preferred lab:      Send INR reminders to:   [facility] CBC ANTICOAG POOL    Comments:            Drug interactions: has remained unchanged.  Diet: has remained unchanged.    Clinic Interview:  No pertinent clinical findings have been reported.    INR History:      Latest Ref Rng & Units 3/23/2023     1:00 PM 3/30/2023     9:19 AM 3/30/2023     9:45 AM 2023     9:02 AM 2023     9:30 AM 2023     9:22 AM 2023    10:15 AM   Anticoagulation Monitoring   INR  4.30  2.60  2.20  4.00   INR Date  3/23/2023  3/30/2023  2023  2023   INR Goal  2.0-3.0  2.0-3.0  2.0-3.0  2.0-3.0   Trend  Same  Same  Same  Same   Last Week Total  52.5 mg  45 mg  52.5 mg  52.5 mg   Next Week Total  45 mg  50 mg  52.5 mg  52.5 mg   Sun  7.5 mg  7.5 mg  7.5 mg  7.5 mg   Mon  7.5 mg  7.5 mg  7.5 mg  7.5 mg   Tue  7.5 mg  7.5 mg  7.5 mg  7.5 mg   Wed  7.5 mg  7.5 mg  7.5 mg  7.5 mg   Thu  Hold (3/23)  5 mg (3/30); Otherwise 7.5 mg  7.5 mg  7.5 mg   Fri  7.5 mg  7.5 mg  7.5 mg  7.5 mg   Sat  7.5 mg  7.5 mg  7.5 mg  7.5 mg   Historical INR 0.80 - 1.20  2.60    2.20    4.00      Visit Report  Report             Plan:  1. INR is Supratherapeutic today- after discussion with patient he did state he had a few more beers this week than normal.  Will instruct Jett Doe to hold today's dose and then resume their normal warfarin regimen- see above in Anticoagulation Summary.  2. Follow up  in 1 weeks  3.They have been instructed to call if any changes in medications, doses, concerns, etc. Patient expresses understanding and has no further questions at this time.    Katie Whitlock [facility]

## 2023-06-01 ENCOUNTER — LAB (OUTPATIENT)
Dept: LAB | Facility: HOSPITAL | Age: 73
End: 2023-06-01
Payer: MEDICARE

## 2023-06-01 ENCOUNTER — ANTICOAGULATION VISIT (OUTPATIENT)
Dept: ONCOLOGY | Facility: HOSPITAL | Age: 73
End: 2023-06-01
Payer: MEDICARE

## 2023-06-01 DIAGNOSIS — Z79.01 LONG TERM (CURRENT) USE OF ANTICOAGULANTS: ICD-10-CM

## 2023-06-01 DIAGNOSIS — I82.501 CHRONIC DEEP VEIN THROMBOSIS (DVT) OF RIGHT LOWER EXTREMITY, UNSPECIFIED VEIN: ICD-10-CM

## 2023-06-01 LAB — INR PPP: 1.5 (ref 0.8–1.2)

## 2023-06-01 PROCEDURE — 85610 PROTHROMBIN TIME: CPT

## 2023-06-01 PROCEDURE — 36416 COLLJ CAPILLARY BLOOD SPEC: CPT

## 2023-06-01 NOTE — PROGRESS NOTES
Anticoagulation Clinic Progress Note    Patient's visit was held by phone today.    Anticoagulation Summary  As of 2023    INR goal:  2.0-3.0   TTR:  62.0 % (3.9 y)   INR used for dosin.50 (2023)   Warfarin maintenance plan:  7.5 mg (5 mg x 1 and 1 mg x 2.5) every day; Starting 2023   Weekly warfarin total:  52.5 mg   Plan last modified:  Shavon Aquino RN (2022)   Next INR check:  2023   Priority:  Maintenance   Target end date:           Anticoagulation Episode Summary     INR check location:  Clinic Lab    Preferred lab:      Send INR reminders to:   [facility] CBC ANTICOAG POOL    Comments:            Drug interactions: has remained unchanged.  Diet: has remained unchanged.    Clinic Interview:  No pertinent clinical findings have been reported.    INR History:      Latest Ref Rng & Units 3/30/2023     9:45 AM 2023     9:02 AM 2023     9:30 AM 2023     9:22 AM 2023    10:15 AM 2023     9:05 AM 2023     9:30 AM   Anticoagulation Monitoring   INR  2.60  2.20  4.00  1.50   INR Date  3/30/2023  2023  2023  2023   INR Goal  2.0-3.0  2.0-3.0  2.0-3.0  2.0-3.0   Trend  Same  Same  Same  Same   Last Week Total  45 mg  52.5 mg  52.5 mg  52.5 mg   Next Week Total  50 mg  52.5 mg  52.5 mg  55 mg   Sun  7.5 mg  7.5 mg  7.5 mg  7.5 mg   Mon  7.5 mg  7.5 mg  7.5 mg  7.5 mg   Tue  7.5 mg  7.5 mg  7.5 mg  7.5 mg   Wed  7.5 mg  7.5 mg  7.5 mg  7.5 mg   Thu  5 mg (3/30); Otherwise 7.5 mg  7.5 mg  7.5 mg  10 mg (); Otherwise 7.5 mg   Fri  7.5 mg  7.5 mg  7.5 mg  7.5 mg   Sat  7.5 mg  7.5 mg  7.5 mg  7.5 mg   Historical INR 0.80 - 1.20  2.20    4.00    1.50          Plan:  1. INR is subtherapeutic today- see above in Anticoagulation Summary. Held dose last week as instructed for supratherapeutic INR with no good reason noted for INR being up.  Will instruct Jett Doe to boost today only to 10mg then continue their warfarin regimen- see above in  Anticoagulation Summary.  2. Follow up in 2 weeks  3.They have been instructed to call if any changes in medications, doses, concerns, etc. Patient expresses understanding and has no further questions at this time.    Ronna Perry RPH

## 2023-06-15 ENCOUNTER — ANTICOAGULATION VISIT (OUTPATIENT)
Dept: ONCOLOGY | Facility: HOSPITAL | Age: 73
End: 2023-06-15
Payer: MEDICARE

## 2023-06-15 ENCOUNTER — LAB (OUTPATIENT)
Dept: LAB | Facility: HOSPITAL | Age: 73
End: 2023-06-15
Payer: MEDICARE

## 2023-06-15 DIAGNOSIS — I26.99 PULMONARY EMBOLISM, OTHER, UNSPECIFIED CHRONICITY, UNSPECIFIED WHETHER ACUTE COR PULMONALE PRESENT: Chronic | ICD-10-CM

## 2023-06-15 LAB — INR PPP: 2.9 (ref 0.8–1.2)

## 2023-06-15 PROCEDURE — 85610 PROTHROMBIN TIME: CPT

## 2023-06-15 PROCEDURE — 36416 COLLJ CAPILLARY BLOOD SPEC: CPT

## 2023-06-15 NOTE — PROGRESS NOTES
Anticoagulation Clinic Progress Note    Patient's visit was held by phone today.    Anticoagulation Summary  As of 6/15/2023      INR goal:  2.0-3.0   TTR:  62.1 % (3.9 y)   INR used for dosin.90 (6/15/2023)   Warfarin maintenance plan:  7.5 mg (5 mg x 1 and 1 mg x 2.5) every day; Starting 6/15/2023   Weekly warfarin total:  52.5 mg   No change documented:  Katie Whitlock RPH   Plan last modified:  Shavon Aquino RN (2022)   Next INR check:  2023   Priority:  Maintenance   Target end date:               Anticoagulation Episode Summary       INR check location:  Clinic Lab    Preferred lab:      Send INR reminders to:   [facility] CBC ANTICOAG POOL    Comments:              Drug interactions: has remained unchanged.  Diet: has remained unchanged.    Clinic Interview:  No pertinent clinical findings have been reported.    INR History:      Latest Ref Rng & Units 2023     9:30 AM 2023     9:22 AM 2023    10:15 AM 2023     9:05 AM 2023     9:30 AM 6/15/2023     9:28 AM 6/15/2023     9:45 AM   Anticoagulation Monitoring   INR  2.20  4.00  1.50  2.90   INR Date  2023  2023  2023  6/15/2023   INR Goal  2.0-3.0  2.0-3.0  2.0-3.0  2.0-3.0   Trend  Same  Same  Same  Same   Last Week Total  52.5 mg  52.5 mg  52.5 mg  52.5 mg   Next Week Total  52.5 mg  52.5 mg  55 mg  52.5 mg   Sun  7.5 mg  7.5 mg  7.5 mg  7.5 mg   Mon  7.5 mg  7.5 mg  7.5 mg  7.5 mg   Tue  7.5 mg  7.5 mg  7.5 mg  7.5 mg   Wed  7.5 mg  7.5 mg  7.5 mg  7.5 mg   Thu  7.5 mg  7.5 mg  10 mg (); Otherwise 7.5 mg  7.5 mg   Fri  7.5 mg  7.5 mg  7.5 mg  7.5 mg   Sat  7.5 mg  7.5 mg  7.5 mg  7.5 mg   Historical INR 0.80 - 1.20  4.00   1.50   2.90         Plan:  1. INR is Therapeutic today- see above in Anticoagulation Summary.   Will instruct Jett Doe to Continue their warfarin regimen- see above in Anticoagulation Summary.  2. Follow up in 2 weeks  3.They have been instructed to call if any changes  in medications, doses, concerns, etc. Patient expresses understanding and has no further questions at this time.    Katie Whitlock [facility]

## 2023-07-21 NOTE — PROGRESS NOTES
Hematology/Oncology Outpatient Follow Up    PATIENT NAME:Jett Doe  :1950  MRN: [id number]  PRIMARY CARE PHYSICIAN: Jasmin Padilla APRN  REFERRING PHYSICIAN: Jasmin Padilla APRN    Chief Complaint   Patient presents with    Follow-up     Chronic deep vein thrombosis (DVT) of right lower extremity, unspecified vein         HISTORY OF PRESENT ILLNESS:     Bilateral PE, right lower extremity DVT and bilateral SVTs diagnosis established in 2014 and MTHFR C677T heterozygosity diagnosis established and 2014.  The patient presented to [facility] on 2014 where was admitted through 2014 with complaints of chest pain and shortness of air.  He was seen per EMS and had associated diaphoresis as well as some low sternal chest pressure.  His troponin was elevated and cardiology was consulted.  He was started on nitroglycerin drip and heparin drip.  He was given diuresis and admitted to the CBCU.  It was planned for him to undergo a cardiac catheterization.  A VQ scan was done to evaluate for bilateral pulmonary emboli and renal is consulted for his elevated creatinine.  The VQ scan showed multiple abnormalities in both lungs with a high probably for pulmonary emboli.  His chest x-ray showed no acute cardiopulmonary abnormality.  We were consulted for his Thrombopenia and his pulmonology emboli.  His risk factors included obesity, sedentary lifestyle, history of stroke and hyperlipidemia.  He has been on Plavix and aspirin as an outpatient and when his pulmonary emboli were found, his heparin was changed to renal dust Lovenox therapy 1 mg/kg per day.  He was not started on Coumadin initially with the plan that cardiology would perform a cardiac catheterization shortly after admission.  A thrombophilia workup was performed and results were; his AT3 was 89.9% normal, protein C was 101.4% normal and protein S was 101% normal, Fibrinogen 416 normal, factor V Leiden screen is  negative, prothrombin gene mutation is negative.  MTHFR showed heterozygosity for C677 T mutation, but negative for K8167H mutation.  Phosphate lipid antibodies were done.  Beta II glycoprotein is negative.  Phosphatidylserine antibody is negative.  Cardiolipin antibodies are all negative.  Bilateral lower extremity Dopplers was performed that showed a right lower extremity DVT and bilateral SVTs in his small saphenous vein.  On 08/20/2014, an IVC filter was placed with plans to hold starting his Coumadinization until after his cardiac catheterization was performed, which was going to be scheduled as an outpatient per Dr. Jurado.  It was noted that he had thrombocytopenia on admission with the platelet count of 125,000.  His platelet count slowly felt to 92,000 and it discharge with 00883.  His LFTs were noted to be abnormal with an AST of 88 and ALT of 103.  His coags were normal with PT of 11.9, INR 1.1 and PTT 35.5.  A thrombocytopenia workup was performed as seen below.  TSH of 1.3, His LFTs are normalized prior to discharge.  Nephrology was consulted for his AMADEO with a creatinine of 2.0 on admission.  Dr. Howard performed some renal studies.  His urinalysis was normal.  His renal ultrasound showed a 2 cm mass extended of the upper pole of the right kidney, which appeared to be solid with recommendations follow up with the CT or an MRI of his abdomen.  There was no hydronephrosis to indicate obstructive uropathy and the urinary bladder was unremarkable.  His creatinine improved with medication adjustments and was 1.4 and discharged with plans to perform an MRI of his afternoon when his creatinine improved to further evaluate his right renal mass.  An echocardiogram was performed that showed 45-55% ejection fraction and no other abnormalities were identified.  8/17/15 - Comprehensive metabolic panel with creatinine 1.6.  9/8/14 - patient was seen in the office as a hospital follow-up. Homocysteine level 18.4  (H)  9/10/14 - orders written to start Folgard daily due to the elevated homocystine level.  10/7/14 - The patient reports that he has not started taking the Folgard and did not remember having a prescription called into the pharmacy.  Advised the patient again to start taking Folgard daily and gave the patient a prescription today  11/13/14 - homocystine 17.7 (H)  11/19/14 - advised patient to increase Folgard 2 twice a day.  8/17/15 - Homocysteine 16.8 (5-12).   3/8/16 - Patient does not recall taking Folgard. Prescription written for Folgard 1 p.o. q. d.  Patient claims that he has not taken Coumadin for three days as he was out of town. INR today 1.4. Instructed to resume Coumadin at same dose and follow INR protocol.   6/30/16 - Comprehensive metabolic panel with creatinine 1.3 (0.7-1.2). Serum homocysteine 12.2 (5-12).     3/7/17 - INR 2.7 on 8 mg of Warfarin daily. Patient asked to increase Folgard to twice a day.   5/5/17 - INR 2.4, therapeutic. Patient is to continue the INR protocol and continue with Folgard one tablet twice daily as well as Coumadin 8 mg daily. Homocysteine 11.2 normal (5.0-12.0).   8/4/17 - INR today is 1.6. Patient states that he missed his dose of Coumadin Monday and Tuesday of this week. He denied any diet changes. I will leave the patient on his current dose of 8 mg daily and have him return to the office for an INR in one week as he had missed two consecutive doses of Coumadin earlier. He is to continue to follow the INR protocol and he is to continue the Folgard one tablet twice daily as it is controlling his homocysteine level.   11/9/17 - Patient claims to be missing occasional Warfarin doses. Counseled.   5/22/18 - Hemoglobin 16.2, MCV 89.6, hematocrit 45.9. Serum homocysteine 14.5 (<15).    9/18/18 - Discussed possibly stopping Coumadin if workup negative. PT PTT 30.5 and 36.5 with correction of PTT on 1:1 mix with normal plasma. Lupus anticoagulant absent. Factor VIII activity  125 (). D-dimer >0.22 (<0.45).     9/21/18 - Venous Doppler study bilateral lower extremity with chronic deep venous thrombosis involving the right popliteal vein. Chronic superficial venous thrombosis involving the right small saphenous vein and a lymph node noted in the right groin.   12/18/18 - Discussed venous Doppler results. Recommended continuation of anticoagulation either with Coumadin or maintenance DOAC’s or Aspirin alone, which I did not recommend. Patient decided to continue on Warfarin.   5/2/2019- homocystine 8.5 (N), factor VIII level 133% (H).  7/8/21 Coumadin and Folgard daily.  No missed doses. Factor VIII and Homocysteine level ordered.  11/11/21: Follow up appt. Continue Folgard and Coumadin at 7.5 mg for INR of 2.0. Continue INR checks monthly. No signs of clots or PE    Polycythemia diagnosis established in January 2015.  1/14/15 - Discussed with the patient the need for a bone marrow biopsy along with additional lab work for further evaluation of the thrombocytopenia and polycythemia.  The patient states that he does not wish to proceed with the blood work or the bone marrow biopsy at this time. Hemoglobin 17.0  3/7/17 - WBC 7.17, hemoglobin 17.2, platelet count 120,000. Discussed bone marrow evaluation again with the patient. He will think about it.   12/18/18 - WBC 10.4 with 84% neutrophils, 9% lymphocytes, 6% monocytes, hemoglobin 16.6, hematocrit 47.1, platelet count 121,000. BCR/ABL negative. [employer] NGS JAK2 panel negative.   9/5/2019- hemoglobin 16.1, hematocrit 47.4.  7/8/21 Hgb 16.3, Hct 47.6  11/11/21: Follow up appt. Hgb 16.6, Hct 46.3. Stable    Thrombocytopenia diagnosis established in October 2014  The patient presented to [facility] on August 19, 2014 where was admitted through 08/22/2014 with complaints of chest pain and shortness of air.  It was noted that he had thrombocytopenia on admission with the platelet count of 125,000.  His platelet count slowly  fell to 92,000 and it discharge with 12,000.  PT 11.9, INR 1.1, PTT 35.5.  A thrombocytopenia workup was performed that showed a folate of greater than 24.8 (H), and vitamin B12 331(N), , CMV, IgM was negative and EBV IgM was positive.  TAMIA screen is negative.  Platelet antibody screen is negative.  His PF4 was negative at 0.035.  It was noted that in the past, he has had some slightly low platelet counts in 125-140 range.    9/8/14 - platelet count 265,000.  EBV IgG positive, EBV IgM negative.  10/7/14 - platelet count 101,000.  11/17/14 - reviewed medication list for thrombocytopenia.  Metoprolol was known to cause thrombocytopenia purpura.  Lisinopril was known to rarely cause thrombocytopenia and less than 0.01% of the patients.  Plavix was known to cause thrombotic thrombocytopenia purpura (TTP) and thrombocytopenia and less than 1% of the patients.  Crestor was known to cause TTP and thrombocytopenia.  9/5/2019-patient reports moderate alcohol use-16 ounces beer 3 times per week.  Platelets 116  11/11/21: Follow up appt. Platelets 129, stable. No bleeding    Elevated LFTs and liver mass diagnosis established in September 2014.  9/8/14 - ALT 52 (H), AST 29 (N),   9/10/14 - advised the patient to increase his hydration and we’ll order a CT scan of the abdomen to be done for further evaluation of the elevated LFTs.  9/16/14 - ALT 49 (H), AST 29 (N)  9/23/14 - CT of the abdomen showed no evidence of renal mass.  There was an approximately 2 cm non-cystic appearing hypodense lesion on the posterior medial aspect of the right hepatic lobe.  This was in close proximity to the upper pole of the right kidney.  This could conceivably represent the mass seen on ultrasound study.  However it was definitely raising from the liver and not the upper pole of the right kidney.  10/7/14 - Will order a MRI for evaluation of the liver mass as well as lab work for further evaluation of the elevated LFTs.  10/7/14 -  Hepatitis screening negative, A1AT 133 (N), AFP 9.2 (H), ceruloplasmin 22 (N), ferritin 133.9 (N),   10/13/14 - MRI of the abdomen showed liver lesion within the posterior right lobe of the liver is most consistent with hemangioma   8/17/15 - ALT 38 (N), AST 26 (N), alkaline phosphatase 26 (N). Elevated LFT’s resolved.   3/8/16 - AFP 8 (0-9).    5/5/17 - CMP revealed an AST of 32 normal (15-41), ALT 55 normal (17-63), creatinine 1.4 high (0.7-1.2) and BUN 32 high (8-20). Otherwise within normal limits.      11/9/17 - Patient claims his PCP has recommended evaluation for his high liver enzymes by a liver doctor, but he has not followed up on it.   7/6/21 CMP drawn  11/11/21:  Follow up appt. CMP drawn      3/22/22: Patient here for follow up appt. Patient is currently on Coumadin 8mg po daily with INR of 2.5 today. No bleeding issues.               Past Medical History:   Diagnosis Date    Cardiomyopathy 2012    CVA (cerebral vascular accident) 2012    Hyperlipemia 2012    Hypertension 2012       Past Surgical History:   Procedure Laterality Date    TURP / TRANSURETHRAL INCISION / DRAINAGE PROSTATE  2016         Current Outpatient Medications:     atorvastatin (LIPITOR) 20 MG tablet, ATORVASTATIN CALCIUM 20 MG TABS, Disp: , Rfl:     clopidogrel (PLAVIX) 75 MG tablet, Take 75 mg by mouth Daily., Disp: , Rfl:     folic acid-vit B6-vit B12 (Virt-Verito) 2.2-25-1 MG tablet tablet, Take 1 tablet by mouth Daily., Disp: 90 tablet, Rfl: 3    hydrALAZINE (APRESOLINE) 10 MG tablet, HYDRALAZINE HCL 10 MG TABS, Disp: , Rfl:     lisinopril-hydrochlorothiazide (PRINZIDE,ZESTORETIC) 20-12.5 MG per tablet, LISINOPRIL-HYDROCHLOROTHIAZIDE 20-12.5 MG TABS, Disp: , Rfl:     metoprolol tartrate (LOPRESSOR) 25 MG tablet, METOPROLOL TARTRATE 25 MG TABS, Disp: , Rfl:     warfarin (COUMADIN) 1 MG tablet, TAKE 2 AND 1/2 TABLETS BY MOUTH DAILY WITH 5 MG TABLET FOR A TOTAL OF 7.5 MG DAILY, Disp: 225 tablet, Rfl: 1    warfarin (COUMADIN) 4 MG  tablet, Take two  4 mg tablet for a total of 8mg by mouth daily. ., Disp: 60 tablet, Rfl: 2    warfarin (COUMADIN) 5 MG tablet, Take 1.5 tablets daily or as directed based on your INR., Disp: 135 tablet, Rfl: 3    No Known Allergies    Family History   Problem Relation Age of Onset    Lung cancer Mother        Cancer-related family history includes Lung cancer in his mother.    Social History     Tobacco Use    Smoking status: Never    Smokeless tobacco: Current     Types: Chew   Substance Use Topics    Alcohol use: Yes    Drug use: No     I have reviewed and confirmed the accuracy of the patient's history: Chief complaint, HPI, ROS, and Subjective as entered by the MA/LPN/RN. Shweta Suha Ayala MD 07/25/23        SUBJECTIVE:      Patient denies any new issues        REVIEW OF SYSTEMS:    Review of Systems   Constitutional:  Negative for activity change, appetite change, chills, fatigue and fever.   HENT:  Negative for ear pain, mouth sores, nosebleeds, sinus pressure, sinus pain, sneezing, sore throat, tinnitus and trouble swallowing.    Eyes:  Negative for photophobia and visual disturbance.   Respiratory:  Negative for apnea, cough, choking, chest tightness, shortness of breath, wheezing and stridor.    Cardiovascular:  Positive for leg swelling. Negative for chest pain and palpitations.        Right leg behind knee. States that his right leg swelling has been present since he had DVT. No pain   Gastrointestinal:  Negative for abdominal pain, anal bleeding, blood in stool, constipation, diarrhea, nausea and vomiting.   Endocrine: Negative for cold intolerance and heat intolerance.   Genitourinary:  Negative for decreased urine volume, dysuria, flank pain, frequency, genital sores, hematuria, penile discharge, penile pain, penile swelling, scrotal swelling, testicular pain and urgency.   Musculoskeletal:  Negative for back pain, gait problem, joint swelling, myalgias, neck pain and neck stiffness.   Skin:   "Positive for wound. Negative for color change and rash.        Skin lesions- flaky and yellowish on scalp.  Brown round flat macule on right lower leg.  Patient denies any change in color.    Allergic/Immunologic: Negative.    Neurological:  Negative for dizziness, tremors, seizures, syncope, facial asymmetry, weakness, light-headedness, numbness and headaches.   Hematological:  Negative for adenopathy. Does not bruise/bleed easily.        No obvious bleeding   Psychiatric/Behavioral:  Negative for agitation, behavioral problems, confusion, decreased concentration, dysphoric mood, hallucinations, self-injury and sleep disturbance. The patient is not nervous/anxious.      OBJECTIVE:    Vitals:    07/25/23 0951   BP: 126/69   Pulse: 53   Resp: 18   Temp: 98 °F (36.7 °C)   TempSrc: Infrared   SpO2: 94%   Weight: 104 kg (229 lb)   Height: 165.1 cm (65\")   PainSc: 0-No pain       ECOG  (0) Fully active, able to carry on all predisease performance without restriction    Physical Exam   Constitutional: He is oriented to person, place, and time. No distress.   HENT:   Head: Normocephalic and atraumatic.   Eyes: Conjunctivae are normal. Right eye exhibits no discharge. Left eye exhibits no discharge. No scleral icterus.   Neck: No thyromegaly present.   Cardiovascular: Normal rate, regular rhythm and normal heart sounds. Exam reveals no gallop and no friction rub.   Pulmonary/Chest: Effort normal. No stridor. No respiratory distress. He has no wheezes.   Abdominal: Soft. Bowel sounds are normal. He exhibits no mass. There is no abdominal tenderness. There is no rebound and no guarding.   Musculoskeletal: Normal range of motion. No tenderness.   Lymphadenopathy:     He has no cervical adenopathy.   Neurological: He is alert and oriented to person, place, and time. He exhibits normal muscle tone.   Skin: Skin is warm. No rash noted. He is not diaphoretic. No erythema.   Psychiatric: His behavior is normal.   Nursing note and " vitals reviewed.    I have reexamined the patient and the results are consistent with the previously documented exam. Shweta Suha Ayala MD      RECENT LABS    WBC   Date Value Ref Range Status   07/25/2023 7.05 3.40 - 10.80 10*3/mm3 Final     RBC   Date Value Ref Range Status   07/25/2023 5.40 4.14 - 5.80 10*6/mm3 Final     Hemoglobin   Date Value Ref Range Status   07/25/2023 17.4 13.0 - 17.7 g/dL Final     Hematocrit   Date Value Ref Range Status   07/25/2023 50.7 37.5 - 51.0 % Final     MCV   Date Value Ref Range Status   07/25/2023 93.9 79.0 - 97.0 fL Final     MCH   Date Value Ref Range Status   07/25/2023 32.2 26.6 - 33.0 pg Final     MCHC   Date Value Ref Range Status   07/25/2023 34.3 31.5 - 35.7 g/dL Final     RDW   Date Value Ref Range Status   07/25/2023 13.7 12.3 - 15.4 % Final     RDW-SD   Date Value Ref Range Status   07/25/2023 45.3 37.0 - 54.0 fl Final     MPV   Date Value Ref Range Status   07/25/2023 9.8 6.0 - 12.0 fL Final     Platelets   Date Value Ref Range Status   07/25/2023 124 (L) 140 - 450 10*3/mm3 Final     Neutrophil %   Date Value Ref Range Status   07/25/2023 70.4 42.7 - 76.0 % Final     Lymphocyte %   Date Value Ref Range Status   07/25/2023 19.1 (L) 19.6 - 45.3 % Final     Monocyte %   Date Value Ref Range Status   07/25/2023 7.5 5.0 - 12.0 % Final     Eosinophil %   Date Value Ref Range Status   07/25/2023 2.6 0.3 - 6.2 % Final     Basophil %   Date Value Ref Range Status   07/25/2023 0.4 0.0 - 1.5 % Final     Neutrophils, Absolute   Date Value Ref Range Status   07/25/2023 4.96 1.70 - 7.00 10*3/mm3 Final     Lymphocytes, Absolute   Date Value Ref Range Status   07/25/2023 1.35 0.70 - 3.10 10*3/mm3 Final     Monocytes, Absolute   Date Value Ref Range Status   07/25/2023 0.53 0.10 - 0.90 10*3/mm3 Final     Eosinophils, Absolute   Date Value Ref Range Status   07/25/2023 0.18 0.00 - 0.40 10*3/mm3 Final     Basophils, Absolute   Date Value Ref Range Status   07/25/2023 0.03 0.00 -  0.20 10*3/mm3 Final       Lab Results   Component Value Date    GLUCOSE 112 (H) 11/11/2021    BUN 14 11/11/2021    CREATININE 1.10 11/11/2021    EGFRIFNONA 66 11/11/2021    BCR 12.7 11/11/2021    K 4.2 11/11/2021    CO2 22.0 11/11/2021    CALCIUM 8.4 (L) 11/11/2021    ALBUMIN 3.90 11/11/2021    LABIL2 1.6 05/22/2018    AST 27 11/11/2021    ALT 42 (H) 11/11/2021           ASSESSMENT:    Bilateral Pulmonary emboli: Anticoagulation therapy.  Continue treatment and are managed in the Coumadin clinic  Chronic DVT of proximal vein of right lower extremity: Good cap refill, no pain, warmth, or skin color changes.  There is mild edema behind the right knee. Patient states that edema has been present since his DVT. Edema has not lessened or changed.  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  Continue Folgard  Heterozygous MTHFR mutation  CKD, stage III.  I have requested for his labs from [facility]  Elevated Factor VIII level: Factor VIII reviewed with patient, elevated. Reports no signs of DVT.  No shortness of breath, swelling, changes in skin color, pain or bleeding noted.   Polycythemia: CBC reviewed with patient Normal levels for Hgb and Hct.  CBC reviewed  Thrombocytopenia: Chronic: Stable at low 100s  Melanoma on chest and scalp:  Patient has been seen by Dr. Kapoor but would like to go to [facility].  Patient has multiple scalp lesions in various shapes and sizes that are yellowish and flaky.  Has flat dime sized macule on right lower leg that is brownish in color.  Patient denies any changes in color of macule. Patient does not know how long macule has been there.  Will consult dermatology, .  Educated completed regarding use of hats and sunscreen.   Patient voiced understanding.         PLANS:  INR the pharmacist today  Follow-up Coumadin clinic  Continue Folgard  Follow-up 6 months  Fasting homocystine level at the next visit  Patient refused home  INR monitoring - is happy coming to anticoagulation clinic  All questions answered, patient verbalized understanding and is agreeable to above plan.     .             I spent 30 total minutes, face-to-face, caring for Jett today. 90% of this time involved counseling and/or coordination of care as documented within this note.       .

## 2023-07-25 ENCOUNTER — OFFICE VISIT (OUTPATIENT)
Dept: ONCOLOGY | Facility: CLINIC | Age: 73
End: 2023-07-25
Payer: MEDICARE

## 2023-07-25 ENCOUNTER — LAB (OUTPATIENT)
Dept: LAB | Facility: HOSPITAL | Age: 73
End: 2023-07-25
Payer: MEDICARE

## 2023-07-25 ENCOUNTER — ANTICOAGULATION VISIT (OUTPATIENT)
Dept: ONCOLOGY | Facility: HOSPITAL | Age: 73
End: 2023-07-25
Payer: MEDICARE

## 2023-07-25 VITALS
BODY MASS INDEX: 38.15 KG/M2 | DIASTOLIC BLOOD PRESSURE: 69 MMHG | HEART RATE: 53 BPM | SYSTOLIC BLOOD PRESSURE: 126 MMHG | HEIGHT: 65 IN | WEIGHT: 229 LBS | TEMPERATURE: 98 F | OXYGEN SATURATION: 94 % | RESPIRATION RATE: 18 BRPM

## 2023-07-25 DIAGNOSIS — I26.99 OTHER PULMONARY EMBOLISM WITHOUT ACUTE COR PULMONALE, UNSPECIFIED CHRONICITY: Chronic | ICD-10-CM

## 2023-07-25 DIAGNOSIS — I26.99 PULMONARY EMBOLISM, OTHER, UNSPECIFIED CHRONICITY, UNSPECIFIED WHETHER ACUTE COR PULMONALE PRESENT: ICD-10-CM

## 2023-07-25 DIAGNOSIS — I26.99 PULMONARY EMBOLISM, OTHER, UNSPECIFIED CHRONICITY, UNSPECIFIED WHETHER ACUTE COR PULMONALE PRESENT: Primary | ICD-10-CM

## 2023-07-25 LAB
BASOPHILS # BLD AUTO: 0.03 10*3/MM3 (ref 0–0.2)
BASOPHILS NFR BLD AUTO: 0.4 % (ref 0–1.5)
DEPRECATED RDW RBC AUTO: 45.3 FL (ref 37–54)
EOSINOPHIL # BLD AUTO: 0.18 10*3/MM3 (ref 0–0.4)
EOSINOPHIL NFR BLD AUTO: 2.6 % (ref 0.3–6.2)
ERYTHROCYTE [DISTWIDTH] IN BLOOD BY AUTOMATED COUNT: 13.7 % (ref 12.3–15.4)
HCT VFR BLD AUTO: 50.7 % (ref 37.5–51)
HGB BLD-MCNC: 17.4 G/DL (ref 13–17.7)
INR PPP: 1.5 (ref 0.8–1.2)
LYMPHOCYTES # BLD AUTO: 1.35 10*3/MM3 (ref 0.7–3.1)
LYMPHOCYTES NFR BLD AUTO: 19.1 % (ref 19.6–45.3)
MCH RBC QN AUTO: 32.2 PG (ref 26.6–33)
MCHC RBC AUTO-ENTMCNC: 34.3 G/DL (ref 31.5–35.7)
MCV RBC AUTO: 93.9 FL (ref 79–97)
MONOCYTES # BLD AUTO: 0.53 10*3/MM3 (ref 0.1–0.9)
MONOCYTES NFR BLD AUTO: 7.5 % (ref 5–12)
NEUTROPHILS NFR BLD AUTO: 4.96 10*3/MM3 (ref 1.7–7)
NEUTROPHILS NFR BLD AUTO: 70.4 % (ref 42.7–76)
PLATELET # BLD AUTO: 124 10*3/MM3 (ref 140–450)
PMV BLD AUTO: 9.8 FL (ref 6–12)
RBC # BLD AUTO: 5.4 10*6/MM3 (ref 4.14–5.8)
WBC NRBC COR # BLD: 7.05 10*3/MM3 (ref 3.4–10.8)

## 2023-07-25 PROCEDURE — 85610 PROTHROMBIN TIME: CPT

## 2023-07-25 PROCEDURE — 85025 COMPLETE CBC W/AUTO DIFF WBC: CPT

## 2023-07-25 RX ORDER — WARFARIN SODIUM 5 MG/1
TABLET ORAL
Qty: 135 TABLET | Refills: 3 | Status: SHIPPED | OUTPATIENT
Start: 2023-07-25

## 2023-07-26 NOTE — PROGRESS NOTES
Anticoagulation Clinic Progress Note    Patient's visit was held by phone today.    Anticoagulation Summary  As of 2023      INR goal:  2.0-3.0   TTR:  62.4 % (4 y)   INR used for dosin.50 (2023)   Warfarin maintenance plan:  10 mg (5 mg x 2) every Wed, Sat; 7.5 mg (5 mg x 1 and 1 mg x 2.5) all other days   Weekly warfarin total:  57.5 mg   Plan last modified:  Katie Whitlock RPH (2023)   Next INR check:  8/10/2023   Priority:  Maintenance   Target end date:               Anticoagulation Episode Summary       INR check location:  Clinic Lab    Preferred lab:      Send INR reminders to:   [facility] CBC ANTICOAG POOL    Comments:              Drug interactions: has remained unchanged.  Diet: has remained unchanged.    Clinic Interview:  No changes in medications or diet, has spent time out in the sun. No missed doses.    INR History:      Latest Ref Rng & Units 6/15/2023     9:45 AM 2023     8:55 AM 2023     9:15 AM 2023     9:23 AM 2023     9:45 AM 2023     9:34 AM 2023    11:15 AM   Anticoagulation Monitoring   INR  2.90  2.10  3.40  1.50   INR Date  6/15/2023  2023  2023  2023   INR Goal  2.0-3.0  2.0-3.0  2.0-3.0  2.0-3.0   Trend  Same  Up  Down  Up   Last Week Total  52.5 mg  52.5 mg  55 mg  52.5 mg   Next Week Total  52.5 mg  55 mg  50 mg  57.5 mg   Sun  7.5 mg  7.5 mg  7.5 mg  7.5 mg   Mon  7.5 mg  7.5 mg  7.5 mg  7.5 mg   Tue  7.5 mg  7.5 mg  7.5 mg  7.5 mg   Wed  7.5 mg  7.5 mg  7.5 mg  10 mg   Thu  7.5 mg  7.5 mg  5 mg (); Otherwise 7.5 mg  7.5 mg   Fri  7.5 mg  7.5 mg  7.5 mg  7.5 mg   Sat  7.5 mg  10 mg  7.5 mg  10 mg   Historical INR 0.80 - 1.20  2.10   3.40   1.50     Visit Report       Report Report       Plan:  1. INR is Subtherapeutic today- see above in Anticoagulation Summary.   Will instruct Jett Doe to Change their warfarin regimen- see above in Anticoagulation Summary.  2. Follow up in 2 weeks  3.They have been  instructed to call if any changes in medications, doses, concerns, etc. Patient expresses understanding and has no further questions at this time.    Katie Whitlock RPH

## 2023-08-10 ENCOUNTER — ANTICOAGULATION VISIT (OUTPATIENT)
Dept: ONCOLOGY | Facility: HOSPITAL | Age: 73
End: 2023-08-10
Payer: MEDICARE

## 2023-08-10 ENCOUNTER — LAB (OUTPATIENT)
Dept: LAB | Facility: HOSPITAL | Age: 73
End: 2023-08-10
Payer: MEDICARE

## 2023-08-10 DIAGNOSIS — I26.99 PULMONARY EMBOLISM, OTHER, UNSPECIFIED CHRONICITY, UNSPECIFIED WHETHER ACUTE COR PULMONALE PRESENT: Chronic | ICD-10-CM

## 2023-08-10 LAB — INR PPP: 1.9 (ref 0.8–1.2)

## 2023-08-10 PROCEDURE — 36416 COLLJ CAPILLARY BLOOD SPEC: CPT

## 2023-08-10 PROCEDURE — 85610 PROTHROMBIN TIME: CPT

## 2023-08-10 NOTE — PROGRESS NOTES
Anticoagulation Clinic Progress Note    Patient's visit was held by phone today.    Anticoagulation Summary  As of 8/10/2023      INR goal:  2.0-3.0   TTR:  61.7 % (4.1 y)   INR used for dosin.90 (8/10/2023)   Warfarin maintenance plan:  10 mg (5 mg x 2) every Sat; 7.5 mg (5 mg x 1 and 1 mg x 2.5) all other days   Weekly warfarin total:  55 mg   Plan last modified:  Ronna Perry RPH (8/10/2023)   Next INR check:  2023   Priority:  Maintenance   Target end date:               Anticoagulation Episode Summary       INR check location:  Clinic Lab    Preferred lab:      Send INR reminders to:   [facility] CBC ANTICOAG POOL    Comments:              Drug interactions: has remained unchanged.  Diet: has remained unchanged.    Clinic Interview:  He took warfarin 7.5mg daily as he did not write down updated instructions last check.  He wrote down new dosing today.    INR History:      Latest Ref Rng & Units 2023     9:15 AM 2023     9:23 AM 2023     9:45 AM 2023     9:34 AM 2023    11:15 AM 8/10/2023     9:04 AM 8/10/2023     9:15 AM   Anticoagulation Monitoring   INR  2.10  3.40  1.50  1.90   INR Date  2023  2023  2023  8/10/2023   INR Goal  2.0-3.0  2.0-3.0  2.0-3.0  2.0-3.0   Trend  Up  Down  Up  Down   Last Week Total  52.5 mg  55 mg  52.5 mg  52.5 mg   Next Week Total  55 mg  50 mg  57.5 mg  55 mg   Sun  7.5 mg  7.5 mg  7.5 mg  7.5 mg   Mon  7.5 mg  7.5 mg  7.5 mg  7.5 mg   Tue  7.5 mg  7.5 mg  7.5 mg  7.5 mg   Wed  7.5 mg  7.5 mg  10 mg  7.5 mg   Thu  7.5 mg  5 mg (); Otherwise 7.5 mg  7.5 mg  7.5 mg   Fri  7.5 mg  7.5 mg  7.5 mg  7.5 mg   Sat  10 mg  7.5 mg  10 mg  10 mg   Historical INR 0.80 - 1.20  3.40   1.50   1.90     Visit Report     Report Report         Plan:  1. INR is Subtherapeutic today- see above in Anticoagulation Summary.   Will instruct Jett Doe to Increase their warfarin regimen to 10mg on Sat and 7.5mg on all other days-- see above in  Anticoagulation Summary.  2. Follow up in 2 weeks  3.They have been instructed to call if any changes in medications, doses, concerns, etc. Patient expresses understanding and has no further questions at this time.    Ronna Perry RPH

## 2023-08-24 ENCOUNTER — LAB (OUTPATIENT)
Dept: LAB | Facility: HOSPITAL | Age: 73
End: 2023-08-24
Payer: MEDICARE

## 2023-08-24 ENCOUNTER — ANTICOAGULATION VISIT (OUTPATIENT)
Dept: ONCOLOGY | Facility: HOSPITAL | Age: 73
End: 2023-08-24
Payer: MEDICARE

## 2023-08-24 DIAGNOSIS — I26.99 PULMONARY EMBOLISM, OTHER, UNSPECIFIED CHRONICITY, UNSPECIFIED WHETHER ACUTE COR PULMONALE PRESENT: Chronic | ICD-10-CM

## 2023-08-24 LAB — INR PPP: 3.7 (ref 0.8–1.2)

## 2023-08-24 PROCEDURE — 36416 COLLJ CAPILLARY BLOOD SPEC: CPT

## 2023-08-24 PROCEDURE — 85610 PROTHROMBIN TIME: CPT

## 2023-08-24 NOTE — PROGRESS NOTES
Anticoagulation Clinic Progress Note    Patient's visit was held by phone today.    Anticoagulation Summary  As of 8/24/2023      INR goal:  2.0-3.0   TTR:  61.7 % (4.1 y)   INR used for dosing:  3.70 (8/24/2023)   Warfarin maintenance plan:  7.5 mg (5 mg x 1 and 1 mg x 2.5) every day   Weekly warfarin total:  52.5 mg   Plan last modified:  Ronna Perry RPH (8/24/2023)   Next INR check:  9/7/2023   Priority:  Maintenance   Target end date:               Anticoagulation Episode Summary       INR check location:  Clinic Lab    Preferred lab:      Send INR reminders to:   [facility] CBC ANTICOAG POOL    Comments:              Drug interactions: has remained unchanged.  Diet: has remained unchanged.    Clinic Interview:  No pertinent clinical findings have been reported.  No good reason for variation in INR recently. No bleeding issues    INR History:      Latest Ref Rng & Units 7/13/2023     9:45 AM 7/25/2023     9:34 AM 7/25/2023    11:15 AM 8/10/2023     9:04 AM 8/10/2023     9:15 AM 8/24/2023     9:27 AM 8/24/2023    10:15 AM   Anticoagulation Monitoring   INR  3.40  1.50  1.90  3.70   INR Date  7/13/2023  7/25/2023  8/10/2023  8/24/2023   INR Goal  2.0-3.0  2.0-3.0  2.0-3.0  2.0-3.0   Trend  Down  Up  Down  Down   Last Week Total  55 mg  52.5 mg  52.5 mg  55 mg   Next Week Total  50 mg  57.5 mg  55 mg  50 mg   Sun  7.5 mg  7.5 mg  7.5 mg  7.5 mg   Mon  7.5 mg  7.5 mg  7.5 mg  7.5 mg   Tue  7.5 mg  7.5 mg  7.5 mg  7.5 mg   Wed  7.5 mg  10 mg  7.5 mg  7.5 mg   Thu  5 mg (7/13); Otherwise 7.5 mg  7.5 mg  7.5 mg  5 mg (8/24); Otherwise 7.5 mg   Fri  7.5 mg  7.5 mg  7.5 mg  7.5 mg   Sat  7.5 mg  10 mg  10 mg  7.5 mg   Historical INR 0.80 - 1.20  1.50   1.90   3.70     Visit Report   Report Report           Plan:  1. INR is Supratherapeutic today- see above in Anticoagulation Summary.   Will instruct Jett Doe to take 5mg today only, then Change their warfarin regimen back to 7.5mg daily- see above in  Anticoagulation Summary.  2. Follow up in 2 weeks  3.They have been instructed to call if any changes in medications, doses, concerns, etc. Patient expresses understanding and has no further questions at this time.    Ronna Perry RPH

## 2023-09-07 ENCOUNTER — LAB (OUTPATIENT)
Dept: LAB | Facility: HOSPITAL | Age: 73
End: 2023-09-07
Payer: MEDICARE

## 2023-09-07 ENCOUNTER — ANTICOAGULATION VISIT (OUTPATIENT)
Dept: ONCOLOGY | Facility: HOSPITAL | Age: 73
End: 2023-09-07
Payer: MEDICARE

## 2023-09-07 DIAGNOSIS — I82.501 CHRONIC DEEP VEIN THROMBOSIS (DVT) OF RIGHT LOWER EXTREMITY, UNSPECIFIED VEIN: ICD-10-CM

## 2023-09-07 DIAGNOSIS — I26.99 PULMONARY EMBOLISM, OTHER, UNSPECIFIED CHRONICITY, UNSPECIFIED WHETHER ACUTE COR PULMONALE PRESENT: Chronic | ICD-10-CM

## 2023-09-07 DIAGNOSIS — Z79.01 LONG TERM (CURRENT) USE OF ANTICOAGULANTS: ICD-10-CM

## 2023-09-07 LAB — INR PPP: 2.5 (ref 0.8–1.2)

## 2023-09-07 PROCEDURE — 36416 COLLJ CAPILLARY BLOOD SPEC: CPT

## 2023-09-07 PROCEDURE — 85610 PROTHROMBIN TIME: CPT

## 2023-09-07 NOTE — PROGRESS NOTES
Anticoagulation Clinic Progress Note    Patient's visit was held by phone today.    Anticoagulation Summary  As of 2023      INR goal:  2.0-3.0   TTR:  61.5 % (4.1 y)   INR used for dosin.50 (2023)   Warfarin maintenance plan:  7.5 mg (5 mg x 1 and 1 mg x 2.5) every day   Weekly warfarin total:  52.5 mg   No change documented:  Ronna Perry RPH   Plan last modified:  Ronna Perry RPH (2023)   Next INR check:  10/5/2023   Priority:  Maintenance   Target end date:               Anticoagulation Episode Summary       INR check location:  Clinic Lab    Preferred lab:      Send INR reminders to:   [facility] CBC ANTICOAG POOL    Comments:              Drug interactions: has remained unchanged.  Diet: has remained unchanged.    Clinic Interview:  No pertinent clinical findings have been reported.    INR History:      Latest Ref Rng & Units 2023    11:15 AM 8/10/2023     9:04 AM 8/10/2023     9:15 AM 2023     9:27 AM 2023    10:15 AM 2023     9:34 AM 2023     9:45 AM   Anticoagulation Monitoring   INR  1.50  1.90  3.70  2.50   INR Date  2023  8/10/2023  2023  2023   INR Goal  2.0-3.0  2.0-3.0  2.0-3.0  2.0-3.0   Trend  Up  Down  Down  Same   Last Week Total  52.5 mg  52.5 mg  55 mg  52.5 mg   Next Week Total  57.5 mg  55 mg  50 mg  52.5 mg   Sun  7.5 mg  7.5 mg  7.5 mg  7.5 mg   Mon  7.5 mg  7.5 mg  7.5 mg  7.5 mg   Tue  7.5 mg  7.5 mg  7.5 mg  7.5 mg   Wed  10 mg  7.5 mg  7.5 mg  7.5 mg   Thu  7.5 mg  7.5 mg  5 mg (); Otherwise 7.5 mg  7.5 mg   Fri  7.5 mg  7.5 mg  7.5 mg  7.5 mg   Sat  10 mg  10 mg  7.5 mg  7.5 mg   Historical INR 0.80 - 1.20  1.90   3.70   2.50     Visit Report  Report             Plan:  1. INR is Therapeutic today- see above in Anticoagulation Summary.   Will instruct Jett Doe to Continue their warfarin regimen- see above in Anticoagulation Summary.  2. Follow up in 4 weeks  3.They have been instructed to call if any changes in  medications, doses, concerns, etc. Patient expresses understanding and has no further questions at this time.    Ronna Perry [facility]

## 2023-10-05 ENCOUNTER — ANTICOAGULATION VISIT (OUTPATIENT)
Dept: ONCOLOGY | Facility: HOSPITAL | Age: 73
End: 2023-10-05
Payer: MEDICARE

## 2023-10-05 ENCOUNTER — LAB (OUTPATIENT)
Dept: LAB | Facility: HOSPITAL | Age: 73
End: 2023-10-05
Payer: MEDICARE

## 2023-10-05 DIAGNOSIS — I26.99 PULMONARY EMBOLISM, OTHER, UNSPECIFIED CHRONICITY, UNSPECIFIED WHETHER ACUTE COR PULMONALE PRESENT: Chronic | ICD-10-CM

## 2023-10-05 LAB — INR PPP: 1.7 (ref 0.8–1.2)

## 2023-10-05 PROCEDURE — 36416 COLLJ CAPILLARY BLOOD SPEC: CPT

## 2023-10-05 PROCEDURE — 85610 PROTHROMBIN TIME: CPT

## 2023-10-05 NOTE — PROGRESS NOTES
Anticoagulation Clinic Progress Note    Patient's visit was held by phone today.    Anticoagulation Summary  As of 10/5/2023      INR goal:  2.0-3.0   TTR:  61.5 % (4.2 y)   INR used for dosin.70 (10/5/2023)   Warfarin maintenance plan:  7.5 mg (5 mg x 1 and 1 mg x 2.5) every day   Weekly warfarin total:  52.5 mg   Plan last modified:  Ronna Perry RPH (2023)   Next INR check:  10/19/2023   Priority:  Maintenance   Target end date:               Anticoagulation Episode Summary       INR check location:  Clinic Lab    Preferred lab:      Send INR reminders to:   [facility] CBC ANTICOAG POOL    Comments:              Drug interactions: has remained unchanged.  Diet: has remained unchanged.    Clinic Interview:  Patient reports no changes that would explain variances in INR.     INR History:      Latest Ref Rng & Units 8/10/2023     9:15 AM 2023     9:27 AM 2023    10:15 AM 2023     9:34 AM 2023     9:45 AM 10/5/2023     9:34 AM 10/5/2023     9:45 AM   Anticoagulation Monitoring   INR  1.90  3.70  2.50  1.70   INR Date  8/10/2023  2023  2023  10/5/2023   INR Goal  2.0-3.0  2.0-3.0  2.0-3.0  2.0-3.0   Trend  Down  Down  Same  Same   Last Week Total  52.5 mg  55 mg  52.5 mg  52.5 mg   Next Week Total  55 mg  50 mg  52.5 mg  55 mg   Sun  7.5 mg  7.5 mg  7.5 mg  7.5 mg   Mon  7.5 mg  7.5 mg  7.5 mg  7.5 mg   Tue  7.5 mg  7.5 mg  7.5 mg  7.5 mg   Wed  7.5 mg  7.5 mg  7.5 mg  7.5 mg   Thu  7.5 mg  5 mg (); Otherwise 7.5 mg  7.5 mg  10 mg (10/5); Otherwise 7.5 mg   Fri  7.5 mg  7.5 mg  7.5 mg  7.5 mg   Sat  10 mg  7.5 mg  7.5 mg  7.5 mg   Historical INR 0.80 - 1.20  3.70   2.50   1.70         Plan:  1. INR is Subtherapeutic today- see above in Anticoagulation Summary.   Will instruct Jett Doe to take 10 mg today only and then resume 7.5 mg daily dosing.    2. Follow up in 2 weeks  3.They have been instructed to call if any changes in medications, doses, concerns, etc. Patient  expresses understanding and has no further questions at this time.    Katie Whitlock, RPH

## 2023-10-19 ENCOUNTER — LAB (OUTPATIENT)
Dept: LAB | Facility: HOSPITAL | Age: 73
End: 2023-10-19
Payer: MEDICARE

## 2023-10-19 ENCOUNTER — HOSPITAL ENCOUNTER (OUTPATIENT)
Dept: ONCOLOGY | Facility: HOSPITAL | Age: 73
Discharge: HOME | End: 2023-10-19
Payer: MEDICARE

## 2023-10-19 DIAGNOSIS — Z79.01 LONG TERM (CURRENT) USE OF ANTICOAGULANTS: ICD-10-CM

## 2023-10-19 DIAGNOSIS — I26.99 PULMONARY EMBOLISM, OTHER, UNSPECIFIED CHRONICITY, UNSPECIFIED WHETHER ACUTE COR PULMONALE PRESENT: Chronic | ICD-10-CM

## 2023-10-19 LAB — INR PPP: 2.7 (ref 0.8–1.2)

## 2023-10-19 PROCEDURE — 85610 PROTHROMBIN TIME: CPT

## 2023-10-19 PROCEDURE — 36416 COLLJ CAPILLARY BLOOD SPEC: CPT

## 2023-10-19 RX ORDER — WARFARIN SODIUM 1 MG/1
TABLET ORAL
Qty: 225 TABLET | Refills: 1 | Status: SHIPPED | OUTPATIENT
Start: 2023-10-19

## 2023-10-19 NOTE — PROGRESS NOTES
Anticoagulation Clinic Progress Note    Patient's visit was held via telephone today.        Clinic Interview:  INR is 2.7. Patient reports no changes in diet, medications or health.     INR History:      Latest Ref Rng & Units 8/24/2023    10:15 AM 9/7/2023     9:34 AM 9/7/2023     9:45 AM 10/5/2023     9:34 AM 10/5/2023     9:45 AM 10/19/2023     8:58 AM 10/19/2023     9:03 AM   Anticoagulation Monitoring   INR  3.70  2.50  1.70 2.70    INR Date  8/24/2023  9/7/2023  10/5/2023 10/19/2023    INR Goal  2.0-3.0  2.0-3.0  2.0-3.0 2.0-3.0    Trend  Down  Same  Same Same    Last Week Total  55 mg  52.5 mg  52.5 mg 52.5 mg    Next Week Total  50 mg  52.5 mg  55 mg 52.5 mg    Sun  7.5 mg  7.5 mg  7.5 mg 7.5 mg    Mon  7.5 mg  7.5 mg  7.5 mg 7.5 mg    Tue  7.5 mg  7.5 mg  7.5 mg 7.5 mg    Wed  7.5 mg  7.5 mg  7.5 mg 7.5 mg    Thu  5 mg (8/24); Otherwise 7.5 mg  7.5 mg  10 mg (10/5); Otherwise 7.5 mg 7.5 mg    Fri  7.5 mg  7.5 mg  7.5 mg 7.5 mg    Sat  7.5 mg  7.5 mg  7.5 mg 7.5 mg    Historical INR 0.80 - 1.20  2.50   1.70    2.70        Plan:  1. INR is Therapeutic today- see above in Anticoagulation Summary.  Will instruct Jett Doe to Continue their warfarin regimen- see above in Anticoagulation Summary.  2. Follow up in 1 month  3. Patient desires warfarin refills. Sent in new Rx for 1 mg tablets to [facility] in [place].  4. Verbal information provided. Patient expresses understanding and has no further questions at this time.    Katie Whitlock [facility]

## 2023-11-14 ENCOUNTER — LAB (OUTPATIENT)
Dept: LAB | Facility: HOSPITAL | Age: 73
End: 2023-11-14
Payer: MEDICARE

## 2023-11-14 ENCOUNTER — ANTICOAGULATION VISIT (OUTPATIENT)
Dept: ONCOLOGY | Facility: HOSPITAL | Age: 73
End: 2023-11-14
Payer: MEDICARE

## 2023-11-14 DIAGNOSIS — I26.99 PULMONARY EMBOLISM, OTHER, UNSPECIFIED CHRONICITY, UNSPECIFIED WHETHER ACUTE COR PULMONALE PRESENT: Chronic | ICD-10-CM

## 2023-11-14 LAB — INR PPP: 2.7 (ref 0.8–1.2)

## 2023-11-14 PROCEDURE — G0463 HOSPITAL OUTPT CLINIC VISIT: HCPCS

## 2023-11-14 PROCEDURE — 85610 PROTHROMBIN TIME: CPT

## 2023-11-14 NOTE — PROGRESS NOTES
Anticoagulation Clinic Progress Note    Patient's visit was held in office today.    Anticoagulation Summary  As of 2023      INR goal:  2.0-3.0   TTR:  62.2% (4.3 y)   INR used for dosin.70 (2023)   Warfarin maintenance plan:  7.5 mg (5 mg x 1 and 1 mg x 2.5) every day   Weekly warfarin total:  52.5 mg   No change documented:  Katie Whitlock RPH   Plan last modified:  Ronna Perry RPH (2023)   Next INR check:  2023   Priority:  Maintenance   Target end date:               Anticoagulation Episode Summary       INR check location:  Clinic Lab    Preferred lab:      Send INR reminders to:   [facility] CBC ANTICOAG POOL    Comments:              Clinic Interview:  Patient Findings     Negatives:  Signs/symptoms of thrombosis, Signs/symptoms of bleeding,   Laboratory test error suspected, Change in health, Change in alcohol use,   Change in activity, Upcoming invasive procedure, Emergency department   visit, Upcoming dental procedure, Missed doses, Extra doses, Change in   medications, Change in diet/appetite, Hospital admission, Bruising, Other   complaints      Clinical Outcomes     Negatives:  Major bleeding event, Thromboembolic event,   Anticoagulation-related hospital admission, Anticoagulation-related ED   visit, Anticoagulation-related fatality        INR History:      Latest Ref Rng & Units 2023     9:45 AM 10/5/2023     9:34 AM 10/5/2023     9:45 AM 10/19/2023     8:58 AM 10/19/2023     9:03 AM 2023     8:59 AM 2023     9:00 AM   Anticoagulation Monitoring   INR  2.50  1.70 2.70   2.70   INR Date  2023  10/5/2023 10/19/2023   2023   INR Goal  2.0-3.0  2.0-3.0 2.0-3.0   2.0-3.0   Trend  Same  Same Same   Same   Last Week Total  52.5 mg  52.5 mg 52.5 mg   52.5 mg   Next Week Total  52.5 mg  55 mg 52.5 mg   52.5 mg   Sun  7.5 mg  7.5 mg 7.5 mg   7.5 mg   Mon  7.5 mg  7.5 mg 7.5 mg   7.5 mg   Tue  7.5 mg  7.5 mg 7.5 mg   7.5 mg   Wed  7.5 mg  7.5 mg 7.5 mg    7.5 mg   Thu  7.5 mg  10 mg (10/5); Otherwise 7.5 mg 7.5 mg   7.5 mg   Fri  7.5 mg  7.5 mg 7.5 mg   7.5 mg   Sat  7.5 mg  7.5 mg 7.5 mg   7.5 mg   Historical INR 0.80 - 1.20  1.70    2.70  2.70         Plan:  1. INR is Therapeutic today- discussed how he remembers to take his medication every day. States he uses a phone reminder and it seems to work well for him.   Will instruct Jett Doe to Continue their warfarin regimen- see above in Anticoagulation Summary. Be mindful of cranberry consumption during the holidays as it can raise INR.   2. Follow up in 1 month  3. Patient declines warfarin refills.  4. Verbal and written information provided. Patient expresses understanding and has no further questions at this time.    Katie Whitlock [facility]

## 2023-12-12 ENCOUNTER — ANTICOAGULATION VISIT (OUTPATIENT)
Dept: ONCOLOGY | Facility: HOSPITAL | Age: 73
End: 2023-12-12
Payer: MEDICARE

## 2023-12-12 ENCOUNTER — LAB (OUTPATIENT)
Dept: LAB | Facility: HOSPITAL | Age: 73
End: 2023-12-12
Payer: MEDICARE

## 2023-12-12 DIAGNOSIS — Z79.01 LONG TERM (CURRENT) USE OF ANTICOAGULANTS: ICD-10-CM

## 2023-12-12 DIAGNOSIS — I26.99 PULMONARY EMBOLISM, OTHER, UNSPECIFIED CHRONICITY, UNSPECIFIED WHETHER ACUTE COR PULMONALE PRESENT: Chronic | ICD-10-CM

## 2023-12-12 DIAGNOSIS — I82.501 CHRONIC DEEP VEIN THROMBOSIS (DVT) OF RIGHT LOWER EXTREMITY, UNSPECIFIED VEIN: ICD-10-CM

## 2023-12-12 LAB — INR PPP: 1.8 (ref 0.8–1.2)

## 2023-12-12 PROCEDURE — 36416 COLLJ CAPILLARY BLOOD SPEC: CPT

## 2023-12-12 PROCEDURE — 85610 PROTHROMBIN TIME: CPT

## 2023-12-12 PROCEDURE — G0463 HOSPITAL OUTPT CLINIC VISIT: HCPCS

## 2023-12-12 NOTE — PROGRESS NOTES
Anticoagulation Clinic Progress Note    Patient's visit was held in office today.    Anticoagulation Summary  As of 2023      INR goal:  2.0-3.0   TTR:  62.5% (4.4 y)   INR used for dosin.80 (2023)   Warfarin maintenance plan:  7.5 mg (5 mg x 1 and 1 mg x 2.5) every day   Weekly warfarin total:  52.5 mg   No change documented:  Katie Whitlock RPH   Plan last modified:  Ronna Perry RPH (2023)   Next INR check:  2024   Priority:  Maintenance   Target end date:               Anticoagulation Episode Summary       INR check location:  Clinic Lab    Preferred lab:      Send INR reminders to:   [facility] CBC ANTICOAG POOL    Comments:              Clinic Interview:  Patient Findings     Positives:  Missed doses    Negatives:  Signs/symptoms of thrombosis, Signs/symptoms of bleeding,   Laboratory test error suspected, Change in health, Change in alcohol use,   Change in activity, Upcoming invasive procedure, Emergency department   visit, Upcoming dental procedure, Extra doses, Change in medications,   Change in diet/appetite, Hospital admission, Bruising, Other complaints      Clinical Outcomes     Negatives:  Major bleeding event, Thromboembolic event,   Anticoagulation-related hospital admission, Anticoagulation-related ED   visit, Anticoagulation-related fatality        INR History:      Latest Ref Rng & Units 10/5/2023     9:45 AM 10/19/2023     8:58 AM 10/19/2023     9:03 AM 2023     8:59 AM 2023     9:00 AM 2023     8:59 AM 2023     9:00 AM   Anticoagulation Monitoring   INR  1.70 2.70   2.70  1.80   INR Date  10/5/2023 10/19/2023   2023  2023   INR Goal  2.0-3.0 2.0-3.0   2.0-3.0  2.0-3.0   Trend  Same Same   Same  Same   Last Week Total  52.5 mg 52.5 mg   52.5 mg  52.5 mg   Next Week Total  55 mg 52.5 mg   52.5 mg  52.5 mg   Sun  7.5 mg 7.5 mg   7.5 mg  7.5 mg   Mon  7.5 mg 7.5 mg   7.5 mg  7.5 mg   Tue  7.5 mg 7.5 mg   7.5 mg  7.5 mg   Wed  7.5 mg  7.5 mg   7.5 mg  7.5 mg   Thu  10 mg (10/5); Otherwise 7.5 mg 7.5 mg   7.5 mg  7.5 mg   Fri  7.5 mg 7.5 mg   7.5 mg  7.5 mg   Sat  7.5 mg 7.5 mg   7.5 mg  7.5 mg   Historical INR 0.80 - 1.20   2.70  2.70   1.80         Plan:  1. INR is slightly Subtherapeutic today- however patient reports missed dose late last week. Discussed compliance and how he usually remembers to take pills. He states he has a reminder on his phone, however sometimes he is not at home to take his medication at that time.   Will instruct Jett Doe to Continue their warfarin regimen- see above in Anticoagulation Summary.  2. Follow up in 1 month  3. Patient declines warfarin refills.  4. Verbal and written information provided. Patient expresses understanding and has no further questions at this time.    Katie Whitlock [facility]

## 2024-01-08 RX ORDER — CYANOCOBALAMIN/FOLIC AC/VIT B6 1-2.2-25MG
1 TABLET ORAL DAILY
Qty: 90 TABLET | Refills: 3 | Status: SHIPPED | OUTPATIENT
Start: 2024-01-08

## 2024-01-25 ENCOUNTER — OFFICE VISIT (OUTPATIENT)
Dept: ONCOLOGY | Facility: CLINIC | Age: 74
End: 2024-01-25
Payer: MEDICARE

## 2024-01-25 ENCOUNTER — ANTICOAGULATION VISIT (OUTPATIENT)
Dept: ONCOLOGY | Facility: HOSPITAL | Age: 74
End: 2024-01-25
Payer: MEDICARE

## 2024-01-25 ENCOUNTER — LAB (OUTPATIENT)
Dept: LAB | Facility: HOSPITAL | Age: 74
End: 2024-01-25
Payer: MEDICARE

## 2024-01-25 VITALS
HEART RATE: 61 BPM | TEMPERATURE: 97.8 F | HEIGHT: 60 IN | BODY MASS INDEX: 45.9 KG/M2 | SYSTOLIC BLOOD PRESSURE: 120 MMHG | WEIGHT: 233.8 LBS | DIASTOLIC BLOOD PRESSURE: 76 MMHG | OXYGEN SATURATION: 93 %

## 2024-01-25 DIAGNOSIS — Z86.718 PERSONAL HISTORY OF OTHER VENOUS THROMBOSIS AND EMBOLISM: ICD-10-CM

## 2024-01-25 DIAGNOSIS — L98.9 SKIN LESION OF SCALP: ICD-10-CM

## 2024-01-25 DIAGNOSIS — D03.59 MELANOMA IN SITU OF OTHER PART OF TRUNK: ICD-10-CM

## 2024-01-25 DIAGNOSIS — Z79.01 LONG TERM (CURRENT) USE OF ANTICOAGULANTS: ICD-10-CM

## 2024-01-25 DIAGNOSIS — I82.501 CHRONIC DEEP VEIN THROMBOSIS (DVT) OF RIGHT LOWER EXTREMITY, UNSPECIFIED VEIN: ICD-10-CM

## 2024-01-25 DIAGNOSIS — I26.99 PULMONARY EMBOLISM, OTHER, UNSPECIFIED CHRONICITY, UNSPECIFIED WHETHER ACUTE COR PULMONALE PRESENT: Primary | ICD-10-CM

## 2024-01-25 DIAGNOSIS — I26.99 PULMONARY EMBOLISM, OTHER, UNSPECIFIED CHRONICITY, UNSPECIFIED WHETHER ACUTE COR PULMONALE PRESENT: Chronic | ICD-10-CM

## 2024-01-25 DIAGNOSIS — Z15.89 HETEROZYGOUS MTHFR MUTATION C677T: ICD-10-CM

## 2024-01-25 LAB
BASOPHILS # BLD AUTO: 0.02 10*3/MM3 (ref 0–0.2)
BASOPHILS NFR BLD AUTO: 0.2 % (ref 0–1.5)
DEPRECATED RDW RBC AUTO: 45.3 FL (ref 37–54)
EOSINOPHIL # BLD AUTO: 0.17 10*3/MM3 (ref 0–0.4)
EOSINOPHIL NFR BLD AUTO: 2 % (ref 0.3–6.2)
ERYTHROCYTE [DISTWIDTH] IN BLOOD BY AUTOMATED COUNT: 13.8 % (ref 12.3–15.4)
HCT VFR BLD AUTO: 51.5 % (ref 37.5–51)
HGB BLD-MCNC: 17.7 G/DL (ref 13–17.7)
INR PPP: 2.4 (ref 0.8–1.2)
LYMPHOCYTES # BLD AUTO: 1.59 10*3/MM3 (ref 0.7–3.1)
LYMPHOCYTES NFR BLD AUTO: 18.5 % (ref 19.6–45.3)
MCH RBC QN AUTO: 31.7 PG (ref 26.6–33)
MCHC RBC AUTO-ENTMCNC: 34.4 G/DL (ref 31.5–35.7)
MCV RBC AUTO: 92.1 FL (ref 79–97)
MONOCYTES # BLD AUTO: 0.81 10*3/MM3 (ref 0.1–0.9)
MONOCYTES NFR BLD AUTO: 9.4 % (ref 5–12)
NEUTROPHILS NFR BLD AUTO: 6 10*3/MM3 (ref 1.7–7)
NEUTROPHILS NFR BLD AUTO: 69.9 % (ref 42.7–76)
PLATELET # BLD AUTO: 122 10*3/MM3 (ref 140–450)
PMV BLD AUTO: 10.4 FL (ref 6–12)
RBC # BLD AUTO: 5.59 10*6/MM3 (ref 4.14–5.8)
WBC NRBC COR # BLD AUTO: 8.59 10*3/MM3 (ref 3.4–10.8)

## 2024-01-25 PROCEDURE — G0463 HOSPITAL OUTPT CLINIC VISIT: HCPCS

## 2024-01-25 PROCEDURE — 85610 PROTHROMBIN TIME: CPT

## 2024-01-25 PROCEDURE — 36416 COLLJ CAPILLARY BLOOD SPEC: CPT

## 2024-01-25 PROCEDURE — 85025 COMPLETE CBC W/AUTO DIFF WBC: CPT | Performed by: NURSE PRACTITIONER

## 2024-01-25 PROCEDURE — 36415 COLL VENOUS BLD VENIPUNCTURE: CPT | Performed by: NURSE PRACTITIONER

## 2024-01-25 NOTE — PROGRESS NOTES
Anticoagulation Clinic Progress Note    Patient's visit was held in office today.    Anticoagulation Summary  As of 2024      INR goal:  2.0-3.0   TTR:  62.6% (4.5 y)   INR used for dosin.40 (2024)   Warfarin maintenance plan:  7.5 mg (5 mg x 1 and 1 mg x 2.5) every day   Weekly warfarin total:  52.5 mg   No change documented:  Katie Whitlock RPH   Plan last modified:  Ronna Perry RPH (2023)   Next INR check:  2024   Priority:  Maintenance   Target end date:               Anticoagulation Episode Summary       INR check location:  Clinic Lab    Preferred lab:      Send INR reminders to:   [facility] CBC ANTICOAG POOL    Comments:              Clinic Interview:  Patient Findings     Negatives:  Signs/symptoms of thrombosis, Signs/symptoms of bleeding,   Laboratory test error suspected, Change in health, Change in alcohol use,   Change in activity, Upcoming invasive procedure, Emergency department   visit, Upcoming dental procedure, Missed doses, Extra doses, Change in   medications, Change in diet/appetite, Hospital admission, Bruising, Other   complaints      Clinical Outcomes     Negatives:  Major bleeding event, Thromboembolic event,   Anticoagulation-related hospital admission, Anticoagulation-related ED   visit, Anticoagulation-related fatality        INR History:      Latest Ref Rng & Units 10/19/2023     9:03 AM 2023     8:59 AM 2023     9:00 AM 2023     8:59 AM 2023     9:00 AM 2024     9:34 AM 2024    10:00 AM   Anticoagulation Monitoring   INR    2.70  1.80  2.40   INR Date    2023   INR Goal    2.0-3.0  2.0-3.0  2.0-3.0   Trend    Same  Same  Same   Last Week Total    52.5 mg  52.5 mg  52.5 mg   Next Week Total    52.5 mg  52.5 mg  52.5 mg   Sun    7.5 mg  7.5 mg  7.5 mg   Mon    7.5 mg  7.5 mg  7.5 mg   Tue    7.5 mg  7.5 mg  7.5 mg   Wed    7.5 mg  7.5 mg  7.5 mg   Thu    7.5 mg  7.5 mg  7.5 mg   Fri    7.5 mg   7.5 mg  7.5 mg   Sat    7.5 mg  7.5 mg  7.5 mg   Historical INR 0.80 - 1.20 2.70  2.70   1.80   2.40     Visit Report       Report Report       Plan:  1. INR is Therapeutic today- see above in Anticoagulation Summary.  Will instruct Jett Doe to Continue their warfarin regimen- see above in Anticoagulation Summary.  2. Follow up in 1 month  3. Patient declines warfarin refills.  4. Verbal and written information provided. Patient expresses understanding and has no further questions at this time.    Katie Whitlock [facility]

## 2024-02-07 RX ORDER — FOLIC ACID-PYRIDOXINE-CYANOCOBALAMIN TAB 2.5-25-2 MG 2.5-25-2 MG
1 TAB ORAL DAILY
Qty: 30 TABLET | Refills: 5 | Status: SHIPPED | OUTPATIENT
Start: 2024-02-07 | End: 2024-03-08

## 2024-02-07 NOTE — PROGRESS NOTES
Patient called stating per [facility] FOLGARD is no longer available from the  and wanted to know if something else could be called in.     Called [facility] to see if FOLBIC is available since both FOLGARD and VIRTGARD have been discontinued. They did state that it was showing available from their distributor. FOLBIC is very similar to FOLGARD, but contains 2 mg of B12 in it instead of 1 mg.     Spoke with SOLOMON Magdaleno and she gave Ok switch prescription to the FOLBIC. Sent in new Rx to [facility] and left VM with patient informing him of new prescription.     Katie Whitlock, PharmD, BCPS  15:12 EST

## 2024-02-29 ENCOUNTER — ANTICOAGULATION VISIT (OUTPATIENT)
Dept: PHARMACY | Facility: HOSPITAL | Age: 74
End: 2024-02-29
Payer: MEDICARE

## 2024-02-29 ENCOUNTER — LAB (OUTPATIENT)
Dept: LAB | Facility: HOSPITAL | Age: 74
End: 2024-02-29
Payer: MEDICARE

## 2024-02-29 DIAGNOSIS — I26.99 PULMONARY EMBOLISM, OTHER, UNSPECIFIED CHRONICITY, UNSPECIFIED WHETHER ACUTE COR PULMONALE PRESENT: Chronic | ICD-10-CM

## 2024-02-29 DIAGNOSIS — Z79.01 LONG TERM (CURRENT) USE OF ANTICOAGULANTS: ICD-10-CM

## 2024-02-29 LAB — INR PPP: 1.8 (ref 0.8–1.2)

## 2024-02-29 PROCEDURE — 85610 PROTHROMBIN TIME: CPT

## 2024-02-29 RX ORDER — WARFARIN SODIUM 1 MG/1
TABLET ORAL
Qty: 225 TABLET | Refills: 1 | Status: SHIPPED | OUTPATIENT
Start: 2024-02-29

## 2024-02-29 NOTE — PROGRESS NOTES
Anticoagulation Clinic Progress Note    Patient's visit was held via telephone today.    Anticoagulation Summary  As of 2024      INR goal:  2.0-3.0   TTR:  62.7% (4.6 y)   INR used for dosin.80 (2024)   Warfarin maintenance plan:  7.5 mg (5 mg x 1 and 1 mg x 2.5) every day   Weekly warfarin total:  52.5 mg   No change documented:  Katie Whitlock RPH   Plan last modified:  Ronna Perry RPH (2023)   Next INR check:  3/28/2024   Priority:  Maintenance   Target end date:               Anticoagulation Episode Summary       INR check location:  Clinic Lab    Preferred lab:      Send INR reminders to:   [facility] CBC ANTICOAG POOL    Comments:              Clinic Interview:  Patient Findings     Positives:  Change in diet/appetite    Negatives:  Signs/symptoms of thrombosis, Signs/symptoms of bleeding,   Laboratory test error suspected, Change in health, Change in alcohol use,   Change in activity, Upcoming invasive procedure, Emergency department   visit, Upcoming dental procedure, Missed doses, Extra doses, Change in   medications, Hospital admission, Bruising, Other complaints      Clinical Outcomes     Negatives:  Major bleeding event, Thromboembolic event,   Anticoagulation-related hospital admission, Anticoagulation-related ED   visit, Anticoagulation-related fatality        INR History:      Latest Ref Rng & Units 2023     9:00 AM 2023     8:59 AM 2023     9:00 AM 2024     9:34 AM 2024    10:00 AM 2024     8:55 AM 2024     9:00 AM   Anticoagulation Monitoring   INR  2.70  1.80  2.40  1.80   INR Date  2023   INR Goal  2.0-3.0  2.0-3.0  2.0-3.0  2.0-3.0   Trend  Same  Same  Same  Same   Last Week Total  52.5 mg  52.5 mg  52.5 mg  52.5 mg   Next Week Total  52.5 mg  52.5 mg  52.5 mg  52.5 mg   Sun  7.5 mg  7.5 mg  7.5 mg  7.5 mg   Mon  7.5 mg  7.5 mg  7.5 mg  7.5 mg   Tue  7.5 mg  7.5 mg  7.5 mg  7.5 mg   Wed  7.5 mg   7.5 mg  7.5 mg  7.5 mg   Thu  7.5 mg  7.5 mg  7.5 mg  7.5 mg   Fri  7.5 mg  7.5 mg  7.5 mg  7.5 mg   Sat  7.5 mg  7.5 mg  7.5 mg  7.5 mg   Historical INR 0.80 - 1.20  1.80   2.40   1.80     Visit Report     Report Report         Plan:  1. INR is Subtherapeutic today- see above in Anticoagulation Summary. Patient states that he did have some coleslaw last night. Discussed how cabbage can decrease INR secondary to it's Vitamin K content. If it was something he wanted to start incorporating that we could make some dose adjustments to allow for that. Patient states that is not his plan at this time and that is an on occasion food item.   Will instruct Jett BOO Raminscott to Continue their warfarin regimen- see above in Anticoagulation Summary.  2. Follow up in 1 month  3. Patient desires warfarin refills. Sent in new Rx for 1 mg tablets to [facility].   4. Verbal and written information provided. Patient expresses understanding and has no further questions at this time.    Katie Whitlock [facility]

## 2024-03-28 ENCOUNTER — LAB (OUTPATIENT)
Dept: LAB | Facility: HOSPITAL | Age: 74
End: 2024-03-28
Payer: MEDICARE

## 2024-03-28 ENCOUNTER — ANTICOAGULATION VISIT (OUTPATIENT)
Dept: PHARMACY | Facility: HOSPITAL | Age: 74
End: 2024-03-28
Payer: MEDICARE

## 2024-03-28 DIAGNOSIS — I26.99 PULMONARY EMBOLISM, OTHER, UNSPECIFIED CHRONICITY, UNSPECIFIED WHETHER ACUTE COR PULMONALE PRESENT: Chronic | ICD-10-CM

## 2024-03-28 LAB — INR PPP: 2.4 (ref 0.8–1.2)

## 2024-03-28 PROCEDURE — 85610 PROTHROMBIN TIME: CPT

## 2024-03-28 NOTE — PROGRESS NOTES
Anticoagulation Clinic Progress Note    Patient's visit was held in office today.    Anticoagulation Summary  As of 3/28/2024      INR goal:  2.0-3.0   TTR:  62.7% (4.7 y)   INR used for dosin.40 (3/28/2024)   Warfarin maintenance plan:  7.5 mg (5 mg x 1 and 1 mg x 2.5) every day   Weekly warfarin total:  52.5 mg   No change documented:  Katie Whitlock RPH   Plan last modified:  Ronna Perry RPH (2023)   Next INR check:  2024   Priority:  Maintenance   Target end date:               Anticoagulation Episode Summary       INR check location:  Clinic Lab    Preferred lab:      Send INR reminders to:   [facility] CBC ANTICOAG POOL    Comments:              Clinic Interview:  Patient Findings     Negatives:  Signs/symptoms of thrombosis, Signs/symptoms of bleeding,   Laboratory test error suspected, Change in health, Change in alcohol use,   Change in activity, Upcoming invasive procedure, Emergency department   visit, Upcoming dental procedure, Missed doses, Extra doses, Change in   medications, Change in diet/appetite, Hospital admission, Bruising, Other   complaints      Clinical Outcomes     Negatives:  Major bleeding event, Thromboembolic event,   Anticoagulation-related hospital admission, Anticoagulation-related ED   visit, Anticoagulation-related fatality        INR History:      Latest Ref Rng & Units 2023     9:00 AM 2024     9:34 AM 2024    10:00 AM 2024     8:55 AM 2024     9:00 AM 3/28/2024     8:52 AM 3/28/2024     9:00 AM   Anticoagulation Monitoring   INR  1.80  2.40  1.80  2.40   INR Date  2023  2024  2024  3/28/2024   INR Goal  2.0-3.0  2.0-3.0  2.0-3.0  2.0-3.0   Trend  Same  Same  Same  Same   Last Week Total  52.5 mg  52.5 mg  52.5 mg  52.5 mg   Next Week Total  52.5 mg  52.5 mg  52.5 mg  52.5 mg   Sun  7.5 mg  7.5 mg  7.5 mg  7.5 mg   Mon  7.5 mg  7.5 mg  7.5 mg  7.5 mg   Tue  7.5 mg  7.5 mg  7.5 mg  7.5 mg   Wed  7.5 mg  7.5 mg  7.5 mg   7.5 mg   Thu  7.5 mg  7.5 mg  7.5 mg  7.5 mg   Fri  7.5 mg  7.5 mg  7.5 mg  7.5 mg   Sat  7.5 mg  7.5 mg  7.5 mg  7.5 mg   Historical INR 0.80 - 1.20  2.40   1.80   2.40     Visit Report   Report Report           Plan:  1. INR is Therapeutic today- see above in Anticoagulation Summary. Over all patient is doing well no issues with bleeding or bruising. States that taking his medication at the same time every day is helping him remember his doses.   Will instruct Jett Doe to Continue their warfarin regimen- see above in Anticoagulation Summary.  2. Follow up in 1 month  3. Patient declines warfarin refills.  4. Verbal and written information provided. Patient expresses understanding and has no further questions at this time.    Katie Whitlock [facility]

## 2024-04-25 ENCOUNTER — ANTICOAGULATION VISIT (OUTPATIENT)
Dept: PHARMACY | Facility: HOSPITAL | Age: 74
End: 2024-04-25
Payer: MEDICARE

## 2024-04-25 ENCOUNTER — LAB (OUTPATIENT)
Dept: LAB | Facility: HOSPITAL | Age: 74
End: 2024-04-25
Payer: MEDICARE

## 2024-04-25 DIAGNOSIS — I26.99 PULMONARY EMBOLISM, OTHER, UNSPECIFIED CHRONICITY, UNSPECIFIED WHETHER ACUTE COR PULMONALE PRESENT: Chronic | ICD-10-CM

## 2024-04-25 LAB — INR PPP: 1.2 (ref 0.8–1.2)

## 2024-04-25 PROCEDURE — 85610 PROTHROMBIN TIME: CPT

## 2024-04-25 NOTE — PROGRESS NOTES
Anticoagulation Clinic Progress Note    Patient's visit was held in office today.    Anticoagulation Summary  As of 2024      INR goal:  2.0-3.0   TTR:  62.3% (4.8 y)   INR used for dosin.20 (2024)   Warfarin maintenance plan:  7.5 mg (5 mg x 1 and 1 mg x 2.5) every day   Weekly warfarin total:  52.5 mg   No change documented:  Katie Whitlock RPH   Plan last modified:  Ronna Perry RPH (2023)   Next INR check:  2024   Priority:  Maintenance   Target end date:               Anticoagulation Episode Summary       INR check location:  Clinic Lab    Preferred lab:      Send INR reminders to:   [facility] CBC ANTICOAG POOL    Comments:              Clinic Interview:  Patient Findings     Positives:  Missed doses, Change in medications    Negatives:  Signs/symptoms of thrombosis, Signs/symptoms of bleeding,   Laboratory test error suspected, Change in health, Change in alcohol use,   Change in activity, Upcoming invasive procedure, Emergency department   visit, Upcoming dental procedure, Extra doses, Change in diet/appetite,   Hospital admission, Bruising, Other complaints      Clinical Outcomes     Negatives:  Major bleeding event, Thromboembolic event,   Anticoagulation-related hospital admission, Anticoagulation-related ED   visit, Anticoagulation-related fatality        INR History:      Latest Ref Rng & Units 2024    10:00 AM 2024     8:55 AM 2024     9:00 AM 3/28/2024     8:52 AM 3/28/2024     9:00 AM 2024     9:59 AM 2024    10:00 AM   Anticoagulation Monitoring   INR  2.40  1.80  2.40  1.20   INR Date  2024  2024  3/28/2024  2024   INR Goal  2.0-3.0  2.0-3.0  2.0-3.0  2.0-3.0   Trend  Same  Same  Same  Same   Last Week Total  52.5 mg  52.5 mg  52.5 mg  45 mg   Next Week Total  52.5 mg  52.5 mg  52.5 mg  52.5 mg   Sun  7.5 mg  7.5 mg  7.5 mg  7.5 mg   Mon  7.5 mg  7.5 mg  7.5 mg  7.5 mg   Tue  7.5 mg  7.5 mg  7.5 mg  -   Wed  7.5 mg  7.5 mg  7.5 mg  " -   Thu  7.5 mg  7.5 mg  7.5 mg  7.5 mg   Fri  7.5 mg  7.5 mg  7.5 mg  7.5 mg   Sat  7.5 mg  7.5 mg  7.5 mg  7.5 mg   Historical INR 0.80 - 1.20  1.80   2.40   1.20     Visit Report  Report             Plan:  1. INR is Subtherapeutic today- see above in Anticoagulation Summary. Per patient he missed Tuesdays dose. Also received phone call from PCP office  yesterday and they will be starting him on Allopurinol which can increase INR. Per patient he plans to begin that this evening. No other issues or complaints per patient, he states he is \"feeling good\"   Will instruct Jett Doe to Continue their warfarin regimen- see above in Anticoagulation Summary. Low INR likely secondary to missed dose. No planned boost at this time as patient is starting Allopurinol which may increase INR as well.   2. Follow up in 5 days  3. Patient declines warfarin refills.  4. Verbal and written information provided. Patient expresses understanding and has no further questions at this time.    Katie Whitlock [facility]  "

## 2024-04-30 ENCOUNTER — ANTICOAGULATION VISIT (OUTPATIENT)
Dept: PHARMACY | Facility: HOSPITAL | Age: 74
End: 2024-04-30
Payer: MEDICARE

## 2024-04-30 ENCOUNTER — LAB (OUTPATIENT)
Dept: LAB | Facility: HOSPITAL | Age: 74
End: 2024-04-30
Payer: MEDICARE

## 2024-04-30 DIAGNOSIS — I26.99 PULMONARY EMBOLISM, OTHER, UNSPECIFIED CHRONICITY, UNSPECIFIED WHETHER ACUTE COR PULMONALE PRESENT: Chronic | ICD-10-CM

## 2024-04-30 LAB — INR PPP: 2.2 (ref 0.8–1.2)

## 2024-04-30 PROCEDURE — 85610 PROTHROMBIN TIME: CPT

## 2024-04-30 NOTE — PROGRESS NOTES
Anticoagulation Clinic Progress Note    Patient's visit was held in office today.    Anticoagulation Summary  As of 2024      INR goal:  2.0-3.0   TTR:  62.1% (4.8 y)   INR used for dosin.20 (2024)   Warfarin maintenance plan:  7.5 mg (5 mg x 1 and 1 mg x 2.5) every day   Weekly warfarin total:  52.5 mg   No change documented:  Katie Whitlock RPH   Plan last modified:  Ronna Perry RPH (2023)   Next INR check:  2024   Priority:  Maintenance   Target end date:               Anticoagulation Episode Summary       INR check location:  Clinic Lab    Preferred lab:      Send INR reminders to:   [facility] CBC ANTICOAG POOL    Comments:              Clinic Interview:  Patient Findings     Negatives:  Signs/symptoms of thrombosis, Signs/symptoms of bleeding,   Laboratory test error suspected, Change in health, Change in alcohol use,   Change in activity, Upcoming invasive procedure, Emergency department   visit, Upcoming dental procedure, Missed doses, Extra doses, Change in   medications, Change in diet/appetite, Hospital admission, Bruising, Other   complaints      Clinical Outcomes     Negatives:  Major bleeding event, Thromboembolic event,   Anticoagulation-related hospital admission, Anticoagulation-related ED   visit, Anticoagulation-related fatality        INR History:      Latest Ref Rng & Units 2024     9:00 AM 3/28/2024     8:52 AM 3/28/2024     9:00 AM 2024     9:59 AM 2024    10:00 AM 2024    10:53 AM 2024    11:00 AM   Anticoagulation Monitoring   INR  1.80  2.40  1.20  2.20   INR Date  2024  3/28/2024  2024  2024   INR Goal  2.0-3.0  2.0-3.0  2.0-3.0  2.0-3.0   Trend  Same  Same  Same  Same   Last Week Total  52.5 mg  52.5 mg  45 mg  45 mg   Next Week Total  52.5 mg  52.5 mg  52.5 mg  52.5 mg   Sun  7.5 mg  7.5 mg  7.5 mg  7.5 mg   Mon  7.5 mg  7.5 mg  7.5 mg  7.5 mg   Tue  7.5 mg  7.5 mg  -  7.5 mg   Wed  7.5 mg  7.5 mg  -  7.5 mg   Thu  7.5  mg  7.5 mg  7.5 mg  7.5 mg   Fri  7.5 mg  7.5 mg  7.5 mg  7.5 mg   Sat  7.5 mg  7.5 mg  7.5 mg  7.5 mg   Historical INR 0.80 - 1.20  2.40   1.20   2.20         Plan:  1. INR is Therapeutic today- see above in Anticoagulation Summary. INR has recovered nicely and is back in range  after missed dose. No other changes or issues.   Will instruct Jett EMA Doe to Continue their warfarin regimen- see above in Anticoagulation Summary.  2. Follow up in 1 month  3. Patient declines warfarin refills.  4. Verbal and written information provided. Patient expresses understanding and has no further questions at this time.    Katie Wihtlock [facility]

## 2024-05-28 ENCOUNTER — ANTICOAGULATION VISIT (OUTPATIENT)
Dept: PHARMACY | Facility: HOSPITAL | Age: 74
End: 2024-05-28
Payer: MEDICARE

## 2024-05-28 ENCOUNTER — LAB (OUTPATIENT)
Dept: LAB | Facility: HOSPITAL | Age: 74
End: 2024-05-28
Payer: MEDICARE

## 2024-05-28 DIAGNOSIS — I26.99 PULMONARY EMBOLISM, OTHER, UNSPECIFIED CHRONICITY, UNSPECIFIED WHETHER ACUTE COR PULMONALE PRESENT: Chronic | ICD-10-CM

## 2024-05-28 LAB — INR PPP: 1.2 (ref 0.8–1.2)

## 2024-05-28 PROCEDURE — 85610 PROTHROMBIN TIME: CPT

## 2024-05-28 NOTE — PROGRESS NOTES
Anticoagulation Clinic Progress Note    Patient's visit was held in office today.    Anticoagulation Summary  As of 2024      INR goal:  2.0-3.0   TTR:  61.5% (4.9 y)   INR used for dosin.20 (2024)   Warfarin maintenance plan:  7.5 mg (5 mg x 1 and 1 mg x 2.5) every day   Weekly warfarin total:  52.5 mg   No change documented:  Katie Whitlock RPH   Plan last modified:  Ronna Perry RPH (2023)   Next INR check:  2024   Priority:  Maintenance   Target end date:               Anticoagulation Episode Summary       INR check location:  Clinic Lab    Preferred lab:      Send INR reminders to:   [facility] CBC ANTICOAG POOL    Comments:              Clinic Interview:      INR History:      Latest Ref Rng & Units 3/28/2024     9:00 AM 2024     9:59 AM 2024    10:00 AM 2024    10:53 AM 2024    11:00 AM 2024     8:52 AM 2024     9:00 AM   Anticoagulation Monitoring   INR  2.40  1.20  2.20  1.20   INR Date  3/28/2024  2024  2024  2024   INR Goal  2.0-3.0  2.0-3.0  2.0-3.0  2.0-3.0   Trend  Same  Same  Same  Same   Last Week Total  52.5 mg  45 mg  45 mg  30 mg   Next Week Total  52.5 mg  52.5 mg  52.5 mg  52.5 mg   Sun  7.5 mg  7.5 mg  7.5 mg  7.5 mg   Mon  7.5 mg  7.5 mg  7.5 mg  7.5 mg   Tue  7.5 mg  -  7.5 mg  7.5 mg   Wed  7.5 mg  -  7.5 mg  7.5 mg   Thu  7.5 mg  7.5 mg  7.5 mg  7.5 mg   Fri  7.5 mg  7.5 mg  7.5 mg  7.5 mg   Sat  7.5 mg  7.5 mg  7.5 mg  7.5 mg   Historical INR 0.80 - 1.20  1.20   2.20   1.20         Plan:  1. INR is Subtherapeutic today- see above in Anticoagulation Summary. Patient had busted blood vessel in left eye so held Warfarin x 3 days. Per patient eye is improving and not painful.   Will instruct Jett Doe to Continue their warfarin regimen- see above in Anticoagulation Summary.  2. Follow up in 2 weeks  3. Patient declines warfarin refills.  4. Verbal and written information provided. Patient expresses understanding  and has no further questions at this time.    Katie Whitlock, RPH

## 2024-06-20 ENCOUNTER — LAB (OUTPATIENT)
Dept: LAB | Facility: HOSPITAL | Age: 74
End: 2024-06-20
Payer: MEDICARE

## 2024-06-20 ENCOUNTER — ANTICOAGULATION VISIT (OUTPATIENT)
Dept: PHARMACY | Facility: HOSPITAL | Age: 74
End: 2024-06-20
Payer: MEDICARE

## 2024-06-20 DIAGNOSIS — Z79.01 LONG TERM (CURRENT) USE OF ANTICOAGULANTS: ICD-10-CM

## 2024-06-20 DIAGNOSIS — Z79.01 LONG TERM (CURRENT) USE OF ANTICOAGULANTS: Primary | ICD-10-CM

## 2024-06-20 LAB — INR PPP: 1.2 (ref 0.8–1.2)

## 2024-06-20 PROCEDURE — 85610 PROTHROMBIN TIME: CPT

## 2024-06-20 RX ORDER — WARFARIN SODIUM 1 MG/1
TABLET ORAL
Qty: 225 TABLET | Refills: 1 | Status: SHIPPED | OUTPATIENT
Start: 2024-06-20

## 2024-06-20 NOTE — PROGRESS NOTES
Anticoagulation Clinic Progress Note    Patient's visit was held in office today.    Anticoagulation Summary  As of 2024      INR goal:  2.0-3.0   TTR:  60.7% (4.9 y)   INR used for dosin.20 (2024)   Warfarin maintenance plan:  7.5 mg (5 mg x 1 and 1 mg x 2.5) every day   Weekly warfarin total:  52.5 mg   Plan last modified:  Ronna Perry RPH (2023)   Next INR check:     Priority:  Maintenance   Target end date:               Anticoagulation Episode Summary       INR check location:  Clinic Lab    Preferred lab:      Send INR reminders to:   [facility] CBC ANTICOAG POOL    Comments:              Clinic Interview:  Patient Findings     Positives:  Missed doses, Change in diet/appetite    Negatives:  Signs/symptoms of thrombosis, Signs/symptoms of bleeding,   Laboratory test error suspected, Change in health, Change in alcohol use,   Change in activity, Upcoming invasive procedure, Emergency department   visit, Upcoming dental procedure, Extra doses, Change in medications,   Hospital admission, Bruising, Other complaints      Clinical Outcomes     Negatives:  Major bleeding event, Thromboembolic event,   Anticoagulation-related hospital admission, Anticoagulation-related ED   visit, Anticoagulation-related fatality        INR History:      Latest Ref Rng & Units 2024    10:00 AM 2024    10:53 AM 2024    11:00 AM 2024     8:52 AM 2024     9:00 AM 2024     8:29 AM 2024     9:00 AM   Anticoagulation Monitoring   INR  1.20  2.20  1.20  1.20   INR Date  2024   INR Goal  2.0-3.0  2.0-3.0  2.0-3.0  2.0-3.0   Trend  Same  Same  Same  Same   Last Week Total  45 mg  45 mg  30 mg  45 mg   Next Week Total  52.5 mg  52.5 mg  52.5 mg  55 mg   Sun  7.5 mg  7.5 mg  7.5 mg  7.5 mg   Mon  7.5 mg  7.5 mg  7.5 mg  7.5 mg   Tue  -  7.5 mg  7.5 mg  7.5 mg   Wed  -  7.5 mg  7.5 mg  7.5 mg   Thu  7.5 mg  7.5 mg  7.5 mg  10 mg ()   Fri  7.5 mg   7.5 mg  7.5 mg  7.5 mg   Sat  7.5 mg  7.5 mg  7.5 mg  7.5 mg   Historical INR 0.80 - 1.20  2.20   1.20   1.20         Plan:  1. INR is Subtherapeutic today- see above in Anticoagulation Summary. Secondary to missed doses and patient stating he had to hold last week for a skin lesion removal. He also endorses eating brussels sprouts on Monday.    Patient has consistently had missed doses the past few visits. Discussed ways that may help him remember to take dose. He states he has an alarm on his phone but is often not home at that time and then forgets when he gets home. States he always remembers his morning medications but often forgets evening medications.     Discussed moving Warfarin dose to morning. Where as it is not ideal in case dose changes need to me made, it would be better for compliance which is more important.  Patient is agreeable.     Will instruct Jett Doe to boost today and then resume their warfarin regimen of 7.5 mg daily tomorrow. OK to transition to morning dosing.   2. Follow up in 5 days  3. Patient desires warfarin refills. Sent in new Rx for 1 mg tablets to [facility] in [place].  4. Verbal and written information provided. Patient expresses understanding and has no further questions at this time.    Katie Whitlock RP

## 2024-07-02 ENCOUNTER — ANTICOAGULATION VISIT (OUTPATIENT)
Dept: PHARMACY | Facility: HOSPITAL | Age: 74
End: 2024-07-02
Payer: MEDICARE

## 2024-07-02 ENCOUNTER — LAB (OUTPATIENT)
Dept: LAB | Facility: HOSPITAL | Age: 74
End: 2024-07-02
Payer: MEDICARE

## 2024-07-02 DIAGNOSIS — I26.99 OTHER PULMONARY EMBOLISM WITHOUT ACUTE COR PULMONALE, UNSPECIFIED CHRONICITY: Chronic | ICD-10-CM

## 2024-07-02 DIAGNOSIS — Z79.01 LONG TERM (CURRENT) USE OF ANTICOAGULANTS: ICD-10-CM

## 2024-07-02 LAB — INR PPP: 1.1 (ref 0.8–1.2)

## 2024-07-02 PROCEDURE — 85610 PROTHROMBIN TIME: CPT

## 2024-07-02 RX ORDER — WARFARIN SODIUM 5 MG/1
TABLET ORAL
Qty: 135 TABLET | Refills: 3 | Status: SHIPPED | OUTPATIENT
Start: 2024-07-02

## 2024-07-02 NOTE — PROGRESS NOTES
Anticoagulation Clinic Progress Note    Patient's visit was held in office today.    Anticoagulation Summary  As of 2024      INR goal:  2.0-3.0   TTR:  60.3% (5 y)   INR used for dosin.10 (2024)   Warfarin maintenance plan:  7.5 mg (5 mg x 1 and 1 mg x 2.5) every day   Weekly warfarin total:  52.5 mg   Plan last modified:  Ronna Perry RPH (2023)   Next INR check:  2024   Priority:  Maintenance   Target end date:               Anticoagulation Episode Summary       INR check location:  Clinic Lab    Preferred lab:      Send INR reminders to:   [facility] CBC ANTICOAG POOL    Comments:              Clinic Interview:  Patient Findings     Positives:  Missed doses    Negatives:  Signs/symptoms of thrombosis, Signs/symptoms of bleeding,   Laboratory test error suspected, Change in health, Change in alcohol use,   Change in activity, Upcoming invasive procedure, Emergency department   visit, Upcoming dental procedure, Extra doses, Change in medications,   Change in diet/appetite, Hospital admission, Bruising, Other complaints      Clinical Outcomes     Negatives:  Major bleeding event, Thromboembolic event,   Anticoagulation-related hospital admission, Anticoagulation-related ED   visit, Anticoagulation-related fatality        INR History:      Latest Ref Rng & Units 2024    11:00 AM 2024     8:52 AM 2024     9:00 AM 2024     8:29 AM 2024     9:00 AM 2024     8:57 AM 2024     9:00 AM   Anticoagulation Monitoring   INR  2.20  1.20  1.20  1.10   INR Date  2024   INR Goal  2.0-3.0  2.0-3.0  2.0-3.0  2.0-3.0   Trend  Same  Same  Same  Same   Last Week Total  45 mg  30 mg  45 mg  45 mg   Next Week Total  52.5 mg  52.5 mg  55 mg  57.5 mg   Sun  7.5 mg  7.5 mg  7.5 mg  7.5 mg   Mon  7.5 mg  7.5 mg  7.5 mg  7.5 mg   Tue  7.5 mg  7.5 mg  -  10 mg (); Otherwise 7.5 mg   Wed  7.5 mg  7.5 mg  -  10 mg (7/3); Otherwise 7.5 mg   Thu  7.5 mg   7.5 mg  10 mg (6/20)  7.5 mg   Fri  7.5 mg  7.5 mg  7.5 mg  7.5 mg   Sat  7.5 mg  7.5 mg  7.5 mg  7.5 mg   Historical INR 0.80 - 1.20  1.20   1.20   1.10         Plan:  1. INR is Subtherapeutic today- see above in Anticoagulation Summary.  Patient has been consistently low lately secondary to missed doses. Have discussed many options to help improve adherence and even discussed possibly taking in the morning instead of the evening if it would help. Has not transitioned to this yet, would like to continue to try and use alarm on phone as he has many medications to take in the AM.  Did also have to hold a few days last week for skin biopsy, however resumed normal dosing on Tuesday.   Will instruct Jett Doe to boost to 10 mg for the next two days, then resume 7.5  mg daily. May need daily dose increase as well, however it is hard to  with frequent missed doses.   2. Follow up in 1 week  3. Patient declines warfarin refills.  4. Verbal and written information provided. Patient expresses understanding and has no further questions at this time.    Katie Whitlock [facility]

## 2024-07-11 ENCOUNTER — LAB (OUTPATIENT)
Dept: LAB | Facility: HOSPITAL | Age: 74
End: 2024-07-11
Payer: MEDICARE

## 2024-07-11 ENCOUNTER — ANTICOAGULATION VISIT (OUTPATIENT)
Dept: PHARMACY | Facility: HOSPITAL | Age: 74
End: 2024-07-11
Payer: MEDICARE

## 2024-07-11 DIAGNOSIS — I26.99 OTHER PULMONARY EMBOLISM WITHOUT ACUTE COR PULMONALE, UNSPECIFIED CHRONICITY: Primary | ICD-10-CM

## 2024-07-11 DIAGNOSIS — Z79.01 LONG TERM (CURRENT) USE OF ANTICOAGULANTS: ICD-10-CM

## 2024-07-11 DIAGNOSIS — I26.99 OTHER PULMONARY EMBOLISM WITHOUT ACUTE COR PULMONALE, UNSPECIFIED CHRONICITY: ICD-10-CM

## 2024-07-11 LAB — INR PPP: 2.5 (ref 0.8–1.2)

## 2024-07-11 PROCEDURE — 85610 PROTHROMBIN TIME: CPT

## 2024-07-11 NOTE — PROGRESS NOTES
Anticoagulation Clinic Progress Note    Patient's visit was held in office today.    Anticoagulation Summary  As of 2024      INR goal:  2.0-3.0   TTR:  60.2% (5 y)   INR used for dosin.50 (2024)   Warfarin maintenance plan:  7.5 mg (5 mg x 1 and 1 mg x 2.5) every day   Weekly warfarin total:  52.5 mg   Plan last modified:  Ronna Perry RPH (2023)   Next INR check:     Priority:  Maintenance   Target end date:               Anticoagulation Episode Summary       INR check location:  Clinic Lab    Preferred lab:      Send INR reminders to:   [facility] CBC ANTICOAG POOL    Comments:              Clinic Interview:  Patient Findings     Negatives:  Signs/symptoms of thrombosis, Signs/symptoms of bleeding,   Laboratory test error suspected, Change in health, Change in alcohol use,   Change in activity, Upcoming invasive procedure, Emergency department   visit, Upcoming dental procedure, Missed doses, Extra doses, Change in   medications, Change in diet/appetite, Hospital admission, Bruising, Other   complaints      Clinical Outcomes     Negatives:  Major bleeding event, Thromboembolic event,   Anticoagulation-related hospital admission, Anticoagulation-related ED   visit, Anticoagulation-related fatality        INR History:      Latest Ref Rng & Units 2024     9:00 AM 2024     8:29 AM 2024     9:00 AM 2024     8:57 AM 2024     9:00 AM 2024     9:00 AM 2024     9:03 AM   Anticoagulation Monitoring   INR  1.20  1.20  1.10 2.50    INR Date  2024    INR Goal  2.0-3.0  2.0-3.0  2.0-3.0 2.0-3.0    Trend  Same  Same  Same Same    Last Week Total  30 mg  45 mg  45 mg 52.5 mg    Next Week Total  52.5 mg  55 mg  57.5 mg 52.5 mg    Sun  7.5 mg  7.5 mg  7.5 mg 7.5 mg    Mon  7.5 mg  7.5 mg  7.5 mg 7.5 mg    Tue  7.5 mg  -  10 mg (); Otherwise 7.5 mg 7.5 mg    Wed  7.5 mg  -  10 mg (7/3); Otherwise 7.5 mg 7.5 mg    Thu  7.5 mg  10 mg ()   7.5 mg 7.5 mg    Fri  7.5 mg  7.5 mg  7.5 mg 7.5 mg    Sat  7.5 mg  7.5 mg  7.5 mg 7.5 mg    Historical INR 0.80 - 1.20  1.20   1.10    2.50        Plan:  1. INR is Therapeutic today- see above in Anticoagulation Summary. Patient was able to remember to take all doses this past week. Discussed using travel pill container that can attach to key chain to help him remember doses when not at home.   Will instruct Jett Doe to Continue their warfarin regimen- see above in Anticoagulation Summary.  2. Follow up in 2 weeks- with MD appointment.   3. Patient declines warfarin refills.  4. Verbal and written information provided. Patient expresses understanding and has no further questions at this time.    Katie Whitlock [facility]

## 2024-07-22 NOTE — PROGRESS NOTES
Hematology/Oncology Outpatient Follow Up    PATIENT NAME:Jett Doe  :1950  MRN: [id number]  PRIMARY CARE PHYSICIAN: Jasmin Padilla APRN  REFERRING PHYSICIAN: No ref. provider found    Chief Complaint   Patient presents with    Appointment    Follow-up     Pulmonary embolism, other, unspecified chronicity, unspecified whether acute cor pulmonale present         HISTORY OF PRESENT ILLNESS:     Bilateral PE, right lower extremity DVT and bilateral SVTs diagnosis established in 2014 and MTHFR C677T heterozygosity diagnosis established and 2014.  The patient presented to [facility] on 2014 where was admitted through 2014 with complaints of chest pain and shortness of air.  He was seen per EMS and had associated diaphoresis as well as some low sternal chest pressure.  His troponin was elevated and cardiology was consulted.  He was started on nitroglycerin drip and heparin drip.  He was given diuresis and admitted to the CBCU.  It was planned for him to undergo a cardiac catheterization.  A VQ scan was done to evaluate for bilateral pulmonary emboli and renal is consulted for his elevated creatinine.  The VQ scan showed multiple abnormalities in both lungs with a high probably for pulmonary emboli.  His chest x-ray showed no acute cardiopulmonary abnormality.  We were consulted for his Thrombopenia and his pulmonology emboli.  His risk factors included obesity, sedentary lifestyle, history of stroke and hyperlipidemia.  He has been on Plavix and aspirin as an outpatient and when his pulmonary emboli were found, his heparin was changed to renal dust Lovenox therapy 1 mg/kg per day.  He was not started on Coumadin initially with the plan that cardiology would perform a cardiac catheterization shortly after admission.  A thrombophilia workup was performed and results were; his AT3 was 89.9% normal, protein C was 101.4% normal and protein S was 101% normal, Fibrinogen  416 normal, factor V Leiden screen is negative, prothrombin gene mutation is negative.  MTHFR showed heterozygosity for C677 T mutation, but negative for [id number] mutation.  Phosphate lipid antibodies were done.  Beta II glycoprotein is negative.  Phosphatidylserine antibody is negative.  Cardiolipin antibodies are all negative.  Bilateral lower extremity Dopplers was performed that showed a right lower extremity DVT and bilateral SVTs in his small saphenous vein.  On 08/20/2014, an IVC filter was placed with plans to hold starting his Coumadinization until after his cardiac catheterization was performed, which was going to be scheduled as an outpatient per Dr. Jurado.  It was noted that he had thrombocytopenia on admission with the platelet count of 125,000.  His platelet count slowly felt to 92,000 and it discharge with [id number].  His LFTs were noted to be abnormal with an AST of 88 and ALT of 103.  His coags were normal with PT of 11.9, INR 1.1 and PTT 35.5.  A thrombocytopenia workup was performed as seen below.  TSH of 1.3, His LFTs are normalized prior to discharge.  Nephrology was consulted for his AMADEO with a creatinine of 2.0 on admission.  Dr. Howard performed some renal studies.  His urinalysis was normal.  His renal ultrasound showed a 2 cm mass extended of the upper pole of the right kidney, which appeared to be solid with recommendations follow up with the CT or an MRI of his abdomen.  There was no hydronephrosis to indicate obstructive uropathy and the urinary bladder was unremarkable.  His creatinine improved with medication adjustments and was 1.4 and discharged with plans to perform an MRI of his afternoon when his creatinine improved to further evaluate his right renal mass.  An echocardiogram was performed that showed 45-55% ejection fraction and no other abnormalities were identified.  8/17/15 - Comprehensive metabolic panel with creatinine 1.6.  9/8/14 - patient was seen in the office as a hospital  follow-up. Homocysteine level 18.4 (H)  9/10/14 - orders written to start Folgard daily due to the elevated homocystine level.  10/7/14 - The patient reports that he has not started taking the Folgard and did not remember having a prescription called into the pharmacy.  Advised the patient again to start taking Folgard daily and gave the patient a prescription today  11/13/14 - homocystine 17.7 (H)  11/19/14 - advised patient to increase Folgard 2 twice a day.  8/17/15 - Homocysteine 16.8 (5-12).   3/8/16 - Patient does not recall taking Folgard. Prescription written for Folgard 1 p.o. q. d.  Patient claims that he has not taken Coumadin for three days as he was out of town. INR today 1.4. Instructed to resume Coumadin at same dose and follow INR protocol.   6/30/16 - Comprehensive metabolic panel with creatinine 1.3 (0.7-1.2). Serum homocysteine 12.2 (5-12).     3/7/17 - INR 2.7 on 8 mg of Warfarin daily. Patient asked to increase Folgard to twice a day.   5/5/17 - INR 2.4, therapeutic. Patient is to continue the INR protocol and continue with Folgard one tablet twice daily as well as Coumadin 8 mg daily. Homocysteine 11.2 normal (5.0-12.0).   8/4/17 - INR today is 1.6. Patient states that he missed his dose of Coumadin Monday and Tuesday of this week. He denied any diet changes. I will leave the patient on his current dose of 8 mg daily and have him return to the office for an INR in one week as he had missed two consecutive doses of Coumadin earlier. He is to continue to follow the INR protocol and he is to continue the Folgard one tablet twice daily as it is controlling his homocysteine level.   11/9/17 - Patient claims to be missing occasional Warfarin doses. Counseled.   5/22/18 - Hemoglobin 16.2, MCV 89.6, hematocrit 45.9. Serum homocysteine 14.5 (<15).    9/18/18 - Discussed possibly stopping Coumadin if workup negative. PT PTT 30.5 and 36.5 with correction of PTT on 1:1 mix with normal plasma. Lupus  anticoagulant absent. Factor VIII activity 125 (). D-dimer >0.22 (<0.45).     9/21/18 - Venous Doppler study bilateral lower extremity with chronic deep venous thrombosis involving the right popliteal vein. Chronic superficial venous thrombosis involving the right small saphenous vein and a lymph node noted in the right groin.   12/18/18 - Discussed venous Doppler results. Recommended continuation of anticoagulation either with Coumadin or maintenance DOAC’s or Aspirin alone, which I did not recommend. Patient decided to continue on Warfarin.   5/2/2019- homocystine 8.5 (N), factor VIII level 133% (H).  7/8/21 Coumadin and Folgard daily.  No missed doses. Factor VIII and Homocysteine level ordered.  11/11/21: Follow up appt. Continue Folgard and Coumadin at 7.5 mg for INR of 2.0. Continue INR checks monthly. No signs of clots or PE    Polycythemia diagnosis established in January 2015.  1/14/15 - Discussed with the patient the need for a bone marrow biopsy along with additional lab work for further evaluation of the thrombocytopenia and polycythemia.  The patient states that he does not wish to proceed with the blood work or the bone marrow biopsy at this time. Hemoglobin 17.0  3/7/17 - WBC 7.17, hemoglobin 17.2, platelet count 120,000. Discussed bone marrow evaluation again with the patient. He will think about it.   12/18/18 - WBC 10.4 with 84% neutrophils, 9% lymphocytes, 6% monocytes, hemoglobin 16.6, hematocrit 47.1, platelet count 121,000. BCR/ABL negative. [employer] NGS JAK2 panel negative.   9/5/2019- hemoglobin 16.1, hematocrit 47.4.  7/8/21 Hgb 16.3, Hct 47.6  11/11/21: Follow up appt. Hgb 16.6, Hct 46.3. Stable    Thrombocytopenia diagnosis established in October 2014  The patient presented to [facility] on August 19, 2014 where was admitted through 08/22/2014 with complaints of chest pain and shortness of air.  It was noted that he had thrombocytopenia on admission with the platelet  count of 125,000.  His platelet count slowly fell to 92,000 and it discharge with 12,000.  PT 11.9, INR 1.1, PTT 35.5.  A thrombocytopenia workup was performed that showed a folate of greater than 24.8 (H), and vitamin B12 331(N), , CMV, IgM was negative and EBV IgM was positive.  TAMIA screen is negative.  Platelet antibody screen is negative.  His PF4 was negative at 0.035.  It was noted that in the past, he has had some slightly low platelet counts in 125-140 range.    9/8/14 - platelet count 265,000.  EBV IgG positive, EBV IgM negative.  10/7/14 - platelet count 101,000.  11/17/14 - reviewed medication list for thrombocytopenia.  Metoprolol was known to cause thrombocytopenia purpura.  Lisinopril was known to rarely cause thrombocytopenia and less than 0.01% of the patients.  Plavix was known to cause thrombotic thrombocytopenia purpura (TTP) and thrombocytopenia and less than 1% of the patients.  Crestor was known to cause TTP and thrombocytopenia.  9/5/2019-patient reports moderate alcohol use-16 ounces beer 3 times per week.  Platelets 116  11/11/21: Follow up appt. Platelets 129, stable. No bleeding    Elevated LFTs and liver mass diagnosis established in September 2014.  9/8/14 - ALT 52 (H), AST 29 (N),   9/10/14 - advised the patient to increase his hydration and we’ll order a CT scan of the abdomen to be done for further evaluation of the elevated LFTs.  9/16/14 - ALT 49 (H), AST 29 (N)  9/23/14 - CT of the abdomen showed no evidence of renal mass.  There was an approximately 2 cm non-cystic appearing hypodense lesion on the posterior medial aspect of the right hepatic lobe.  This was in close proximity to the upper pole of the right kidney.  This could conceivably represent the mass seen on ultrasound study.  However it was definitely raising from the liver and not the upper pole of the right kidney.  10/7/14 - Will order a MRI for evaluation of the liver mass as well as lab work for further  evaluation of the elevated LFTs.  10/7/14 - Hepatitis screening negative, A1AT 133 (N), AFP 9.2 (H), ceruloplasmin 22 (N), ferritin 133.9 (N),   10/13/14 - MRI of the abdomen showed liver lesion within the posterior right lobe of the liver is most consistent with hemangioma   8/17/15 - ALT 38 (N), AST 26 (N), alkaline phosphatase 26 (N). Elevated LFT’s resolved.   3/8/16 - AFP 8 (0-9).    5/5/17 - CMP revealed an AST of 32 normal (15-41), ALT 55 normal (17-63), creatinine 1.4 high (0.7-1.2) and BUN 32 high (8-20). Otherwise within normal limits.      11/9/17 - Patient claims his PCP has recommended evaluation for his high liver enzymes by a liver doctor, but he has not followed up on it.   7/6/21 CMP drawn  11/11/21:  Follow up appt. CMP drawn      3/22/22: Patient here for follow up appt. Patient is currently on Coumadin 8mg po daily with INR of 2.5 today. No bleeding issues.     Past Medical History:   Diagnosis Date    Cardiomyopathy 2012    CVA (cerebral vascular accident) 2012    Hyperlipemia 2012    Hypertension 2012       Past Surgical History:   Procedure Laterality Date    TURP / TRANSURETHRAL INCISION / DRAINAGE PROSTATE  2016         Current Outpatient Medications:     atorvastatin (LIPITOR) 20 MG tablet, ATORVASTATIN CALCIUM 20 MG TABS, Disp: , Rfl:     clopidogrel (PLAVIX) 75 MG tablet, Take 1 tablet by mouth Daily., Disp: , Rfl:     hydrALAZINE (APRESOLINE) 10 MG tablet, HYDRALAZINE HCL 10 MG TABS, Disp: , Rfl:     lisinopril-hydrochlorothiazide (PRINZIDE,ZESTORETIC) 20-12.5 MG per tablet, LISINOPRIL-HYDROCHLOROTHIAZIDE 20-12.5 MG TABS, Disp: , Rfl:     metoprolol tartrate (LOPRESSOR) 25 MG tablet, METOPROLOL TARTRATE 25 MG TABS, Disp: , Rfl:     warfarin (COUMADIN) 1 MG tablet, TAKE 2 AND 1/2 TABLETS BY MOUTH DAILY WITH 5 MG TABLET FOR A TOTAL OF 7.5 MG DAILY, Disp: 225 tablet, Rfl: 1    warfarin (COUMADIN) 4 MG tablet, Take two  4 mg tablet for a total of 8mg by mouth daily. ., Disp: 60 tablet, Rfl:  2    warfarin (COUMADIN) 5 MG tablet, Take 1.5 tablets daily or as directed based on your INR., Disp: 135 tablet, Rfl: 3    No Known Allergies    Family History   Problem Relation Age of Onset    Lung cancer Mother        Cancer-related family history includes Lung cancer in his mother.    Social History     Tobacco Use    Smoking status: Never    Smokeless tobacco: Current     Types: Chew   Substance Use Topics    Alcohol use: Yes    Drug use: No     I have reviewed and confirmed the accuracy of the patient's history: Chief complaint, HPI, ROS, and Subjective as entered by the MA/LPN/RN. Shweta Ayala MD 07/25/24      SUBJECTIVE:      Patient is here today for follow-up and denies any new issues.        REVIEW OF SYSTEMS:    Review of Systems   Constitutional:  Negative for activity change, appetite change, chills, fatigue and fever.   HENT:  Negative for ear pain, mouth sores, nosebleeds, sinus pressure, sinus pain, sneezing, sore throat, tinnitus and trouble swallowing.    Eyes:  Negative for photophobia and visual disturbance.   Respiratory:  Negative for apnea, cough, choking, chest tightness, shortness of breath, wheezing and stridor.    Cardiovascular:  Negative for chest pain, palpitations and leg swelling.   Gastrointestinal:  Negative for abdominal pain, anal bleeding, blood in stool, constipation, diarrhea, nausea and vomiting.   Endocrine: Negative for cold intolerance and heat intolerance.   Genitourinary:  Negative for decreased urine volume, dysuria, flank pain, frequency, genital sores, hematuria, penile discharge, penile pain, penile swelling, scrotal swelling, testicular pain and urgency.   Musculoskeletal:  Negative for back pain, gait problem, joint swelling, myalgias, neck pain and neck stiffness.   Skin:  Negative for color change, rash and wound.        Skin lesions- flaky and yellowish on scalp.  Brown round flat macule on right lower leg.  Patient denies any change in color.   "  Allergic/Immunologic: Negative.    Neurological:  Negative for dizziness, tremors, seizures, syncope, facial asymmetry, weakness, light-headedness, numbness and headaches.   Hematological:  Negative for adenopathy. Does not bruise/bleed easily.        No obvious bleeding   Psychiatric/Behavioral:  Negative for agitation, behavioral problems, confusion, decreased concentration, dysphoric mood, hallucinations, self-injury and sleep disturbance. The patient is not nervous/anxious.        OBJECTIVE:    Vitals:    07/25/24 0905   BP: 116/72   Pulse: 51   SpO2: 95%   Weight: 105 kg (231 lb)   Height: 170.2 cm (67\")   PainSc: 0-No pain           ECOG  (0) Fully active, able to carry on all predisease performance without restriction    Physical Exam   Constitutional: He is oriented to person, place, and time. No distress.   HENT:   Head: Normocephalic and atraumatic.   Eyes: Conjunctivae are normal. Right eye exhibits no discharge. Left eye exhibits no discharge. No scleral icterus.   Neck: No thyromegaly present.   Cardiovascular: Normal rate, regular rhythm and normal heart sounds. Exam reveals no gallop and no friction rub.   Pulmonary/Chest: Effort normal. No stridor. No respiratory distress. He has no wheezes.   Abdominal: Soft. Bowel sounds are normal. He exhibits no mass. There is no abdominal tenderness. There is no rebound and no guarding.   Musculoskeletal: Normal range of motion. No tenderness.   Lymphadenopathy:     He has no cervical adenopathy.   Neurological: He is alert and oriented to person, place, and time. He exhibits normal muscle tone.   Skin: Skin is warm. No rash noted. He is not diaphoretic. No erythema.   Psychiatric: His behavior is normal.   Nursing note and vitals reviewed.    I have reexamined the patient and the results are consistent with the previously documented exam. Shweta Ayala MD        RECENT LABS    WBC   Date Value Ref Range Status   07/25/2024 7.21 3.40 - 10.80 10*3/mm3 " Final     RBC   Date Value Ref Range Status   07/25/2024 5.31 4.14 - 5.80 10*6/mm3 Final     Hemoglobin   Date Value Ref Range Status   07/25/2024 17.2 13.0 - 17.7 g/dL Final     Hematocrit   Date Value Ref Range Status   07/25/2024 49.0 37.5 - 51.0 % Final     MCV   Date Value Ref Range Status   07/25/2024 92.3 79.0 - 97.0 fL Final     MCH   Date Value Ref Range Status   07/25/2024 32.4 26.6 - 33.0 pg Final     MCHC   Date Value Ref Range Status   07/25/2024 35.1 31.5 - 35.7 g/dL Final     RDW   Date Value Ref Range Status   07/25/2024 13.5 12.3 - 15.4 % Final     RDW-SD   Date Value Ref Range Status   07/25/2024 44.7 37.0 - 54.0 fl Final     MPV   Date Value Ref Range Status   07/25/2024 10.3 6.0 - 12.0 fL Final     Platelets   Date Value Ref Range Status   07/25/2024 114 (L) 140 - 450 10*3/mm3 Final     Neutrophil %   Date Value Ref Range Status   07/25/2024 69.2 42.7 - 76.0 % Final     Lymphocyte %   Date Value Ref Range Status   07/25/2024 19.4 (L) 19.6 - 45.3 % Final     Monocyte %   Date Value Ref Range Status   07/25/2024 8.6 5.0 - 12.0 % Final     Eosinophil %   Date Value Ref Range Status   07/25/2024 2.2 0.3 - 6.2 % Final     Basophil %   Date Value Ref Range Status   07/25/2024 0.6 0.0 - 1.5 % Final     Neutrophils, Absolute   Date Value Ref Range Status   07/25/2024 4.99 1.70 - 7.00 10*3/mm3 Final     Lymphocytes, Absolute   Date Value Ref Range Status   07/25/2024 1.40 0.70 - 3.10 10*3/mm3 Final     Monocytes, Absolute   Date Value Ref Range Status   07/25/2024 0.62 0.10 - 0.90 10*3/mm3 Final     Eosinophils, Absolute   Date Value Ref Range Status   07/25/2024 0.16 0.00 - 0.40 10*3/mm3 Final     Basophils, Absolute   Date Value Ref Range Status   07/25/2024 0.04 0.00 - 0.20 10*3/mm3 Final       Lab Results   Component Value Date    GLUCOSE 112 (H) 11/11/2021    BUN 14 11/11/2021    CREATININE 1.10 11/11/2021    EGFRIFNONA 66 11/11/2021    BCR 12.7 11/11/2021    K 4.2 11/11/2021    CO2 22.0 11/11/2021     CALCIUM 8.4 (L) 11/11/2021    ALBUMIN 3.90 11/11/2021    LABIL2 1.6 05/22/2018    AST 27 11/11/2021    ALT 42 (H) 11/11/2021       ASSESSMENT:    Bilateral Pulmonary emboli: Anticoagulation therapy.  Continues to follow-up in the Coumadin clinic  Chronic DVT of proximal vein of right lower extremity: Good cap refill, no pain, warmth, or skin color changes.  There is mild edema behind the right knee. Patient states that edema has been present since his DVT. Edema has not lessened or changed.  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  Continue Folgard  Heterozygous MTHFR mutation  CKD, stage III.  I have requested for his labs from [facility]  Elevated Factor VIII level: Factor VIII reviewed with patient, elevated. Reports no signs of DVT.  No shortness of breath, swelling, changes in skin color, pain or bleeding noted.   Polycythemia: CBC reviewed with patient Normal levels for Hgb and Hct.  CBC reviewed  Thrombocytopenia: Chronic: Stable at low 100s  Melanoma on chest and scalp:  Patient has been seen by Dr. Kapoor but would like to go to [facility].  Patient has multiple scalp lesions in various shapes and sizes that are yellowish and flaky.  Has flat dime sized macule on right lower leg that is brownish in color.  Patient denies any changes in color of macule. Patient does not know how long macule has been there.  Will consult dermatology, .  Educated completed regarding use of hats and sunscreen.   Patient voiced understanding.       PLANS:  INR reviewed follow-up in the Coumadin clinic  CBC reviewed  Continue Folgard  Follow-up 6 months  Fasting homocystine level at the next visit  Dermatology consult placed.  Patient has a history of melanoma status post resection and also has a scalp lesion.  All questions answered, patient verbalized understanding and is agreeable to above plan.

## 2024-07-25 ENCOUNTER — OFFICE VISIT (OUTPATIENT)
Dept: ONCOLOGY | Facility: CLINIC | Age: 74
End: 2024-07-25
Payer: MEDICARE

## 2024-07-25 ENCOUNTER — ANTICOAGULATION VISIT (OUTPATIENT)
Dept: PHARMACY | Facility: HOSPITAL | Age: 74
End: 2024-07-25
Payer: MEDICARE

## 2024-07-25 ENCOUNTER — LAB (OUTPATIENT)
Dept: LAB | Facility: HOSPITAL | Age: 74
End: 2024-07-25
Payer: MEDICARE

## 2024-07-25 VITALS
BODY MASS INDEX: 36.26 KG/M2 | SYSTOLIC BLOOD PRESSURE: 116 MMHG | HEART RATE: 51 BPM | OXYGEN SATURATION: 95 % | WEIGHT: 231 LBS | HEIGHT: 67 IN | DIASTOLIC BLOOD PRESSURE: 72 MMHG

## 2024-07-25 DIAGNOSIS — I26.99 PULMONARY EMBOLISM, OTHER, UNSPECIFIED CHRONICITY, UNSPECIFIED WHETHER ACUTE COR PULMONALE PRESENT: ICD-10-CM

## 2024-07-25 DIAGNOSIS — Z15.89 HETEROZYGOUS MTHFR MUTATION C677T: Primary | ICD-10-CM

## 2024-07-25 DIAGNOSIS — Z79.01 LONG TERM (CURRENT) USE OF ANTICOAGULANTS: ICD-10-CM

## 2024-07-25 DIAGNOSIS — Z15.89 HETEROZYGOUS MTHFR MUTATION C677T: ICD-10-CM

## 2024-07-25 LAB
BASOPHILS # BLD AUTO: 0.04 10*3/MM3 (ref 0–0.2)
BASOPHILS NFR BLD AUTO: 0.6 % (ref 0–1.5)
DEPRECATED RDW RBC AUTO: 44.7 FL (ref 37–54)
EOSINOPHIL # BLD AUTO: 0.16 10*3/MM3 (ref 0–0.4)
EOSINOPHIL NFR BLD AUTO: 2.2 % (ref 0.3–6.2)
ERYTHROCYTE [DISTWIDTH] IN BLOOD BY AUTOMATED COUNT: 13.5 % (ref 12.3–15.4)
HCT VFR BLD AUTO: 49 % (ref 37.5–51)
HGB BLD-MCNC: 17.2 G/DL (ref 13–17.7)
INR PPP: 3.1 (ref 0.8–1.2)
LYMPHOCYTES # BLD AUTO: 1.4 10*3/MM3 (ref 0.7–3.1)
LYMPHOCYTES NFR BLD AUTO: 19.4 % (ref 19.6–45.3)
MCH RBC QN AUTO: 32.4 PG (ref 26.6–33)
MCHC RBC AUTO-ENTMCNC: 35.1 G/DL (ref 31.5–35.7)
MCV RBC AUTO: 92.3 FL (ref 79–97)
MONOCYTES # BLD AUTO: 0.62 10*3/MM3 (ref 0.1–0.9)
MONOCYTES NFR BLD AUTO: 8.6 % (ref 5–12)
NEUTROPHILS NFR BLD AUTO: 4.99 10*3/MM3 (ref 1.7–7)
NEUTROPHILS NFR BLD AUTO: 69.2 % (ref 42.7–76)
PLATELET # BLD AUTO: 114 10*3/MM3 (ref 140–450)
PMV BLD AUTO: 10.3 FL (ref 6–12)
RBC # BLD AUTO: 5.31 10*6/MM3 (ref 4.14–5.8)
WBC NRBC COR # BLD AUTO: 7.21 10*3/MM3 (ref 3.4–10.8)

## 2024-07-25 PROCEDURE — 99213 OFFICE O/P EST LOW 20 MIN: CPT | Performed by: INTERNAL MEDICINE

## 2024-07-25 PROCEDURE — 85025 COMPLETE CBC W/AUTO DIFF WBC: CPT

## 2024-07-25 PROCEDURE — 85610 PROTHROMBIN TIME: CPT

## 2024-07-25 PROCEDURE — 36415 COLL VENOUS BLD VENIPUNCTURE: CPT

## 2024-07-25 PROCEDURE — 1126F AMNT PAIN NOTED NONE PRSNT: CPT | Performed by: INTERNAL MEDICINE

## 2024-07-25 NOTE — PROGRESS NOTES
Anticoagulation Clinic Progress Note    Patient's visit was held in office today.    Anticoagulation Summary  As of 7/25/2024      INR goal:  2.0-3.0   TTR:  60.4% (5 y)   INR used for dosing:  3.10 (7/25/2024)   Warfarin maintenance plan:  7.5 mg (5 mg x 1 and 1 mg x 2.5) every day   Weekly warfarin total:  52.5 mg   No change documented:  Katie Whitlock RPH   Plan last modified:  Ronna Perry RPH (8/24/2023)   Next INR check:  8/22/2024   Priority:  Maintenance   Target end date:               Anticoagulation Episode Summary       INR check location:  Clinic Lab    Preferred lab:      Send INR reminders to:   [facility] CBC ANTICOAG POOL    Comments:              Clinic Interview:  Patient Findings     Negatives:  Signs/symptoms of thrombosis, Signs/symptoms of bleeding,   Laboratory test error suspected, Change in health, Change in alcohol use,   Change in activity, Upcoming invasive procedure, Emergency department   visit, Upcoming dental procedure, Missed doses, Extra doses, Change in   medications, Change in diet/appetite, Hospital admission, Bruising, Other   complaints      Clinical Outcomes     Negatives:  Major bleeding event, Thromboembolic event,   Anticoagulation-related hospital admission, Anticoagulation-related ED   visit, Anticoagulation-related fatality        INR History:      Latest Ref Rng & Units 6/20/2024     9:00 AM 7/2/2024     8:57 AM 7/2/2024     9:00 AM 7/11/2024     9:00 AM 7/11/2024     9:03 AM 7/25/2024     8:59 AM 7/25/2024     9:15 AM   Anticoagulation Monitoring   INR  1.20  1.10 2.50   3.10   INR Date  6/20/2024 7/2/2024 7/11/2024 7/25/2024   INR Goal  2.0-3.0  2.0-3.0 2.0-3.0   2.0-3.0   Trend  Same  Same Same   Same   Last Week Total  45 mg  45 mg 52.5 mg   52.5 mg   Next Week Total  55 mg  57.5 mg 52.5 mg   52.5 mg   Sun  7.5 mg  7.5 mg 7.5 mg   7.5 mg   Mon  7.5 mg  7.5 mg 7.5 mg   7.5 mg   Tue  -  10 mg (7/2); Otherwise 7.5 mg 7.5 mg   7.5 mg   Wed  -  10 mg (7/3);  Otherwise 7.5 mg 7.5 mg   7.5 mg   Thu  10 mg (6/20)  7.5 mg 7.5 mg   7.5 mg   Fri  7.5 mg  7.5 mg 7.5 mg   7.5 mg   Sat  7.5 mg  7.5 mg 7.5 mg   7.5 mg   Historical INR 0.80 - 1.20  1.10    2.50  3.10     Visit Report       Report Report       Plan:  1. INR is Slightly Supratherapeutic today- see above in Anticoagulation Summary. Per patient no signs or symptoms of bleeding or bruising. No changes in medications or diet. Patient has implemented changes to help him not miss doses.   Will instruct Jett Doe to Continue their warfarin regimen- see above in Anticoagulation Summary. Patient would prefer to keep with same dosing and make some diet modifications.   2. Follow up in 1 month  3. Patient declines warfarin refills.  4. Verbal and written information provided. Patient expresses understanding and has no further questions at this time.    Katie Whitlock [facility]

## 2024-08-22 ENCOUNTER — ANTICOAGULATION VISIT (OUTPATIENT)
Dept: PHARMACY | Facility: HOSPITAL | Age: 74
End: 2024-08-22
Payer: MEDICARE

## 2024-08-22 ENCOUNTER — LAB (OUTPATIENT)
Dept: LAB | Facility: HOSPITAL | Age: 74
End: 2024-08-22
Payer: MEDICARE

## 2024-08-22 DIAGNOSIS — Z79.01 LONG TERM (CURRENT) USE OF ANTICOAGULANTS: ICD-10-CM

## 2024-08-22 LAB — INR PPP: 1.6 (ref 0.8–1.2)

## 2024-08-22 PROCEDURE — 85610 PROTHROMBIN TIME: CPT

## 2024-08-22 NOTE — PROGRESS NOTES
Anticoagulation Clinic Progress Note    Patient's visit was held in office today.    Anticoagulation Summary  As of 2024      INR goal:  2.0-3.0   TTR:  60.4% (5.1 y)   INR used for dosin.60 (2024)   Warfarin maintenance plan:  7.5 mg (5 mg x 1 and 1 mg x 2.5) every day   Weekly warfarin total:  52.5 mg   Plan last modified:  Katie Whitlock RPH (2024)   Next INR check:  2024   Priority:  Maintenance   Target end date:               Anticoagulation Episode Summary       INR check location:  Clinic Lab    Preferred lab:      Send INR reminders to:   [facility] CBC ANTICOAG POOL    Comments:              Clinic Interview:  Patient Findings     Positives:  Change in alcohol use    Negatives:  Signs/symptoms of thrombosis, Signs/symptoms of bleeding,   Laboratory test error suspected, Change in health, Change in activity,   Upcoming invasive procedure, Emergency department visit, Upcoming dental   procedure, Missed doses, Extra doses, Change in medications, Change in   diet/appetite, Hospital admission, Bruising, Other complaints      Clinical Outcomes     Negatives:  Major bleeding event, Thromboembolic event,   Anticoagulation-related hospital admission, Anticoagulation-related ED   visit, Anticoagulation-related fatality        INR History:      Latest Ref Rng & Units 2024     9:00 AM 2024     9:00 AM 2024     9:03 AM 2024     8:59 AM 2024     9:15 AM 2024    10:00 AM 2024    10:04 AM   Anticoagulation Monitoring   INR  1.10 2.50   3.10 1.60    INR Date  2024    INR Goal  2.0-3.0 2.0-3.0   2.0-3.0 2.0-3.0    Trend  Same Same   Same Same    Last Week Total  45 mg 52.5 mg   52.5 mg 52.5 mg    Next Week Total  57.5 mg 52.5 mg   52.5 mg 55 mg    Sun  7.5 mg 7.5 mg   7.5 mg 7.5 mg    Mon  7.5 mg 7.5 mg   7.5 mg 7.5 mg    Tue  10 mg (); Otherwise 7.5 mg 7.5 mg   7.5 mg 7.5 mg    Wed  10 mg (7/3); Otherwise 7.5 mg 7.5 mg   7.5  mg 7.5 mg    Thu  7.5 mg 7.5 mg   7.5 mg 10 mg (8/22); Otherwise 7.5 mg    Fri  7.5 mg 7.5 mg   7.5 mg 7.5 mg    Sat  7.5 mg 7.5 mg   7.5 mg 7.5 mg    Historical INR 0.80 - 1.20   2.50  3.10    1.60    Visit Report     Report Report         Plan:  1. INR is Subtherapeutic today- see above in Anticoagulation Summary. Patient does not believe any missed doses. No changes in medications. He did state that he usually has a few beers every night and did not last night.   Will instruct Jett Doe to boost to 10 mg tonight and then continue their warfarin regimen- see above in Anticoagulation Summary. Patient preferred to stick with current dosing and re-check prior to making any changes.   2. Follow up in 2 weeks  3. Patient declines warfarin refills.  4. Verbal and written information provided. Patient expresses understanding and has no further questions at this time.    Katie Whitlock [facility]

## 2024-09-05 ENCOUNTER — TELEPHONE (OUTPATIENT)
Dept: ONCOLOGY | Facility: CLINIC | Age: 74
End: 2024-09-05
Payer: MEDICARE

## 2024-09-05 ENCOUNTER — LAB (OUTPATIENT)
Dept: LAB | Facility: HOSPITAL | Age: 74
End: 2024-09-05
Payer: MEDICARE

## 2024-09-05 ENCOUNTER — ANTICOAGULATION VISIT (OUTPATIENT)
Dept: PHARMACY | Facility: HOSPITAL | Age: 74
End: 2024-09-05
Payer: MEDICARE

## 2024-09-05 DIAGNOSIS — Z79.01 LONG TERM (CURRENT) USE OF ANTICOAGULANTS: ICD-10-CM

## 2024-09-05 LAB — INR PPP: 1.7 (ref 0.8–1.2)

## 2024-09-05 PROCEDURE — 85610 PROTHROMBIN TIME: CPT

## 2024-09-05 NOTE — PROGRESS NOTES
Anticoagulation Clinic Progress Note    Patient's visit was held in office today.    Anticoagulation Summary  As of 2024      INR goal:  2.0-3.0   TTR:  60.0% (5.1 y)   INR used for dosin.70 (2024)   Warfarin maintenance plan:  8 mg (5 mg x 1 and 1 mg x 3) every day   Weekly warfarin total:  56 mg   Plan last modified:  Katie Whitlock RPH (2024)   Next INR check:     Priority:  Maintenance   Target end date:               Anticoagulation Episode Summary       INR check location:  Clinic Lab    Preferred lab:      Send INR reminders to:   [facility] CBC ANTICOAG POOL    Comments:              Clinic Interview:  Patient Findings     Negatives:  Signs/symptoms of thrombosis, Signs/symptoms of bleeding,   Laboratory test error suspected, Change in health, Change in alcohol use,   Change in activity, Upcoming invasive procedure, Emergency department   visit, Upcoming dental procedure, Missed doses, Extra doses, Change in   medications, Change in diet/appetite, Hospital admission, Bruising, Other   complaints      Clinical Outcomes     Negatives:  Major bleeding event, Thromboembolic event,   Anticoagulation-related hospital admission, Anticoagulation-related ED   visit, Anticoagulation-related fatality        INR History:      Latest Ref Rng & Units 2024     9:03 AM 2024     8:59 AM 2024     9:15 AM 2024    10:00 AM 2024    10:04 AM 2024     9:59 AM 2024    10:00 AM   Anticoagulation Monitoring   INR    3.10 1.60   1.70   INR Date    2024   INR Goal    2.0-3.0 2.0-3.0   2.0-3.0   Trend    Same Same   Up   Last Week Total    52.5 mg 52.5 mg   52.5 mg   Next Week Total    52.5 mg 55 mg   56 mg   Sun    7.5 mg 7.5 mg   8 mg   Mon    7.5 mg 7.5 mg   8 mg   Tue    7.5 mg 7.5 mg   8 mg   Wed    7.5 mg 7.5 mg   8 mg   Thu    7.5 mg 10 mg (); Otherwise 7.5 mg   8 mg   Fri    7.5 mg 7.5 mg   8 mg   Sat    7.5 mg 7.5 mg   8 mg   Historical INR 0.80  - 1.20 2.50  3.10    1.60  1.70     Visit Report   Report Report           Plan:  1. INR is Subtherapeutic today- see above in Anticoagulation Summary.  Will instruct Jett Doe to Change their warfarin regimen- see above in Anticoagulation Summary. Will increase to 8 mg daily (56 mg total weekly dose, ~ 7% dose increase). Patient consistently low recently however also endorses possible missed doses, so may need to reduce back to 7.5 mg daily upon re-check.   2. Follow up in 2 weeks  3. Patient declines warfarin refills.  4. Verbal and written information provided. Patient expresses understanding and has no further questions at this time.    Katie Whitlock [facility]

## 2024-09-05 NOTE — TELEPHONE ENCOUNTER
Caller: Jett Doe    Relationship: Self    Best call back number: [phone]    What is the best time to reach you: ANYTIME    Who are you requesting to speak with (clinical staff, provider,  specific staff member): CLINICAL    What was the call regarding: PT STATES HE NEEDS A NEW SCRIPT FOR [place]STAB, SENT TO PHARMACY, 0 REFILLS REMAINING.  [facility] HAS TRIED TO REACH OUT A FEW TIMES WITH NO RESPONSE      [facility] [id number] - [place], [place] - [address] - [phone]  - [phone] FX

## 2024-09-19 ENCOUNTER — LAB (OUTPATIENT)
Dept: LAB | Facility: HOSPITAL | Age: 74
End: 2024-09-19
Payer: MEDICARE

## 2024-09-19 ENCOUNTER — ANTICOAGULATION VISIT (OUTPATIENT)
Dept: PHARMACY | Facility: HOSPITAL | Age: 74
End: 2024-09-19
Payer: MEDICARE

## 2024-09-19 DIAGNOSIS — Z79.01 LONG TERM (CURRENT) USE OF ANTICOAGULANTS: ICD-10-CM

## 2024-09-19 LAB — INR PPP: 3.2 (ref 0.8–1.2)

## 2024-09-19 PROCEDURE — 85610 PROTHROMBIN TIME: CPT

## 2024-10-03 ENCOUNTER — ANTICOAGULATION VISIT (OUTPATIENT)
Dept: PHARMACY | Facility: HOSPITAL | Age: 74
End: 2024-10-03
Payer: MEDICARE

## 2024-10-03 ENCOUNTER — LAB (OUTPATIENT)
Dept: LAB | Facility: HOSPITAL | Age: 74
End: 2024-10-03
Payer: MEDICARE

## 2024-10-03 DIAGNOSIS — Z79.01 LONG TERM (CURRENT) USE OF ANTICOAGULANTS: ICD-10-CM

## 2024-10-03 LAB — INR PPP: 3.1 (ref 0.8–1.2)

## 2024-10-03 PROCEDURE — 85610 PROTHROMBIN TIME: CPT

## 2024-10-03 RX ORDER — WARFARIN SODIUM 1 MG/1
TABLET ORAL
Qty: 225 TABLET | Refills: 1 | Status: SHIPPED | OUTPATIENT
Start: 2024-10-03

## 2024-10-03 RX ORDER — WARFARIN SODIUM 5 MG/1
5 TABLET ORAL NIGHTLY
Qty: 90 TABLET | Refills: 1 | Status: SHIPPED | OUTPATIENT
Start: 2024-10-03

## 2024-10-03 NOTE — PROGRESS NOTES
Anticoagulation Clinic Progress Note    Patient's visit was held in office today.    Anticoagulation Summary  As of 10/3/2024      INR goal:  2.0-3.0   TTR:  59.6% (5.2 y)   INR used for dosing:  3.10 (10/3/2024)   Warfarin maintenance plan:  8 mg (5 mg x 1 and 1 mg x 3) every Mon, Wed, Fri; 7 mg (5 mg x 1 and 1 mg x 2) all other days   Weekly warfarin total:  52 mg   Plan last modified:  Katie Whitlock RPH (10/3/2024)   Next INR check:  10/24/2024   Priority:  Maintenance   Target end date:               Anticoagulation Episode Summary       INR check location:  Clinic Lab    Preferred lab:      Send INR reminders to:  [facility] CBC ANTICOAG POOL    Comments:              Clinic Interview:  Patient Findings     Negatives:  Signs/symptoms of thrombosis, Signs/symptoms of bleeding,   Laboratory test error suspected, Change in health, Change in alcohol use,   Change in activity, Upcoming invasive procedure, Emergency department   visit, Upcoming dental procedure, Missed doses, Extra doses, Change in   medications, Change in diet/appetite, Hospital admission, Bruising, Other   complaints      Clinical Outcomes     Negatives:  Major bleeding event, Thromboembolic event,   Anticoagulation-related hospital admission, Anticoagulation-related ED   visit, Anticoagulation-related fatality        INR History:      Latest Ref Rng & Units 8/22/2024    10:04 AM 9/5/2024     9:59 AM 9/5/2024    10:00 AM 9/19/2024     9:45 AM 9/19/2024     9:54 AM 10/3/2024    10:15 AM 10/3/2024    10:25 AM   Anticoagulation Monitoring   INR    1.70 3.20  3.10    INR Date    9/5/2024 9/19/2024  10/3/2024    INR Goal    2.0-3.0 2.0-3.0  2.0-3.0    Trend    Up Down  Down    Last Week Total    52.5 mg 56 mg  53 mg    Next Week Total    56 mg 53 mg  52 mg    Sun    8 mg 8 mg  7 mg    Mon    8 mg 7 mg  8 mg    Tue    8 mg 8 mg  7 mg    Wed    8 mg 7 mg  8 mg    Thu    8 mg 8 mg  7 mg    Fri    8 mg 7 mg  8 mg    Sat    8 mg 8 mg  7 mg    Historical  INR 0.80 - 1.20 1.60  1.70    3.20   3.10        Plan:  1. INR is Slightly  Supratherapeutic today- see above in Anticoagulation Summary. Patient states that he was able to take all doses as directed since last visit.    Will instruct Jett Doe to Change their warfarin regimen- see above in Anticoagulation Summary.  Will make slight reduction to 8 mg on Mon/Wed/Fri and 7 mg all other days. (52 mg total weekly dose, ~ 2% total weekly dose reduction).   2. Follow up in 2 weeks  3. Patient desires warfarin refills.Sent in new Rxs to [facility] pharmacy per patient request.   4. Verbal and written information provided. Patient expresses understanding and has no further questions at this time.    Katie Whitlock RP

## 2024-10-29 ENCOUNTER — ANTICOAGULATION VISIT (OUTPATIENT)
Dept: PHARMACY | Facility: HOSPITAL | Age: 74
End: 2024-10-29
Payer: MEDICARE

## 2024-10-29 ENCOUNTER — LAB (OUTPATIENT)
Dept: LAB | Facility: HOSPITAL | Age: 74
End: 2024-10-29
Payer: MEDICARE

## 2024-10-29 DIAGNOSIS — Z79.01 LONG TERM (CURRENT) USE OF ANTICOAGULANTS: ICD-10-CM

## 2024-10-29 LAB — INR PPP: 2.4 (ref 0.8–1.2)

## 2024-10-29 PROCEDURE — 85610 PROTHROMBIN TIME: CPT

## 2024-10-29 NOTE — PROGRESS NOTES
Anticoagulation Clinic Progress Note    Patient's visit was held via telephone today.    Anticoagulation Summary  As of 10/29/2024      INR goal:  2.0-3.0   TTR:  60.0% (5.3 y)   INR used for dosin.40 (10/29/2024)   Warfarin maintenance plan:  8 mg (5 mg x 1 and 1 mg x 3) every Mon, Wed, Fri; 7 mg (5 mg x 1 and 1 mg x 2) all other days   Weekly warfarin total:  52 mg   No change documented:  Ronna Perry RP   Plan last modified:  Katie Whitlock [facility] (10/3/2024)   Next INR check:  2024   Priority:  Maintenance   Target end date:  --             Anticoagulation Episode Summary       INR check location:  Clinic Lab    Preferred lab:  --    Send INR reminders to:  [facility] CBC ANTICOAG POOL    Comments:  --            Clinic Interview: No changes      INR History:      Latest Ref Rng & Units 2024    10:00 AM 2024     9:45 AM 2024     9:54 AM 10/3/2024    10:15 AM 10/3/2024    10:25 AM 10/29/2024    10:00 AM 10/29/2024    10:08 AM   Anticoagulation Monitoring   INR  1.70 3.20  3.10  2.40    INR Date  2024 2024  10/3/2024  10/29/2024    INR Goal  2.0-3.0 2.0-3.0  2.0-3.0  2.0-3.0    Trend  Up Down  Down  Same    Last Week Total  52.5 mg 56 mg  53 mg  52 mg    Next Week Total  56 mg 53 mg  52 mg  52 mg    Sun  8 mg 8 mg  7 mg  7 mg    Mon  8 mg 7 mg  8 mg  8 mg    Tue  8 mg 8 mg  7 mg  7 mg    Wed  8 mg 7 mg  8 mg  8 mg    Thu  8 mg 8 mg  7 mg  7 mg    Fri  8 mg 7 mg  8 mg  8 mg    Sat  8 mg 8 mg  7 mg  7 mg    Historical INR 0.80 - 1.20   3.20   3.10   2.40        Plan:  1. INR is Therapeutic today- see above in Anticoagulation Summary.  Will instruct Jett Doe to Continue their warfarin regimen- see above in Anticoagulation Summary.  2. Follow up in 1 month  3. Patient declines warfarin refills.  4. Verbal information provided. Patient expresses understanding and has no further questions at this time.    Ronna Perry [facility]

## 2024-11-26 ENCOUNTER — LAB (OUTPATIENT)
Dept: LAB | Facility: HOSPITAL | Age: 74
End: 2024-11-26
Payer: MEDICARE

## 2024-11-26 ENCOUNTER — ANTICOAGULATION VISIT (OUTPATIENT)
Dept: PHARMACY | Facility: HOSPITAL | Age: 74
End: 2024-11-26
Payer: MEDICARE

## 2024-11-26 DIAGNOSIS — Z79.01 LONG TERM (CURRENT) USE OF ANTICOAGULANTS: ICD-10-CM

## 2024-11-26 LAB — INR PPP: 1.7 (ref 0.8–1.2)

## 2024-11-26 PROCEDURE — 85610 PROTHROMBIN TIME: CPT

## 2024-11-26 NOTE — PROGRESS NOTES
Anticoagulation Clinic Progress Note    Patient's visit was held by phone today.    Anticoagulation Summary  As of 2024      INR goal:  2.0-3.0   TTR:  59.9% (5.4 y)   INR used for dosin.70 (2024)   Warfarin maintenance plan:  8 mg (5 mg x 1 and 1 mg x 3) every Mon, Wed, Fri; 7 mg (5 mg x 1 and 1 mg x 2) all other days   Weekly warfarin total:  52 mg   No change documented:  Katie Whitlock RPH   Plan last modified:  Katie Whitlock RPH (10/3/2024)   Next INR check:  2024   Priority:  Maintenance   Target end date:  --             Anticoagulation Episode Summary       INR check location:  Clinic Lab    Preferred lab:  --    Send INR reminders to:  [facility] CBC ANTICOAG POOL    Comments:  --            Drug interactions: has remained unchanged.  Diet: has remained unchanged.    Clinic Interview:  Patient missed dose on Saturday.     INR History:      Latest Ref Rng & Units 2024     9:54 AM 10/3/2024    10:15 AM 10/3/2024    10:25 AM 10/29/2024    10:00 AM 10/29/2024    10:08 AM 2024    10:01 AM 2024    10:15 AM   Anticoagulation Monitoring   INR   3.10  2.40   1.70   INR Date   10/3/2024  10/29/2024   2024   INR Goal   2.0-3.0  2.0-3.0   2.0-3.0   Trend   Down  Same   Same   Last Week Total   53 mg  52 mg   45 mg   Next Week Total   52 mg  52 mg   52 mg   Sun   7 mg  7 mg   7 mg   Mon   8 mg  8 mg   8 mg   Tue   7 mg  7 mg   7 mg   Wed   8 mg  8 mg   8 mg   Thu   7 mg  7 mg   7 mg   Fri   8 mg  8 mg   8 mg   Sat   7 mg  7 mg   7 mg   Historical INR 0.80 - 1.20 3.20   3.10   2.40  1.70         Plan:  1. INR is Subtherapeutic today- see above in Anticoagulation Summary. Likely secondary to missed dose.   Will instruct Jett Doe to Continue their warfarin regimen- see above in Anticoagulation Summary.  2. Follow up in 4 weeks  3.They have been instructed to call if any changes in medications, doses, concerns, etc. Patient expresses understanding and has no  further questions at this time.    Katie Whitlock, RPH

## 2024-12-19 ENCOUNTER — LAB (OUTPATIENT)
Dept: LAB | Facility: HOSPITAL | Age: 74
End: 2024-12-19
Payer: MEDICARE

## 2024-12-19 ENCOUNTER — ANTICOAGULATION VISIT (OUTPATIENT)
Dept: PHARMACY | Facility: HOSPITAL | Age: 74
End: 2024-12-19
Payer: MEDICARE

## 2024-12-19 DIAGNOSIS — Z79.01 LONG TERM (CURRENT) USE OF ANTICOAGULANTS: ICD-10-CM

## 2024-12-19 LAB — INR PPP: 4.3 (ref 0.8–1.2)

## 2024-12-19 PROCEDURE — 85610 PROTHROMBIN TIME: CPT

## 2024-12-19 NOTE — PROGRESS NOTES
Anticoagulation Clinic Progress Note    Patient's visit was held in office today.    Anticoagulation Summary  As of 2024      INR goal:  2.0-3.0   TTR:  59.7% (5.4 y)   INR used for dosin.30 (2024)   Warfarin maintenance plan:  7 mg (5 mg x 1 and 1 mg x 2) every day   Weekly warfarin total:  49 mg   Plan last modified:  Katie Whitlock [facility] (2024)   Next INR check:  2025   Priority:  Maintenance   Target end date:  --             Anticoagulation Episode Summary       INR check location:  Clinic Lab    Preferred lab:  --    Send INR reminders to:   [facility] CBC ANTICOAG POOL    Comments:  --            Clinic Interview:  Patient Findings     Negatives:  Signs/symptoms of thrombosis, Signs/symptoms of bleeding,   Laboratory test error suspected, Change in health, Change in alcohol use,   Change in activity, Upcoming invasive procedure, Emergency department   visit, Upcoming dental procedure, Missed doses, Extra doses, Change in   medications, Change in diet/appetite, Hospital admission, Bruising, Other   complaints      Clinical Outcomes     Negatives:  Major bleeding event, Thromboembolic event,   Anticoagulation-related hospital admission, Anticoagulation-related ED   visit, Anticoagulation-related fatality        INR History:      Latest Ref Rng & Units 10/3/2024    10:25 AM 10/29/2024    10:00 AM 10/29/2024    10:08 AM 2024    10:01 AM 2024    10:15 AM 2024    10:54 AM 2024    11:00 AM   Anticoagulation Monitoring   INR   2.40   1.70  4.30   INR Date   10/29/2024   2024  2024   INR Goal   2.0-3.0   2.0-3.0  2.0-3.0   Trend   Same   Same  Down   Last Week Total   52 mg   45 mg  52 mg   Next Week Total   52 mg   52 mg  38 mg   Sun   7 mg   7 mg  7 mg   Mon   8 mg   8 mg  7 mg   Tue   7 mg   7 mg  7 mg   Wed   8 mg   8 mg  7 mg   Thu   7 mg   7 mg  Hold (); Otherwise 7 mg   Fri   8 mg   8 mg  3 mg (); Otherwise 7 mg   Sat   7 mg   7 mg  7 mg    Historical INR 0.80 - 1.20 3.10   2.40  1.70   4.30         Plan:  1. INR is Supratherapeutic today- see above in Anticoagulation Summary. No signs or symptoms of bleeding.     Unclear cause of supratherapeutic INR. Patient states no changes in medications, diet has been consistent. Did endorse two beers last night however that is not out of the norm for patient.   Will instruct Jett Doe to Change their warfarin regimen- see above in Anticoagulation Summary. Hold today's dose and take reduced dose of 3 mg tomorrow. Then begin 7 mg daily. (Total weekly dose, 49 mg ~ 6% dose reduction)  2. Follow up in 2 weeks  3. Patient declines warfarin refills.  4. Verbal and written information provided. Patient expresses understanding and has no further questions at this time.    Katie Whitlock [facility]

## 2025-01-02 ENCOUNTER — LAB (OUTPATIENT)
Dept: LAB | Facility: HOSPITAL | Age: 75
End: 2025-01-02
Payer: MEDICARE

## 2025-01-02 ENCOUNTER — ANTICOAGULATION VISIT (OUTPATIENT)
Dept: PHARMACY | Facility: HOSPITAL | Age: 75
End: 2025-01-02
Payer: MEDICARE

## 2025-01-02 DIAGNOSIS — Z79.01 LONG TERM (CURRENT) USE OF ANTICOAGULANTS: Primary | ICD-10-CM

## 2025-01-02 DIAGNOSIS — Z79.01 LONG TERM (CURRENT) USE OF ANTICOAGULANTS: ICD-10-CM

## 2025-01-02 LAB
ALBUMIN SERPL-MCNC: 4 G/DL (ref 3.5–5.2)
ALBUMIN/GLOB SERPL: 1.6 G/DL
ALP SERPL-CCNC: 129 U/L (ref 39–117)
ALT SERPL W P-5'-P-CCNC: 34 U/L (ref 1–41)
ANION GAP SERPL CALCULATED.3IONS-SCNC: 7.5 MMOL/L (ref 5–15)
AST SERPL-CCNC: 26 U/L (ref 1–40)
BASOPHILS # BLD AUTO: 0.02 10*3/MM3 (ref 0–0.2)
BASOPHILS NFR BLD AUTO: 0.2 % (ref 0–1.5)
BILIRUB SERPL-MCNC: 0.5 MG/DL (ref 0–1.2)
BUN SERPL-MCNC: 25 MG/DL (ref 8–23)
BUN/CREAT SERPL: 18.9 (ref 7–25)
CALCIUM SPEC-SCNC: 9.5 MG/DL (ref 8.6–10.5)
CHLORIDE SERPL-SCNC: 106 MMOL/L (ref 98–107)
CO2 SERPL-SCNC: 25.5 MMOL/L (ref 22–29)
CREAT SERPL-MCNC: 1.32 MG/DL (ref 0.76–1.27)
DEPRECATED RDW RBC AUTO: 46.9 FL (ref 37–54)
EGFRCR SERPLBLD CKD-EPI 2021: 56.6 ML/MIN/1.73
EOSINOPHIL # BLD AUTO: 0.15 10*3/MM3 (ref 0–0.4)
EOSINOPHIL NFR BLD AUTO: 1.9 % (ref 0.3–6.2)
ERYTHROCYTE [DISTWIDTH] IN BLOOD BY AUTOMATED COUNT: 14.1 % (ref 12.3–15.4)
GLOBULIN UR ELPH-MCNC: 2.5 GM/DL
GLUCOSE SERPL-MCNC: 115 MG/DL (ref 65–99)
HCT VFR BLD AUTO: 48.4 % (ref 37.5–51)
HGB BLD-MCNC: 16.2 G/DL (ref 13–17.7)
INR PPP: 3.91 (ref 2–3)
INR PPP: 4 (ref 0.8–1.2)
LYMPHOCYTES # BLD AUTO: 1.22 10*3/MM3 (ref 0.7–3.1)
LYMPHOCYTES NFR BLD AUTO: 15.1 % (ref 19.6–45.3)
MCH RBC QN AUTO: 31.3 PG (ref 26.6–33)
MCHC RBC AUTO-ENTMCNC: 33.5 G/DL (ref 31.5–35.7)
MCV RBC AUTO: 93.6 FL (ref 79–97)
MONOCYTES # BLD AUTO: 0.79 10*3/MM3 (ref 0.1–0.9)
MONOCYTES NFR BLD AUTO: 9.8 % (ref 5–12)
NEUTROPHILS NFR BLD AUTO: 5.92 10*3/MM3 (ref 1.7–7)
NEUTROPHILS NFR BLD AUTO: 73 % (ref 42.7–76)
PLATELET # BLD AUTO: 127 10*3/MM3 (ref 140–450)
PMV BLD AUTO: 9.8 FL (ref 6–12)
POTASSIUM SERPL-SCNC: 4.3 MMOL/L (ref 3.5–5.2)
PROT SERPL-MCNC: 6.5 G/DL (ref 6–8.5)
PROTHROMBIN TIME: 38.2 SECONDS (ref 19.4–28.5)
RBC # BLD AUTO: 5.17 10*6/MM3 (ref 4.14–5.8)
SODIUM SERPL-SCNC: 139 MMOL/L (ref 136–145)
WBC NRBC COR # BLD AUTO: 8.1 10*3/MM3 (ref 3.4–10.8)

## 2025-01-02 PROCEDURE — 85610 PROTHROMBIN TIME: CPT

## 2025-01-02 PROCEDURE — 80053 COMPREHEN METABOLIC PANEL: CPT | Performed by: INTERNAL MEDICINE

## 2025-01-02 PROCEDURE — 85025 COMPLETE CBC W/AUTO DIFF WBC: CPT

## 2025-01-02 PROCEDURE — 36415 COLL VENOUS BLD VENIPUNCTURE: CPT

## 2025-01-02 PROCEDURE — 85610 PROTHROMBIN TIME: CPT | Performed by: INTERNAL MEDICINE

## 2025-01-03 NOTE — PROGRESS NOTES
Anticoagulation Clinic Progress Note    Patient's visit was held in office today.    Anticoagulation Summary  As of 2025      INR goal:  2.0-3.0   TTR:  59.2% (5.5 y)   INR used for dosin.00 (2025)   Warfarin maintenance plan:  6 mg (5 mg x 1 and 1 mg x 1) every day   Weekly warfarin total:  42 mg   Plan last modified:  Katie Whitlock RPH (2025)   Next INR check:  2025   Priority:  Maintenance   Target end date:  --             Anticoagulation Episode Summary       INR check location:  Clinic Lab    Preferred lab:  --    Send INR reminders to:   [facility] CBC ANTICOAG POOL    Comments:  --            Clinic Interview:  Patient Findings     Negatives:  Signs/symptoms of thrombosis, Signs/symptoms of bleeding,   Laboratory test error suspected, Change in health, Change in alcohol use,   Change in activity, Upcoming invasive procedure, Emergency department   visit, Upcoming dental procedure, Missed doses, Extra doses, Change in   medications, Change in diet/appetite, Hospital admission, Bruising, Other   complaints      Clinical Outcomes     Negatives:  Major bleeding event, Thromboembolic event,   Anticoagulation-related hospital admission, Anticoagulation-related ED   visit, Anticoagulation-related fatality        INR History:      Latest Ref Rng & Units 2024    10:01 AM 2024    10:15 AM 2024    10:54 AM 2024    11:00 AM 2025    11:00 AM 2025    11:03 AM 2025    11:19 AM   Anticoagulation Monitoring   INR   1.70  4.30 4.00     INR Date   2024     INR Goal   2.0-3.0  2.0-3.0 2.0-3.0     Trend   Same  Down Down     Last Week Total   45 mg  52 mg 49 mg     Next Week Total   52 mg  38 mg 30 mg     Sun   7 mg  7 mg 6 mg     Mon   8 mg  7 mg 6 mg     Tue   7 mg  7 mg 6 mg     Wed   8 mg  7 mg 6 mg     Thu   7 mg  Hold (); Otherwise 7 mg Hold ()     Fri   8 mg  3 mg (); Otherwise 7 mg Hold (1/3)     Sat   7 mg  7 mg 6 mg      Historical INR 2.00 - 3.00 1.70   4.30    4.00  3.91        Plan:  1. INR is Supratherapeutic today- see above in Anticoagulation Summary. Per patient no signs or symptoms of bleeding. No changes in medications or diet that he is aware of.  No clear reason for higher INRs last two visit despite dose reduction. Went ahead and checked CBC and CMP today as none on file in past 6 months. Liver and Kidney function appear to be at patient's baseline.       Will instruct Jett Doe to Change their warfarin regimen- see above in Anticoagulation Summary. Hold dose for next 2 days. Then begin 6 mg daily. (Total weekly dose 42 mg, ~ 14% dose reduction)  2. Follow up in 1 week  3. Patient declines warfarin refills.  4. Verbal and written information provided. Patient expresses understanding and has no further questions at this time.    Katie Whitlock [facility]

## 2025-01-09 ENCOUNTER — LAB (OUTPATIENT)
Dept: LAB | Facility: HOSPITAL | Age: 75
End: 2025-01-09
Payer: MEDICARE

## 2025-01-09 ENCOUNTER — ANTICOAGULATION VISIT (OUTPATIENT)
Dept: PHARMACY | Facility: HOSPITAL | Age: 75
End: 2025-01-09
Payer: MEDICARE

## 2025-01-09 DIAGNOSIS — Z79.01 LONG TERM (CURRENT) USE OF ANTICOAGULANTS: ICD-10-CM

## 2025-01-09 LAB — INR PPP: 2.1 (ref 0.8–1.2)

## 2025-01-09 PROCEDURE — 85610 PROTHROMBIN TIME: CPT

## 2025-01-09 NOTE — PROGRESS NOTES
Anticoagulation Clinic Progress Note    Patient's visit was held in office today.    Anticoagulation Summary  As of 2025      INR goal:  2.0-3.0   TTR:  59.2% (5.5 y)   INR used for dosin.10 (2025)   Warfarin maintenance plan:  6 mg (5 mg x 1 and 1 mg x 1) every day   Weekly warfarin total:  42 mg   No change documented:  Katie Whitlock RPH   Plan last modified:  Katie Whitlock RPH (2025)   Next INR check:  2025   Priority:  Maintenance   Target end date:  --             Anticoagulation Episode Summary       INR check location:  Clinic Lab    Preferred lab:  --    Send INR reminders to:  [facility] CBC ANTICOAG POOL    Comments:  --            Clinic Interview:      INR History:      Latest Ref Rng & Units 2024    10:54 AM 2024    11:00 AM 2025    11:00 AM 2025    11:03 AM 2025    11:19 AM 2025    10:38 AM 2025    10:45 AM   Anticoagulation Monitoring   INR   4.30 4.00    2.10   INR Date   2024   INR Goal   2.0-3.0 2.0-3.0    2.0-3.0   Trend   Down Down    Same   Last Week Total   52 mg 49 mg    30 mg   Next Week Total   38 mg 30 mg    42 mg   Sun   7 mg 6 mg    6 mg   Mon   7 mg 6 mg    6 mg   Tue   7 mg 6 mg    6 mg   Wed   7 mg 6 mg    6 mg   Thu   Hold (); Otherwise 7 mg Hold ()    6 mg   Fri   3 mg (); Otherwise 7 mg Hold (1/3)    6 mg   Sat   7 mg 6 mg    6 mg   Historical INR 0.80 - 1.20 4.30    4.00  3.91  2.10         Plan:  1. INR is Therapeutic today- see above in Anticoagulation Summary.  Will instruct Jett Doe to Continue their warfarin regimen- see above in Anticoagulation Summary.  2. Follow up in 2 weeks with NP appointment.   3. Patient declines warfarin refills.  4. Verbal and written information provided. Patient expresses understanding and has no further questions at this time.    Katie Whitlock RPH

## 2025-01-22 NOTE — PROGRESS NOTES
Hematology/Oncology Outpatient Follow Up    PATIENT NAME:Jett Doe  :1950  MRN: [id number]  PRIMARY CARE PHYSICIAN: Bing Jarrett NP  REFERRING PHYSICIAN: Bing Jarrett NP    Chief Complaint   Patient presents with    Follow-up     Heterozygous MTHFR mutation C677         HISTORY OF PRESENT ILLNESS:     Bilateral PE, right lower extremity DVT and bilateral SVTs diagnosis established in 2014 and MTHFR C677T heterozygosity diagnosis established and 2014.  The patient presented to [facility] on 2014 where was admitted through 2014 with complaints of chest pain and shortness of air.  He was seen per EMS and had associated diaphoresis as well as some low sternal chest pressure.  His troponin was elevated and cardiology was consulted.  He was started on nitroglycerin drip and heparin drip.  He was given diuresis and admitted to the CBCU.  It was planned for him to undergo a cardiac catheterization.  A VQ scan was done to evaluate for bilateral pulmonary emboli and renal is consulted for his elevated creatinine.  The VQ scan showed multiple abnormalities in both lungs with a high probably for pulmonary emboli.  His chest x-ray showed no acute cardiopulmonary abnormality.  We were consulted for his Thrombopenia and his pulmonology emboli.  His risk factors included obesity, sedentary lifestyle, history of stroke and hyperlipidemia.  He has been on Plavix and aspirin as an outpatient and when his pulmonary emboli were found, his heparin was changed to renal dust Lovenox therapy 1 mg/kg per day.  He was not started on Coumadin initially with the plan that cardiology would perform a cardiac catheterization shortly after admission.  A thrombophilia workup was performed and results were; his AT3 was 89.9% normal, protein C was 101.4% normal and protein S was 101% normal, Fibrinogen 416 normal, factor V Leiden screen is negative, prothrombin gene mutation is  negative.  MTHFR showed heterozygosity for C677 T mutation, but negative for T8855T mutation.  Phosphate lipid antibodies were done.  Beta II glycoprotein is negative.  Phosphatidylserine antibody is negative.  Cardiolipin antibodies are all negative.  Bilateral lower extremity Dopplers was performed that showed a right lower extremity DVT and bilateral SVTs in his small saphenous vein.  On 08/20/2014, an IVC filter was placed with plans to hold starting his Coumadinization until after his cardiac catheterization was performed, which was going to be scheduled as an outpatient per Dr. Jurado.  It was noted that he had thrombocytopenia on admission with the platelet count of 125,000.  His platelet count slowly felt to 92,000 and it discharge with 01283.  His LFTs were noted to be abnormal with an AST of 88 and ALT of 103.  His coags were normal with PT of 11.9, INR 1.1 and PTT 35.5.  A thrombocytopenia workup was performed as seen below.  TSH of 1.3, His LFTs are normalized prior to discharge.  Nephrology was consulted for his AMADEO with a creatinine of 2.0 on admission.  Dr. Howard performed some renal studies.  His urinalysis was normal.  His renal ultrasound showed a 2 cm mass extended of the upper pole of the right kidney, which appeared to be solid with recommendations follow up with the CT or an MRI of his abdomen.  There was no hydronephrosis to indicate obstructive uropathy and the urinary bladder was unremarkable.  His creatinine improved with medication adjustments and was 1.4 and discharged with plans to perform an MRI of his afternoon when his creatinine improved to further evaluate his right renal mass.  An echocardiogram was performed that showed 45-55% ejection fraction and no other abnormalities were identified.  8/17/15 - Comprehensive metabolic panel with creatinine 1.6.  9/8/14 - patient was seen in the office as a hospital follow-up. Homocysteine level 18.4 (H)  9/10/14 - orders written to start  Folgard daily due to the elevated homocystine level.  10/7/14 - The patient reports that he has not started taking the Folgard and did not remember having a prescription called into the pharmacy.  Advised the patient again to start taking Folgard daily and gave the patient a prescription today  11/13/14 - homocystine 17.7 (H)  11/19/14 - advised patient to increase Folgard 2 twice a day.  8/17/15 - Homocysteine 16.8 (5-12).   3/8/16 - Patient does not recall taking Folgard. Prescription written for Folgard 1 p.o. q. d.  Patient claims that he has not taken Coumadin for three days as he was out of town. INR today 1.4. Instructed to resume Coumadin at same dose and follow INR protocol.   6/30/16 - Comprehensive metabolic panel with creatinine 1.3 (0.7-1.2). Serum homocysteine 12.2 (5-12).     3/7/17 - INR 2.7 on 8 mg of Warfarin daily. Patient asked to increase Folgard to twice a day.   5/5/17 - INR 2.4, therapeutic. Patient is to continue the INR protocol and continue with Folgard one tablet twice daily as well as Coumadin 8 mg daily. Homocysteine 11.2 normal (5.0-12.0).   8/4/17 - INR today is 1.6. Patient states that he missed his dose of Coumadin Monday and Tuesday of this week. He denied any diet changes. I will leave the patient on his current dose of 8 mg daily and have him return to the office for an INR in one week as he had missed two consecutive doses of Coumadin earlier. He is to continue to follow the INR protocol and he is to continue the Folgard one tablet twice daily as it is controlling his homocysteine level.   11/9/17 - Patient claims to be missing occasional Warfarin doses. Counseled.   5/22/18 - Hemoglobin 16.2, MCV 89.6, hematocrit 45.9. Serum homocysteine 14.5 (<15).    9/18/18 - Discussed possibly stopping Coumadin if workup negative. PT PTT 30.5 and 36.5 with correction of PTT on 1:1 mix with normal plasma. Lupus anticoagulant absent. Factor VIII activity 125 (). D-dimer >0.22 (<0.45).      9/21/18 - Venous Doppler study bilateral lower extremity with chronic deep venous thrombosis involving the right popliteal vein. Chronic superficial venous thrombosis involving the right small saphenous vein and a lymph node noted in the right groin.   12/18/18 - Discussed venous Doppler results. Recommended continuation of anticoagulation either with Coumadin or maintenance DOAC’s or Aspirin alone, which I did not recommend. Patient decided to continue on Warfarin.   5/2/2019- homocystine 8.5 (N), factor VIII level 133% (H).  7/8/21 Coumadin and Folgard daily.  No missed doses. Factor VIII and Homocysteine level ordered.  11/11/21: Follow up appt. Continue Folgard and Coumadin at 7.5 mg for INR of 2.0. Continue INR checks monthly. No signs of clots or PE    Polycythemia diagnosis established in January 2015.  1/14/15 - Discussed with the patient the need for a bone marrow biopsy along with additional lab work for further evaluation of the thrombocytopenia and polycythemia.  The patient states that he does not wish to proceed with the blood work or the bone marrow biopsy at this time. Hemoglobin 17.0  3/7/17 - WBC 7.17, hemoglobin 17.2, platelet count 120,000. Discussed bone marrow evaluation again with the patient. He will think about it.   12/18/18 - WBC 10.4 with 84% neutrophils, 9% lymphocytes, 6% monocytes, hemoglobin 16.6, hematocrit 47.1, platelet count 121,000. BCR/ABL negative. [employer] NGS JAK2 panel negative.   9/5/2019- hemoglobin 16.1, hematocrit 47.4.  7/8/21 Hgb 16.3, Hct 47.6  11/11/21: Follow up appt. Hgb 16.6, Hct 46.3. Stable    Thrombocytopenia diagnosis established in October 2014  The patient presented to [facility] on August 19, 2014 where was admitted through 08/22/2014 with complaints of chest pain and shortness of air.  It was noted that he had thrombocytopenia on admission with the platelet count of 125,000.  His platelet count slowly fell to 92,000 and it discharge with  12,000.  PT 11.9, INR 1.1, PTT 35.5.  A thrombocytopenia workup was performed that showed a folate of greater than 24.8 (H), and vitamin B12 331(N), , CMV, IgM was negative and EBV IgM was positive.  TAMIA screen is negative.  Platelet antibody screen is negative.  His PF4 was negative at 0.035.  It was noted that in the past, he has had some slightly low platelet counts in 125-140 range.    9/8/14 - platelet count 265,000.  EBV IgG positive, EBV IgM negative.  10/7/14 - platelet count 101,000.  11/17/14 - reviewed medication list for thrombocytopenia.  Metoprolol was known to cause thrombocytopenia purpura.  Lisinopril was known to rarely cause thrombocytopenia and less than 0.01% of the patients.  Plavix was known to cause thrombotic thrombocytopenia purpura (TTP) and thrombocytopenia and less than 1% of the patients.  Crestor was known to cause TTP and thrombocytopenia.  9/5/2019-patient reports moderate alcohol use-16 ounces beer 3 times per week.  Platelets 116  11/11/21: Follow up appt. Platelets 129, stable. No bleeding    Elevated LFTs and liver mass diagnosis established in September 2014.  9/8/14 - ALT 52 (H), AST 29 (N),   9/10/14 - advised the patient to increase his hydration and we’ll order a CT scan of the abdomen to be done for further evaluation of the elevated LFTs.  9/16/14 - ALT 49 (H), AST 29 (N)  9/23/14 - CT of the abdomen showed no evidence of renal mass.  There was an approximately 2 cm non-cystic appearing hypodense lesion on the posterior medial aspect of the right hepatic lobe.  This was in close proximity to the upper pole of the right kidney.  This could conceivably represent the mass seen on ultrasound study.  However it was definitely raising from the liver and not the upper pole of the right kidney.  10/7/14 - Will order a MRI for evaluation of the liver mass as well as lab work for further evaluation of the elevated LFTs.  10/7/14 - Hepatitis screening negative, A1AT 133 (N),  AFP 9.2 (H), ceruloplasmin 22 (N), ferritin 133.9 (N),   10/13/14 - MRI of the abdomen showed liver lesion within the posterior right lobe of the liver is most consistent with hemangioma   8/17/15 - ALT 38 (N), AST 26 (N), alkaline phosphatase 26 (N). Elevated LFT’s resolved.   3/8/16 - AFP 8 (0-9).    5/5/17 - CMP revealed an AST of 32 normal (15-41), ALT 55 normal (17-63), creatinine 1.4 high (0.7-1.2) and BUN 32 high (8-20). Otherwise within normal limits.      11/9/17 - Patient claims his PCP has recommended evaluation for his high liver enzymes by a liver doctor, but he has not followed up on it.   7/6/21 CMP drawn  11/11/21:  Follow up appt. CMP drawn      3/22/22: Patient here for follow up appt. Patient is currently on Coumadin 8mg po daily with INR of 2.5 today. No bleeding issues.     Past Medical History:   Diagnosis Date    Cardiomyopathy 2012    CVA (cerebral vascular accident) 2012    Hyperlipemia 2012    Hypertension 2012       Past Surgical History:   Procedure Laterality Date    TURP / TRANSURETHRAL INCISION / DRAINAGE PROSTATE  2016         Current Outpatient Medications:     atorvastatin (LIPITOR) 20 MG tablet, ATORVASTATIN CALCIUM 20 MG TABS, Disp: , Rfl:     clopidogrel (PLAVIX) 75 MG tablet, Take 1 tablet by mouth Daily., Disp: , Rfl:     folic acid-pyridoxine-cyanocobalamin (FOLBIC) 2.5-25-2 MG tablet tablet, Take 1 tablet by mouth Daily., Disp: 30 each, Rfl: 5    hydrALAZINE (APRESOLINE) 10 MG tablet, HYDRALAZINE HCL 10 MG TABS, Disp: , Rfl:     lisinopril-hydrochlorothiazide (PRINZIDE,ZESTORETIC) 20-12.5 MG per tablet, LISINOPRIL-HYDROCHLOROTHIAZIDE 20-12.5 MG TABS, Disp: , Rfl:     metoprolol tartrate (LOPRESSOR) 25 MG tablet, METOPROLOL TARTRATE 25 MG TABS, Disp: , Rfl:     warfarin (COUMADIN) 1 MG tablet, Take 3 tablets with 5 mg tablet (8 mg) on Monday/Wednesday/Friday and 2 tablets with 5 mg tablet (7mg) all other days, Disp: 225 tablet, Rfl: 1    warfarin (COUMADIN) 5 MG tablet, Take  1.5 tablets daily or as directed based on your INR., Disp: 135 tablet, Rfl: 3    warfarin (COUMADIN) 5 MG tablet, Take 1 tablet by mouth Every Night., Disp: 90 tablet, Rfl: 1    No Known Allergies    Family History   Problem Relation Age of Onset    Lung cancer Mother        Cancer-related family history includes Lung cancer in his mother.    Social History     Tobacco Use    Smoking status: Never    Smokeless tobacco: Current     Types: Chew   Substance Use Topics    Alcohol use: Yes    Drug use: No     I have reviewed and confirmed the accuracy of the patient's history: Chief complaint, HPI, ROS, and Subjective as entered by the MA/LPN/RN.  01/23/25      SUBJECTIVE:  Jett is here today for his routine 6-month follow-up.  He reports that he is doing well.  He is followed by the warfarin clinic and states that his INRs are mostly in range.  He denies any signs or symptoms of bleeding.  Denies any signs or symptoms of recurrent clot.  He remains on his Folgard.  He continues to follow with dermatology.        REVIEW OF SYSTEMS:    Review of Systems   Constitutional:  Negative for activity change, appetite change, chills, fatigue and fever.   HENT:  Negative for ear pain, mouth sores, nosebleeds, sinus pressure, sinus pain, sneezing, sore throat, tinnitus and trouble swallowing.    Eyes:  Negative for photophobia and visual disturbance.   Respiratory:  Negative for apnea, cough, choking, chest tightness, shortness of breath, wheezing and stridor.    Cardiovascular:  Negative for chest pain, palpitations and leg swelling.   Gastrointestinal:  Negative for abdominal pain, anal bleeding, blood in stool, constipation, diarrhea, nausea and vomiting.   Endocrine: Negative for cold intolerance and heat intolerance.   Genitourinary:  Negative for decreased urine volume, dysuria, flank pain, frequency, genital sores, hematuria, penile discharge, penile pain, penile swelling, scrotal swelling, testicular pain and urgency.  "  Musculoskeletal:  Negative for back pain, gait problem, joint swelling, myalgias, neck pain and neck stiffness.   Skin:  Negative for color change, rash and wound.        Skin lesions on scalp-being treated by dermatology   Allergic/Immunologic: Negative.    Neurological:  Negative for dizziness, tremors, seizures, syncope, facial asymmetry, weakness, light-headedness, numbness and headaches.   Hematological:  Negative for adenopathy. Does not bruise/bleed easily.        No obvious bleeding   Psychiatric/Behavioral:  Negative for agitation, behavioral problems, confusion, decreased concentration, dysphoric mood, hallucinations, self-injury and sleep disturbance. The patient is not nervous/anxious.        OBJECTIVE:    Vitals:    01/23/25 1054   BP: 149/85   Pulse: 60   Temp: 98.4 °F (36.9 °C)   SpO2: 94%   Weight: 103 kg (228 lb)   Height: 170.2 cm (67.01\")   PainSc: 0-No pain       ECOG  (0) Fully active, able to carry on all predisease performance without restriction    Physical Exam   Constitutional: He is oriented to person, place, and time. No distress.   HENT:   Head: Normocephalic and atraumatic.   Eyes: Conjunctivae are normal. Right eye exhibits no discharge. Left eye exhibits no discharge. No scleral icterus.   Neck: No thyromegaly present.   Cardiovascular: Normal rate, regular rhythm and normal heart sounds. Exam reveals no gallop and no friction rub.   Pulmonary/Chest: Effort normal. No stridor. No respiratory distress. He has no wheezes.   Abdominal: Soft. Bowel sounds are normal. He exhibits no mass. There is no abdominal tenderness. There is no rebound and no guarding.   Musculoskeletal: Normal range of motion. No tenderness.   Lymphadenopathy:     He has no cervical adenopathy.   Neurological: He is alert and oriented to person, place, and time. He exhibits normal muscle tone.   Skin: Skin is warm. No rash noted. He is not diaphoretic. No erythema.   Psychiatric: His behavior is normal.   Nursing " note and vitals reviewed.    I have reexamined the patient and the results are consistent with the previously documented exam.         RECENT LABS    WBC   Date Value Ref Range Status   01/23/2025 9.49 3.40 - 10.80 10*3/mm3 Final     RBC   Date Value Ref Range Status   01/23/2025 5.22 4.14 - 5.80 10*6/mm3 Final     Hemoglobin   Date Value Ref Range Status   01/23/2025 16.3 13.0 - 17.7 g/dL Final     Hematocrit   Date Value Ref Range Status   01/23/2025 49.2 37.5 - 51.0 % Final     MCV   Date Value Ref Range Status   01/23/2025 94.3 79.0 - 97.0 fL Final     MCH   Date Value Ref Range Status   01/23/2025 31.2 26.6 - 33.0 pg Final     MCHC   Date Value Ref Range Status   01/23/2025 33.1 31.5 - 35.7 g/dL Final     RDW   Date Value Ref Range Status   01/23/2025 13.6 12.3 - 15.4 % Final     RDW-SD   Date Value Ref Range Status   01/23/2025 45.1 37.0 - 54.0 fl Final     MPV   Date Value Ref Range Status   01/23/2025 9.9 6.0 - 12.0 fL Final     Platelets   Date Value Ref Range Status   01/23/2025 123 (L) 140 - 450 10*3/mm3 Final     Neutrophil %   Date Value Ref Range Status   01/23/2025 77.8 (H) 42.7 - 76.0 % Final     Lymphocyte %   Date Value Ref Range Status   01/23/2025 12.2 (L) 19.6 - 45.3 % Final     Monocyte %   Date Value Ref Range Status   01/23/2025 8.1 5.0 - 12.0 % Final     Eosinophil %   Date Value Ref Range Status   01/23/2025 1.7 0.3 - 6.2 % Final     Basophil %   Date Value Ref Range Status   01/23/2025 0.2 0.0 - 1.5 % Final     Neutrophils, Absolute   Date Value Ref Range Status   01/23/2025 7.38 (H) 1.70 - 7.00 10*3/mm3 Final     Lymphocytes, Absolute   Date Value Ref Range Status   01/23/2025 1.16 0.70 - 3.10 10*3/mm3 Final     Monocytes, Absolute   Date Value Ref Range Status   01/23/2025 0.77 0.10 - 0.90 10*3/mm3 Final     Eosinophils, Absolute   Date Value Ref Range Status   01/23/2025 0.16 0.00 - 0.40 10*3/mm3 Final     Basophils, Absolute   Date Value Ref Range Status   01/23/2025 0.02 0.00 - 0.20  10*3/mm3 Final       Lab Results   Component Value Date    GLUCOSE 115 (H) 01/02/2025    BUN 25 (H) 01/02/2025    CREATININE 1.32 (H) 01/02/2025    EGFRIFNONA 66 11/11/2021    BCR 18.9 01/02/2025    K 4.3 01/02/2025    CO2 25.5 01/02/2025    CALCIUM 9.5 01/02/2025    ALBUMIN 4.0 01/02/2025    LABIL2 1.6 05/22/2018    AST 26 01/02/2025    ALT 34 01/02/2025       ASSESSMENT:    Bilateral Pulmonary emboli: Anticoagulation therapy.  Continues to follow-up in the Coumadin clinic  Chronic DVT of proximal vein of right lower extremity: Good cap refill, no pain, warmth, or skin color changes.  There is mild edema behind the right knee. Patient states that edema has been present since his DVT. Edema has not lessened or changed.  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  Continue Folgard  Heterozygous MTHFR mutation  CKD, stage III.  I have requested for his labs from [facility]  Elevated Factor VIII level: Factor VIII reviewed with patient, elevated. Reports no signs of DVT.  No shortness of breath, swelling, changes in skin color, pain or bleeding noted.   Polycythemia: CBC reviewed with patient Normal levels for Hgb and Hct.  CBC reviewed  Thrombocytopenia: Chronic: Stable at low 100s  Melanoma on chest and scalp:  Patient has been seen by Dr. Kapoor but would like to go to [facility].  Patient has multiple scalp lesions in various shapes and sizes that are yellowish and flaky.  Has flat dime sized macule on right lower leg that is brownish in color.  Patient denies any changes in color of macule. Patient does not know how long macule has been there.  Will consult dermatology, . Educated completed regarding use of hats and sunscreen.   Patient voiced understanding.  Continue to follow with dermatology.      PLANS:  INR reviewed follow-up in the Coumadin clinic  CBC reviewed today with the patient.  CMP ordered.  Continue Folgard  Follow-up 6 months with   Lucy or sooner if needed  Fasting homocystine level today  Dermatology consult placed.  Patient has a history of melanoma status post resection and also has a scalp lesion.  Patient is now established with [facility]illo [facility].  All questions answered, patient verbalized understanding and is agreeable to above plan.

## 2025-01-23 ENCOUNTER — LAB (OUTPATIENT)
Dept: LAB | Facility: HOSPITAL | Age: 75
End: 2025-01-23
Payer: MEDICARE

## 2025-01-23 ENCOUNTER — ANTICOAGULATION VISIT (OUTPATIENT)
Dept: PHARMACY | Facility: HOSPITAL | Age: 75
End: 2025-01-23
Payer: MEDICARE

## 2025-01-23 ENCOUNTER — OFFICE VISIT (OUTPATIENT)
Dept: ONCOLOGY | Facility: CLINIC | Age: 75
End: 2025-01-23
Payer: MEDICARE

## 2025-01-23 VITALS
DIASTOLIC BLOOD PRESSURE: 85 MMHG | WEIGHT: 228 LBS | OXYGEN SATURATION: 94 % | TEMPERATURE: 98.4 F | HEIGHT: 67 IN | HEART RATE: 60 BPM | SYSTOLIC BLOOD PRESSURE: 149 MMHG | BODY MASS INDEX: 35.79 KG/M2

## 2025-01-23 DIAGNOSIS — Z79.01 LONG TERM (CURRENT) USE OF ANTICOAGULANTS: ICD-10-CM

## 2025-01-23 DIAGNOSIS — Z86.718 PERSONAL HISTORY OF OTHER VENOUS THROMBOSIS AND EMBOLISM: ICD-10-CM

## 2025-01-23 DIAGNOSIS — I26.99 PULMONARY EMBOLISM, OTHER, UNSPECIFIED CHRONICITY, UNSPECIFIED WHETHER ACUTE COR PULMONALE PRESENT: ICD-10-CM

## 2025-01-23 DIAGNOSIS — I26.99 PULMONARY EMBOLISM, OTHER, UNSPECIFIED CHRONICITY, UNSPECIFIED WHETHER ACUTE COR PULMONALE PRESENT: Primary | ICD-10-CM

## 2025-01-23 DIAGNOSIS — Z15.89 HETEROZYGOUS MTHFR MUTATION C677T: Primary | ICD-10-CM

## 2025-01-23 DIAGNOSIS — Z15.89 HETEROZYGOUS MTHFR MUTATION C677T: ICD-10-CM

## 2025-01-23 LAB
ALBUMIN SERPL-MCNC: 4.2 G/DL (ref 3.5–5.2)
ALBUMIN/GLOB SERPL: 1.7 G/DL
ALP SERPL-CCNC: 111 U/L (ref 39–117)
ALT SERPL W P-5'-P-CCNC: 37 U/L (ref 1–41)
ANION GAP SERPL CALCULATED.3IONS-SCNC: 10 MMOL/L (ref 5–15)
AST SERPL-CCNC: 31 U/L (ref 1–40)
BASOPHILS # BLD AUTO: 0.02 10*3/MM3 (ref 0–0.2)
BASOPHILS NFR BLD AUTO: 0.2 % (ref 0–1.5)
BILIRUB SERPL-MCNC: 0.8 MG/DL (ref 0–1.2)
BUN SERPL-MCNC: 29 MG/DL (ref 8–23)
BUN/CREAT SERPL: 16.3 (ref 7–25)
CALCIUM SPEC-SCNC: 9.7 MG/DL (ref 8.6–10.5)
CHLORIDE SERPL-SCNC: 107 MMOL/L (ref 98–107)
CO2 SERPL-SCNC: 29 MMOL/L (ref 22–29)
CREAT SERPL-MCNC: 1.78 MG/DL (ref 0.76–1.27)
DEPRECATED RDW RBC AUTO: 45.1 FL (ref 37–54)
EGFRCR SERPLBLD CKD-EPI 2021: 39.5 ML/MIN/1.73
EOSINOPHIL # BLD AUTO: 0.16 10*3/MM3 (ref 0–0.4)
EOSINOPHIL NFR BLD AUTO: 1.7 % (ref 0.3–6.2)
ERYTHROCYTE [DISTWIDTH] IN BLOOD BY AUTOMATED COUNT: 13.6 % (ref 12.3–15.4)
GLOBULIN UR ELPH-MCNC: 2.5 GM/DL
GLUCOSE SERPL-MCNC: 123 MG/DL (ref 65–99)
HCT VFR BLD AUTO: 49.2 % (ref 37.5–51)
HCYS SERPL-MCNC: 12.9 UMOL/L (ref 0–15)
HGB BLD-MCNC: 16.3 G/DL (ref 13–17.7)
HOLD SPECIMEN: NORMAL
INR PPP: 2.5 (ref 0.8–1.2)
LYMPHOCYTES # BLD AUTO: 1.16 10*3/MM3 (ref 0.7–3.1)
LYMPHOCYTES NFR BLD AUTO: 12.2 % (ref 19.6–45.3)
MCH RBC QN AUTO: 31.2 PG (ref 26.6–33)
MCHC RBC AUTO-ENTMCNC: 33.1 G/DL (ref 31.5–35.7)
MCV RBC AUTO: 94.3 FL (ref 79–97)
MONOCYTES # BLD AUTO: 0.77 10*3/MM3 (ref 0.1–0.9)
MONOCYTES NFR BLD AUTO: 8.1 % (ref 5–12)
NEUTROPHILS NFR BLD AUTO: 7.38 10*3/MM3 (ref 1.7–7)
NEUTROPHILS NFR BLD AUTO: 77.8 % (ref 42.7–76)
PLATELET # BLD AUTO: 123 10*3/MM3 (ref 140–450)
PMV BLD AUTO: 9.9 FL (ref 6–12)
POTASSIUM SERPL-SCNC: 4.1 MMOL/L (ref 3.5–5.2)
PROT SERPL-MCNC: 6.7 G/DL (ref 6–8.5)
RBC # BLD AUTO: 5.22 10*6/MM3 (ref 4.14–5.8)
SODIUM SERPL-SCNC: 146 MMOL/L (ref 136–145)
WBC NRBC COR # BLD AUTO: 9.49 10*3/MM3 (ref 3.4–10.8)
WHOLE BLOOD HOLD COAG: NORMAL

## 2025-01-23 PROCEDURE — 80053 COMPREHEN METABOLIC PANEL: CPT | Performed by: NURSE PRACTITIONER

## 2025-01-23 PROCEDURE — 85610 PROTHROMBIN TIME: CPT

## 2025-01-23 PROCEDURE — 83090 ASSAY OF HOMOCYSTEINE: CPT | Performed by: NURSE PRACTITIONER

## 2025-01-23 PROCEDURE — 36415 COLL VENOUS BLD VENIPUNCTURE: CPT

## 2025-01-23 PROCEDURE — 85025 COMPLETE CBC W/AUTO DIFF WBC: CPT

## 2025-01-23 NOTE — PROGRESS NOTES
Anticoagulation Clinic Progress Note    Patient's visit was held in office today.    Anticoagulation Summary  As of 2025      INR goal:  2.0-3.0   TTR:  59.5% (5.5 y)   INR used for dosin.50 (2025)   Warfarin maintenance plan:  6 mg (5 mg x 1 and 1 mg x 1) every day   Weekly warfarin total:  42 mg   No change documented:  Katie Whitlock RPH   Plan last modified:  Katie Whitlock RPH (2025)   Next INR check:  2025   Priority:  Maintenance   Target end date:  --             Anticoagulation Episode Summary       INR check location:  Clinic Lab    Preferred lab:  --    Send INR reminders to:   [facility] CBC ANTICOAG POOL    Comments:  --            Clinic Interview:  Patient Findings     Negatives:  Signs/symptoms of thrombosis, Signs/symptoms of bleeding,   Laboratory test error suspected, Change in health, Change in alcohol use,   Change in activity, Upcoming invasive procedure, Emergency department   visit, Upcoming dental procedure, Missed doses, Extra doses, Change in   medications, Change in diet/appetite, Hospital admission, Bruising, Other   complaints      Clinical Outcomes     Negatives:  Major bleeding event, Thromboembolic event,   Anticoagulation-related hospital admission, Anticoagulation-related ED   visit, Anticoagulation-related fatality        INR History:      Latest Ref Rng & Units 2025    11:00 AM 2025    11:03 AM 2025    11:19 AM 2025    10:38 AM 2025    10:45 AM 2025    11:00 AM 2025    11:11 AM   Anticoagulation Monitoring   INR  4.00    2.10 2.50    INR Date  2025    INR Goal  2.0-3.0    2.0-3.0 2.0-3.0    Trend  Down    Same Same    Last Week Total  49 mg    30 mg 42 mg    Next Week Total  30 mg    42 mg 42 mg    Sun  6 mg    6 mg 6 mg    Mon  6 mg    6 mg 6 mg    Tue  6 mg    6 mg 6 mg    Wed  6 mg    6 mg 6 mg    Thu  Hold ()    6 mg 6 mg    Fri  Hold (1/3)    6 mg 6 mg    Sat  6 mg    6 mg 6 mg     Historical INR 0.80 - 1.20  4.00  3.91  2.10    2.50    Visit Report       Report Report       Plan:  1. INR is Therapeutic today- see above in Anticoagulation Summary.  Will instruct Jett Doe to Continue their warfarin regimen- see above in Anticoagulation Summary.  2. Follow up in 1 month  3. Patient declines warfarin refills.  4. Verbal and written information provided. Patient expresses understanding and has no further questions at this time.    Katie Whitlock [facility]

## 2025-01-24 ENCOUNTER — TELEPHONE (OUTPATIENT)
Dept: ONCOLOGY | Facility: CLINIC | Age: 75
End: 2025-01-24
Payer: MEDICARE

## 2025-01-24 NOTE — TELEPHONE ENCOUNTER
----- Message from Rosalee LEHMAN sent at 1/23/2025  5:27 PM EST -----    ----- Message -----  From: Sharla Damon APRN  Sent: 1/23/2025   5:16 PM EST  To: Rosalee Conrad MA; Susana Oakley RN    His kidney function is a bit decreased from his normal baseline.  Please ask him to hydrate making sure that he is drinking 64 ounces of fluid a day and avoid NSAIDs.  Recheck a BMP early next week.  This can be faxed to his PCP or a nephrologist if he has one.  
Called pt to inform him of lab results.  Left a VM for him to return call to get a lab appt scheduled for early next week (week of 01/27/25) to check BMP.  
What Is The Reason For Today's Visit?: Full Body Skin Examination
What Is The Reason For Today's Visit? (Being Monitored For X): the re-examination of lesions previously examined

## 2025-02-03 ENCOUNTER — APPOINTMENT (OUTPATIENT)
Dept: CT IMAGING | Facility: HOSPITAL | Age: 75
DRG: 683 | End: 2025-02-03
Payer: MEDICARE

## 2025-02-03 ENCOUNTER — HOSPITAL ENCOUNTER (INPATIENT)
Facility: HOSPITAL | Age: 75
LOS: 5 days | Discharge: HOME | DRG: 683 | End: 2025-02-08
Attending: INTERNAL MEDICINE | Admitting: INTERNAL MEDICINE
Payer: MEDICARE

## 2025-02-03 DIAGNOSIS — N17.9 ACUTE RENAL FAILURE, UNSPECIFIED ACUTE RENAL FAILURE TYPE: Primary | ICD-10-CM

## 2025-02-03 LAB
ALBUMIN SERPL-MCNC: 3.5 G/DL (ref 3.5–5.2)
ALBUMIN/GLOB SERPL: 1.1 G/DL
ALP SERPL-CCNC: 93 U/L (ref 39–117)
ALT SERPL W P-5'-P-CCNC: 27 U/L (ref 1–41)
ANION GAP SERPL CALCULATED.3IONS-SCNC: 19.8 MMOL/L (ref 5–15)
ANION GAP SERPL CALCULATED.3IONS-SCNC: 21.8 MMOL/L (ref 5–15)
AST SERPL-CCNC: 26 U/L (ref 1–40)
BACTERIA UR QL AUTO: NORMAL /HPF
BASOPHILS # BLD AUTO: 0.04 10*3/MM3 (ref 0–0.2)
BASOPHILS NFR BLD AUTO: 0.4 % (ref 0–1.5)
BILIRUB SERPL-MCNC: 0.4 MG/DL (ref 0–1.2)
BILIRUB UR QL STRIP: NEGATIVE
BUN SERPL-MCNC: 118 MG/DL (ref 8–23)
BUN SERPL-MCNC: 127 MG/DL (ref 8–23)
BUN/CREAT SERPL: 8.6 (ref 7–25)
BUN/CREAT SERPL: 9.5 (ref 7–25)
CALCIUM SPEC-SCNC: 9.2 MG/DL (ref 8.6–10.5)
CALCIUM SPEC-SCNC: 9.3 MG/DL (ref 8.6–10.5)
CHLORIDE SERPL-SCNC: 101 MMOL/L (ref 98–107)
CHLORIDE SERPL-SCNC: 106 MMOL/L (ref 98–107)
CLARITY UR: CLEAR
CO2 SERPL-SCNC: 15.2 MMOL/L (ref 22–29)
CO2 SERPL-SCNC: 19.2 MMOL/L (ref 22–29)
COLOR UR: YELLOW
CREAT SERPL-MCNC: 12.4 MG/DL (ref 0.76–1.27)
CREAT SERPL-MCNC: 14.8 MG/DL (ref 0.76–1.27)
DEPRECATED RDW RBC AUTO: 40.6 FL (ref 37–54)
EGFRCR SERPLBLD CKD-EPI 2021: 3.1 ML/MIN/1.73
EGFRCR SERPLBLD CKD-EPI 2021: 3.8 ML/MIN/1.73
EOSINOPHIL # BLD AUTO: 0.12 10*3/MM3 (ref 0–0.4)
EOSINOPHIL NFR BLD AUTO: 1.1 % (ref 0.3–6.2)
ERYTHROCYTE [DISTWIDTH] IN BLOOD BY AUTOMATED COUNT: 12.4 % (ref 12.3–15.4)
GLOBULIN UR ELPH-MCNC: 3.2 GM/DL
GLUCOSE SERPL-MCNC: 114 MG/DL (ref 65–99)
GLUCOSE SERPL-MCNC: 114 MG/DL (ref 65–99)
GLUCOSE UR STRIP-MCNC: NEGATIVE MG/DL
HCT VFR BLD AUTO: 41.8 % (ref 37.5–51)
HGB BLD-MCNC: 14.3 G/DL (ref 13–17.7)
HGB UR QL STRIP.AUTO: ABNORMAL
HYALINE CASTS UR QL AUTO: NORMAL /LPF
IMM GRANULOCYTES # BLD AUTO: 0.09 10*3/MM3 (ref 0–0.05)
IMM GRANULOCYTES NFR BLD AUTO: 0.8 % (ref 0–0.5)
INR PPP: 4.4 (ref 2–3)
KETONES UR QL STRIP: NEGATIVE
LEUKOCYTE ESTERASE UR QL STRIP.AUTO: NEGATIVE
LIPASE SERPL-CCNC: 100 U/L (ref 13–60)
LYMPHOCYTES # BLD AUTO: 0.61 10*3/MM3 (ref 0.7–3.1)
LYMPHOCYTES NFR BLD AUTO: 5.7 % (ref 19.6–45.3)
MCH RBC QN AUTO: 30.8 PG (ref 26.6–33)
MCHC RBC AUTO-ENTMCNC: 34.2 G/DL (ref 31.5–35.7)
MCV RBC AUTO: 89.9 FL (ref 79–97)
MONOCYTES # BLD AUTO: 0.88 10*3/MM3 (ref 0.1–0.9)
MONOCYTES NFR BLD AUTO: 8.2 % (ref 5–12)
NEUTROPHILS NFR BLD AUTO: 8.94 10*3/MM3 (ref 1.7–7)
NEUTROPHILS NFR BLD AUTO: 83.8 % (ref 42.7–76)
NITRITE UR QL STRIP: NEGATIVE
NRBC BLD AUTO-RTO: 0 /100 WBC (ref 0–0.2)
PH UR STRIP.AUTO: 5.5 [PH] (ref 5–8)
PLATELET # BLD AUTO: 161 10*3/MM3 (ref 140–450)
PMV BLD AUTO: 9.7 FL (ref 6–12)
POTASSIUM SERPL-SCNC: 4.8 MMOL/L (ref 3.5–5.2)
POTASSIUM SERPL-SCNC: 5.3 MMOL/L (ref 3.5–5.2)
PROT SERPL-MCNC: 6.7 G/DL (ref 6–8.5)
PROT UR QL STRIP: ABNORMAL
PROTHROMBIN TIME: 41.6 SECONDS (ref 19.4–28.5)
RBC # BLD AUTO: 4.65 10*6/MM3 (ref 4.14–5.8)
RBC # UR STRIP: NORMAL /HPF
REF LAB TEST METHOD: NORMAL
SODIUM SERPL-SCNC: 138 MMOL/L (ref 136–145)
SODIUM SERPL-SCNC: 145 MMOL/L (ref 136–145)
SP GR UR STRIP: 1.01 (ref 1–1.03)
SQUAMOUS #/AREA URNS HPF: NORMAL /HPF
UROBILINOGEN UR QL STRIP: ABNORMAL
WBC # UR STRIP: NORMAL /HPF
WBC NRBC COR # BLD AUTO: 10.68 10*3/MM3 (ref 3.4–10.8)

## 2025-02-03 PROCEDURE — 85610 PROTHROMBIN TIME: CPT

## 2025-02-03 PROCEDURE — 81001 URINALYSIS AUTO W/SCOPE: CPT

## 2025-02-03 PROCEDURE — 99285 EMERGENCY DEPT VISIT HI MDM: CPT

## 2025-02-03 PROCEDURE — 36415 COLL VENOUS BLD VENIPUNCTURE: CPT

## 2025-02-03 PROCEDURE — 51702 INSERT TEMP BLADDER CATH: CPT

## 2025-02-03 PROCEDURE — 74176 CT ABD & PELVIS W/O CONTRAST: CPT

## 2025-02-03 PROCEDURE — 83690 ASSAY OF LIPASE: CPT

## 2025-02-03 PROCEDURE — 93005 ELECTROCARDIOGRAM TRACING: CPT | Performed by: INTERNAL MEDICINE

## 2025-02-03 PROCEDURE — 93010 ELECTROCARDIOGRAM REPORT: CPT | Performed by: INTERNAL MEDICINE

## 2025-02-03 PROCEDURE — 85025 COMPLETE CBC W/AUTO DIFF WBC: CPT

## 2025-02-03 PROCEDURE — 80053 COMPREHEN METABOLIC PANEL: CPT

## 2025-02-03 RX ORDER — ONDANSETRON 4 MG/1
4 TABLET, ORALLY DISINTEGRATING ORAL EVERY 6 HOURS PRN
Status: DISCONTINUED | OUTPATIENT
Start: 2025-02-03 | End: 2025-02-08 | Stop reason: HOSPADM

## 2025-02-03 RX ORDER — DILTIAZEM HYDROCHLORIDE 5 MG/ML
10 INJECTION INTRAVENOUS ONCE
Status: COMPLETED | OUTPATIENT
Start: 2025-02-04 | End: 2025-02-03

## 2025-02-03 RX ORDER — ACETAMINOPHEN 650 MG/1
650 SUPPOSITORY RECTAL EVERY 4 HOURS PRN
Status: DISCONTINUED | OUTPATIENT
Start: 2025-02-03 | End: 2025-02-08 | Stop reason: HOSPADM

## 2025-02-03 RX ORDER — SODIUM CHLORIDE 9 MG/ML
40 INJECTION, SOLUTION INTRAVENOUS AS NEEDED
Status: DISCONTINUED | OUTPATIENT
Start: 2025-02-03 | End: 2025-02-08 | Stop reason: HOSPADM

## 2025-02-03 RX ORDER — ACETAMINOPHEN 160 MG/5ML
650 SOLUTION ORAL EVERY 4 HOURS PRN
Status: DISCONTINUED | OUTPATIENT
Start: 2025-02-03 | End: 2025-02-08 | Stop reason: HOSPADM

## 2025-02-03 RX ORDER — SODIUM CHLORIDE 0.9 % (FLUSH) 0.9 %
10 SYRINGE (ML) INJECTION EVERY 12 HOURS SCHEDULED
Status: DISCONTINUED | OUTPATIENT
Start: 2025-02-03 | End: 2025-02-08 | Stop reason: HOSPADM

## 2025-02-03 RX ORDER — AMOXICILLIN 250 MG
2 CAPSULE ORAL 2 TIMES DAILY PRN
Status: DISCONTINUED | OUTPATIENT
Start: 2025-02-03 | End: 2025-02-08 | Stop reason: HOSPADM

## 2025-02-03 RX ORDER — SODIUM CHLORIDE 0.9 % (FLUSH) 0.9 %
10 SYRINGE (ML) INJECTION AS NEEDED
Status: DISCONTINUED | OUTPATIENT
Start: 2025-02-03 | End: 2025-02-08 | Stop reason: HOSPADM

## 2025-02-03 RX ORDER — BISACODYL 5 MG/1
5 TABLET, DELAYED RELEASE ORAL DAILY PRN
Status: DISCONTINUED | OUTPATIENT
Start: 2025-02-03 | End: 2025-02-08 | Stop reason: HOSPADM

## 2025-02-03 RX ORDER — ALUMINA, MAGNESIA, AND SIMETHICONE 2400; 2400; 240 MG/30ML; MG/30ML; MG/30ML
15 SUSPENSION ORAL EVERY 6 HOURS PRN
Status: DISCONTINUED | OUTPATIENT
Start: 2025-02-03 | End: 2025-02-08 | Stop reason: HOSPADM

## 2025-02-03 RX ORDER — ACETAMINOPHEN 325 MG/1
650 TABLET ORAL EVERY 4 HOURS PRN
Status: DISCONTINUED | OUTPATIENT
Start: 2025-02-03 | End: 2025-02-08 | Stop reason: HOSPADM

## 2025-02-03 RX ORDER — BISACODYL 10 MG
10 SUPPOSITORY, RECTAL RECTAL DAILY PRN
Status: DISCONTINUED | OUTPATIENT
Start: 2025-02-03 | End: 2025-02-08 | Stop reason: HOSPADM

## 2025-02-03 RX ORDER — FUROSEMIDE 10 MG/ML
20 INJECTION INTRAMUSCULAR; INTRAVENOUS EVERY 12 HOURS
Status: DISCONTINUED | OUTPATIENT
Start: 2025-02-03 | End: 2025-02-03

## 2025-02-03 RX ORDER — NITROGLYCERIN 0.4 MG/1
0.4 TABLET SUBLINGUAL
Status: DISCONTINUED | OUTPATIENT
Start: 2025-02-03 | End: 2025-02-08 | Stop reason: HOSPADM

## 2025-02-03 RX ORDER — ONDANSETRON 2 MG/ML
4 INJECTION INTRAMUSCULAR; INTRAVENOUS EVERY 6 HOURS PRN
Status: DISCONTINUED | OUTPATIENT
Start: 2025-02-03 | End: 2025-02-08 | Stop reason: HOSPADM

## 2025-02-03 RX ORDER — POLYETHYLENE GLYCOL 3350 17 G/17G
17 POWDER, FOR SOLUTION ORAL DAILY PRN
Status: DISCONTINUED | OUTPATIENT
Start: 2025-02-03 | End: 2025-02-08 | Stop reason: HOSPADM

## 2025-02-03 RX ADMIN — Medication 10 ML: at 20:30

## 2025-02-03 RX ADMIN — DILTIAZEM HYDROCHLORIDE 10 MG: 5 INJECTION INTRAVENOUS at 23:53

## 2025-02-03 RX ADMIN — SODIUM BICARBONATE 75 MEQ: 84 INJECTION INTRAVENOUS at 18:01

## 2025-02-03 NOTE — ED NOTES
Pt here for suprapubic abdominal pain on R side for 2 weeks. Pt states he has injured that area before & had previous hernia. Pt states pain is not getting better. Pt states he's having trouble urinating & having bowel movements.  Pt placed in gown on monitor.   Stretcher in low position

## 2025-02-03 NOTE — ED NOTES
RN attempted to bladder scan pt. RN observed pt bladder appeared large with different color fluid observed. Provider notified. Machine unable to give accurate volume.

## 2025-02-03 NOTE — Clinical Note
Level of Care: Progressive Care [20]   Diagnosis: AMADEO (acute kidney injury) [[id number]]   Certification: I Certify That Inpatient Hospital Services Are Medically Necessary For Greater Than 2 Midnights

## 2025-02-03 NOTE — ED NOTES
Patient evaluated by provider and will return to waiting room with Intravenous line in place.  Patient has been instructed not to inject anything into IV, or leave premises with line in place. Patient pending further evaluation, treatment, testing / monitoring.      Pt given urine cup and instructed to give UA when possible

## 2025-02-03 NOTE — ED PROVIDER NOTES
Subjective     Provider in Triage Note  Due to significant overcrowding in the emergency department patient was initially seen and evaluated in triage.  Provider in triage recommended patient placement in the treatment area to initiate therapy and movement to an ER bed as soon as possible.    Patient is a 74-year-old male who presents by private vehicle with a known history of CVA, hyperlipidemia and hypertension he states for the last 2 weeks he has had increasing abdominal discomfort, constipation, decreased p.o. intake no nausea or vomiting he is also having complaints of penile swelling and leakage.     History of Present Illness  Patient was seen by PIT provider.  I have read and reviewed PIT providers note and agree with Providers HPI with the addition of pain in the umbilical area, and states he has a hernia there and has been doing some heavy lifting at work recently.        Review of Systems   Constitutional:  Negative for fever.       Past Medical History:   Diagnosis Date    Cardiomyopathy 2012    CVA (cerebral vascular accident) 2012    Hyperlipemia 2012    Hypertension 2012       No Known Allergies    Past Surgical History:   Procedure Laterality Date    TURP / TRANSURETHRAL INCISION / DRAINAGE PROSTATE  2016       Family History   Problem Relation Age of Onset    Lung cancer Mother        Social History     Socioeconomic History    Marital status: Single   Tobacco Use    Smoking status: Never    Smokeless tobacco: Current     Types: Chew   Substance and Sexual Activity    Alcohol use: Yes    Drug use: No    Sexual activity: Defer           Objective   Physical Exam  Vitals and nursing note reviewed. Exam conducted with a chaperone present.   Constitutional:       Appearance: He is well-developed.   HENT:      Head: Normocephalic.      Mouth/Throat:      Mouth: Mucous membranes are moist.   Eyes:      Extraocular Movements: Extraocular movements intact.   Cardiovascular:      Rate and Rhythm: Normal  "rate and regular rhythm.      Heart sounds: Normal heart sounds.   Pulmonary:      Effort: Pulmonary effort is normal.      Breath sounds: Normal breath sounds. No wheezing.   Abdominal:      General: Abdomen is protuberant. There is distension.      Palpations: There is no mass.      Tenderness: There is abdominal tenderness in the periumbilical area and suprapubic area.      Hernia: Hernia is present in the umbilical area.   Genitourinary:     Penis: Normal. No tenderness, discharge or swelling.       Testes: Normal. Cremasteric reflex is present.         Right: Tenderness or swelling not present.         Left: Tenderness or swelling not present.      Comments: No swelling noted to penis or testicles.  No discoloration of the testicles.  No tenderness noted to penis or testicles.    JEREMY Carver present.  Lymphadenopathy:      Lower Body: No right inguinal adenopathy. No left inguinal adenopathy.   Skin:     General: Skin is warm.      Capillary Refill: Capillary refill takes less than 2 seconds.   Neurological:      General: No focal deficit present.      Mental Status: He is alert and oriented to person, place, and time.   Psychiatric:         Mood and Affect: Mood normal.         Behavior: Behavior normal.         Procedures           ED Course  ED Course as of 02/03/25 1631   Mon Feb 03, 2025   1526 Creatinine(!): 14.80 []   1526 Consult placed to nephrology []   1526 Lipase(!): 100 []   1526 Potassium(!): 5.3 []      ED Course User Index  [] Yesi Isaacs, SOLOMON              /91   Pulse 90   Temp 97.9 °F (36.6 °C) (Oral)   Resp 21   Ht 170.2 cm (67\")   Wt 108 kg (237 lb 3.4 oz)   SpO2 95%   BMI 37.15 kg/m²   Labs Reviewed   COMPREHENSIVE METABOLIC PANEL - Abnormal; Notable for the following components:       Result Value    Glucose 114 (*)      (*)     Creatinine 14.80 (*)     Potassium 5.3 (*)     CO2 15.2 (*)     Anion Gap 21.8 (*)     eGFR 3.1 (*)     All other components " within normal limits    Narrative:     GFR Categories in Chronic Kidney Disease (CKD)      GFR Category          GFR (mL/min/1.73)    Interpretation  G1                     90 or greater         Normal or high (1)  G2                      60-89                Mild decrease (1)  G3a                   45-59                Mild to moderate decrease  G3b                   30-44                Moderate to severe decrease  G4                    15-29                Severe decrease  G5                    14 or less           Kidney failure          (1)In the absence of evidence of kidney disease, neither GFR category G1 or G2 fulfill the criteria for CKD.    eGFR calculation 2021 CKD-EPI creatinine equation, which does not include race as a factor   LIPASE - Abnormal; Notable for the following components:    Lipase 100 (*)     All other components within normal limits   URINALYSIS W/ CULTURE IF INDICATED - Abnormal; Notable for the following components:    Blood, UA Moderate (2+) (*)     Protein, UA 30 mg/dL (1+) (*)     All other components within normal limits    Narrative:     In absence of clinical symptoms, the presence of pyuria, bacteria, and/or nitrites on the urinalysis result does not correlate with infection.   PROTIME-INR - Abnormal; Notable for the following components:    Protime 41.6 (*)     INR 4.40 (*)     All other components within normal limits   CBC WITH AUTO DIFFERENTIAL - Abnormal; Notable for the following components:    Neutrophil % 83.8 (*)     Lymphocyte % 5.7 (*)     Immature Grans % 0.8 (*)     Neutrophils, Absolute 8.94 (*)     Lymphocytes, Absolute 0.61 (*)     Immature Grans, Absolute 0.09 (*)     All other components within normal limits   URINALYSIS, MICROSCOPIC ONLY   BASIC METABOLIC PANEL   CBC AND DIFFERENTIAL    Narrative:     The following orders were created for panel order CBC & Differential.  Procedure                               Abnormality         Status                      ---------                               -----------         ------                     CBC Auto Differential[[id number]]        Abnormal            Final result                 Please view results for these tests on the individual orders.     Medications   sodium chloride 0.9 % flush 10 mL (has no administration in time range)   sodium chloride 0.9 % flush 10 mL (has no administration in time range)   sodium chloride 0.9 % flush 10 mL (has no administration in time range)   sodium chloride 0.9 % infusion 40 mL (has no administration in time range)   nitroglycerin (NITROSTAT) SL tablet 0.4 mg (has no administration in time range)   acetaminophen (TYLENOL) tablet 650 mg (has no administration in time range)     Or   acetaminophen (TYLENOL) 160 MG/5ML oral solution 650 mg (has no administration in time range)     Or   acetaminophen (TYLENOL) suppository 650 mg (has no administration in time range)   sennosides-docusate (PERICOLACE) 8.6-50 MG per tablet 2 tablet (has no administration in time range)     And   polyethylene glycol (MIRALAX) packet 17 g (has no administration in time range)     And   bisacodyl (DULCOLAX) EC tablet 5 mg (has no administration in time range)     And   bisacodyl (DULCOLAX) suppository 10 mg (has no administration in time range)   melatonin tablet 5 mg (has no administration in time range)   ondansetron ODT (ZOFRAN-ODT) disintegrating tablet 4 mg (has no administration in time range)     Or   ondansetron (ZOFRAN) injection 4 mg (has no administration in time range)   aluminum-magnesium hydroxide-simethicone (MAALOX MAX) 400-400-40 MG/5ML suspension 15 mL (has no administration in time range)   sodium bicarbonate 8.4 % 75 mEq in sodium chloride 0.45 % IVPB (has no administration in time range)   furosemide (LASIX) injection 20 mg (has no administration in time range)     CT Abdomen Pelvis Without Contrast    Result Date: 2/3/2025  Impression: 1.Urinary bladder distention resulting in moderate  bilateral hydroureteronephrosis. Correlate for bladder outlet obstruction. Prostate is enlarged impressing upon the urinary bladder base. Electronically Signed: Guanako Wrigth MD  2/3/2025 4:07 PM EST  Workstation ID: [id number]                                            Medical Decision Making  Chart review:    Radiology interpretation: CT abdomen pelvis reviewed and interpreted by Eh: .Urinary bladder distention resulting in moderate bilateral hydroureteronephrosis. Correlate for bladder outlet obstruction. Prostate is enlarged impressing upon the urinary bladder base  Further interpretation by radiologist as above  Lab interpretation:  Labs all viewed by me and significant for: Creatinine 14.8, , potassium 5.3, lipase 100, PT 41.6, INR 4.40, white count 10.68, hemoglobin 14.3.  UA moderate 2+ blood, 30 protein, RBC 0-2, WBCs none seen, bacteria none seen.    EKG was considered but was not emergently warranted at this time.    IV was established and labs and scans were obtained to evaluate for infection, electrolyte imbalance, hernia, bowel obstruction.  Patient is a 74-year-old male who presents to the ER for above complaint.  On initial examination patient was resting comfortably sitting on the side of the bed, nontoxic in appearance with no signs and symptoms of distress.  Physical exam revealed abdominal distention, tenderness to umbilical and suprapubic region on palpation.  Lungs clear bilaterally on auscultation, 1+ pitting edema in bilateral lower extremities noted.  Patient denied any testicular pain, swelling or discoloration.  Creatinine was elevated at 14.8.  Consulted nephrology.  Spoke with Dr. Lee who agreed to see the patient.  Consulted hospitalist.  Spoke with Dr. Kang who agreed to admit patient and assume care.  CT was indicative of a bladder obstruction.  Cain catheter ordered.  On reexamination patient was resting comfortably in the bed, nontoxic in appearance with no signs and  symptoms of distress.  Discussed findings and decision to admit patient.  Patient was agreeable to admission and plan of care.      Appropriate PPE worn during exam.  Discussed care with: Dr. Lee with nephrology, and Dr. Garvey with hospitalist group.       Based on the clinical findings at this time I anticipate the patient will require a 2 midnight stay    Problems Addressed:  Acute renal failure, unspecified acute renal failure type: acute illness or injury    Amount and/or Complexity of Data Reviewed  Labs: ordered.  Radiology: ordered.    Risk  Prescription drug management.  Decision regarding hospitalization.          Final diagnoses:   Acute renal failure, unspecified acute renal failure type       ED Disposition  ED Disposition       ED Disposition   Decision to Admit    Condition   --    Comment   Level of Care: Telemetry [5]   Admitting Physician: LEXIS GARVEY [[id number]]   Attending Physician: LEXIS GARVEY [[id number]]                 No follow-up provider specified.       Medication List      No changes were made to your prescriptions during this visit.            Skye Cornelius, SOLOMON  02/03/25 1609       Skye Cornelius APRN  02/03/25 1631

## 2025-02-03 NOTE — H&P
History and Physical   Jett Doe : 1950 MRN:[id number] LOS:0     Reason for admission: AMADEO (acute kidney injury)     Assessment / Plan     #Profound postrenal AMADEO on CKD with mild hyperkalemia secondary to bladder outlet obstruction likely from enlarged prostate   -Patient presented with lower abdominal pain distention and bilateral lower extremity edema   -Patient has difficulty urination at baseline but in the past 2-week it is only dribbling.  -Patient has significant acute kidney injury with a creatinine being 1.78 which is 11 days ago and currently at 14  -Potassium of 5.3. HAGMA  -CT abdominal pelvis showing urinary bladder distention and moderate bilateral hydroureteronephrosis.  Enlarged prostate.  -Nephrology and urology on board.  -gillis to put in   -N.p.o. as the patient might need procedure urgently.  -HCO3 drip and lasix ordered by the nephrologist  -monitor BMP   -PCU level of care.    #Cardiomyopathy  #CVA  #Hypertension  #Hyperlipidemia  -Resume home medication once verified by pharmacy and clinically appropriate            Nutrition: NPO Diet NPO Type: Strict NPO     DVT Prophylaxis: Active VTE Prophylaxis  Mechanical:        Start        25 1558  Maintain Sequential Compression Device  Continuous                          Select Pharmacologic VTE Prophylaxis if Desired & Appropriate      History of Present illness     A 74 y.o. old male patient with PMH of cardiomyopathy CVA hypertension hyperlipidemia presents to the hospital with complaints of lower abdominal pain and was abdominal distention and lower extremity edema.  Patient mentioned that he always have difficulty urination for some times and was worse in past 2 weeks.  He only have it dribble.  Patient labs are significant for creatinine of 14.8 with potassium of 5.3 and bicarb of 15 and anion gap of 21.  GFR 3.  Creatinine 11-day ago is 1.7.  UA without UTI.  CT abdominal pelvis showing urinary bladder distention and  moderate bilateral hydro ureteral nephrosis.  Prostate is enlarged.  Nephrologist and urologist consulted.     Patient will be admitted to PCU level of care for postrenal AMADEO on CKD.     Subjective / Review of systems     Review of Systems   Lower abdominal pain    Past Medical/Surgical/Social/Family History & Allergies     Past Medical History:   Diagnosis Date    Cardiomyopathy 2012    CVA (cerebral vascular accident) 2012    Hyperlipemia 2012    Hypertension 2012      Past Surgical History:   Procedure Laterality Date    TURP / TRANSURETHRAL INCISION / DRAINAGE PROSTATE  2016      Social History     Socioeconomic History    Marital status: Single   Tobacco Use    Smoking status: Never    Smokeless tobacco: Current     Types: Chew   Substance and Sexual Activity    Alcohol use: Yes    Drug use: No    Sexual activity: Defer      Family History   Problem Relation Age of Onset    Lung cancer Mother       No Known Allergies     Home Medications     Prior to Admission medications    Medication Sig Start Date End Date Taking? Authorizing Provider   atorvastatin (LIPITOR) 20 MG tablet ATORVASTATIN CALCIUM 20 MG TABS 9/24/14   Dina Davila MD   clopidogrel (PLAVIX) 75 MG tablet Take 1 tablet by mouth Daily.    Dina Davila MD   folic acid-pyridoxine-cyanocobalamin (FOLBIC) 2.5-25-2 MG tablet tablet Take 1 tablet by mouth Daily. 9/5/24   Shweta Ayala MD   hydrALAZINE (APRESOLINE) 10 MG tablet HYDRALAZINE HCL 10 MG TABS 9/24/14   Dina Davila MD   lisinopril-hydrochlorothiazide (PRINZIDE,ZESTORETIC) 20-12.5 MG per tablet LISINOPRIL-HYDROCHLOROTHIAZIDE 20-12.5 MG TABS 9/24/14   Dina Davila MD   metoprolol tartrate (LOPRESSOR) 25 MG tablet METOPROLOL TARTRATE 25 MG TABS 9/24/14   Dina Davila MD   warfarin (COUMADIN) 1 MG tablet Take 3 tablets with 5 mg tablet (8 mg) on Monday/Wednesday/Friday and 2 tablets with 5 mg tablet (7mg) all other days 10/3/24   Shweta Ayala  MD Suha   warfarin (COUMADIN) 5 MG tablet Take 1.5 tablets daily or as directed based on your INR. 7/2/24   Shweta Ayala MD   warfarin (COUMADIN) 5 MG tablet Take 1 tablet by mouth Every Night. 10/3/24   Shweta Ayala MD      Objective / Physical Exam   Vital signs:  Temp: 97.9 °F (36.6 °C)  BP: 167/91  Heart Rate: 90  Resp: 21  SpO2: 95 %  Weight: 108 kg (237 lb 3.4 oz)    Admission Weight: Weight: 108 kg (237 lb 3.4 oz)    Physical Exam   Physical Exam  HENT:      Head: Normocephalic and atraumatic.      Nose: Nose normal.   Eyes:      Extraocular Movements: Extraocular movements intact.      Conjunctivae/sclerae: Conjunctivae normal.      Pupils: Pupils are equal, round, and reactive to light.   Cardiovascular:      Rate and Rhythm: Normal       Pulses: Normal pulses.      Heart sounds: Normal heart sounds.   Pulmonary:      Effort: normal      Breath sounds: normal   Abdominal:   Distended abdomen lower abdominal tenderness  Musculoskeletal:      3+ bilateral lower extremity edema  Skin:     General: Skin is dry.   Neurological:      General: No focal deficit present.      Mental Status: alert.   Psychiatric:         Mood and Affect: Mood normal.        Labs     Results from last 7 days   Lab Units 02/03/25  1339   WBC 10*3/mm3 10.68   HEMATOCRIT % 41.8   PLATELETS 10*3/mm3 161      Results from last 7 days   Lab Units 02/03/25  1339   SODIUM mmol/L 138   POTASSIUM mmol/L 5.3*   CHLORIDE mmol/L 101   CO2 mmol/L 15.2*   BUN mg/dL 127*   CREATININE mg/dL 14.80*        Current Medications   Scheduled Meds:sodium chloride, 10 mL, Intravenous, Q12H         Continuous Infusions:      Simona Kang MD  [facility]   02/03/25   16:00 EST

## 2025-02-03 NOTE — CONSULTS
Nephrology Consult Note                                                [facility]      Patient Identification:  Name: Jett Doe  Age: 74 y.o.  Sex: male  :  1950  MRN: [id number]               Requesting Physician: Simona Kang MD  Reason for Consultation: management recommendations      History of Present Illness:    Patient is a 74-year-old white male patient no aware previous kidney disease with history of hyperlipidemia hypertension previous CVA cardiomyopathy and what appears to be prostate enlargement who presented to the hospital because of abdominal discomfort and constipation and decreased p.o. intake and loss of appetite further evaluation revealed acute kidney injury with a creatinine of 14.8  CO2 15 potassium 5.3 prompting renal consultation  Problem List:    AMADEO (acute kidney injury)     Past Medical History:  Past Medical History:   Diagnosis Date    Cardiomyopathy     CVA (cerebral vascular accident)     Hyperlipemia     Hypertension      Past Surgical History:  Past Surgical History:   Procedure Laterality Date    TURP / TRANSURETHRAL INCISION / DRAINAGE PROSTATE  2016      Home Meds:  (Not in a hospital admission)    Current Meds:     Current Facility-Administered Medications:     acetaminophen (TYLENOL) tablet 650 mg, 650 mg, Oral, Q4H PRN **OR** acetaminophen (TYLENOL) 160 MG/5ML oral solution 650 mg, 650 mg, Oral, Q4H PRN **OR** acetaminophen (TYLENOL) suppository 650 mg, 650 mg, Rectal, Q4H PRN, Simona Kang MD    aluminum-magnesium hydroxide-simethicone (MAALOX MAX) 400-400-40 MG/5ML suspension 15 mL, 15 mL, Oral, Q6H PRN, Simona Kang MD    sennosides-docusate (PERICOLACE) 8.6-50 MG per tablet 2 tablet, 2 tablet, Oral, BID PRN **AND** polyethylene glycol (MIRALAX) packet 17 g, 17 g, Oral, Daily PRN **AND** bisacodyl (DULCOLAX) EC tablet 5 mg, 5 mg, Oral, Daily PRN **AND** bisacodyl (DULCOLAX) suppository 10 mg, 10 mg, Rectal, Daily  PRJorge Luis SWEENEY Yadana, MD    melatonin tablet 5 mg, 5 mg, Oral, Nightly PRJorge Luis SWEENEY Yadana, MD    nitroglycerin (NITROSTAT) SL tablet 0.4 mg, 0.4 mg, Sublingual, Q5 Min PRJorge Luis SWEENEY Yadana, MD    ondansetron ODT (ZOFRAN-ODT) disintegrating tablet 4 mg, 4 mg, Oral, Q6H PRN **OR** ondansetron (ZOFRAN) injection 4 mg, 4 mg, Intravenous, Q6H PRN, Simona Kang MD    [COMPLETED] Insert Peripheral IV, , , Once **AND** sodium chloride 0.9 % flush 10 mL, 10 mL, Intravenous, PRN, Yesi Isaacs, SOLOMON    sodium chloride 0.9 % flush 10 mL, 10 mL, Intravenous, Q12H, Simona Kang MD    sodium chloride 0.9 % flush 10 mL, 10 mL, Intravenous, PRJorge Luis SWEENEY Yadana, MD    sodium chloride 0.9 % infusion 40 mL, 40 mL, Intravenous, PRN, Simona Kang MD    Current Outpatient Medications:     atorvastatin (LIPITOR) 20 MG tablet, ATORVASTATIN CALCIUM 20 MG TABS, Disp: , Rfl:     clopidogrel (PLAVIX) 75 MG tablet, Take 1 tablet by mouth Daily., Disp: , Rfl:     folic acid-pyridoxine-cyanocobalamin (FOLBIC) 2.5-25-2 MG tablet tablet, Take 1 tablet by mouth Daily., Disp: 30 each, Rfl: 5    hydrALAZINE (APRESOLINE) 10 MG tablet, HYDRALAZINE HCL 10 MG TABS, Disp: , Rfl:     lisinopril-hydrochlorothiazide (PRINZIDE,ZESTORETIC) 20-12.5 MG per tablet, LISINOPRIL-HYDROCHLOROTHIAZIDE 20-12.5 MG TABS, Disp: , Rfl:     metoprolol tartrate (LOPRESSOR) 25 MG tablet, METOPROLOL TARTRATE 25 MG TABS, Disp: , Rfl:     warfarin (COUMADIN) 1 MG tablet, Take 3 tablets with 5 mg tablet (8 mg) on Monday/Wednesday/Friday and 2 tablets with 5 mg tablet (7mg) all other days, Disp: 225 tablet, Rfl: 1    warfarin (COUMADIN) 5 MG tablet, Take 1.5 tablets daily or as directed based on your INR., Disp: 135 tablet, Rfl: 3    warfarin (COUMADIN) 5 MG tablet, Take 1 tablet by mouth Every Night., Disp: 90 tablet, Rfl: 1    Allergies:  No Known Allergies  Social History:   Social History     Tobacco Use    Smoking status: Never    Smokeless tobacco: Current     Types: Chew   Substance Use  "Topics    Alcohol use: Yes      Family History:  Family History   Problem Relation Age of Onset    Lung cancer Mother         Review of Systems  Reviewed 12 systems were reviewed, all negative except for those mentioned in HPI    Objective:  Vitals:   /91   Pulse 90   Temp 97.9 °F (36.6 °C) (Oral)   Resp 21   Ht 170.2 cm (67\")   Wt 108 kg (237 lb 3.4 oz)   SpO2 95%   BMI 37.15 kg/m²   I/O:   No intake or output data in the 24 hours ending 02/03/25 1613    Exam:  General Appearance: Awake restless  Head:  Normocephalic, without obvious abnormality, atraumatic  Eyes:  PERRL, conjunctiva/corneas clear     Neck:  Supple,  no adenopathy;      Lungs:  Decreased BS occasion ronchi  Heart:  Regular rate and rhythm, S1 and S2 normal  Abdomen:  Soft, non-tender, bowel sounds active   Extremities: +++ edema  Pulses: 2+ and symmetric all extremities  Skin:  No rashes or lesions  Data Review:  All labs (24hrs):   Recent Results (from the past 24 hours)   Comprehensive Metabolic Panel    Collection Time: 02/03/25  1:39 PM    Specimen: Blood   Result Value Ref Range    Glucose 114 (H) 65 - 99 mg/dL     (H) 8 - 23 mg/dL    Creatinine 14.80 (H) 0.76 - 1.27 mg/dL    Sodium 138 136 - 145 mmol/L    Potassium 5.3 (H) 3.5 - 5.2 mmol/L    Chloride 101 98 - 107 mmol/L    CO2 15.2 (L) 22.0 - 29.0 mmol/L    Calcium 9.2 8.6 - 10.5 mg/dL    Total Protein 6.7 6.0 - 8.5 g/dL    Albumin 3.5 3.5 - 5.2 g/dL    ALT (SGPT) 27 1 - 41 U/L    AST (SGOT) 26 1 - 40 U/L    Alkaline Phosphatase 93 39 - 117 U/L    Total Bilirubin 0.4 0.0 - 1.2 mg/dL    Globulin 3.2 gm/dL    A/G Ratio 1.1 g/dL    BUN/Creatinine Ratio 8.6 7.0 - 25.0    Anion Gap 21.8 (H) 5.0 - 15.0 mmol/L    eGFR 3.1 (L) >60.0 mL/min/1.73   Lipase    Collection Time: 02/03/25  1:39 PM    Specimen: Blood   Result Value Ref Range    Lipase 100 (H) 13 - 60 U/L   Protime-INR    Collection Time: 02/03/25  1:39 PM    Specimen: Blood   Result Value Ref Range    Protime 41.6 (H) " 19.4 - 28.5 Seconds    INR 4.40 (H) 2.00 - 3.00   CBC Auto Differential    Collection Time: 02/03/25  1:39 PM    Specimen: Blood   Result Value Ref Range    WBC 10.68 3.40 - 10.80 10*3/mm3    RBC 4.65 4.14 - 5.80 10*6/mm3    Hemoglobin 14.3 13.0 - 17.7 g/dL    Hematocrit 41.8 37.5 - 51.0 %    MCV 89.9 79.0 - 97.0 fL    MCH 30.8 26.6 - 33.0 pg    MCHC 34.2 31.5 - 35.7 g/dL    RDW 12.4 12.3 - 15.4 %    RDW-SD 40.6 37.0 - 54.0 fl    MPV 9.7 6.0 - 12.0 fL    Platelets 161 140 - 450 10*3/mm3    Neutrophil % 83.8 (H) 42.7 - 76.0 %    Lymphocyte % 5.7 (L) 19.6 - 45.3 %    Monocyte % 8.2 5.0 - 12.0 %    Eosinophil % 1.1 0.3 - 6.2 %    Basophil % 0.4 0.0 - 1.5 %    Immature Grans % 0.8 (H) 0.0 - 0.5 %    Neutrophils, Absolute 8.94 (H) 1.70 - 7.00 10*3/mm3    Lymphocytes, Absolute 0.61 (L) 0.70 - 3.10 10*3/mm3    Monocytes, Absolute 0.88 0.10 - 0.90 10*3/mm3    Eosinophils, Absolute 0.12 0.00 - 0.40 10*3/mm3    Basophils, Absolute 0.04 0.00 - 0.20 10*3/mm3    Immature Grans, Absolute 0.09 (H) 0.00 - 0.05 10*3/mm3    nRBC 0.0 0.0 - 0.2 /100 WBC   Urinalysis With Culture If Indicated - Urine, Clean Catch    Collection Time: 02/03/25  2:19 PM    Specimen: Urine, Clean Catch   Result Value Ref Range    Color, UA Yellow Yellow, Straw    Appearance, UA Clear Clear    pH, UA 5.5 5.0 - 8.0    Specific Gravity, UA 1.010 1.005 - 1.030    Glucose, UA Negative Negative    Ketones, UA Negative Negative    Bilirubin, UA Negative Negative    Blood, UA Moderate (2+) (A) Negative    Protein, UA 30 mg/dL (1+) (A) Negative    Leuk Esterase, UA Negative Negative    Nitrite, UA Negative Negative    Urobilinogen, UA 0.2 E.U./dL 0.2 - 1.0 E.U./dL   Urinalysis, Microscopic Only - Urine, Clean Catch    Collection Time: 02/03/25  2:19 PM    Specimen: Urine, Clean Catch   Result Value Ref Range    RBC, UA 0-2 None Seen, 0-2 /HPF    WBC, UA None Seen None Seen, 0-2 /HPF    Bacteria, UA None Seen None Seen /HPF    Squamous Epithelial Cells, UA None Seen  None Seen, 0-2 /HPF    Hyaline Casts, UA None Seen None Seen /LPF    Methodology Automated Microscopy        Current Facility-Administered Medications:     acetaminophen (TYLENOL) tablet 650 mg, 650 mg, Oral, Q4H PRN **OR** acetaminophen (TYLENOL) 160 MG/5ML oral solution 650 mg, 650 mg, Oral, Q4H PRN **OR** acetaminophen (TYLENOL) suppository 650 mg, 650 mg, Rectal, Q4H PRNJorge Luis Yadana, MD    aluminum-magnesium hydroxide-simethicone (MAALOX MAX) 400-400-40 MG/5ML suspension 15 mL, 15 mL, Oral, Q6H PRNJorge Luis Yadana, MD    sennosides-docusate (PERICOLACE) 8.6-50 MG per tablet 2 tablet, 2 tablet, Oral, BID PRN **AND** polyethylene glycol (MIRALAX) packet 17 g, 17 g, Oral, Daily PRN **AND** bisacodyl (DULCOLAX) EC tablet 5 mg, 5 mg, Oral, Daily PRN **AND** bisacodyl (DULCOLAX) suppository 10 mg, 10 mg, Rectal, Daily PRN, Simona Kang MD    melatonin tablet 5 mg, 5 mg, Oral, Nightly PRNJorge Luis Yadana, MD    nitroglycerin (NITROSTAT) SL tablet 0.4 mg, 0.4 mg, Sublingual, Q5 Min PRNJorge Luis Yadana, MD    ondansetron ODT (ZOFRAN-ODT) disintegrating tablet 4 mg, 4 mg, Oral, Q6H PRN **OR** ondansetron (ZOFRAN) injection 4 mg, 4 mg, Intravenous, Q6H PRN, Simona Kang MD    [COMPLETED] Insert Peripheral IV, , , Once **AND** sodium chloride 0.9 % flush 10 mL, 10 mL, Intravenous, PRN, Yesi Isaacs, SOLOMON    sodium chloride 0.9 % flush 10 mL, 10 mL, Intravenous, Q12H, Simona Kang MD    sodium chloride 0.9 % flush 10 mL, 10 mL, Intravenous, PRN, Simona Kang MD    sodium chloride 0.9 % infusion 40 mL, 40 mL, Intravenous, PRN, Simona Kang MD    Current Outpatient Medications:     atorvastatin (LIPITOR) 20 MG tablet, ATORVASTATIN CALCIUM 20 MG TABS, Disp: , Rfl:     clopidogrel (PLAVIX) 75 MG tablet, Take 1 tablet by mouth Daily., Disp: , Rfl:     folic acid-pyridoxine-cyanocobalamin (FOLBIC) 2.5-25-2 MG tablet tablet, Take 1 tablet by mouth Daily., Disp: 30 each, Rfl: 5    hydrALAZINE (APRESOLINE) 10 MG tablet, HYDRALAZINE HCL 10 MG  TABS, Disp: , Rfl:     lisinopril-hydrochlorothiazide (PRINZIDE,ZESTORETIC) 20-12.5 MG per tablet, LISINOPRIL-HYDROCHLOROTHIAZIDE 20-12.5 MG TABS, Disp: , Rfl:     metoprolol tartrate (LOPRESSOR) 25 MG tablet, METOPROLOL TARTRATE 25 MG TABS, Disp: , Rfl:     warfarin (COUMADIN) 1 MG tablet, Take 3 tablets with 5 mg tablet (8 mg) on Monday/Wednesday/Friday and 2 tablets with 5 mg tablet (7mg) all other days, Disp: 225 tablet, Rfl: 1    warfarin (COUMADIN) 5 MG tablet, Take 1.5 tablets daily or as directed based on your INR., Disp: 135 tablet, Rfl: 3    warfarin (COUMADIN) 5 MG tablet, Take 1 tablet by mouth Every Night., Disp: 90 tablet, Rfl: 1    Assessment:  Acute kidney injury versus acute on chronic kidney disease as a baseline creatinine of 1.3 from earlier last month  Obstructive uropathy  Metabolic acidosis  Hyperkalemia  Volume excess  What appears to be prostate enlargement  History of CVA      Recommendations:  Gentle hydration with bicarb to correct acidosis and potassium  Gentle diuresis  Cain catheter  urology consult  No immediate need for dialysis but possible  Follow closely        Rory Lee MD  2/3/2025  16:13 EST

## 2025-02-04 LAB
BASOPHILS # BLD AUTO: 0.04 10*3/MM3 (ref 0–0.2)
BASOPHILS NFR BLD AUTO: 0.3 % (ref 0–1.5)
DEPRECATED RDW RBC AUTO: 40.8 FL (ref 37–54)
EOSINOPHIL # BLD AUTO: 0.05 10*3/MM3 (ref 0–0.4)
EOSINOPHIL NFR BLD AUTO: 0.4 % (ref 0.3–6.2)
ERYTHROCYTE [DISTWIDTH] IN BLOOD BY AUTOMATED COUNT: 12.5 % (ref 12.3–15.4)
HCT VFR BLD AUTO: 44.6 % (ref 37.5–51)
HGB BLD-MCNC: 15.3 G/DL (ref 13–17.7)
IMM GRANULOCYTES # BLD AUTO: 0.15 10*3/MM3 (ref 0–0.05)
IMM GRANULOCYTES NFR BLD AUTO: 1.3 % (ref 0–0.5)
LYMPHOCYTES # BLD AUTO: 0.66 10*3/MM3 (ref 0.7–3.1)
LYMPHOCYTES NFR BLD AUTO: 5.5 % (ref 19.6–45.3)
MAGNESIUM SERPL-MCNC: 2.5 MG/DL (ref 1.6–2.4)
MCH RBC QN AUTO: 30.5 PG (ref 26.6–33)
MCHC RBC AUTO-ENTMCNC: 34.3 G/DL (ref 31.5–35.7)
MCV RBC AUTO: 89 FL (ref 79–97)
MONOCYTES # BLD AUTO: 0.96 10*3/MM3 (ref 0.1–0.9)
MONOCYTES NFR BLD AUTO: 8 % (ref 5–12)
NEUTROPHILS NFR BLD AUTO: 10.14 10*3/MM3 (ref 1.7–7)
NEUTROPHILS NFR BLD AUTO: 84.5 % (ref 42.7–76)
NRBC BLD AUTO-RTO: 0 /100 WBC (ref 0–0.2)
PHOSPHATE SERPL-MCNC: 6.2 MG/DL (ref 2.5–4.5)
PLATELET # BLD AUTO: 206 10*3/MM3 (ref 140–450)
PMV BLD AUTO: 9.6 FL (ref 6–12)
RBC # BLD AUTO: 5.01 10*6/MM3 (ref 4.14–5.8)
WBC NRBC COR # BLD AUTO: 12 10*3/MM3 (ref 3.4–10.8)

## 2025-02-04 PROCEDURE — 83735 ASSAY OF MAGNESIUM: CPT | Performed by: INTERNAL MEDICINE

## 2025-02-04 PROCEDURE — 85025 COMPLETE CBC W/AUTO DIFF WBC: CPT | Performed by: INTERNAL MEDICINE

## 2025-02-04 PROCEDURE — 84100 ASSAY OF PHOSPHORUS: CPT | Performed by: INTERNAL MEDICINE

## 2025-02-04 RX ORDER — METOPROLOL TARTRATE 25 MG/1
25 TABLET, FILM COATED ORAL EVERY 12 HOURS SCHEDULED
Status: DISCONTINUED | OUTPATIENT
Start: 2025-02-04 | End: 2025-02-08 | Stop reason: HOSPADM

## 2025-02-04 RX ORDER — TAMSULOSIN HYDROCHLORIDE 0.4 MG/1
0.4 CAPSULE ORAL DAILY
Status: DISCONTINUED | OUTPATIENT
Start: 2025-02-04 | End: 2025-02-08 | Stop reason: HOSPADM

## 2025-02-04 RX ADMIN — SODIUM BICARBONATE 75 MEQ: 84 INJECTION INTRAVENOUS at 09:02

## 2025-02-04 RX ADMIN — METOPROLOL TARTRATE 25 MG: 25 TABLET, FILM COATED ORAL at 00:17

## 2025-02-04 RX ADMIN — TAMSULOSIN HYDROCHLORIDE 0.4 MG: 0.4 CAPSULE ORAL at 11:54

## 2025-02-04 RX ADMIN — Medication 10 ML: at 11:54

## 2025-02-04 RX ADMIN — SODIUM BICARBONATE 75 MEQ: 84 INJECTION INTRAVENOUS at 23:08

## 2025-02-04 RX ADMIN — METOPROLOL TARTRATE 25 MG: 25 TABLET, FILM COATED ORAL at 11:54

## 2025-02-04 NOTE — PLAN OF CARE
Goal Outcome Evaluation:         Pt was able to rest during majority of shift. No major complaints.

## 2025-02-04 NOTE — PROGRESS NOTES
"                                                                                                                                      Nephrology  Progress Note                                        [facility]    Patient Identification    Name: Jett Doe  Age: 74 y.o.  Sex: male  :  1950  MRN: [id number]      DATE OF SERVICE:  2025        Subective    Feeling better     Objective   Scheduled Meds:metoprolol tartrate, 25 mg, Oral, Q12H  sodium chloride, 10 mL, Intravenous, Q12H          Continuous Infusions:sodium bicarbonate 8.4 % 75 mEq in sodium chloride 0.45 % 1,000 mL infusion (less than/equal to 100 mEq), 75 mEq, Last Rate: 75 mL/hr at 25        PRN Meds:  acetaminophen **OR** acetaminophen **OR** acetaminophen    aluminum-magnesium hydroxide-simethicone    senna-docusate sodium **AND** polyethylene glycol **AND** bisacodyl **AND** bisacodyl    melatonin    nitroglycerin    ondansetron ODT **OR** ondansetron    [COMPLETED] Insert Peripheral IV **AND** sodium chloride    sodium chloride    sodium chloride     Exam:  /78 (BP Location: Right arm, Patient Position: Lying)   Pulse 91   Temp 98 °F (36.7 °C) (Oral)   Resp 20   Ht 170.2 cm (67\")   Wt 108 kg (237 lb 3.4 oz)   SpO2 95%   BMI 37.15 kg/m²     Intake/Output last 3 shifts:  I/O last 3 completed shifts:  In: -   Out: 68664 [Urine:[id number]]    Intake/Output this shift:  No intake/output data recorded.    Physical exam:  General Appearance:  Alert  Head:  Normocephalic, without obvious abnormality, atraumatic  Eyes:  PERRL, conjunctiva/corneas clear     Neck:  Supple,  no adenopathy;      Lungs:  Decreased BS occasion ronchi  Heart:  Regular rate and rhythm, S1 and S2 normal  Abdomen:  Soft, non-tender, bowel sounds active   Extremities: trace edema  Pulses: 2+ and symmetric all extremities  Skin:  No rashes or lesions       Data Review:  All labs (24hrs):   Recent Results (from the past 24 hours) "   Comprehensive Metabolic Panel    Collection Time: 02/03/25  1:39 PM    Specimen: Blood   Result Value Ref Range    Glucose 114 (H) 65 - 99 mg/dL     (H) 8 - 23 mg/dL    Creatinine 14.80 (H) 0.76 - 1.27 mg/dL    Sodium 138 136 - 145 mmol/L    Potassium 5.3 (H) 3.5 - 5.2 mmol/L    Chloride 101 98 - 107 mmol/L    CO2 15.2 (L) 22.0 - 29.0 mmol/L    Calcium 9.2 8.6 - 10.5 mg/dL    Total Protein 6.7 6.0 - 8.5 g/dL    Albumin 3.5 3.5 - 5.2 g/dL    ALT (SGPT) 27 1 - 41 U/L    AST (SGOT) 26 1 - 40 U/L    Alkaline Phosphatase 93 39 - 117 U/L    Total Bilirubin 0.4 0.0 - 1.2 mg/dL    Globulin 3.2 gm/dL    A/G Ratio 1.1 g/dL    BUN/Creatinine Ratio 8.6 7.0 - 25.0    Anion Gap 21.8 (H) 5.0 - 15.0 mmol/L    eGFR 3.1 (L) >60.0 mL/min/1.73   Lipase    Collection Time: 02/03/25  1:39 PM    Specimen: Blood   Result Value Ref Range    Lipase 100 (H) 13 - 60 U/L   Protime-INR    Collection Time: 02/03/25  1:39 PM    Specimen: Blood   Result Value Ref Range    Protime 41.6 (H) 19.4 - 28.5 Seconds    INR 4.40 (H) 2.00 - 3.00   CBC Auto Differential    Collection Time: 02/03/25  1:39 PM    Specimen: Blood   Result Value Ref Range    WBC 10.68 3.40 - 10.80 10*3/mm3    RBC 4.65 4.14 - 5.80 10*6/mm3    Hemoglobin 14.3 13.0 - 17.7 g/dL    Hematocrit 41.8 37.5 - 51.0 %    MCV 89.9 79.0 - 97.0 fL    MCH 30.8 26.6 - 33.0 pg    MCHC 34.2 31.5 - 35.7 g/dL    RDW 12.4 12.3 - 15.4 %    RDW-SD 40.6 37.0 - 54.0 fl    MPV 9.7 6.0 - 12.0 fL    Platelets 161 140 - 450 10*3/mm3    Neutrophil % 83.8 (H) 42.7 - 76.0 %    Lymphocyte % 5.7 (L) 19.6 - 45.3 %    Monocyte % 8.2 5.0 - 12.0 %    Eosinophil % 1.1 0.3 - 6.2 %    Basophil % 0.4 0.0 - 1.5 %    Immature Grans % 0.8 (H) 0.0 - 0.5 %    Neutrophils, Absolute 8.94 (H) 1.70 - 7.00 10*3/mm3    Lymphocytes, Absolute 0.61 (L) 0.70 - 3.10 10*3/mm3    Monocytes, Absolute 0.88 0.10 - 0.90 10*3/mm3    Eosinophils, Absolute 0.12 0.00 - 0.40 10*3/mm3    Basophils, Absolute 0.04 0.00 - 0.20 10*3/mm3     Immature Grans, Absolute 0.09 (H) 0.00 - 0.05 10*3/mm3    nRBC 0.0 0.0 - 0.2 /100 WBC   Urinalysis With Culture If Indicated - Urine, Clean Catch    Collection Time: 02/03/25  2:19 PM    Specimen: Urine, Clean Catch   Result Value Ref Range    Color, UA Yellow Yellow, Straw    Appearance, UA Clear Clear    pH, UA 5.5 5.0 - 8.0    Specific Gravity, UA 1.010 1.005 - 1.030    Glucose, UA Negative Negative    Ketones, UA Negative Negative    Bilirubin, UA Negative Negative    Blood, UA Moderate (2+) (A) Negative    Protein, UA 30 mg/dL (1+) (A) Negative    Leuk Esterase, UA Negative Negative    Nitrite, UA Negative Negative    Urobilinogen, UA 0.2 E.U./dL 0.2 - 1.0 E.U./dL   Urinalysis, Microscopic Only - Urine, Clean Catch    Collection Time: 02/03/25  2:19 PM    Specimen: Urine, Clean Catch   Result Value Ref Range    RBC, UA 0-2 None Seen, 0-2 /HPF    WBC, UA None Seen None Seen, 0-2 /HPF    Bacteria, UA None Seen None Seen /HPF    Squamous Epithelial Cells, UA None Seen None Seen, 0-2 /HPF    Hyaline Casts, UA None Seen None Seen /LPF    Methodology Automated Microscopy    Basic Metabolic Panel    Collection Time: 02/03/25 10:13 PM    Specimen: Blood   Result Value Ref Range    Glucose 114 (H) 65 - 99 mg/dL     (H) 8 - 23 mg/dL    Creatinine 12.40 (H) 0.76 - 1.27 mg/dL    Sodium 145 136 - 145 mmol/L    Potassium 4.8 3.5 - 5.2 mmol/L    Chloride 106 98 - 107 mmol/L    CO2 19.2 (L) 22.0 - 29.0 mmol/L    Calcium 9.3 8.6 - 10.5 mg/dL    BUN/Creatinine Ratio 9.5 7.0 - 25.0    Anion Gap 19.8 (H) 5.0 - 15.0 mmol/L    eGFR 3.8 (L) >60.0 mL/min/1.73   ECG 12 Lead Rhythm Change    Collection Time: 02/03/25 11:33 PM   Result Value Ref Range    QT Interval 328 ms    QTC Interval 446 ms   Magnesium    Collection Time: 02/04/25  6:23 AM    Specimen: Arm, Left; Blood   Result Value Ref Range    Magnesium 2.5 (H) 1.6 - 2.4 mg/dL   Phosphorus    Collection Time: 02/04/25  6:23 AM    Specimen: Arm, Left; Blood   Result Value  Ref Range    Phosphorus 6.2 (H) 2.5 - 4.5 mg/dL   CBC Auto Differential    Collection Time: 02/04/25  6:23 AM    Specimen: Arm, Left; Blood   Result Value Ref Range    WBC 12.00 (H) 3.40 - 10.80 10*3/mm3    RBC 5.01 4.14 - 5.80 10*6/mm3    Hemoglobin 15.3 13.0 - 17.7 g/dL    Hematocrit 44.6 37.5 - 51.0 %    MCV 89.0 79.0 - 97.0 fL    MCH 30.5 26.6 - 33.0 pg    MCHC 34.3 31.5 - 35.7 g/dL    RDW 12.5 12.3 - 15.4 %    RDW-SD 40.8 37.0 - 54.0 fl    MPV 9.6 6.0 - 12.0 fL    Platelets 206 140 - 450 10*3/mm3    Neutrophil % 84.5 (H) 42.7 - 76.0 %    Lymphocyte % 5.5 (L) 19.6 - 45.3 %    Monocyte % 8.0 5.0 - 12.0 %    Eosinophil % 0.4 0.3 - 6.2 %    Basophil % 0.3 0.0 - 1.5 %    Immature Grans % 1.3 (H) 0.0 - 0.5 %    Neutrophils, Absolute 10.14 (H) 1.70 - 7.00 10*3/mm3    Lymphocytes, Absolute 0.66 (L) 0.70 - 3.10 10*3/mm3    Monocytes, Absolute 0.96 (H) 0.10 - 0.90 10*3/mm3    Eosinophils, Absolute 0.05 0.00 - 0.40 10*3/mm3    Basophils, Absolute 0.04 0.00 - 0.20 10*3/mm3    Immature Grans, Absolute 0.15 (H) 0.00 - 0.05 10*3/mm3    nRBC 0.0 0.0 - 0.2 /100 WBC          Imaging:  CT Abdomen Pelvis Without Contrast    Result Date: 2/3/2025  Impression: 1.Urinary bladder distention resulting in moderate bilateral hydroureteronephrosis. Correlate for bladder outlet obstruction. Prostate is enlarged impressing upon the urinary bladder base. Electronically Signed: Guanako Wright MD  2/3/2025 4:07 PM EST  Workstation ID: [id number]     Assessment/Plan:     AMADEO (acute kidney injury)         Acute kidney injury versus acute on chronic kidney disease as a baseline creatinine of 1.3 from earlier last month  Obstructive uropathy  Metabolic acidosis  Hyperkalemia  Volume excess  What appears to be prostate enlargement  History of CVA        Recommendations:  Cain catheter was placed  BUN/creatinine trending down  Currently on bicarb drip  Continue for another day  Probably will change the fluid tomorrow  Potassium is better  Urology on  board

## 2025-02-04 NOTE — PROGRESS NOTES
[facility] MEDICINE SERVICE  DAILY PROGRESS NOTE    NAME: Jett Doe  : 1950  MRN: [id number]      LOS: 1 day     PROVIDER OF SERVICE: Geovany Lin MD    Chief Complaint: AMADEO (acute kidney injury)    Subjective:     Interval History: Patient is seen at bedside today.            Objective:     Vital Signs  Temp:  [97.9 °F (36.6 °C)-98.2 °F (36.8 °C)] 97.9 °F (36.6 °C)  Heart Rate:  [] 105  Resp:  [17-25] 22  BP: (114-167)/(60-91) 114/60   Body mass index is 37.15 kg/m².    Physical Exam  Physical Exam    General, awake and alert.  Head and ENT, normocephalic and atraumatic.  Lungs, symmetric expansion, equal air entry bilaterally.  Heart, regular rate and rhythm.  Abdomen, soft and nontender.  Extremities, no clubbing or cyanosis.  Neuro, no focal deficits.  Skin: Warm and no rash.  Psych, normal mood and affect.  Musculoskeletal, joint examination is grossly normal.         Diagnostic Data    Results from last 7 days   Lab Units 25  0623 25  2213 25  1339   WBC 10*3/mm3 12.00*  --  10.68   HEMOGLOBIN g/dL 15.3  --  14.3   HEMATOCRIT % 44.6  --  41.8   PLATELETS 10*3/mm3 206  --  161   GLUCOSE mg/dL  --  114* 114*   CREATININE mg/dL  --  12.40* 14.80*   BUN mg/dL  --  118* 127*   SODIUM mmol/L  --  145 138   POTASSIUM mmol/L  --  4.8 5.3*   AST (SGOT) U/L  --   --  26   ALT (SGPT) U/L  --   --  27   ALK PHOS U/L  --   --  93   BILIRUBIN mg/dL  --   --  0.4   ANION GAP mmol/L  --  19.8* 21.8*       CT Abdomen Pelvis Without Contrast    Result Date: 2/3/2025  Impression: 1.Urinary bladder distention resulting in moderate bilateral hydroureteronephrosis. Correlate for bladder outlet obstruction. Prostate is enlarged impressing upon the urinary bladder base. Electronically Signed: Guanako Wright MD  2/3/2025 4:07 PM EST  Workstation ID: [id number]       I reviewed the patient's new clinical results.    Assessment/Plan:     Active and Resolved Problems  Active Hospital  Problems    Diagnosis  POA    **AMADEO (acute kidney injury) [N17.9]  Yes      Resolved Hospital Problems   No resolved problems to display.       Assessment and plan  Obstructive uropathy, with acute on chronic kidney injury, due to bladder outlet obstruction, likely from enlarged prostate, nephrology and urology are following, follow recommendations.  Last creatinine noted to be above 12.  Management of hydroureteronephrosis, based on urology recommendations.  Metabolic acidosis, bicarb drip based on nephrology recommendations.  History of cardiomyopathy, CVA, hypertension, hyperlipidemia, we will continue home medications.  These are chronic conditions which are stable.  CODE STATUS is full code.  Further plans based on hospital course.    Signature: Electronically signed by Geovany Lin MD, 02/04/25, 13:16 EST.  [facility] Hospitalist Team

## 2025-02-04 NOTE — NURSING NOTE
Patient sleeping and heart rate is sustaining in the 130s-140s. EKG ordered, showed afib. Patient poor historian and doesn't recall having a history of afib. Message sent to on call provider DR Maurice. Cardizem iv push and metoprolol ordered.   Patient remains in afib but heart rate improved to 90s/100s.     Patient told this RN that he hasn't received any of his home medications for two days. Dr Maurice notified.

## 2025-02-04 NOTE — CASE MANAGEMENT/SOCIAL WORK
Discharge Planning Assessment   Devyn     Patient Name: Jett Doe  MRN: [id number]  Today's Date: 2/4/2025    Admit Date: 2/3/2025    Plan: Return home with Daphne GAMBOA   Discharge Needs Assessment       Row Name 02/04/25 1555       Living Environment    People in Home significant other    Name(s) of People in Home Daphne    Current Living Arrangements home    Potentially Unsafe Housing Conditions none    In the past 12 months has the electric, gas, oil, or water company threatened to shut off services in your home? No    Primary Care Provided by self    Provides Primary Care For no one, unable/limited ability to care for self    Family Caregiver if Needed significant other    Family Caregiver Names Daphne, COOPER    Quality of Family Relationships supportive;involved;helpful    Able to Return to Prior Arrangements yes       Resource/Environmental Concerns    Resource/Environmental Concerns none    Transportation Concerns none       Transportation Needs    In the past 12 months, has lack of transportation kept you from medical appointments or from getting medications? no    In the past 12 months, has lack of transportation kept you from meetings, work, or from getting things needed for daily living? No       Food Insecurity    Within the past 12 months, you worried that your food would run out before you got the money to buy more. Never true    Within the past 12 months, the food you bought just didn't last and you didn't have money to get more. Never true       Transition Planning    Patient/Family Anticipates Transition to home with family    Patient/Family Anticipated Services at Transition none    Transportation Anticipated car, drives self;family or friend will provide       Discharge Needs Assessment    Readmission Within the Last 30 Days no previous admission in last 30 days    Equipment Currently Used at Home none    Concerns to be Addressed no discharge needs identified    Do you want help finding or  keeping work or a job? I do not need or want help    Do you want help with school or training? For example, starting or completing job training or getting a high school diploma, GED or equivalent No    Anticipated Changes Related to Illness none    Equipment Needed After Discharge none                   Discharge Plan       Row Name 02/04/25 155       Plan    Plan Return home with Daphne GAMBOA    Patient/Family in Agreement with Plan yes    Plan Comments Barriers: Nephrology and Urology consults. IV Sodium Bicarb infusing. CM met with Mr. Doe and his S.Daphne LIAO who reside together. He is a/o  and said he drives and is independent with no medical equipment. PCP and Pharmacy verified. He denied any financial concerns.                Demographic Summary       Row Name 02/04/25 6532       General Information    Admission Type inpatient    Arrived From emergency department    Required Notices Provided Important Message from Medicare    Referral Source admission list    Reason for Consult discharge planning    Preferred Language English       Contact Information    Permission Granted to Share Info With                    Functional Status       Row Name 02/04/25 5737       Functional Status    Usual Activity Tolerance good    Current Activity Tolerance moderate       Functional Status, IADL    Medications independent    Meal Preparation independent;assistive person    Housekeeping independent;assistive person    Laundry independent;assistive person    Shopping independent;assistive person    If for any reason you need help with day-to-day activities such as bathing, preparing meals, shopping, managing finances, etc., do you get the help you need? I get all the help I need       Mental Status    General Appearance WDL WDL       Mental Status Summary    Recent Changes in Mental Status/Cognitive Functioning no changes       Employment/    Employment Status retired                          Carrie Mejia  Magen MARTE,RN Case Manager  [facility]  Phone: Desk- [phone] cell- [phone]

## 2025-02-04 NOTE — CONSULTS
[facility] CONSULT      Patient Identification:  NAME:  Jett Doe  Age:  74 y.o.   Sex:  male   :  1950   MRN:  [id number]       Chief complaint/Reason for consult: Large volume retention    History of present illness:  74 y.o. male presented with abdominal distention ultimately CT scan was found to have distended bladder up to nearly xiphoid process and bilateral hydronephrosis with creatinine over 13.  12 Sinhala Cain was placed drainage of at least 2 L of urine immediately.  He has history of elevated PSA seeing Dr. Rivas in the past with atypical biopsy in 2016      Past medical history:  Past Medical History:   Diagnosis Date    Cardiomyopathy     CVA (cerebral vascular accident)     Hyperlipemia     Hypertension        Past surgical history:  Past Surgical History:   Procedure Laterality Date    TURP / TRANSURETHRAL INCISION / DRAINAGE PROSTATE  2016       Allergies:  Patient has no known allergies.    Home medications:  Medications Prior to Admission   Medication Sig Dispense Refill Last Dose/Taking    atorvastatin (LIPITOR) 20 MG tablet ATORVASTATIN CALCIUM 20 MG TABS       clopidogrel (PLAVIX) 75 MG tablet Take 1 tablet by mouth Daily.       folic acid-pyridoxine-cyanocobalamin (FOLBIC) 2.5-25-2 MG tablet tablet Take 1 tablet by mouth Daily. 30 each 5     hydrALAZINE (APRESOLINE) 10 MG tablet HYDRALAZINE HCL 10 MG TABS       lisinopril-hydrochlorothiazide (PRINZIDE,ZESTORETIC) 20-12.5 MG per tablet LISINOPRIL-HYDROCHLOROTHIAZIDE 20-12.5 MG TABS       metoprolol tartrate (LOPRESSOR) 25 MG tablet METOPROLOL TARTRATE 25 MG TABS       warfarin (COUMADIN) 1 MG tablet Take 3 tablets with 5 mg tablet (8 mg) on Monday/Wednesday/Friday and 2 tablets with 5 mg tablet (7mg) all other days 225 tablet 1     warfarin (COUMADIN) 5 MG tablet Take 1.5 tablets daily or as directed based on your INR. 135 tablet 3     warfarin (COUMADIN) 5 MG tablet Take 1 tablet by mouth Every Night. 90  tablet 1         Hospital medications:  metoprolol tartrate, 25 mg, Oral, Q12H  sodium chloride, 10 mL, Intravenous, Q12H      sodium bicarbonate 8.4 % 75 mEq in sodium chloride 0.45 % 1,000 mL infusion (less than/equal to 100 mEq), 75 mEq, Last Rate: 75 mEq (25 0902)        acetaminophen **OR** acetaminophen **OR** acetaminophen    aluminum-magnesium hydroxide-simethicone    senna-docusate sodium **AND** polyethylene glycol **AND** bisacodyl **AND** bisacodyl    melatonin    nitroglycerin    ondansetron ODT **OR** ondansetron    [COMPLETED] Insert Peripheral IV **AND** sodium chloride    sodium chloride    sodium chloride    Family history:  Family History   Problem Relation Age of Onset    Lung cancer Mother        Social history:  Social History     Tobacco Use    Smoking status: Never    Smokeless tobacco: Current     Types: Chew   Vaping Use    Vaping status: Never Used   Substance Use Topics    Alcohol use: Not Currently    Drug use: No       Objective:  TMax 24 hours:   Temp (24hrs), Av °F (36.7 °C), Min:97.9 °F (36.6 °C), Max:98.2 °F (36.8 °C)      Vitals Ranges:   Temp:  [97.9 °F (36.6 °C)-98.2 °F (36.8 °C)] 98 °F (36.7 °C)  Heart Rate:  [] 91  Resp:  [17-25] 20  BP: (125-167)/(75-91) 125/78    Intake/Output Last 3 shifts:  I/O last 3 completed shifts:  In: -   Out: 26586 [Urine:23474]     Physical Exam:    General Appearance:    Alert, cooperative, NAD   Lungs:     Respirations unlabored, no audible wheezing    Heart:    No cyanosis   Abdomen:     Soft, ND    :    No suprapubic distention, 12 Estonian Cain in place clear urine in tubing red in bag              Results review:   I reviewed the patient's new clinical results.    Data review:  Lab Results (last 24 hours)       Procedure Component Value Units Date/Time    Magnesium [[id number]]  (Abnormal) Collected: 25 0623    Specimen: Blood from Arm, Left Updated: 25 0707     Magnesium 2.5 mg/dL     Phosphorus [[id number]]   (Abnormal) Collected: 02/04/25 0623    Specimen: Blood from Arm, Left Updated: 02/04/25 0707     Phosphorus 6.2 mg/dL     CBC & Differential [[id number]]  (Abnormal) Collected: 02/04/25 0623    Specimen: Blood from Arm, Left Updated: 02/04/25 0646    Narrative:      The following orders were created for panel order CBC & Differential.  Procedure                               Abnormality         Status                     ---------                               -----------         ------                     CBC Auto Differential[[id number]]        Abnormal            Final result                 Please view results for these tests on the individual orders.    CBC Auto Differential [[id number]]  (Abnormal) Collected: 02/04/25 0623    Specimen: Blood from Arm, Left Updated: 02/04/25 0646     WBC 12.00 10*3/mm3      RBC 5.01 10*6/mm3      Hemoglobin 15.3 g/dL      Hematocrit 44.6 %      MCV 89.0 fL      MCH 30.5 pg      MCHC 34.3 g/dL      RDW 12.5 %      RDW-SD 40.8 fl      MPV 9.6 fL      Platelets 206 10*3/mm3      Neutrophil % 84.5 %      Lymphocyte % 5.5 %      Monocyte % 8.0 %      Eosinophil % 0.4 %      Basophil % 0.3 %      Immature Grans % 1.3 %      Neutrophils, Absolute 10.14 10*3/mm3      Lymphocytes, Absolute 0.66 10*3/mm3      Monocytes, Absolute 0.96 10*3/mm3      Eosinophils, Absolute 0.05 10*3/mm3      Basophils, Absolute 0.04 10*3/mm3      Immature Grans, Absolute 0.15 10*3/mm3      nRBC 0.0 /100 WBC     Basic Metabolic Panel [[id number]]  (Abnormal) Collected: 02/03/25 2213    Specimen: Blood Updated: 02/03/25 2251     Glucose 114 mg/dL       mg/dL      Creatinine 12.40 mg/dL      Sodium 145 mmol/L      Potassium 4.8 mmol/L      Chloride 106 mmol/L      CO2 19.2 mmol/L      Calcium 9.3 mg/dL      BUN/Creatinine Ratio 9.5     Anion Gap 19.8 mmol/L      eGFR 3.8 mL/min/1.73     Narrative:      GFR Categories in Chronic Kidney Disease (CKD)      GFR Category          GFR (mL/min/1.73)     Interpretation  G1                     90 or greater         Normal or high (1)  G2                      60-89                Mild decrease (1)  G3a                   45-59                Mild to moderate decrease  G3b                   30-44                Moderate to severe decrease  G4                    15-29                Severe decrease  G5                    14 or less           Kidney failure          (1)In the absence of evidence of kidney disease, neither GFR category G1 or G2 fulfill the criteria for CKD.    eGFR calculation 2021 CKD-EPI creatinine equation, which does not include race as a factor    Urinalysis, Microscopic Only - Urine, Clean Catch [[id number]] Collected: 02/03/25 1419    Specimen: Urine, Clean Catch Updated: 02/03/25 1439     RBC, UA 0-2 /HPF      WBC, UA None Seen /HPF      Comment: Urine culture not indicated.        Bacteria, UA None Seen /HPF      Squamous Epithelial Cells, UA None Seen /HPF      Hyaline Casts, UA None Seen /LPF      Methodology Automated Microscopy    Urinalysis With Culture If Indicated - Urine, Clean Catch [[id number]]  (Abnormal) Collected: 02/03/25 1419    Specimen: Urine, Clean Catch Updated: 02/03/25 1430     Color, UA Yellow     Appearance, UA Clear     pH, UA 5.5     Specific Gravity, UA 1.010     Glucose, UA Negative     Ketones, UA Negative     Bilirubin, UA Negative     Blood, UA Moderate (2+)     Protein, UA 30 mg/dL (1+)     Leuk Esterase, UA Negative     Nitrite, UA Negative     Urobilinogen, UA 0.2 E.U./dL    Narrative:      In absence of clinical symptoms, the presence of pyuria, bacteria, and/or nitrites on the urinalysis result does not correlate with infection.    Comprehensive Metabolic Panel [[id number]]  (Abnormal) Collected: 02/03/25 1339    Specimen: Blood Updated: 02/03/25 1419     Glucose 114 mg/dL       mg/dL      Creatinine 14.80 mg/dL      Sodium 138 mmol/L      Potassium 5.3 mmol/L      Comment: Slight hemolysis detected by  analyzer. Result may be falsely elevated.        Chloride 101 mmol/L      CO2 15.2 mmol/L      Calcium 9.2 mg/dL      Total Protein 6.7 g/dL      Albumin 3.5 g/dL      ALT (SGPT) 27 U/L      AST (SGOT) 26 U/L      Comment: Slight hemolysis detected by analyzer. Result may be falsely elevated.        Alkaline Phosphatase 93 U/L      Total Bilirubin 0.4 mg/dL      Globulin 3.2 gm/dL      A/G Ratio 1.1 g/dL      BUN/Creatinine Ratio 8.6     Anion Gap 21.8 mmol/L      eGFR 3.1 mL/min/1.73     Narrative:      GFR Categories in Chronic Kidney Disease (CKD)      GFR Category          GFR (mL/min/1.73)    Interpretation  G1                     90 or greater         Normal or high (1)  G2                      60-89                Mild decrease (1)  G3a                   45-59                Mild to moderate decrease  G3b                   30-44                Moderate to severe decrease  G4                    15-29                Severe decrease  G5                    14 or less           Kidney failure          (1)In the absence of evidence of kidney disease, neither GFR category G1 or G2 fulfill the criteria for CKD.    eGFR calculation 2021 CKD-EPI creatinine equation, which does not include race as a factor    Lipase [[id number]]  (Abnormal) Collected: 02/03/25 1339    Specimen: Blood Updated: 02/03/25 1407     Lipase 100 U/L     Protime-INR [[id number]]  (Abnormal) Collected: 02/03/25 1339    Specimen: Blood Updated: 02/03/25 1358     Protime 41.6 Seconds      INR 4.40    CBC & Differential [[id number]]  (Abnormal) Collected: 02/03/25 1339    Specimen: Blood Updated: 02/03/25 1345    Narrative:      The following orders were created for panel order CBC & Differential.  Procedure                               Abnormality         Status                     ---------                               -----------         ------                     CBC Auto Differential[[id number]]        Abnormal            Final result                  Please view results for these tests on the individual orders.    CBC Auto Differential [[id number]]  (Abnormal) Collected: 02/03/25 1339    Specimen: Blood Updated: 02/03/25 1345     WBC 10.68 10*3/mm3      RBC 4.65 10*6/mm3      Hemoglobin 14.3 g/dL      Hematocrit 41.8 %      MCV 89.9 fL      MCH 30.8 pg      MCHC 34.2 g/dL      RDW 12.4 %      RDW-SD 40.6 fl      MPV 9.7 fL      Platelets 161 10*3/mm3      Neutrophil % 83.8 %      Lymphocyte % 5.7 %      Monocyte % 8.2 %      Eosinophil % 1.1 %      Basophil % 0.4 %      Immature Grans % 0.8 %      Neutrophils, Absolute 8.94 10*3/mm3      Lymphocytes, Absolute 0.61 10*3/mm3      Monocytes, Absolute 0.88 10*3/mm3      Eosinophils, Absolute 0.12 10*3/mm3      Basophils, Absolute 0.04 10*3/mm3      Immature Grans, Absolute 0.09 10*3/mm3      nRBC 0.0 /100 WBC              Imaging:  Imaging Results (Last 24 Hours)       Procedure Component Value Units Date/Time    CT Abdomen Pelvis Without Contrast [[id number]] Collected: 02/03/25 1532     Updated: 02/03/25 1609    Narrative:      CT ABDOMEN PELVIS WO CONTRAST    Date of Exam: 2/3/2025 3:27 PM EST    Indication: abdominal pain, constipation.    Comparison: CT abdomen 9/23/2014    Technique: Axial CT images were obtained of the abdomen and pelvis without the administration of contrast. Sagittal and coronal reconstructions were performed.  Automated exposure control and iterative reconstruction methods were used.      Findings:  LUNG BASES:  Unremarkable without mass or infiltrate.    LIVER:  Unremarkable parenchyma without focal lesion.  BILIARY/GALLBLADDER:  Unremarkable  SPLEEN:  Unremarkable  PANCREAS:  Unremarkable  ADRENAL:  Unremarkable  KIDNEYS:  Unremarkable parenchyma with no solid mass identified. There is moderate bilateral hydroureteronephrosis secondary to urinary bladder distention..  No calculus identified.  GASTROINTESTINAL/MESENTERY:  No evidence of obstruction nor inflammation.    AORTA/IVC: IVC  filter is present on imaging study. It is recommended that all patients with IVC filters in place have an active management care plan to monitor their IVC filter. If a care plan is not in place, patient should have a non emergent referral   to an interventional specialist such as interventional radiology or other interventional vascular specialist for establishment of an IVC filter management care plan.    RETROPERITONEUM/LYMPH NODES:  Unremarkable    REPRODUCTIVE: Enlarged prostate gland impresses upon the urinary bladder base.  BLADDER: Generalized urinary bladder distention.    OSSEUS STRUCTURES:  Typical for age with no acute process identified.    There are relatively small fat-containing inguinal hernias, left larger than right.      Impression:      Impression:  1.Urinary bladder distention resulting in moderate bilateral hydroureteronephrosis. Correlate for bladder outlet obstruction. Prostate is enlarged impressing upon the urinary bladder base.            Electronically Signed: Guanako Wright MD    2/3/2025 4:07 PM EST    Workstation ID: [id number]               Assessment:       AMADEO (acute kidney injury)      Large volume retention greater than 2 L  AMADEO  Bilateral hydronephrosis    Plan:     CT images reviewed with impressive distention of the bladder resulting in bilateral hydronephrosis  Maintain Cain, irrigate as needed for patency  Start Flomax  He will need Cain in place for at minimum 1 week likely longer then will need a cystoscopy  Follow creatinine to perez and plan repeat imaging based on clinical course, may need stents if renal function does not return to baseline    Tino Heard MD  [facility]  [facility], [address]  [place] [address]  Office: [phone]  Available via [employer]  02/04/25  10:18 EST

## 2025-02-05 LAB
ANION GAP SERPL CALCULATED.3IONS-SCNC: 13.2 MMOL/L (ref 5–15)
BASOPHILS # BLD AUTO: 0.05 10*3/MM3 (ref 0–0.2)
BASOPHILS NFR BLD AUTO: 0.6 % (ref 0–1.5)
BUN SERPL-MCNC: 97 MG/DL (ref 8–23)
BUN/CREAT SERPL: 16.6 (ref 7–25)
CALCIUM SPEC-SCNC: 8.4 MG/DL (ref 8.6–10.5)
CHLORIDE SERPL-SCNC: 105 MMOL/L (ref 98–107)
CO2 SERPL-SCNC: 24.8 MMOL/L (ref 22–29)
CREAT SERPL-MCNC: 5.83 MG/DL (ref 0.76–1.27)
DEPRECATED RDW RBC AUTO: 40.7 FL (ref 37–54)
EGFRCR SERPLBLD CKD-EPI 2021: 9.5 ML/MIN/1.73
EOSINOPHIL # BLD AUTO: 0.14 10*3/MM3 (ref 0–0.4)
EOSINOPHIL NFR BLD AUTO: 1.6 % (ref 0.3–6.2)
ERYTHROCYTE [DISTWIDTH] IN BLOOD BY AUTOMATED COUNT: 12.3 % (ref 12.3–15.4)
GLUCOSE SERPL-MCNC: 126 MG/DL (ref 65–99)
HCT VFR BLD AUTO: 39 % (ref 37.5–51)
HGB BLD-MCNC: 13.2 G/DL (ref 13–17.7)
IMM GRANULOCYTES # BLD AUTO: 0.13 10*3/MM3 (ref 0–0.05)
IMM GRANULOCYTES NFR BLD AUTO: 1.4 % (ref 0–0.5)
LYMPHOCYTES # BLD AUTO: 1.03 10*3/MM3 (ref 0.7–3.1)
LYMPHOCYTES NFR BLD AUTO: 11.4 % (ref 19.6–45.3)
MAGNESIUM SERPL-MCNC: 1.6 MG/DL (ref 1.6–2.4)
MCH RBC QN AUTO: 30.5 PG (ref 26.6–33)
MCHC RBC AUTO-ENTMCNC: 33.8 G/DL (ref 31.5–35.7)
MCV RBC AUTO: 90.1 FL (ref 79–97)
MONOCYTES # BLD AUTO: 0.68 10*3/MM3 (ref 0.1–0.9)
MONOCYTES NFR BLD AUTO: 7.5 % (ref 5–12)
NEUTROPHILS NFR BLD AUTO: 6.98 10*3/MM3 (ref 1.7–7)
NEUTROPHILS NFR BLD AUTO: 77.5 % (ref 42.7–76)
NRBC BLD AUTO-RTO: 0 /100 WBC (ref 0–0.2)
PHOSPHATE SERPL-MCNC: 5 MG/DL (ref 2.5–4.5)
PLATELET # BLD AUTO: 163 10*3/MM3 (ref 140–450)
PMV BLD AUTO: 9.7 FL (ref 6–12)
POTASSIUM SERPL-SCNC: 4.1 MMOL/L (ref 3.5–5.2)
RBC # BLD AUTO: 4.33 10*6/MM3 (ref 4.14–5.8)
SODIUM SERPL-SCNC: 143 MMOL/L (ref 136–145)
WBC NRBC COR # BLD AUTO: 9.01 10*3/MM3 (ref 3.4–10.8)

## 2025-02-05 PROCEDURE — 83735 ASSAY OF MAGNESIUM: CPT | Performed by: INTERNAL MEDICINE

## 2025-02-05 PROCEDURE — 85025 COMPLETE CBC W/AUTO DIFF WBC: CPT | Performed by: INTERNAL MEDICINE

## 2025-02-05 PROCEDURE — 84100 ASSAY OF PHOSPHORUS: CPT | Performed by: INTERNAL MEDICINE

## 2025-02-05 PROCEDURE — 80048 BASIC METABOLIC PNL TOTAL CA: CPT | Performed by: INTERNAL MEDICINE

## 2025-02-05 RX ORDER — SODIUM CHLORIDE 450 MG/100ML
75 INJECTION, SOLUTION INTRAVENOUS CONTINUOUS
Status: DISCONTINUED | OUTPATIENT
Start: 2025-02-05 | End: 2025-02-07

## 2025-02-05 RX ADMIN — Medication 10 ML: at 20:53

## 2025-02-05 RX ADMIN — Medication 10 ML: at 08:41

## 2025-02-05 RX ADMIN — SODIUM CHLORIDE 75 ML/HR: 4.5 INJECTION, SOLUTION INTRAVENOUS at 13:46

## 2025-02-05 RX ADMIN — METOPROLOL TARTRATE 25 MG: 25 TABLET, FILM COATED ORAL at 20:52

## 2025-02-05 RX ADMIN — TAMSULOSIN HYDROCHLORIDE 0.4 MG: 0.4 CAPSULE ORAL at 08:41

## 2025-02-05 RX ADMIN — METOPROLOL TARTRATE 25 MG: 25 TABLET, FILM COATED ORAL at 08:41

## 2025-02-05 NOTE — PROGRESS NOTES
"  [facility] DAILY PROGRESS NOTE    Patient Identification  Name: Jett Doe  Age: 74 y.o.  Sex: male  :  1950  MRN: [id number]    Date: 2025             Subjective:  Interval History: Creatinine trending down    Objective:    Scheduled Meds:metoprolol tartrate, 25 mg, Oral, Q12H  sodium chloride, 10 mL, Intravenous, Q12H  tamsulosin, 0.4 mg, Oral, Daily      Continuous Infusions:sodium chloride, 75 mL/hr, Last Rate: 75 mL/hr (25 1346)      PRN Meds:  acetaminophen **OR** acetaminophen **OR** acetaminophen    aluminum-magnesium hydroxide-simethicone    senna-docusate sodium **AND** polyethylene glycol **AND** bisacodyl **AND** bisacodyl    melatonin    nitroglycerin    ondansetron ODT **OR** ondansetron    [COMPLETED] Insert Peripheral IV **AND** sodium chloride    sodium chloride    sodium chloride    Vital signs in last 24 hours:  Temp:  [97.8 °F (36.6 °C)-98.7 °F (37.1 °C)] 98 °F (36.7 °C)  Heart Rate:  [79-87] 79  Resp:  [15-28] 28  BP: ()/(53-74) 129/74    Intake/Output:    Intake/Output Summary (Last 24 hours) at 2025 1502  Last data filed at 2025 1142  Gross per 24 hour   Intake 4032 ml   Output 4100 ml   Net -68 ml       Exam:  /74 (BP Location: Right arm, Patient Position: Sitting)   Pulse 79   Temp 98 °F (36.7 °C) (Oral)   Resp 28   Ht 170.2 cm (67\")   Wt 108 kg (237 lb 3.4 oz)   SpO2 95%   BMI 37.15 kg/m²     General Appearance:    Alert, cooperative, NAD   Lungs:     Respirations unlabored, no audible wheezing    Heart:    No cyanosis   Abdomen:     Soft, ND    :    No suprapubic distention, Cain clear yellow            Data Review:  All labs (24hrs):   Recent Results (from the past 24 hours)   Basic Metabolic Panel    Collection Time: 25  2:27 AM    Specimen: Blood   Result Value Ref Range    Glucose 126 (H) 65 - 99 mg/dL    BUN 97 (H) 8 - 23 mg/dL    Creatinine 5.83 (H) 0.76 - 1.27 mg/dL    Sodium 143 136 - 145 mmol/L    Potassium 4.1 3.5 - " 5.2 mmol/L    Chloride 105 98 - 107 mmol/L    CO2 24.8 22.0 - 29.0 mmol/L    Calcium 8.4 (L) 8.6 - 10.5 mg/dL    BUN/Creatinine Ratio 16.6 7.0 - 25.0    Anion Gap 13.2 5.0 - 15.0 mmol/L    eGFR 9.5 (L) >60.0 mL/min/1.73   Magnesium    Collection Time: 02/05/25  2:27 AM    Specimen: Blood   Result Value Ref Range    Magnesium 1.6 1.6 - 2.4 mg/dL   Phosphorus    Collection Time: 02/05/25  2:27 AM    Specimen: Blood   Result Value Ref Range    Phosphorus 5.0 (H) 2.5 - 4.5 mg/dL   CBC Auto Differential    Collection Time: 02/05/25  2:27 AM    Specimen: Blood   Result Value Ref Range    WBC 9.01 3.40 - 10.80 10*3/mm3    RBC 4.33 4.14 - 5.80 10*6/mm3    Hemoglobin 13.2 13.0 - 17.7 g/dL    Hematocrit 39.0 37.5 - 51.0 %    MCV 90.1 79.0 - 97.0 fL    MCH 30.5 26.6 - 33.0 pg    MCHC 33.8 31.5 - 35.7 g/dL    RDW 12.3 12.3 - 15.4 %    RDW-SD 40.7 37.0 - 54.0 fl    MPV 9.7 6.0 - 12.0 fL    Platelets 163 140 - 450 10*3/mm3    Neutrophil % 77.5 (H) 42.7 - 76.0 %    Lymphocyte % 11.4 (L) 19.6 - 45.3 %    Monocyte % 7.5 5.0 - 12.0 %    Eosinophil % 1.6 0.3 - 6.2 %    Basophil % 0.6 0.0 - 1.5 %    Immature Grans % 1.4 (H) 0.0 - 0.5 %    Neutrophils, Absolute 6.98 1.70 - 7.00 10*3/mm3    Lymphocytes, Absolute 1.03 0.70 - 3.10 10*3/mm3    Monocytes, Absolute 0.68 0.10 - 0.90 10*3/mm3    Eosinophils, Absolute 0.14 0.00 - 0.40 10*3/mm3    Basophils, Absolute 0.05 0.00 - 0.20 10*3/mm3    Immature Grans, Absolute 0.13 (H) 0.00 - 0.05 10*3/mm3    nRBC 0.0 0.0 - 0.2 /100 WBC      Imaging Results (Last 24 Hours)       ** No results found for the last 24 hours. **             Assessment:    AMADEO (acute kidney injury)      Large volume retention greater than 2 L  AMADEO  Bilateral hydronephrosis     Plan:      CT images reviewed with impressive distention of the bladder resulting in bilateral hydronephrosis  Maintain Cain, irrigate as needed for patency  Flomax  He will need Cain in place for at minimum 1 week likely longer then will need a  cystoscopy  Follow creatinine to perez and plan repeat imaging based on clinical course, may need stents if renal function does not return to baseline  Okay for discharge with Cain catheter once cleared by nephrology and medical service and we will arrange follow-up for his retention    Tino Heard MD  [facility]  [facility], [address]  [place] [address]  Office: [phone]  Available via [employer] Secure Chat  02/05/25  15:02 EST

## 2025-02-05 NOTE — PLAN OF CARE
Goal Outcome Evaluation:  Plan of Care Reviewed With: patient        Progress: improving  Outcome Evaluation: VSS, creatinine significantly improved this AM, tolerated bicarb gtt, f/c remains in place w/good output, no need for irrigation this shift. Pt has also remained in NSR this shift. Pt able to make needs known, no c/o pain. Metoprolol held due to low b/p per parameters. Plan of care ongoing.

## 2025-02-05 NOTE — PROGRESS NOTES
"                                                                                                                                      Nephrology  Progress Note                                        [facility]    Patient Identification    Name: Jett Doe  Age: 74 y.o.  Sex: male  :  1950  MRN: [id number]      DATE OF SERVICE:  2025        Subective    Feeling better     Objective   Scheduled Meds:metoprolol tartrate, 25 mg, Oral, Q12H  sodium chloride, 10 mL, Intravenous, Q12H  tamsulosin, 0.4 mg, Oral, Daily          Continuous Infusions:sodium bicarbonate 8.4 % 75 mEq in sodium chloride 0.45 % 1,000 mL infusion (less than/equal to 100 mEq), 75 mEq, Last Rate: 75 mEq (25 2302)        PRN Meds:  acetaminophen **OR** acetaminophen **OR** acetaminophen    aluminum-magnesium hydroxide-simethicone    senna-docusate sodium **AND** polyethylene glycol **AND** bisacodyl **AND** bisacodyl    melatonin    nitroglycerin    ondansetron ODT **OR** ondansetron    [COMPLETED] Insert Peripheral IV **AND** sodium chloride    sodium chloride    sodium chloride     Exam:  /73 (BP Location: Right arm, Patient Position: Lying)   Pulse 85   Temp 97.8 °F (36.6 °C) (Oral)   Resp 17   Ht 170.2 cm (67\")   Wt 108 kg (237 lb 3.4 oz)   SpO2 95%   BMI 37.15 kg/m²     Intake/Output last 3 shifts:  I/O last 3 completed shifts:  In: 720 [P.O.:720]  Out: 9700 [Urine:9700]    Intake/Output this shift:  No intake/output data recorded.    Physical exam:  General Appearance:  Alert  Head:  Normocephalic, without obvious abnormality, atraumatic  Eyes:  PERRL, conjunctiva/corneas clear     Neck:  Supple,  no adenopathy;      Lungs:  Decreased BS occasion ronchi  Heart:  Regular rate and rhythm, S1 and S2 normal  Abdomen:  Soft, non-tender, bowel sounds active   Extremities: trace edema  Pulses: 2+ and symmetric all extremities  Skin:  No rashes or lesions       Data Review:  All labs (24hrs): "   Recent Results (from the past 24 hours)   Basic Metabolic Panel    Collection Time: 02/05/25  2:27 AM    Specimen: Blood   Result Value Ref Range    Glucose 126 (H) 65 - 99 mg/dL    BUN 97 (H) 8 - 23 mg/dL    Creatinine 5.83 (H) 0.76 - 1.27 mg/dL    Sodium 143 136 - 145 mmol/L    Potassium 4.1 3.5 - 5.2 mmol/L    Chloride 105 98 - 107 mmol/L    CO2 24.8 22.0 - 29.0 mmol/L    Calcium 8.4 (L) 8.6 - 10.5 mg/dL    BUN/Creatinine Ratio 16.6 7.0 - 25.0    Anion Gap 13.2 5.0 - 15.0 mmol/L    eGFR 9.5 (L) >60.0 mL/min/1.73   Magnesium    Collection Time: 02/05/25  2:27 AM    Specimen: Blood   Result Value Ref Range    Magnesium 1.6 1.6 - 2.4 mg/dL   Phosphorus    Collection Time: 02/05/25  2:27 AM    Specimen: Blood   Result Value Ref Range    Phosphorus 5.0 (H) 2.5 - 4.5 mg/dL   CBC Auto Differential    Collection Time: 02/05/25  2:27 AM    Specimen: Blood   Result Value Ref Range    WBC 9.01 3.40 - 10.80 10*3/mm3    RBC 4.33 4.14 - 5.80 10*6/mm3    Hemoglobin 13.2 13.0 - 17.7 g/dL    Hematocrit 39.0 37.5 - 51.0 %    MCV 90.1 79.0 - 97.0 fL    MCH 30.5 26.6 - 33.0 pg    MCHC 33.8 31.5 - 35.7 g/dL    RDW 12.3 12.3 - 15.4 %    RDW-SD 40.7 37.0 - 54.0 fl    MPV 9.7 6.0 - 12.0 fL    Platelets 163 140 - 450 10*3/mm3    Neutrophil % 77.5 (H) 42.7 - 76.0 %    Lymphocyte % 11.4 (L) 19.6 - 45.3 %    Monocyte % 7.5 5.0 - 12.0 %    Eosinophil % 1.6 0.3 - 6.2 %    Basophil % 0.6 0.0 - 1.5 %    Immature Grans % 1.4 (H) 0.0 - 0.5 %    Neutrophils, Absolute 6.98 1.70 - 7.00 10*3/mm3    Lymphocytes, Absolute 1.03 0.70 - 3.10 10*3/mm3    Monocytes, Absolute 0.68 0.10 - 0.90 10*3/mm3    Eosinophils, Absolute 0.14 0.00 - 0.40 10*3/mm3    Basophils, Absolute 0.05 0.00 - 0.20 10*3/mm3    Immature Grans, Absolute 0.13 (H) 0.00 - 0.05 10*3/mm3    nRBC 0.0 0.0 - 0.2 /100 WBC          Imaging:  CT Abdomen Pelvis Without Contrast    Result Date: 2/3/2025  Impression: 1.Urinary bladder distention resulting in moderate bilateral  hydroureteronephrosis. Correlate for bladder outlet obstruction. Prostate is enlarged impressing upon the urinary bladder base. Electronically Signed: Guanako Wright MD  2/3/2025 4:07 PM EST  Workstation ID: [id number]     Assessment/Plan:     AMADEO (acute kidney injury)         Acute kidney injury versus acute on chronic kidney disease as a baseline creatinine of 1.3 from earlier last month  Obstructive uropathy  Metabolic acidosis  Hyperkalemia  Volume excess  What appears to be prostate enlargement  History of CVA        Recommendations:  Creatinine trending down to 5.8  Change IV fluid to half-normal saline  Potassium is better  Urology on board

## 2025-02-05 NOTE — PROGRESS NOTES
[facility] MEDICINE SERVICE  DAILY PROGRESS NOTE    NAME: Jett Doe  : 1950  MRN: [id number]      LOS: 2 days     PROVIDER OF SERVICE: Geovany Lin MD    Chief Complaint: AMADEO (acute kidney injury)    Subjective:     Interval History:   Patient is seen at bedside, no new complaints.          Objective:     Vital Signs  Temp:  [97.8 °F (36.6 °C)-98.7 °F (37.1 °C)] 98 °F (36.7 °C)  Heart Rate:  [79-87] 79  Resp:  [15-28] 28  BP: ()/(53-74) 129/74   Body mass index is 37.15 kg/m².    Physical Exam  Physical Exam general, awake and alert.  Head and ENT, normocephalic and atraumatic.  Lungs, symmetric expansion, equal air entry bilaterally.  Heart, regular rate and rhythm.  Abdomen, soft and nontender.  Extremities, no clubbing or cyanosis.  Neuro, no focal deficits.  Skin: Warm and no rash.  Psych, normal mood and affect.  Musculoskeletal, joint examination is grossly normal.           Diagnostic Data    Results from last 7 days   Lab Units 25  0227 25  2213 25  1339   WBC 10*3/mm3 9.01   < > 10.68   HEMOGLOBIN g/dL 13.2   < > 14.3   HEMATOCRIT % 39.0   < > 41.8   PLATELETS 10*3/mm3 163   < > 161   GLUCOSE mg/dL 126*   < > 114*   CREATININE mg/dL 5.83*   < > 14.80*   BUN mg/dL 97*   < > 127*   SODIUM mmol/L 143   < > 138   POTASSIUM mmol/L 4.1   < > 5.3*   AST (SGOT) U/L  --   --  26   ALT (SGPT) U/L  --   --  27   ALK PHOS U/L  --   --  93   BILIRUBIN mg/dL  --   --  0.4   ANION GAP mmol/L 13.2   < > 21.8*    < > = values in this interval not displayed.       CT Abdomen Pelvis Without Contrast    Result Date: 2/3/2025  Impression: 1.Urinary bladder distention resulting in moderate bilateral hydroureteronephrosis. Correlate for bladder outlet obstruction. Prostate is enlarged impressing upon the urinary bladder base. Electronically Signed: Guanako Wright MD  2/3/2025 4:07 PM EST  Workstation ID: [id number]       I reviewed the patient's new clinical  results.    Assessment/Plan:     Active and Resolved Problems  Active Hospital Problems    Diagnosis  POA    **AMADEO (acute kidney injury) [N17.9]  Yes      Resolved Hospital Problems   No resolved problems to display.     Assessment and plan  Obstructive uropathy, with acute on chronic kidney injury, due to bladder outlet obstruction, likely from enlarged prostate, nephrology and urology are following, follow recommendations.  Creatinine has improved to 5.8 today.  Management of hydroureteronephrosis, based on urology recommendations.  Metabolic acidosis, labs have been improving, patient clinically has also been improving.  History of cardiomyopathy, CVA, hypertension, hyperlipidemia, we will continue home medications.  These are chronic conditions which are stable.  CODE STATUS is full code.  Further plans based on hospital course.        Signature: Electronically signed by Geovany Lin MD, 02/05/25, 12:28 EST.  [facility] Hospitalist Team

## 2025-02-05 NOTE — PLAN OF CARE
Goal Outcome Evaluation:                                  New admission to [facility], stable. Able to make needs known, plan of care ongoing.

## 2025-02-06 LAB
ANION GAP SERPL CALCULATED.3IONS-SCNC: 10.9 MMOL/L (ref 5–15)
APTT PPP: 28 SECONDS (ref 22.7–35.4)
APTT PPP: 49.8 SECONDS (ref 22.7–35.4)
BASOPHILS # BLD AUTO: 0.05 10*3/MM3 (ref 0–0.2)
BASOPHILS NFR BLD AUTO: 0.5 % (ref 0–1.5)
BUN SERPL-MCNC: 79 MG/DL (ref 8–23)
BUN/CREAT SERPL: 23 (ref 7–25)
CALCIUM SPEC-SCNC: 8.3 MG/DL (ref 8.6–10.5)
CHLORIDE SERPL-SCNC: 102 MMOL/L (ref 98–107)
CO2 SERPL-SCNC: 28.1 MMOL/L (ref 22–29)
CREAT SERPL-MCNC: 3.43 MG/DL (ref 0.76–1.27)
DEPRECATED RDW RBC AUTO: 40.9 FL (ref 37–54)
EGFRCR SERPLBLD CKD-EPI 2021: 18 ML/MIN/1.73
EOSINOPHIL # BLD AUTO: 0.24 10*3/MM3 (ref 0–0.4)
EOSINOPHIL NFR BLD AUTO: 2.2 % (ref 0.3–6.2)
ERYTHROCYTE [DISTWIDTH] IN BLOOD BY AUTOMATED COUNT: 12.2 % (ref 12.3–15.4)
GLUCOSE SERPL-MCNC: 168 MG/DL (ref 65–99)
HCT VFR BLD AUTO: 39.7 % (ref 37.5–51)
HGB BLD-MCNC: 13.3 G/DL (ref 13–17.7)
IMM GRANULOCYTES # BLD AUTO: 0.13 10*3/MM3 (ref 0–0.05)
IMM GRANULOCYTES NFR BLD AUTO: 1.2 % (ref 0–0.5)
INR PPP: 1.5 (ref 2–3)
LYMPHOCYTES # BLD AUTO: 1.12 10*3/MM3 (ref 0.7–3.1)
LYMPHOCYTES NFR BLD AUTO: 10.5 % (ref 19.6–45.3)
MAGNESIUM SERPL-MCNC: 1.3 MG/DL (ref 1.6–2.4)
MCH RBC QN AUTO: 30.5 PG (ref 26.6–33)
MCHC RBC AUTO-ENTMCNC: 33.5 G/DL (ref 31.5–35.7)
MCV RBC AUTO: 91.1 FL (ref 79–97)
MONOCYTES # BLD AUTO: 0.87 10*3/MM3 (ref 0.1–0.9)
MONOCYTES NFR BLD AUTO: 8.1 % (ref 5–12)
NEUTROPHILS NFR BLD AUTO: 77.5 % (ref 42.7–76)
NEUTROPHILS NFR BLD AUTO: 8.27 10*3/MM3 (ref 1.7–7)
NRBC BLD AUTO-RTO: 0 /100 WBC (ref 0–0.2)
PHOSPHATE SERPL-MCNC: 2.9 MG/DL (ref 2.5–4.5)
PLATELET # BLD AUTO: 193 10*3/MM3 (ref 140–450)
PMV BLD AUTO: 9.7 FL (ref 6–12)
POTASSIUM SERPL-SCNC: 3.6 MMOL/L (ref 3.5–5.2)
PROTHROMBIN TIME: 18.1 SECONDS (ref 19.4–28.5)
RBC # BLD AUTO: 4.36 10*6/MM3 (ref 4.14–5.8)
SODIUM SERPL-SCNC: 141 MMOL/L (ref 136–145)
WBC NRBC COR # BLD AUTO: 10.68 10*3/MM3 (ref 3.4–10.8)

## 2025-02-06 PROCEDURE — 85025 COMPLETE CBC W/AUTO DIFF WBC: CPT | Performed by: INTERNAL MEDICINE

## 2025-02-06 PROCEDURE — 83735 ASSAY OF MAGNESIUM: CPT | Performed by: INTERNAL MEDICINE

## 2025-02-06 PROCEDURE — 85730 THROMBOPLASTIN TIME PARTIAL: CPT | Performed by: STUDENT IN AN ORGANIZED HEALTH CARE EDUCATION/TRAINING PROGRAM

## 2025-02-06 PROCEDURE — 25010000002 HEPARIN (PORCINE) 25000-0.45 UT/250ML-% SOLUTION: Performed by: STUDENT IN AN ORGANIZED HEALTH CARE EDUCATION/TRAINING PROGRAM

## 2025-02-06 PROCEDURE — 85610 PROTHROMBIN TIME: CPT | Performed by: STUDENT IN AN ORGANIZED HEALTH CARE EDUCATION/TRAINING PROGRAM

## 2025-02-06 PROCEDURE — 84100 ASSAY OF PHOSPHORUS: CPT | Performed by: INTERNAL MEDICINE

## 2025-02-06 PROCEDURE — 80048 BASIC METABOLIC PNL TOTAL CA: CPT | Performed by: INTERNAL MEDICINE

## 2025-02-06 RX ORDER — HEPARIN SODIUM 10000 [USP'U]/100ML
9.26 INJECTION, SOLUTION INTRAVENOUS
Status: DISCONTINUED | OUTPATIENT
Start: 2025-02-06 | End: 2025-02-08 | Stop reason: HOSPADM

## 2025-02-06 RX ORDER — DIPHENHYDRAMINE HYDROCHLORIDE 25 MG/1
25 CAPSULE ORAL DAILY
Status: DISCONTINUED | OUTPATIENT
Start: 2025-02-06 | End: 2025-02-08 | Stop reason: HOSPADM

## 2025-02-06 RX ORDER — FOLIC ACID 1 MG/1
2 TABLET ORAL DAILY
Status: DISCONTINUED | OUTPATIENT
Start: 2025-02-06 | End: 2025-02-08 | Stop reason: HOSPADM

## 2025-02-06 RX ORDER — WARFARIN SODIUM 3 MG/1
6 TABLET ORAL
Status: DISCONTINUED | OUTPATIENT
Start: 2025-02-06 | End: 2025-02-08

## 2025-02-06 RX ORDER — CLOPIDOGREL BISULFATE 75 MG/1
75 TABLET ORAL DAILY
Status: DISCONTINUED | OUTPATIENT
Start: 2025-02-06 | End: 2025-02-08 | Stop reason: HOSPADM

## 2025-02-06 RX ORDER — MULTIVITAMIN WITH IRON
2000 TABLET ORAL DAILY
Status: DISCONTINUED | OUTPATIENT
Start: 2025-02-06 | End: 2025-02-08 | Stop reason: HOSPADM

## 2025-02-06 RX ORDER — ATORVASTATIN CALCIUM 20 MG/1
20 TABLET, FILM COATED ORAL NIGHTLY
Status: DISCONTINUED | OUTPATIENT
Start: 2025-02-06 | End: 2025-02-08 | Stop reason: HOSPADM

## 2025-02-06 RX ADMIN — Medication 10 ML: at 20:19

## 2025-02-06 RX ADMIN — HEPARIN SODIUM 9.26 UNITS/KG/HR: 10000 INJECTION, SOLUTION INTRAVENOUS at 13:27

## 2025-02-06 RX ADMIN — SODIUM CHLORIDE 75 ML/HR: 4.5 INJECTION, SOLUTION INTRAVENOUS at 02:43

## 2025-02-06 RX ADMIN — METOPROLOL TARTRATE 25 MG: 25 TABLET, FILM COATED ORAL at 08:38

## 2025-02-06 RX ADMIN — ATORVASTATIN CALCIUM 20 MG: 20 TABLET, FILM COATED ORAL at 20:19

## 2025-02-06 RX ADMIN — FOLIC ACID 2 MG: 1 TABLET ORAL at 17:17

## 2025-02-06 RX ADMIN — SODIUM CHLORIDE 75 ML/HR: 4.5 INJECTION, SOLUTION INTRAVENOUS at 17:18

## 2025-02-06 RX ADMIN — HEPARIN SODIUM 12.26 UNITS/KG/HR: 10000 INJECTION, SOLUTION INTRAVENOUS at 20:59

## 2025-02-06 RX ADMIN — Medication 25 MG: at 17:17

## 2025-02-06 RX ADMIN — METOPROLOL TARTRATE 25 MG: 25 TABLET, FILM COATED ORAL at 20:19

## 2025-02-06 RX ADMIN — TAMSULOSIN HYDROCHLORIDE 0.4 MG: 0.4 CAPSULE ORAL at 08:38

## 2025-02-06 RX ADMIN — Medication 10 ML: at 08:38

## 2025-02-06 RX ADMIN — Medication 2000 MCG: at 17:16

## 2025-02-06 RX ADMIN — WARFARIN SODIUM 6 MG: 3 TABLET ORAL at 17:17

## 2025-02-06 RX ADMIN — CLOPIDOGREL BISULFATE 75 MG: 75 TABLET ORAL at 11:58

## 2025-02-06 NOTE — PROGRESS NOTES
"                                                                                                                                      Nephrology  Progress Note                                        [facility]    Patient Identification    Name: Jett Doe  Age: 74 y.o.  Sex: male  :  1950  MRN: [id number]      DATE OF SERVICE:  2025        Subective    Feeling better     Objective   Scheduled Meds:metoprolol tartrate, 25 mg, Oral, Q12H  sodium chloride, 10 mL, Intravenous, Q12H  tamsulosin, 0.4 mg, Oral, Daily          Continuous Infusions:sodium chloride, 75 mL/hr, Last Rate: 75 mL/hr (25 0243)        PRN Meds:  acetaminophen **OR** acetaminophen **OR** acetaminophen    aluminum-magnesium hydroxide-simethicone    senna-docusate sodium **AND** polyethylene glycol **AND** bisacodyl **AND** bisacodyl    melatonin    nitroglycerin    ondansetron ODT **OR** ondansetron    [COMPLETED] Insert Peripheral IV **AND** sodium chloride    sodium chloride    sodium chloride     Exam:  /67 (BP Location: Right arm, Patient Position: Lying)   Pulse 83   Temp 97.1 °F (36.2 °C) (Oral)   Resp 16   Ht 170.2 cm (67\")   Wt 108 kg (237 lb 3.4 oz)   SpO2 95%   BMI 37.15 kg/m²     Intake/Output last 3 shifts:  I/O last 3 completed shifts:  In: 4632 [P.O.:1320; I.V.:3312]  Out: 8300 [Urine:8300]    Intake/Output this shift:  No intake/output data recorded.    Physical exam:  General Appearance:  Alert  Head:  Normocephalic, without obvious abnormality, atraumatic  Eyes:  PERRL, conjunctiva/corneas clear     Neck:  Supple,  no adenopathy;      Lungs:  Decreased BS occasion ronchi  Heart:  Regular rate and rhythm, S1 and S2 normal  Abdomen:  Soft, non-tender, bowel sounds active   Extremities: trace edema  Pulses: 2+ and symmetric all extremities  Skin:  No rashes or lesions       Data Review:  All labs (24hrs):   Recent Results (from the past 24 hours)   Basic Metabolic Panel    " Collection Time: 02/06/25 12:31 AM    Specimen: Arm, Right; Blood   Result Value Ref Range    Glucose 168 (H) 65 - 99 mg/dL    BUN 79 (H) 8 - 23 mg/dL    Creatinine 3.43 (H) 0.76 - 1.27 mg/dL    Sodium 141 136 - 145 mmol/L    Potassium 3.6 3.5 - 5.2 mmol/L    Chloride 102 98 - 107 mmol/L    CO2 28.1 22.0 - 29.0 mmol/L    Calcium 8.3 (L) 8.6 - 10.5 mg/dL    BUN/Creatinine Ratio 23.0 7.0 - 25.0    Anion Gap 10.9 5.0 - 15.0 mmol/L    eGFR 18.0 (L) >60.0 mL/min/1.73   Magnesium    Collection Time: 02/06/25 12:31 AM    Specimen: Arm, Right; Blood   Result Value Ref Range    Magnesium 1.3 (L) 1.6 - 2.4 mg/dL   Phosphorus    Collection Time: 02/06/25 12:31 AM    Specimen: Arm, Right; Blood   Result Value Ref Range    Phosphorus 2.9 2.5 - 4.5 mg/dL   CBC Auto Differential    Collection Time: 02/06/25 12:31 AM    Specimen: Arm, Right; Blood   Result Value Ref Range    WBC 10.68 3.40 - 10.80 10*3/mm3    RBC 4.36 4.14 - 5.80 10*6/mm3    Hemoglobin 13.3 13.0 - 17.7 g/dL    Hematocrit 39.7 37.5 - 51.0 %    MCV 91.1 79.0 - 97.0 fL    MCH 30.5 26.6 - 33.0 pg    MCHC 33.5 31.5 - 35.7 g/dL    RDW 12.2 (L) 12.3 - 15.4 %    RDW-SD 40.9 37.0 - 54.0 fl    MPV 9.7 6.0 - 12.0 fL    Platelets 193 140 - 450 10*3/mm3    Neutrophil % 77.5 (H) 42.7 - 76.0 %    Lymphocyte % 10.5 (L) 19.6 - 45.3 %    Monocyte % 8.1 5.0 - 12.0 %    Eosinophil % 2.2 0.3 - 6.2 %    Basophil % 0.5 0.0 - 1.5 %    Immature Grans % 1.2 (H) 0.0 - 0.5 %    Neutrophils, Absolute 8.27 (H) 1.70 - 7.00 10*3/mm3    Lymphocytes, Absolute 1.12 0.70 - 3.10 10*3/mm3    Monocytes, Absolute 0.87 0.10 - 0.90 10*3/mm3    Eosinophils, Absolute 0.24 0.00 - 0.40 10*3/mm3    Basophils, Absolute 0.05 0.00 - 0.20 10*3/mm3    Immature Grans, Absolute 0.13 (H) 0.00 - 0.05 10*3/mm3    nRBC 0.0 0.0 - 0.2 /100 WBC          Imaging:  CT Abdomen Pelvis Without Contrast    Result Date: 2/3/2025  Impression: 1.Urinary bladder distention resulting in moderate bilateral hydroureteronephrosis.  Correlate for bladder outlet obstruction. Prostate is enlarged impressing upon the urinary bladder base. Electronically Signed: Guanako Wright MD  2/3/2025 4:07 PM EST  Workstation ID: [id number]     Assessment/Plan:     AMADEO (acute kidney injury)         Acute kidney injury versus acute on chronic kidney disease as a baseline creatinine of 1.3 from earlier last month  Obstructive uropathy  Metabolic acidosis  Hyperkalemia  Volume excess  What appears to be prostate enlargement  History of CVA        Recommendations:  Creatinine trending down to 3.4 from  5.8  Cont IV fluid to half-normal saline  Potassium is better  Urology on board  May be another day

## 2025-02-06 NOTE — PROGRESS NOTES
[facility] MEDICINE SERVICE  DAILY PROGRESS NOTE    NAME: Jett Doe  : 1950  MRN: [id number]      LOS: 3 days     PROVIDER OF SERVICE: Kaylyn Arndt MD    Chief Complaint: AMADEO (acute kidney injury)    Subjective:     Interval History:   Patient is seen at bedside, feels better this a.m.          Objective:     Vital Signs  Temp:  [97.1 °F (36.2 °C)-98.8 °F (37.1 °C)] 97.7 °F (36.5 °C)  Heart Rate:  [69-95] 69  Resp:  [16-20] 17  BP: (109-133)/(63-77) 109/63   Body mass index is 37.15 kg/m².    Physical Exam  Physical Exam   General: Alert and oriented, no acute distress.  Cain in place.  Lungs: Clear to auscultation, nonlabored respiration.  Heart: RRR, no murmur, gallop or edema.  Abdomen: Soft, nontender,  + bowel sounds.  Musculoskeletal: Normal range of motion and strength, no tenderness or swelling.  Neuro: alert and awake, moving all 4 extremities   Skin: Skin is warm, dry and pink, no rashes or lesions.  Psychiatric: Cooperative, appropriate mood and affect.               Diagnostic Data    Results from last 7 days   Lab Units 25  0031 25  2213 25  1339   WBC 10*3/mm3 10.68   < > 10.68   HEMOGLOBIN g/dL 13.3   < > 14.3   HEMATOCRIT % 39.7   < > 41.8   PLATELETS 10*3/mm3 193   < > 161   GLUCOSE mg/dL 168*   < > 114*   CREATININE mg/dL 3.43*   < > 14.80*   BUN mg/dL 79*   < > 127*   SODIUM mmol/L 141   < > 138   POTASSIUM mmol/L 3.6   < > 5.3*   AST (SGOT) U/L  --   --  26   ALT (SGPT) U/L  --   --  27   ALK PHOS U/L  --   --  93   BILIRUBIN mg/dL  --   --  0.4   ANION GAP mmol/L 10.9   < > 21.8*    < > = values in this interval not displayed.       No radiology results for the last day      I reviewed the patient's new clinical results.    Assessment/Plan:     Active and Resolved Problems  Active Hospital Problems    Diagnosis  POA    **AMADEO (acute kidney injury) [N17.9]  Yes      Resolved Hospital Problems   No resolved problems to display.     Assessment and  plan  #Obstructive uropathy  #AMADEO on CKD  due to bladder outlet obstruction, likely from enlarged prostate  Urology recommendation appreciated-patient will need to keep Cain for at least 1 week or likely longer and then will need cystoscopy.  Okay to discharge from urology perspective pending renal clearance  Nephrology following, recommendation appreciated  Continue IV fluid  Creatinine is getting better, today's 3.43  Continue Flomax  Monitor BMP      #History of cardiomyopathy,  #CVA  #hypertension  #hyperlipidemia  Continue Plavix and statin  Continue metoprolol 25 mg every 12    # History of PE  # History of right lower extremity DVT  INR today 1.5  Resume warfarin with heparin bridging  Pharmacy to dose warfarin  Goal INR 2-3    # History of polycythemia/thrombocytopenia  # History of liver mass  Follows with hematology and oncology as an outpatient  Continue outpatient follow-up and workup    Signature: Electronically signed by Kaylyn Arndt MD, 02/06/25, 11:58 EST.  [facility] Hospitalist Team

## 2025-02-06 NOTE — PROGRESS NOTES
"  [facility] DAILY PROGRESS NOTE    Patient Identification  Name: Jett Doe  Age: 74 y.o.  Sex: male  :  1950  MRN: [id number]    Date: 2025             Subjective:  Interval History: Creatinine continues to trend down    Objective:    Scheduled Meds:atorvastatin, 20 mg, Oral, Nightly  clopidogrel, 75 mg, Oral, Daily  folic acid-pyridoxine-cyanocobalamin, 1 tablet, Oral, Daily  metoprolol tartrate, 25 mg, Oral, Q12H  sodium chloride, 10 mL, Intravenous, Q12H  tamsulosin, 0.4 mg, Oral, Daily  warfarin, 6 mg, Oral, Daily      Continuous Infusions:heparin, 9.26 Units/kg/hr  Pharmacy to dose warfarin,   sodium chloride, 75 mL/hr, Last Rate: 75 mL/hr (25 0243)      PRN Meds:  acetaminophen **OR** acetaminophen **OR** acetaminophen    aluminum-magnesium hydroxide-simethicone    senna-docusate sodium **AND** polyethylene glycol **AND** bisacodyl **AND** bisacodyl    heparin    heparin    melatonin    nitroglycerin    ondansetron ODT **OR** ondansetron    Pharmacy to dose warfarin    [COMPLETED] Insert Peripheral IV **AND** sodium chloride    sodium chloride    sodium chloride    Vital signs in last 24 hours:  Temp:  [97.1 °F (36.2 °C)-98.8 °F (37.1 °C)] 97.7 °F (36.5 °C)  Heart Rate:  [69-95] 69  Resp:  [16-20] 17  BP: (109-133)/(63-77) 109/63    Intake/Output:    Intake/Output Summary (Last 24 hours) at 2025 1238  Last data filed at 2025 0628  Gross per 24 hour   Intake 840 ml   Output 4200 ml   Net -3360 ml       Exam:  /63 (BP Location: Right arm, Patient Position: Sitting)   Pulse 69   Temp 97.7 °F (36.5 °C) (Oral)   Resp 17   Ht 170.2 cm (67\")   Wt 97.3 kg (214 lb 9.6 oz)   SpO2 91%   BMI 33.61 kg/m²     General Appearance:    Alert, cooperative, NAD   Lungs:     Respirations unlabored, no audible wheezing    Heart:    No cyanosis   Abdomen:     Soft, ND    :    No suprapubic distention, indwelling Cain in place with clear yellow urine          Data Review:  All labs " (24hrs):   Recent Results (from the past 24 hours)   Basic Metabolic Panel    Collection Time: 02/06/25 12:31 AM    Specimen: Arm, Right; Blood   Result Value Ref Range    Glucose 168 (H) 65 - 99 mg/dL    BUN 79 (H) 8 - 23 mg/dL    Creatinine 3.43 (H) 0.76 - 1.27 mg/dL    Sodium 141 136 - 145 mmol/L    Potassium 3.6 3.5 - 5.2 mmol/L    Chloride 102 98 - 107 mmol/L    CO2 28.1 22.0 - 29.0 mmol/L    Calcium 8.3 (L) 8.6 - 10.5 mg/dL    BUN/Creatinine Ratio 23.0 7.0 - 25.0    Anion Gap 10.9 5.0 - 15.0 mmol/L    eGFR 18.0 (L) >60.0 mL/min/1.73   Magnesium    Collection Time: 02/06/25 12:31 AM    Specimen: Arm, Right; Blood   Result Value Ref Range    Magnesium 1.3 (L) 1.6 - 2.4 mg/dL   Phosphorus    Collection Time: 02/06/25 12:31 AM    Specimen: Arm, Right; Blood   Result Value Ref Range    Phosphorus 2.9 2.5 - 4.5 mg/dL   CBC Auto Differential    Collection Time: 02/06/25 12:31 AM    Specimen: Arm, Right; Blood   Result Value Ref Range    WBC 10.68 3.40 - 10.80 10*3/mm3    RBC 4.36 4.14 - 5.80 10*6/mm3    Hemoglobin 13.3 13.0 - 17.7 g/dL    Hematocrit 39.7 37.5 - 51.0 %    MCV 91.1 79.0 - 97.0 fL    MCH 30.5 26.6 - 33.0 pg    MCHC 33.5 31.5 - 35.7 g/dL    RDW 12.2 (L) 12.3 - 15.4 %    RDW-SD 40.9 37.0 - 54.0 fl    MPV 9.7 6.0 - 12.0 fL    Platelets 193 140 - 450 10*3/mm3    Neutrophil % 77.5 (H) 42.7 - 76.0 %    Lymphocyte % 10.5 (L) 19.6 - 45.3 %    Monocyte % 8.1 5.0 - 12.0 %    Eosinophil % 2.2 0.3 - 6.2 %    Basophil % 0.5 0.0 - 1.5 %    Immature Grans % 1.2 (H) 0.0 - 0.5 %    Neutrophils, Absolute 8.27 (H) 1.70 - 7.00 10*3/mm3    Lymphocytes, Absolute 1.12 0.70 - 3.10 10*3/mm3    Monocytes, Absolute 0.87 0.10 - 0.90 10*3/mm3    Eosinophils, Absolute 0.24 0.00 - 0.40 10*3/mm3    Basophils, Absolute 0.05 0.00 - 0.20 10*3/mm3    Immature Grans, Absolute 0.13 (H) 0.00 - 0.05 10*3/mm3    nRBC 0.0 0.0 - 0.2 /100 WBC   Protime-INR    Collection Time: 02/06/25 10:50 AM    Specimen: Blood   Result Value Ref Range     Protime 18.1 (L) 19.4 - 28.5 Seconds    INR 1.50 (L) 2.00 - 3.00   aPTT    Collection Time: 02/06/25 10:50 AM    Specimen: Blood   Result Value Ref Range    PTT 28.0 22.7 - 35.4 seconds      Imaging Results (Last 24 Hours)       ** No results found for the last 24 hours. **             Assessment:    AMADEO (acute kidney injury)      Large volume retention greater than 2 L  AMADEO  Bilateral hydronephrosis    Plan:      Maintain Cain, irrigate as needed for patency  Continue Flomax  He will need Cain in place for at minimum 1 week likely longer then will need a cystoscopy  Follow creatinine to perez and plan repeat imaging based on clinical course, may need stents if renal function does not return to baseline  Okay for discharge with Cain catheter once cleared by nephrology and medical service and we will arrange follow-up for his retention    SOLOMON Dao  [facility]  [facility] [facility] [facility] [address], [address]  [place] [id number]  Office: [phone]  Available via Epic Secure Chat  02/06/25  12:38 EST     Plan reviewed and discussed with Dr. Heard.

## 2025-02-06 NOTE — PLAN OF CARE
Goal Outcome Evaluation:  Patient alert and oriented x 4. Able to make needs known. Standby assist, tolerates well. Cain cath to stay in place. N/S running at 75mL/hr. Personal items and call light in reach. Plan of care is ongoing.

## 2025-02-06 NOTE — PROGRESS NOTES
"Pharmacy dosing service  Anticoagulant  Warfarin     Subjective:    Jett Doe is a 74 y.o.male being continued on warfarin for Atrial Fibrillation / Flutter and History of DVT/PE.    INR Goal: 2 - 3  Home medication?: warfarin 6 mg PO daily  Bridge Therapy Present?:  Yes, Heparin drip (Protocol ACS)   Interacting Medications Evaluation (New/Present/Discontinued): Plavix (increases effects of warfarin)  Additional Contributing Factors: AMADEO, age      Assessment/Plan:    INR was supratherpeutic upon admission, possibly due to AMADEO with serum creatinine >14 mg/dL. Will resume home dosing of warfarin 6 mg daily. Will not give a bolus dose of warfarin d/t serum creatinine still remaining elevated but trending down.     Per Dr. Arndt, continue heparin gtt until INR is therapeutic.    Continue to monitor and adjust based on INR.         Date 2/6           INR 1.5           Dose 6 mg               Objective:  [Ht: 170.2 cm (67\"); Wt: 108 kg (237 lb 3.4 oz); BMI: Body mass index is 37.15 kg/m².]    Lab Results   Component Value Date    ALBUMIN 3.5 02/03/2025     Lab Results   Component Value Date    INR 1.50 (L) 02/06/2025    INR 4.40 (H) 02/03/2025    INR 2.50 (H) 01/23/2025    PROTIME 18.1 (L) 02/06/2025    PROTIME 41.6 (H) 02/03/2025    PROTIME 38.2 (H) 01/02/2025     Lab Results   Component Value Date    HGB 13.3 02/06/2025    HGB 13.2 02/05/2025    HGB 15.3 02/04/2025     Lab Results   Component Value Date    HCT 39.7 02/06/2025    HCT 39.0 02/05/2025    HCT 44.6 02/04/2025       Barbie Hardwick, [facility]  02/06/25 11:43 EST    "

## 2025-02-06 NOTE — PLAN OF CARE
Goal Outcome Evaluation:  Plan of Care Reviewed With: patient        Progress: improving  Outcome Evaluation: Patient was a transfer from [facility]. Urology following d/t bilateral hydroureteronephrosis and acute urinary retention. F/C in place for a minimum of 1 week and to discharge WITH F/C. F/U with Urology outpatient for cysto. Neph following d/t elevated kidney fuction. 1/2 NS running at 75mL/hr. Heparin gtt started for bridging over to PO warfarin.  Started on plavix this morning. A&Ox4. No other issues at this time. Continuing to monitor.

## 2025-02-07 PROBLEM — N13.9 OBSTRUCTIVE UROPATHY: Status: ACTIVE | Noted: 2025-02-07

## 2025-02-07 PROBLEM — Z86.73 H/O: CVA (CEREBROVASCULAR ACCIDENT): Status: ACTIVE | Noted: 2025-02-07

## 2025-02-07 PROBLEM — N13.30 BILATERAL HYDRONEPHROSIS: Status: ACTIVE | Noted: 2025-02-07

## 2025-02-07 PROBLEM — R33.8 ACUTE URINARY RETENTION: Status: ACTIVE | Noted: 2025-02-07

## 2025-02-07 PROBLEM — I10 HTN (HYPERTENSION): Status: ACTIVE | Noted: 2025-02-07

## 2025-02-07 LAB
ANION GAP SERPL CALCULATED.3IONS-SCNC: 12.6 MMOL/L (ref 5–15)
APTT PPP: 106.5 SECONDS (ref 22.7–35.4)
APTT PPP: 54.1 SECONDS (ref 22.7–35.4)
APTT PPP: 79.3 SECONDS (ref 22.7–35.4)
APTT PPP: 81.1 SECONDS (ref 22.7–35.4)
BUN SERPL-MCNC: 50 MG/DL (ref 8–23)
BUN/CREAT SERPL: 24.8 (ref 7–25)
CALCIUM SPEC-SCNC: 8.5 MG/DL (ref 8.6–10.5)
CHLORIDE SERPL-SCNC: 104 MMOL/L (ref 98–107)
CO2 SERPL-SCNC: 26.4 MMOL/L (ref 22–29)
CREAT SERPL-MCNC: 2.02 MG/DL (ref 0.76–1.27)
EGFRCR SERPLBLD CKD-EPI 2021: 34 ML/MIN/1.73
GLUCOSE SERPL-MCNC: 146 MG/DL (ref 65–99)
INR PPP: 1.43 (ref 2–3)
POTASSIUM SERPL-SCNC: 3.9 MMOL/L (ref 3.5–5.2)
PROTHROMBIN TIME: 17.4 SECONDS (ref 19.4–28.5)
SODIUM SERPL-SCNC: 143 MMOL/L (ref 136–145)

## 2025-02-07 PROCEDURE — 85730 THROMBOPLASTIN TIME PARTIAL: CPT

## 2025-02-07 PROCEDURE — 85730 THROMBOPLASTIN TIME PARTIAL: CPT | Performed by: STUDENT IN AN ORGANIZED HEALTH CARE EDUCATION/TRAINING PROGRAM

## 2025-02-07 PROCEDURE — 25010000002 HEPARIN (PORCINE) 25000-0.45 UT/250ML-% SOLUTION: Performed by: STUDENT IN AN ORGANIZED HEALTH CARE EDUCATION/TRAINING PROGRAM

## 2025-02-07 PROCEDURE — 85730 THROMBOPLASTIN TIME PARTIAL: CPT | Performed by: FAMILY MEDICINE

## 2025-02-07 PROCEDURE — 80048 BASIC METABOLIC PNL TOTAL CA: CPT | Performed by: FAMILY MEDICINE

## 2025-02-07 PROCEDURE — 85610 PROTHROMBIN TIME: CPT | Performed by: STUDENT IN AN ORGANIZED HEALTH CARE EDUCATION/TRAINING PROGRAM

## 2025-02-07 RX ADMIN — HEPARIN SODIUM 12.26 UNITS/KG/HR: 10000 INJECTION, SOLUTION INTRAVENOUS at 02:46

## 2025-02-07 RX ADMIN — SODIUM CHLORIDE 75 ML/HR: 4.5 INJECTION, SOLUTION INTRAVENOUS at 08:10

## 2025-02-07 RX ADMIN — METOPROLOL TARTRATE 25 MG: 25 TABLET, FILM COATED ORAL at 08:08

## 2025-02-07 RX ADMIN — FOLIC ACID 2 MG: 1 TABLET ORAL at 08:08

## 2025-02-07 RX ADMIN — WARFARIN SODIUM 6 MG: 3 TABLET ORAL at 17:41

## 2025-02-07 RX ADMIN — Medication 25 MG: at 08:07

## 2025-02-07 RX ADMIN — Medication 2000 MCG: at 08:08

## 2025-02-07 RX ADMIN — ATORVASTATIN CALCIUM 20 MG: 20 TABLET, FILM COATED ORAL at 20:22

## 2025-02-07 RX ADMIN — METOPROLOL TARTRATE 25 MG: 25 TABLET, FILM COATED ORAL at 20:22

## 2025-02-07 RX ADMIN — HEPARIN SODIUM 13.26 UNITS/KG/HR: 10000 INJECTION, SOLUTION INTRAVENOUS at 13:45

## 2025-02-07 RX ADMIN — Medication 10 ML: at 20:22

## 2025-02-07 RX ADMIN — CLOPIDOGREL BISULFATE 75 MG: 75 TABLET ORAL at 08:07

## 2025-02-07 RX ADMIN — Medication 10 ML: at 08:08

## 2025-02-07 RX ADMIN — TAMSULOSIN HYDROCHLORIDE 0.4 MG: 0.4 CAPSULE ORAL at 08:08

## 2025-02-07 NOTE — PLAN OF CARE
Goal Outcome Evaluation:         Pt AOx4. Makes needs known. Continuing to monitor for duration of this shift.

## 2025-02-07 NOTE — PROGRESS NOTES
"  [facility] DAILY PROGRESS NOTE    Patient Identification  Name: Jett Doe  Age: 74 y.o.  Sex: male  :  1950  MRN: [id number]    Date: 2025             Subjective:  Interval History: Creatinine trending down    Objective:    Scheduled Meds:atorvastatin, 20 mg, Oral, Nightly  clopidogrel, 75 mg, Oral, Daily  folic acid, 2 mg, Oral, Daily   And  vitamin B-6, 25 mg, Oral, Daily   And  vitamin B-12, 2,000 mcg, Oral, Daily  metoprolol tartrate, 25 mg, Oral, Q12H  sodium chloride, 10 mL, Intravenous, Q12H  tamsulosin, 0.4 mg, Oral, Daily  warfarin, 6 mg, Oral, Daily      Continuous Infusions:heparin, 9.26 Units/kg/hr, Last Rate: 12.26 Units/kg/hr (25 0246)  Pharmacy to dose warfarin,   sodium chloride, 75 mL/hr, Last Rate: 75 mL/hr (25 1718)      PRN Meds:  acetaminophen **OR** acetaminophen **OR** acetaminophen    aluminum-magnesium hydroxide-simethicone    senna-docusate sodium **AND** polyethylene glycol **AND** bisacodyl **AND** bisacodyl    heparin    heparin    melatonin    nitroglycerin    ondansetron ODT **OR** ondansetron    Pharmacy to dose warfarin    [COMPLETED] Insert Peripheral IV **AND** sodium chloride    sodium chloride    sodium chloride    Vital signs in last 24 hours:  Temp:  [97.4 °F (36.3 °C)-98.5 °F (36.9 °C)] 97.4 °F (36.3 °C)  Heart Rate:  [68-74] 68  Resp:  [13-19] 19  BP: (103-117)/(63-79) 103/64    Intake/Output:    Intake/Output Summary (Last 24 hours) at 2025 0756  Last data filed at 2025 2100  Gross per 24 hour   Intake 720 ml   Output 2500 ml   Net -1780 ml       Exam:  /64 (BP Location: Right arm, Patient Position: Lying)   Pulse 68   Temp 97.4 °F (36.3 °C) (Oral)   Resp 19   Ht 170.2 cm (67\")   Wt 97.3 kg (214 lb 9.6 oz)   SpO2 97%   BMI 33.61 kg/m²     General Appearance:    Alert, cooperative, NAD   Lungs:     Respirations unlabored, no audible wheezing    Heart:    No cyanosis   Abdomen:     Soft, ND    :    No suprapubic " distention, Cain clear yellow            Data Review:  All labs (24hrs):   Recent Results (from the past 24 hours)   Protime-INR    Collection Time: 02/06/25 10:50 AM    Specimen: Blood   Result Value Ref Range    Protime 18.1 (L) 19.4 - 28.5 Seconds    INR 1.50 (L) 2.00 - 3.00   aPTT    Collection Time: 02/06/25 10:50 AM    Specimen: Blood   Result Value Ref Range    PTT 28.0 22.7 - 35.4 seconds   aPTT    Collection Time: 02/06/25  7:53 PM    Specimen: Blood   Result Value Ref Range    PTT 49.8 (H) 22.7 - 35.4 seconds   aPTT    Collection Time: 02/07/25  4:40 AM    Specimen: Arm, Left; Blood   Result Value Ref Range    .5 (C) 22.7 - 35.4 seconds   Protime-INR    Collection Time: 02/07/25  4:40 AM    Specimen: Arm, Left; Blood   Result Value Ref Range    Protime 17.4 (L) 19.4 - 28.5 Seconds    INR 1.43 (L) 2.00 - 3.00   aPTT    Collection Time: 02/07/25  5:41 AM    Specimen: Blood   Result Value Ref Range    PTT 81.1 (C) 22.7 - 35.4 seconds      Imaging Results (Last 24 Hours)       ** No results found for the last 24 hours. **             Assessment:    AMADEO (acute kidney injury)      Large volume retention greater than 2 L  AMADEO  Bilateral hydronephrosis     Plan:      CT images reviewed with impressive distention of the bladder resulting in bilateral hydronephrosis  Maintain Cain, irrigate as needed for patency  Flomax  He will need Cain in place for at minimum 1 week likely longer then will need a cystoscopy and workup in the office  Cont good UOP, no further inpatient intervention  Okay for discharge with Cain catheter once cleared by nephrology and medical service and we will arrange follow-up next week    Tino Heard MD  [facility]  [facility], [address]  [place] [address]  Office: [phone]  Available via Epic Secure Chat  02/07/25  07:56 EST

## 2025-02-07 NOTE — PROGRESS NOTES
LOS: 4 days   Patient Care Team:  Bing Jarrett NP as PCP - General (Nurse Practitioner)  Shweta Ayala MD as Consulting Physician (Hematology and Oncology)  Jasmin Padilla APRN as Referring Physician (Nurse Practitioner)    Subjective     Interval History: care transferred to [facility] 2/7/25    Patient Complaints: pt is feeling much better    History taken from: patient    Review of Systems        Objective     Vital Signs  Temp:  [97.4 °F (36.3 °C)-98.5 °F (36.9 °C)] 98.1 °F (36.7 °C)  Heart Rate:  [68-74] 71  Resp:  [13-19] 18  BP: (103-117)/(64-79) 105/66    Physical Exam:     General Appearance:    Alert, cooperative, in no acute distress   Head:    Normocephalic, without obvious abnormality, atraumatic   Eyes:            Lids and lashes normal, conjunctivae and sclerae normal, no   icterus, no pallor, corneas clear, PERRLA   Ears:    Ears appear intact with no abnormalities noted   Throat:   No oral lesions, no thrush, oral mucosa moist   Neck:   No adenopathy, supple, trachea midline, no thyromegaly, no   carotid bruit, no JVD   Lungs:     Clear to auscultation,respirations regular, even and                  unlabored    Heart:    Regular rhythm and normal rate, normal S1 and S2, no            murmur, no gallop, no rub, no click   Chest Wall:    No abnormalities observed   Abdomen:     Normal bowel sounds, no masses, no organomegaly, soft        non-tender, non-distended, no guarding, no rebound                tenderness   Extremities:   Moves all extremities well, no edema, no cyanosis, no             redness   Pulses:   Pulses palpable and equal bilaterally   Skin:   No bleeding, bruising or rash   Lymph nodes:   No palpable adenopathy   Neurologic:   Cranial nerves 2 - 12 grossly intact, sensation intact, DTR       present and equal bilaterally        Results Review:    Lab Results (last 24 hours)       Procedure Component Value Units Date/Time    aPTT [[id number]]  (Abnormal) Collected:  02/07/25 1238    Specimen: Blood from Arm, Right Updated: 02/07/25 1310     PTT 54.1 seconds     Basic Metabolic Panel [[id number]]  (Abnormal) Collected: 02/07/25 0900    Specimen: Blood from Arm, Left Updated: 02/07/25 0941     Glucose 146 mg/dL      BUN 50 mg/dL      Creatinine 2.02 mg/dL      Sodium 143 mmol/L      Potassium 3.9 mmol/L      Chloride 104 mmol/L      CO2 26.4 mmol/L      Calcium 8.5 mg/dL      BUN/Creatinine Ratio 24.8     Anion Gap 12.6 mmol/L      eGFR 34.0 mL/min/1.73     Narrative:      GFR Categories in Chronic Kidney Disease (CKD)      GFR Category          GFR (mL/min/1.73)    Interpretation  G1                     90 or greater         Normal or high (1)  G2                      60-89                Mild decrease (1)  G3a                   45-59                Mild to moderate decrease  G3b                   30-44                Moderate to severe decrease  G4                    15-29                Severe decrease  G5                    14 or less           Kidney failure          (1)In the absence of evidence of kidney disease, neither GFR category G1 or G2 fulfill the criteria for CKD.    eGFR calculation 2021 CKD-EPI creatinine equation, which does not include race as a factor    aPTT [[id number]]  (Abnormal) Collected: 02/07/25 0541    Specimen: Blood Updated: 02/07/25 0617     PTT 81.1 seconds     aPTT [[id number]]  (Abnormal) Collected: 02/07/25 0440    Specimen: Blood from Arm, Left Updated: 02/07/25 0528     .5 seconds     Protime-INR [[id number]]  (Abnormal) Collected: 02/07/25 0440    Specimen: Blood from Arm, Left Updated: 02/07/25 0512     Protime 17.4 Seconds      INR 1.43    aPTT [[id number]]  (Abnormal) Collected: 02/06/25 1953    Specimen: Blood Updated: 02/06/25 2028     PTT 49.8 seconds              Imaging Results (Last 24 Hours)       ** No results found for the last 24 hours. **                 I reviewed the patient's new clinical results.    Medication Review:    Scheduled Meds:atorvastatin, 20 mg, Oral, Nightly  clopidogrel, 75 mg, Oral, Daily  folic acid, 2 mg, Oral, Daily   And  vitamin B-6, 25 mg, Oral, Daily   And  vitamin B-12, 2,000 mcg, Oral, Daily  metoprolol tartrate, 25 mg, Oral, Q12H  sodium chloride, 10 mL, Intravenous, Q12H  tamsulosin, 0.4 mg, Oral, Daily  warfarin, 6 mg, Oral, Daily      Continuous Infusions:heparin, 9.26 Units/kg/hr, Last Rate: 12.26 Units/kg/hr (02/07/25 0246)  Pharmacy to dose warfarin,       PRN Meds:.  acetaminophen **OR** acetaminophen **OR** acetaminophen    aluminum-magnesium hydroxide-simethicone    senna-docusate sodium **AND** polyethylene glycol **AND** bisacodyl **AND** bisacodyl    heparin    heparin    melatonin    nitroglycerin    ondansetron ODT **OR** ondansetron    Pharmacy to dose warfarin    [COMPLETED] Insert Peripheral IV **AND** sodium chloride    sodium chloride    sodium chloride     Assessment & Plan       AMADEO (acute kidney injury)    Chronic deep vein thrombosis (DVT) of right lower extremity    Hyperhomocysteinemia    CKD (chronic kidney disease), stage III    Polycythemia    Thrombocytopenia    Acute urinary retention    Bilateral hydronephrosis    Obstructive uropathy    HTN (hypertension)    H/O: CVA (cerebrovascular accident)    Followed by urology and nephrology.  Creatine down to 2.02.  pt will keep gillis catheter and f/u in urology office in 1 week for cystoscopy.  Continue flomax. No further urology w/o while in hospital.  Nephrology has d/c IVF.  Potassium improved.  Possible d/c tomorrow. Pt still on warfarin with heparin bridging.        Plan for disposition:home when able    Juanita Obando MD  02/07/25  13:29 EST

## 2025-02-07 NOTE — PROGRESS NOTES
"Pharmacy dosing service  Anticoagulant  Warfarin     Subjective:    Jett Doe is a 74 y.o.male being continued on warfarin for Atrial Fibrillation / Flutter and History of DVT/PE.    INR Goal: 2 - 3  Home medication?: warfarin 6 mg PO daily  Bridge Therapy Present?:  Yes, Heparin drip (Protocol ACS)   Interacting Medications Evaluation (New/Present/Discontinued): Plavix (increases effects of warfarin)  Additional Contributing Factors: AMADEO, age      Assessment/Plan:    INR was supratherpeutic upon admission, possibly due to AMADEO with serum creatinine >14 mg/dL. Serum creatinine continues to trend down.     INR is subtherapeutic today after missed doses 2/3--2/5. Will continue warfarin 6 mg daily instead of giving bolus dose to boost INR as warfarin requirements are expected to be lower with kidney impairment.    Per Dr. Arndt, continue heparin gtt until INR is therapeutic.    Continue to monitor and adjust based on INR.         Date 2/6 2/7          INR 1.5 1.43          Dose 6 mg 6 mg              Objective:  [Ht: 170.2 cm (67\"); Wt: 97.3 kg (214 lb 9.6 oz); BMI: Body mass index is 33.61 kg/m².]    Lab Results   Component Value Date    ALBUMIN 3.5 02/03/2025     Lab Results   Component Value Date    INR 1.43 (L) 02/07/2025    INR 1.50 (L) 02/06/2025    INR 4.40 (H) 02/03/2025    PROTIME 17.4 (L) 02/07/2025    PROTIME 18.1 (L) 02/06/2025    PROTIME 41.6 (H) 02/03/2025     Lab Results   Component Value Date    HGB 13.3 02/06/2025    HGB 13.2 02/05/2025    HGB 15.3 02/04/2025     Lab Results   Component Value Date    HCT 39.7 02/06/2025    HCT 39.0 02/05/2025    HCT 44.6 02/04/2025       Barbie Hardwick [facility]  02/07/25 08:44 EST      "

## 2025-02-07 NOTE — CASE MANAGEMENT/SOCIAL WORK
Continued Stay Note  JERRY Shepard     Patient Name: Jett Doe  MRN: [id number]  Today's Date: 2/7/2025    Admit Date: 2/3/2025    Plan: Return home with significant other.   Discharge Plan       Row Name 02/07/25 1510       Plan    Plan Return home with significant other.    Patient/Family in Agreement with Plan yes    Plan Comments DC barriers: Urology and Renal following. Pt continues on Heparin gtt and 1/2 NS fluids. Provided pt with copy of IMM letter at bedside.             Megan Naegele, RN     Office Phone: [phone]  Office Cell: [phone]

## 2025-02-07 NOTE — PLAN OF CARE
Problem: Adult Inpatient Plan of Care  Goal: Plan of Care Review  Outcome: Met  Flowsheets  Taken 2/7/2025 09 by Danuta Pulliam, RN  Progress: improving  Outcome Evaluation: F/C remains and place and patent. Heperin gtt continues running to bridge to PO warfarin. Pt a&Ox4, appropriate and cooperative, denies having any questions or concerns at this time. Plan of care continues.  Taken 2/6/2025 1429 by Linda Gee RN  Plan of Care Reviewed With: patient   Goal Outcome Evaluation:           Progress: improving  Outcome Evaluation: F/C remains and place and patent. Heperin gtt continues running to bridge to PO warfarin. Pt a&Ox4, appropriate and cooperative, denies having any questions or concerns at this time. Plan of care continues.

## 2025-02-07 NOTE — PROGRESS NOTES
"                                                                                                                                      Nephrology  Progress Note                                        [facility]    Patient Identification    Name: Jett Doe  Age: 74 y.o.  Sex: male  :  1950  MRN: [id number]      DATE OF SERVICE:  2025        Subective    Feeling better     Objective   Scheduled Meds:atorvastatin, 20 mg, Oral, Nightly  clopidogrel, 75 mg, Oral, Daily  folic acid, 2 mg, Oral, Daily   And  vitamin B-6, 25 mg, Oral, Daily   And  vitamin B-12, 2,000 mcg, Oral, Daily  metoprolol tartrate, 25 mg, Oral, Q12H  sodium chloride, 10 mL, Intravenous, Q12H  tamsulosin, 0.4 mg, Oral, Daily  warfarin, 6 mg, Oral, Daily          Continuous Infusions:heparin, 9.26 Units/kg/hr, Last Rate: 12.26 Units/kg/hr (25 0246)  Pharmacy to dose warfarin,   sodium chloride, 75 mL/hr, Last Rate: 75 mL/hr (25 1718)        PRN Meds:  acetaminophen **OR** acetaminophen **OR** acetaminophen    aluminum-magnesium hydroxide-simethicone    senna-docusate sodium **AND** polyethylene glycol **AND** bisacodyl **AND** bisacodyl    heparin    heparin    melatonin    nitroglycerin    ondansetron ODT **OR** ondansetron    Pharmacy to dose warfarin    [COMPLETED] Insert Peripheral IV **AND** sodium chloride    sodium chloride    sodium chloride     Exam:  /64 (BP Location: Right arm, Patient Position: Lying)   Pulse 68   Temp 97.4 °F (36.3 °C) (Oral)   Resp 19   Ht 170.2 cm (67\")   Wt 97.3 kg (214 lb 9.6 oz)   SpO2 97%   BMI 33.61 kg/m²     Intake/Output last 3 shifts:  I/O last 3 completed shifts:  In: 1560 [P.O.:1560]  Out: 5200 [Urine:5200]    Intake/Output this shift:  No intake/output data recorded.    Physical exam:  General Appearance:  Alert  Head:  Normocephalic, without obvious abnormality, atraumatic  Eyes:  PERRL, conjunctiva/corneas clear     Neck:  Supple,  no adenopathy;   "    Lungs:  Decreased BS occasion ronchi  Heart:  Regular rate and rhythm, S1 and S2 normal  Abdomen:  Soft, non-tender, bowel sounds active   Extremities: trace edema  Pulses: 2+ and symmetric all extremities  Skin:  No rashes or lesions       Data Review:  All labs (24hrs):   Recent Results (from the past 24 hours)   Protime-INR    Collection Time: 02/06/25 10:50 AM    Specimen: Blood   Result Value Ref Range    Protime 18.1 (L) 19.4 - 28.5 Seconds    INR 1.50 (L) 2.00 - 3.00   aPTT    Collection Time: 02/06/25 10:50 AM    Specimen: Blood   Result Value Ref Range    PTT 28.0 22.7 - 35.4 seconds   aPTT    Collection Time: 02/06/25  7:53 PM    Specimen: Blood   Result Value Ref Range    PTT 49.8 (H) 22.7 - 35.4 seconds   aPTT    Collection Time: 02/07/25  4:40 AM    Specimen: Arm, Left; Blood   Result Value Ref Range    .5 (C) 22.7 - 35.4 seconds   Protime-INR    Collection Time: 02/07/25  4:40 AM    Specimen: Arm, Left; Blood   Result Value Ref Range    Protime 17.4 (L) 19.4 - 28.5 Seconds    INR 1.43 (L) 2.00 - 3.00   aPTT    Collection Time: 02/07/25  5:41 AM    Specimen: Blood   Result Value Ref Range    PTT 81.1 (C) 22.7 - 35.4 seconds          Imaging:  CT Abdomen Pelvis Without Contrast    Result Date: 2/3/2025  Impression: 1.Urinary bladder distention resulting in moderate bilateral hydroureteronephrosis. Correlate for bladder outlet obstruction. Prostate is enlarged impressing upon the urinary bladder base. Electronically Signed: Guanako Wright MD  2/3/2025 4:07 PM EST  Workstation ID: [id number]     Assessment/Plan:     AMADEO (acute kidney injury)         Acute kidney injury versus acute on chronic kidney disease as a baseline creatinine of 1.3 from earlier last month  Obstructive uropathy  Metabolic acidosis  Hyperkalemia  Volume excess  What appears to be prostate enlargement  History of CVA        Recommendations:  Creatinine trending down to 2.0 from  5.8  DC IV fluids  If labs look better  tomorrow  Could be managed outpatient  Potassium is better  Urology on board

## 2025-02-08 ENCOUNTER — READMISSION MANAGEMENT (OUTPATIENT)
Dept: CALL CENTER | Facility: HOSPITAL | Age: 75
End: 2025-02-08
Payer: MEDICARE

## 2025-02-08 VITALS
HEIGHT: 67 IN | SYSTOLIC BLOOD PRESSURE: 99 MMHG | OXYGEN SATURATION: 97 % | DIASTOLIC BLOOD PRESSURE: 68 MMHG | WEIGHT: 214.6 LBS | TEMPERATURE: 97.7 F | HEART RATE: 71 BPM | BODY MASS INDEX: 33.68 KG/M2 | RESPIRATION RATE: 21 BRPM

## 2025-02-08 LAB
ANION GAP SERPL CALCULATED.3IONS-SCNC: 10.8 MMOL/L (ref 5–15)
APTT PPP: 78.2 SECONDS (ref 22.7–35.4)
BASOPHILS # BLD AUTO: 0.07 10*3/MM3 (ref 0–0.2)
BASOPHILS NFR BLD AUTO: 0.7 % (ref 0–1.5)
BUN SERPL-MCNC: 37 MG/DL (ref 8–23)
BUN/CREAT SERPL: 19.3 (ref 7–25)
CALCIUM SPEC-SCNC: 8.5 MG/DL (ref 8.6–10.5)
CHLORIDE SERPL-SCNC: 104 MMOL/L (ref 98–107)
CO2 SERPL-SCNC: 25.2 MMOL/L (ref 22–29)
CREAT SERPL-MCNC: 1.92 MG/DL (ref 0.76–1.27)
DEPRECATED RDW RBC AUTO: 41.3 FL (ref 37–54)
EGFRCR SERPLBLD CKD-EPI 2021: 36.1 ML/MIN/1.73
EOSINOPHIL # BLD AUTO: 0.3 10*3/MM3 (ref 0–0.4)
EOSINOPHIL NFR BLD AUTO: 3.1 % (ref 0.3–6.2)
ERYTHROCYTE [DISTWIDTH] IN BLOOD BY AUTOMATED COUNT: 12 % (ref 12.3–15.4)
GLUCOSE SERPL-MCNC: 135 MG/DL (ref 65–99)
HCT VFR BLD AUTO: 38.4 % (ref 37.5–51)
HGB BLD-MCNC: 12.6 G/DL (ref 13–17.7)
IMM GRANULOCYTES # BLD AUTO: 0.24 10*3/MM3 (ref 0–0.05)
IMM GRANULOCYTES NFR BLD AUTO: 2.5 % (ref 0–0.5)
INR PPP: 1.55 (ref 2–3)
LYMPHOCYTES # BLD AUTO: 1.68 10*3/MM3 (ref 0.7–3.1)
LYMPHOCYTES NFR BLD AUTO: 17.5 % (ref 19.6–45.3)
MCH RBC QN AUTO: 30.6 PG (ref 26.6–33)
MCHC RBC AUTO-ENTMCNC: 32.8 G/DL (ref 31.5–35.7)
MCV RBC AUTO: 93.2 FL (ref 79–97)
MONOCYTES # BLD AUTO: 0.7 10*3/MM3 (ref 0.1–0.9)
MONOCYTES NFR BLD AUTO: 7.3 % (ref 5–12)
NEUTROPHILS NFR BLD AUTO: 6.61 10*3/MM3 (ref 1.7–7)
NEUTROPHILS NFR BLD AUTO: 68.9 % (ref 42.7–76)
NRBC BLD AUTO-RTO: 0 /100 WBC (ref 0–0.2)
PLATELET # BLD AUTO: 157 10*3/MM3 (ref 140–450)
PMV BLD AUTO: 9.5 FL (ref 6–12)
POTASSIUM SERPL-SCNC: 4.5 MMOL/L (ref 3.5–5.2)
PROTHROMBIN TIME: 18.5 SECONDS (ref 19.4–28.5)
QT INTERVAL: 328 MS
QTC INTERVAL: 446 MS
RBC # BLD AUTO: 4.12 10*6/MM3 (ref 4.14–5.8)
SODIUM SERPL-SCNC: 140 MMOL/L (ref 136–145)
WBC NRBC COR # BLD AUTO: 9.6 10*3/MM3 (ref 3.4–10.8)

## 2025-02-08 PROCEDURE — 85025 COMPLETE CBC W/AUTO DIFF WBC: CPT | Performed by: STUDENT IN AN ORGANIZED HEALTH CARE EDUCATION/TRAINING PROGRAM

## 2025-02-08 PROCEDURE — 85610 PROTHROMBIN TIME: CPT | Performed by: STUDENT IN AN ORGANIZED HEALTH CARE EDUCATION/TRAINING PROGRAM

## 2025-02-08 PROCEDURE — 85730 THROMBOPLASTIN TIME PARTIAL: CPT | Performed by: FAMILY MEDICINE

## 2025-02-08 PROCEDURE — 25010000002 HEPARIN (PORCINE) 25000-0.45 UT/250ML-% SOLUTION: Performed by: STUDENT IN AN ORGANIZED HEALTH CARE EDUCATION/TRAINING PROGRAM

## 2025-02-08 PROCEDURE — 80048 BASIC METABOLIC PNL TOTAL CA: CPT | Performed by: FAMILY MEDICINE

## 2025-02-08 RX ORDER — WARFARIN SODIUM 5 MG/1
10 TABLET ORAL
Status: DISCONTINUED | OUTPATIENT
Start: 2025-02-08 | End: 2025-02-08 | Stop reason: HOSPADM

## 2025-02-08 RX ORDER — WARFARIN SODIUM 3 MG/1
6 TABLET ORAL
Status: DISCONTINUED | OUTPATIENT
Start: 2025-02-09 | End: 2025-02-08 | Stop reason: HOSPADM

## 2025-02-08 RX ORDER — WARFARIN SODIUM 5 MG/1
TABLET ORAL
Qty: 30 TABLET | Refills: 0 | Status: SHIPPED | OUTPATIENT
Start: 2025-02-08 | End: 2025-02-08

## 2025-02-08 RX ORDER — WARFARIN SODIUM 5 MG/1
TABLET ORAL
Start: 2025-02-08

## 2025-02-08 RX ORDER — TAMSULOSIN HYDROCHLORIDE 0.4 MG/1
0.4 CAPSULE ORAL DAILY
Qty: 30 CAPSULE | Refills: 1 | Status: SHIPPED | OUTPATIENT
Start: 2025-02-09

## 2025-02-08 RX ADMIN — FOLIC ACID 2 MG: 1 TABLET ORAL at 09:51

## 2025-02-08 RX ADMIN — Medication 10 ML: at 09:52

## 2025-02-08 RX ADMIN — METOPROLOL TARTRATE 25 MG: 25 TABLET, FILM COATED ORAL at 09:51

## 2025-02-08 RX ADMIN — Medication 25 MG: at 09:51

## 2025-02-08 RX ADMIN — HEPARIN SODIUM 13.26 UNITS/KG/HR: 10000 INJECTION, SOLUTION INTRAVENOUS at 01:12

## 2025-02-08 RX ADMIN — TAMSULOSIN HYDROCHLORIDE 0.4 MG: 0.4 CAPSULE ORAL at 09:51

## 2025-02-08 RX ADMIN — Medication 2000 MCG: at 09:51

## 2025-02-08 RX ADMIN — CLOPIDOGREL BISULFATE 75 MG: 75 TABLET ORAL at 09:51

## 2025-02-08 NOTE — PROGRESS NOTES
"Pharmacy dosing service  Anticoagulant  Warfarin     Subjective:    Jett Doe is a 74 y.o.male being continued on warfarin for History of DVT/PE.    INR Goal: 2 - 3  Home medication?: warfarin 6 mg PO daily  Bridge Therapy Present?:  Yes, Heparin drip (Protocol ACS)   Interacting Medications Evaluation (New/Present/Discontinued): Plavix (increases effects of warfarin)  Additional Contributing Factors: AMADEO, age      Assessment/Plan:    INR was supratherapeutic upon admission, possibly due to AMADEO with serum creatinine >14 mg/dL. Serum creatinine continues to trend down.     INR is subtherapeutic today after missed doses 2/3--2/5 but is trending up. Will give bolus dose of warfarin 10 mg today.    Per Dr. Arndt, continue heparin gtt until INR is therapeutic.    Continue to monitor and adjust based on INR.         Date 2/6 2/7 2/8         INR 1.5 1.43 1.55         Dose 6 mg 6 mg 10 mg             Objective:  [Ht: 170.2 cm (67\"); Wt: 97.3 kg (214 lb 9.6 oz); BMI: Body mass index is 33.61 kg/m².]    Lab Results   Component Value Date    ALBUMIN 3.5 02/03/2025     Lab Results   Component Value Date    INR 1.55 (L) 02/08/2025    INR 1.43 (L) 02/07/2025    INR 1.50 (L) 02/06/2025    PROTIME 18.5 (L) 02/08/2025    PROTIME 17.4 (L) 02/07/2025    PROTIME 18.1 (L) 02/06/2025     Lab Results   Component Value Date    HGB 12.6 (L) 02/08/2025    HGB 13.3 02/06/2025    HGB 13.2 02/05/2025     Lab Results   Component Value Date    HCT 38.4 02/08/2025    HCT 39.7 02/06/2025    HCT 39.0 02/05/2025       Barbie Hardwick [facility]  02/08/25 09:43 EST        "

## 2025-02-08 NOTE — DISCHARGE SUMMARY
Date of Discharge:  2/8/2025    Discharge Diagnosis:     Presenting Problem/History of Present Illness  Active Hospital Problems    Diagnosis  POA    **AMADEO (acute kidney injury) [N17.9]  Yes    Acute urinary retention [R33.8]  Yes    Bilateral hydronephrosis [N13.30]  Yes    Obstructive uropathy [N13.9]  Yes    HTN (hypertension) [I10]  Unknown    H/O: CVA (cerebrovascular accident) [Z86.73]  Not Applicable    Chronic deep vein thrombosis (DVT) of right lower extremity [I82.501]  Yes    Hyperhomocysteinemia [E72.11]  Yes    CKD (chronic kidney disease), stage III [N18.30]  Yes    Polycythemia [D75.1]  Yes    Thrombocytopenia [D69.6]  Yes      Resolved Hospital Problems   No resolved problems to display.          Hospital Course      A 74 y.o. old male patient with PMH of cardiomyopathy CVA hypertension hyperlipidemia presented to the hospital with complaints of lower abdominal pain with abdominal distention and lower extremity edema.  Patient mentioned that he always has difficulty with urination but it had been worse for the  past 2 weeks.  Patient labs are significant for creatinine of 14.8 with potassium of 5.3 and bicarb of 15 and anion gap of 21.  GFR 3.  Creatinine 11-day ago is 1.7.  UA without UTI.  CT abdominal pelvis showing urinary bladder distention and moderate bilateral hydro ureteral nephrosis.  Prostate is enlarged.  Nephrologist and urologist consulted.  Pt was admitted. A gillis was placed and he had at least 2 Liter of urine immediately.  He was given gentle hydration with bicarb as well as gentle diuresis. He was started on flomax.  His creatine continued to trend down.  Electrolyte abnormalities were corrected.  Pt is feeling much better.  His creatine at d/c is down to 1.9.  he will need close f/u with nephrology.  He will be d/c with gillis in place.  F/u with urology in 1 week for cystoscopy and further w/u.  At the time of admission he was taken off of his coumadin and placed on heparin.   After improvement, his coumadin was restarted.  At the time of d/c his INR is 1.5.  he will take 10mg of coumadin until seen in the coumadin clinic on Monday 2/10/25.    Procedures Performed         Consults:   Consults       Date and Time Order Name Status Description    2/3/2025  4:00 PM Inpatient Urology Consult Completed     2/3/2025  3:53 PM Hospitalist (on-call MD unless specified)      2/3/2025  3:26 PM Nephrology (on -call MD unless specified) Completed             Pertinent Test Results:    Lab Results (most recent)       Procedure Component Value Units Date/Time    Basic Metabolic Panel [[id number]]  (Abnormal) Collected: 02/08/25 1000    Specimen: Blood Updated: 02/08/25 1032     Glucose 135 mg/dL      BUN 37 mg/dL      Creatinine 1.92 mg/dL      Sodium 140 mmol/L      Potassium 4.5 mmol/L      Chloride 104 mmol/L      CO2 25.2 mmol/L      Calcium 8.5 mg/dL      BUN/Creatinine Ratio 19.3     Anion Gap 10.8 mmol/L      eGFR 36.1 mL/min/1.73     Narrative:      GFR Categories in Chronic Kidney Disease (CKD)      GFR Category          GFR (mL/min/1.73)    Interpretation  G1                     90 or greater         Normal or high (1)  G2                      60-89                Mild decrease (1)  G3a                   45-59                Mild to moderate decrease  G3b                   30-44                Moderate to severe decrease  G4                    15-29                Severe decrease  G5                    14 or less           Kidney failure          (1)In the absence of evidence of kidney disease, neither GFR category G1 or G2 fulfill the criteria for CKD.    eGFR calculation 2021 CKD-EPI creatinine equation, which does not include race as a factor    Protime-INR [[id number]]  (Abnormal) Collected: 02/08/25 0219    Specimen: Blood from Arm, Left Updated: 02/08/25 0253     Protime 18.5 Seconds      INR 1.55    aPTT [[id number]]  (Abnormal) Collected: 02/08/25 0219    Specimen: Blood from Arm, Left  Updated: 02/08/25 0253     PTT 78.2 seconds     CBC & Differential [[id number]]  (Abnormal) Collected: 02/08/25 0219    Specimen: Blood from Arm, Left Updated: 02/08/25 0231    Narrative:      The following orders were created for panel order CBC & Differential.  Procedure                               Abnormality         Status                     ---------                               -----------         ------                     CBC Auto Differential[[id number]]        Abnormal            Final result                 Please view results for these tests on the individual orders.    CBC Auto Differential [[id number]]  (Abnormal) Collected: 02/08/25 0219    Specimen: Blood from Arm, Left Updated: 02/08/25 0231     WBC 9.60 10*3/mm3      RBC 4.12 10*6/mm3      Hemoglobin 12.6 g/dL      Hematocrit 38.4 %      MCV 93.2 fL      MCH 30.6 pg      MCHC 32.8 g/dL      RDW 12.0 %      RDW-SD 41.3 fl      MPV 9.5 fL      Platelets 157 10*3/mm3      Neutrophil % 68.9 %      Lymphocyte % 17.5 %      Monocyte % 7.3 %      Eosinophil % 3.1 %      Basophil % 0.7 %      Immature Grans % 2.5 %      Neutrophils, Absolute 6.61 10*3/mm3      Lymphocytes, Absolute 1.68 10*3/mm3      Monocytes, Absolute 0.70 10*3/mm3      Eosinophils, Absolute 0.30 10*3/mm3      Basophils, Absolute 0.07 10*3/mm3      Immature Grans, Absolute 0.24 10*3/mm3      nRBC 0.0 /100 WBC     aPTT [[id number]]  (Abnormal) Collected: 02/07/25 2032    Specimen: Blood Updated: 02/07/25 2150     PTT 79.3 seconds     Basic Metabolic Panel [[id number]]  (Abnormal) Collected: 02/07/25 0900    Specimen: Blood from Arm, Left Updated: 02/07/25 0941     Glucose 146 mg/dL      BUN 50 mg/dL      Creatinine 2.02 mg/dL      Sodium 143 mmol/L      Potassium 3.9 mmol/L      Chloride 104 mmol/L      CO2 26.4 mmol/L      Calcium 8.5 mg/dL      BUN/Creatinine Ratio 24.8     Anion Gap 12.6 mmol/L      eGFR 34.0 mL/min/1.73     Narrative:      GFR Categories in Chronic Kidney Disease  (CKD)      GFR Category          GFR (mL/min/1.73)    Interpretation  G1                     90 or greater         Normal or high (1)  G2                      60-89                Mild decrease (1)  G3a                   45-59                Mild to moderate decrease  G3b                   30-44                Moderate to severe decrease  G4                    15-29                Severe decrease  G5                    14 or less           Kidney failure          (1)In the absence of evidence of kidney disease, neither GFR category G1 or G2 fulfill the criteria for CKD.    eGFR calculation 2021 CKD-EPI creatinine equation, which does not include race as a factor    Protime-INR [[id number]]  (Abnormal) Collected: 02/07/25 0440    Specimen: Blood from Arm, Left Updated: 02/07/25 0512     Protime 17.4 Seconds      INR 1.43    Phosphorus [[id number]]  (Normal) Collected: 02/06/25 0031    Specimen: Blood from Arm, Right Updated: 02/06/25 0117     Phosphorus 2.9 mg/dL     Magnesium [[id number]]  (Abnormal) Collected: 02/06/25 0031    Specimen: Blood from Arm, Right Updated: 02/06/25 0104     Magnesium 1.3 mg/dL     CBC & Differential [[id number]]  (Abnormal) Collected: 02/06/25 0031    Specimen: Blood from Arm, Right Updated: 02/06/25 0042    Narrative:      The following orders were created for panel order CBC & Differential.  Procedure                               Abnormality         Status                     ---------                               -----------         ------                     CBC Auto Differential[[id number]]        Abnormal            Final result                 Please view results for these tests on the individual orders.    CBC Auto Differential [[id number]]  (Abnormal) Collected: 02/06/25 0031    Specimen: Blood from Arm, Right Updated: 02/06/25 0042     WBC 10.68 10*3/mm3      RBC 4.36 10*6/mm3      Hemoglobin 13.3 g/dL      Hematocrit 39.7 %      MCV 91.1 fL      MCH 30.5 pg      MCHC 33.5 g/dL       RDW 12.2 %      RDW-SD 40.9 fl      MPV 9.7 fL      Platelets 193 10*3/mm3      Neutrophil % 77.5 %      Lymphocyte % 10.5 %      Monocyte % 8.1 %      Eosinophil % 2.2 %      Basophil % 0.5 %      Immature Grans % 1.2 %      Neutrophils, Absolute 8.27 10*3/mm3      Lymphocytes, Absolute 1.12 10*3/mm3      Monocytes, Absolute 0.87 10*3/mm3      Eosinophils, Absolute 0.24 10*3/mm3      Basophils, Absolute 0.05 10*3/mm3      Immature Grans, Absolute 0.13 10*3/mm3      nRBC 0.0 /100 WBC     Magnesium [[id number]]  (Normal) Collected: 02/05/25 0227    Specimen: Blood Updated: 02/05/25 0304     Magnesium 1.6 mg/dL     Phosphorus [[id number]]  (Abnormal) Collected: 02/05/25 0227    Specimen: Blood Updated: 02/05/25 0258     Phosphorus 5.0 mg/dL     Urinalysis, Microscopic Only - Urine, Clean Catch [[id number]] Collected: 02/03/25 1419    Specimen: Urine, Clean Catch Updated: 02/03/25 1439     RBC, UA 0-2 /HPF      WBC, UA None Seen /HPF      Comment: Urine culture not indicated.        Bacteria, UA None Seen /HPF      Squamous Epithelial Cells, UA None Seen /HPF      Hyaline Casts, UA None Seen /LPF      Methodology Automated Microscopy    Urinalysis With Culture If Indicated - Urine, Clean Catch [[id number]]  (Abnormal) Collected: 02/03/25 1419    Specimen: Urine, Clean Catch Updated: 02/03/25 1430     Color, UA Yellow     Appearance, UA Clear     pH, UA 5.5     Specific Gravity, UA 1.010     Glucose, UA Negative     Ketones, UA Negative     Bilirubin, UA Negative     Blood, UA Moderate (2+)     Protein, UA 30 mg/dL (1+)     Leuk Esterase, UA Negative     Nitrite, UA Negative     Urobilinogen, UA 0.2 E.U./dL    Narrative:      In absence of clinical symptoms, the presence of pyuria, bacteria, and/or nitrites on the urinalysis result does not correlate with infection.    Comprehensive Metabolic Panel [[id number]]  (Abnormal) Collected: 02/03/25 1339    Specimen: Blood Updated: 02/03/25 1419     Glucose 114 mg/dL        mg/dL      Creatinine 14.80 mg/dL      Sodium 138 mmol/L      Potassium 5.3 mmol/L      Comment: Slight hemolysis detected by analyzer. Result may be falsely elevated.        Chloride 101 mmol/L      CO2 15.2 mmol/L      Calcium 9.2 mg/dL      Total Protein 6.7 g/dL      Albumin 3.5 g/dL      ALT (SGPT) 27 U/L      AST (SGOT) 26 U/L      Comment: Slight hemolysis detected by analyzer. Result may be falsely elevated.        Alkaline Phosphatase 93 U/L      Total Bilirubin 0.4 mg/dL      Globulin 3.2 gm/dL      A/G Ratio 1.1 g/dL      BUN/Creatinine Ratio 8.6     Anion Gap 21.8 mmol/L      eGFR 3.1 mL/min/1.73     Narrative:      GFR Categories in Chronic Kidney Disease (CKD)      GFR Category          GFR (mL/min/1.73)    Interpretation  G1                     90 or greater         Normal or high (1)  G2                      60-89                Mild decrease (1)  G3a                   45-59                Mild to moderate decrease  G3b                   30-44                Moderate to severe decrease  G4                    15-29                Severe decrease  G5                    14 or less           Kidney failure          (1)In the absence of evidence of kidney disease, neither GFR category G1 or G2 fulfill the criteria for CKD.    eGFR calculation 2021 CKD-EPI creatinine equation, which does not include race as a factor    Lipase [[id number]]  (Abnormal) Collected: 02/03/25 1339    Specimen: Blood Updated: 02/03/25 1407     Lipase 100 U/L                          Condition on Discharge:  good    Vital Signs  Temp:  [97.7 °F (36.5 °C)-98.6 °F (37 °C)] 97.7 °F (36.5 °C)  Heart Rate:  [71-85] 71  Resp:  [16-23] 21  BP: ()/(55-70) 99/68    Physical Exam:     General Appearance:    Alert, cooperative, in no acute distress   Head:    Normocephalic, without obvious abnormality, atraumatic   Eyes:            Lids and lashes normal, conjunctivae and sclerae normal, no   icterus, no pallor, corneas clear, PERRLA    Ears:    Ears appear intact with no abnormalities noted   Throat:   No oral lesions, no thrush, oral mucosa moist   Neck:   No adenopathy, supple, trachea midline, no thyromegaly, no   carotid bruit, no JVD   Lungs:     Clear to auscultation,respirations regular, even and                  unlabored    Heart:    Regular rhythm and normal rate, normal S1 and S2, no            murmur, no gallop, no rub, no click   Chest Wall:    No abnormalities observed   Abdomen:     Normal bowel sounds, no masses, no organomegaly, soft        non-tender, non-distended, no guarding, no rebound                tenderness   Extremities:   Moves all extremities well, no edema, no cyanosis, no             redness   Pulses:   Pulses palpable and equal bilaterally   Skin:   No bleeding, bruising or rash   Lymph nodes:   No palpable adenopathy   Neurologic:   Cranial nerves 2 - 12 grossly intact, sensation intact, DTR       present and equal bilaterally       Discharge Disposition  Home or Self Care    Discharge Medications     Discharge Medications        New Medications        Instructions Start Date   tamsulosin 0.4 MG capsule 24 hr capsule  Commonly known as: FLOMAX   0.4 mg, Oral, Daily   Start Date: February 9, 2025            Changes to Medications        Instructions Start Date   warfarin 5 MG tablet  Commonly known as: COUMADIN  What changed:   additional instructions  Another medication with the same name was removed. Continue taking this medication, and follow the directions you see here.   Indications: Atrial Fibrillation             Continue These Medications        Instructions Start Date   atorvastatin 20 MG tablet  Commonly known as: LIPITOR   ATORVASTATIN CALCIUM 20 MG TABS      clopidogrel 75 MG tablet  Commonly known as: PLAVIX   75 mg, Daily      folic acid-pyridoxine-cyanocobalamin 2.5-25-2 MG tablet tablet  Commonly known as: FOLBIC   1 tablet, Oral, Daily      metoprolol tartrate 25 MG tablet  Commonly known as:  LOPRESSOR   METOPROLOL TARTRATE 25 MG TABS             Stop These Medications      hydrALAZINE 10 MG tablet  Commonly known as: APRESOLINE     lisinopril-hydrochlorothiazide 20-12.5 MG per tablet  Commonly known as: PRINZIDE,ZESTORETIC              Discharge Diet:     Activity at Discharge:     Follow-up Appointments  Future Appointments   Date Time Provider Department Center   2/27/2025  9:00 AM LAB  [facility] NA  [facility]   2/27/2025  9:00 AM PHARMACY CHAIR [facility] None   7/24/2025 10:25 AM LAB MD [facility] ONC LAB NA  [facility]   7/24/2025 10:30 AM Shweta Ayala MD [facility] NA [facility]     Additional Instructions for the Follow-ups that You Need to Schedule       Discharge Follow-up with PCP   As directed       Currently Documented PCP:    Bing Jarrett NP    PCP Phone Number:    [phone]     Follow Up Details: 1-2 weeks        Discharge Follow-up with Specified Provider: Dr. Heard; 1 Week   As directed      To: Dr. Heard   Follow Up: 1 Week        Discharge Follow-up with Specified Provider: Dr. Lee; 1 Week   As directed      To: Dr. Lee   Follow Up: 1 Week                Test Results Pending at Discharge       Juanita Obando MD  02/08/25  14:11 EST

## 2025-02-08 NOTE — PLAN OF CARE
Goal Outcome Evaluation:      Pt has had consecutive therapeutic PTT levels. Ptt draws now ordered every AM. Pt is still being bridged from heparin to warfarin. Will DC with gillis cath. VSS at this time with no new complaints.

## 2025-02-08 NOTE — PLAN OF CARE
Goal Outcome Evaluation:           Progress: improving  Outcome Evaluation: F/C remains and place and patent. Heperin gtt continues running to bridge to PO warfarin. Pt a&Ox4, appropriate and cooperative, denies having any questions or concerns at this time. Plan of care continues.

## 2025-02-09 NOTE — OUTREACH NOTE
Prep Survey      Flowsheet Row Responses   [facility] patient discharged from? [facility]   Is LACE score < 7 ? No   Eligibility Readm Mgmt   Discharge diagnosis AMADEO (acute kidney injury)   Does the patient have one of the following disease processes/diagnoses(primary or secondary)? Other   Prep survey completed? Yes            Jaclyn LEHMAN - Registered Nurse

## 2025-02-09 NOTE — CASE MANAGEMENT/SOCIAL WORK
Case Management Discharge Note      Final Note: Routine home.         Selected Continued Care - Discharged on 2/8/2025 Admission date: 2/3/2025 - Discharge disposition: Home or Self Care       Transportation Services  Private: Car    Final Discharge Disposition Code: 01 - home or self-care

## 2025-02-12 ENCOUNTER — READMISSION MANAGEMENT (OUTPATIENT)
Dept: CALL CENTER | Facility: HOSPITAL | Age: 75
End: 2025-02-12
Payer: MEDICARE

## 2025-02-12 NOTE — OUTREACH NOTE
Medical Week 1 Survey      Flowsheet Row Responses   [facility] facility patient discharged from? [facility]   Does the patient have one of the following disease processes/diagnoses(primary or secondary)? Other   Week 1 attempt successful? No   Unsuccessful attempts Attempt 1            Mainor HEWITT - Registered Nurse

## 2025-02-17 ENCOUNTER — READMISSION MANAGEMENT (OUTPATIENT)
Dept: CALL CENTER | Facility: HOSPITAL | Age: 75
End: 2025-02-17
Payer: MEDICARE

## 2025-02-17 NOTE — OUTREACH NOTE
Medical Week 1 Survey      Flowsheet Row Responses   [facility] [place] patient discharged from? [facility]   Does the patient have one of the following disease processes/diagnoses(primary or secondary)? Other   Week 1 attempt successful? Yes   Call start time 1028   Call end time 1034   Discharge diagnosis AMADEO (acute kidney injury)   Meds reviewed with patient/caregiver? Yes   Is the patient having any side effects they believe may be caused by any medication additions or changes? No   Does the patient have all medications ordered at discharge? Yes   Is the patient taking all medications as directed (includes completed medication regime)? Yes   Does the patient have a primary care provider?  Yes   Does the patient have an appointment with their PCP within 7 days of discharge? No   Has the patient kept scheduled appointments due by today? N/A   Comments Pt has appt with Urology on Thurs.   Psychosocial issues? No   Did the patient receive a copy of their discharge instructions? Yes   Nursing interventions Reviewed instructions with patient   What is the patient's perception of their health status since discharge? Improving   Is the patient/caregiver able to teach back signs and symptoms related to disease process for when to call PCP? Yes   Is the patient/caregiver able to teach back signs and symptoms related to disease process for when to call 911? Yes   Is the patient/caregiver able to teach back the hierarchy of who to call/visit for symptoms/problems? PCP, Specialist, Home health nurse, Urgent Care, ED, 911 Yes   Additional teach back comments Pt has not had INR checked since discharged and still taking 10mg coumadin.  Asked if he went to Coumadin clinic on Monday 2/10 to have checked.  He didn't know anything about it.  He says oncology checks it for him and has appt on 2/27.  Advised to contact them ASAP to have this checked. He will call as soon as call ends.   Week 1 call completed? Yes   Wrap up additional  comments Pt to contact his hematologist/oncologist for INR to be checked ASAP.   Call end time 1030            Lorri PRADO - Licensed Nurse

## 2025-02-26 ENCOUNTER — READMISSION MANAGEMENT (OUTPATIENT)
Dept: CALL CENTER | Facility: HOSPITAL | Age: 75
End: 2025-02-26
Payer: MEDICARE

## 2025-02-26 NOTE — OUTREACH NOTE
Medical Week 2 Survey      Flowsheet Row Responses   [facility] patient discharged from? [facility]   Does the patient have one of the following disease processes/diagnoses(primary or secondary)? Other   Week 2 attempt successful? Yes   Call start time 1608   Discharge diagnosis AMADEO (acute kidney injury), urinary retention, chronic DT, polycythemia   Call end time 1611   Person spoke with today (if not patient) and relationship Patient   Medication alerts for this patient ON coumadin. Patient reports having INR checked tomorrow.   Meds reviewed with patient/caregiver? Yes   Does the patient have all medications ordered at discharge? Yes   Is the patient taking all medications as directed (includes completed medication regime)? Yes   Does the patient have a primary care provider?  Yes   Does the patient have an appointment with their PCP within 7 days of discharge? No   Comments regarding PCP Patient states that he has not had follow up with PCP.   Nursing Interventions Educated patient on importance of making appointment, Advised patient to make appointment   Has the patient kept scheduled appointments due by today? Yes  [Patient has had appt with urology since discharge.]   Has home health visited the patient within 72 hours of discharge? N/A   Psychosocial issues? No   Did the patient receive a copy of their discharge instructions? Yes   Nursing interventions Reviewed instructions with patient   What is the patient's perception of their health status since discharge? Improving   Is the patient/caregiver able to teach back the hierarchy of who to call/visit for symptoms/problems? PCP, Specialist, Home health nurse, Urgent Care, ED, 911 Yes   Week 2 Call Completed? Yes   Is the patient interested in additional calls from an ambulatory ? No   Would this patient benefit from a Referral to Amb Social Work? No   Wrap up additional comments Patient had not had INR checked since discharge and taking 10mg  coumadin a day. Advised of importance of having checked and he has appt in place tomorrow.   Call end time 1611            SARA GUERRIER - Registered Nurse

## 2025-02-27 ENCOUNTER — LAB (OUTPATIENT)
Dept: LAB | Facility: HOSPITAL | Age: 75
End: 2025-02-27
Payer: MEDICARE

## 2025-02-27 ENCOUNTER — ANTICOAGULATION VISIT (OUTPATIENT)
Dept: PHARMACY | Facility: HOSPITAL | Age: 75
End: 2025-02-27
Payer: MEDICARE

## 2025-02-27 DIAGNOSIS — Z79.01 LONG TERM (CURRENT) USE OF ANTICOAGULANTS: ICD-10-CM

## 2025-02-27 LAB — INR PPP: 3.5 (ref 0.8–1.2)

## 2025-02-27 PROCEDURE — 85610 PROTHROMBIN TIME: CPT

## 2025-03-13 ENCOUNTER — LAB (OUTPATIENT)
Dept: LAB | Facility: HOSPITAL | Age: 75
End: 2025-03-13
Payer: MEDICARE

## 2025-03-13 ENCOUNTER — ANTICOAGULATION VISIT (OUTPATIENT)
Dept: PHARMACY | Facility: HOSPITAL | Age: 75
End: 2025-03-13
Payer: MEDICARE

## 2025-03-13 DIAGNOSIS — Z79.01 LONG TERM (CURRENT) USE OF ANTICOAGULANTS: ICD-10-CM

## 2025-03-13 LAB — INR PPP: 3.6 (ref 0.8–1.2)

## 2025-03-13 PROCEDURE — 85610 PROTHROMBIN TIME: CPT

## 2025-03-13 NOTE — PROGRESS NOTES
Anticoagulation Clinic Progress Note    Patient's visit was held in office today.    Anticoagulation Summary  As of 3/13/2025      INR goal:  2.0-3.0   TTR:  58.9% (5.6 y)   INR used for dosing:  3.60 (3/13/2025)   Warfarin maintenance plan:  7.5 mg (5 mg x 1.5) every day   Weekly warfarin total:  52.5 mg   Plan last modified:  Katie Whitlock RPH (3/13/2025)   Next INR check:  3/20/2025   Priority:  Maintenance   Target end date:  --             Anticoagulation Episode Summary       INR check location:  Clinic Lab    Preferred lab:  --    Send INR reminders to:   [facility] CBC ANTICOAG POOL    Comments:  --            Clinic Interview:  Patient Findings     Positives:  Signs/symptoms of bleeding - Patient endorsed blood in gillis bag. Encouraged him to follow up with urology regarding this.        INR History:      Latest Ref Rng & Units 2/6/2025    10:50 AM 2/7/2025     4:40 AM 2/8/2025     2:19 AM 2/27/2025     8:59 AM 2/27/2025     9:00 AM 3/13/2025     8:48 AM 3/13/2025     9:00 AM   Anticoagulation Monitoring   INR      3.50  3.60   INR Date      2/27/2025  3/13/2025   INR Goal      2.0-3.0  2.0-3.0   Trend      Up  Down   Last Week Total      65 mg  59.5 mg   Next Week Total      51 mg  45 mg   Sun      8.5 mg  7.5 mg   Mon      8.5 mg  7.5 mg   Tue      8.5 mg  7.5 mg   Wed      8.5 mg  7.5 mg   Thu      Hold (2/27); Otherwise 8.5 mg  Hold (3/13)   Fri      8.5 mg  7.5 mg   Sat      8.5 mg  7.5 mg   Historical INR 0.80 - 1.20 1.50  1.43  1.55  3.50   3.60         Plan:  1. INR is Supratherapeutic today- see above in Anticoagulation Summary.  Will instruct Jett Doe to Change their warfarin regimen- see above in Anticoagulation Summary. Hold today's dose and then begin 7.5 mg daily. (Total weekly dose 52.5 mg, ~12% dose reduction).  2. Follow up in 1 week  3. Patient declines warfarin refills.  4. Verbal and written information provided. Patient expresses understanding and has no further questions at  this time.    Katie Whitlock [facility]

## 2025-03-14 ENCOUNTER — TELEPHONE (OUTPATIENT)
Dept: ONCOLOGY | Facility: CLINIC | Age: 75
End: 2025-03-14

## 2025-03-14 NOTE — TELEPHONE ENCOUNTER
Caller: YAMIL    Relationship: [facility]    Best call back number: [phone]    What is the best time to reach you: ANY    Who are you requesting to speak with (clinical staff, provider,  specific staff member): CLINICAL       What was the call regarding: YAMIL IS CALLING TO CHECK ON THE CLEARANCE TO STOP COUMADIN AND PLAVIX 7 DAYS PRIOR TO SURGERY  SHE FAXED IT ON 3-10      PLEASE ADVISE

## 2025-03-20 ENCOUNTER — ANTICOAGULATION VISIT (OUTPATIENT)
Dept: PHARMACY | Facility: HOSPITAL | Age: 75
End: 2025-03-20
Payer: MEDICARE

## 2025-03-20 ENCOUNTER — LAB (OUTPATIENT)
Dept: LAB | Facility: HOSPITAL | Age: 75
End: 2025-03-20
Payer: MEDICARE

## 2025-03-20 DIAGNOSIS — Z79.01 LONG TERM (CURRENT) USE OF ANTICOAGULANTS: ICD-10-CM

## 2025-03-20 DIAGNOSIS — Z79.01 LONG TERM (CURRENT) USE OF ANTICOAGULANTS: Primary | ICD-10-CM

## 2025-03-20 LAB — INR PPP: 1.8 (ref 0.8–1.2)

## 2025-03-20 PROCEDURE — 85610 PROTHROMBIN TIME: CPT

## 2025-03-20 NOTE — PROGRESS NOTES
Anticoagulation Clinic Progress Note    Patient's visit was held in office today.    Anticoagulation Summary  As of 3/20/2025      INR goal:  2.0-3.0   TTR:  58.9% (5.6 y)   INR used for dosin.80 (3/20/2025)   Warfarin maintenance plan:  7.5 mg (5 mg x 1.5) every day   Weekly warfarin total:  52.5 mg   No change documented:  Katie Whitlock RPH   Plan last modified:  Katie Whitlock RPH (3/13/2025)   Next INR check:  3/27/2025   Priority:  Maintenance   Target end date:  --             Anticoagulation Episode Summary       INR check location:  Clinic Lab    Preferred lab:  --    Send INR reminders to:   [facility] CBC ANTICOAG POOL    Comments:  --            Clinic Interview:  Patient Findings     Negatives:  Signs/symptoms of thrombosis, Signs/symptoms of bleeding,   Laboratory test error suspected, Change in health, Change in alcohol use,   Change in activity, Upcoming invasive procedure, Emergency department   visit, Upcoming dental procedure, Missed doses, Extra doses, Change in   medications, Change in diet/appetite, Hospital admission, Bruising, Other   complaints      Clinical Outcomes     Negatives:  Major bleeding event, Thromboembolic event,   Anticoagulation-related hospital admission, Anticoagulation-related ED   visit, Anticoagulation-related fatality        INR History:      Latest Ref Rng & Units 2025     4:40 AM 2025     2:19 AM 2025     8:59 AM 2025     9:00 AM 3/13/2025     8:48 AM 3/13/2025     9:00 AM 3/20/2025    10:00 AM   Anticoagulation Monitoring   INR     3.50  3.60 1.80   INR Date     2025  3/13/2025 3/20/2025   INR Goal     2.0-3.0  2.0-3.0 2.0-3.0   Trend     Up  Down Same   Last Week Total     65 mg  59.5 mg 45 mg   Next Week Total     51 mg  45 mg 52.5 mg   Sun     8.5 mg  7.5 mg 7.5 mg   Mon     8.5 mg  7.5 mg 7.5 mg   Tue     8.5 mg  7.5 mg 7.5 mg   Wed     8.5 mg  7.5 mg 7.5 mg   Thu     Hold (); Otherwise 8.5 mg  Hold (3/13) 7.5 mg   Fri      8.5 mg  7.5 mg 7.5 mg   Sat     8.5 mg  7.5 mg 7.5 mg   Historical INR 0.80 - 1.20 1.43  1.55  3.50   3.60   1.80        Plan:  1. INR is Subtherapeutic today- see above in Anticoagulation Summary. Still recovering from hold last week?  Will instruct Jett Doe to Continue their warfarin regimen- see above in Anticoagulation Summary. Continue with the 7.5 mg daily for now, re-assess next week.   2. Follow up in 1 week  3. Patient declines warfarin refills.  4. Verbal and written information provided. Patient expresses understanding and has no further questions at this time.    Katie Whitlock [facility]

## 2025-03-27 ENCOUNTER — HOSPITAL ENCOUNTER (OUTPATIENT)
Dept: CARDIOLOGY | Facility: HOSPITAL | Age: 75
Discharge: HOME | End: 2025-03-27
Payer: MEDICARE

## 2025-03-27 ENCOUNTER — TRANSCRIBE ORDERS (OUTPATIENT)
Dept: ADMINISTRATIVE | Facility: HOSPITAL | Age: 75
End: 2025-03-27
Payer: MEDICARE

## 2025-03-27 ENCOUNTER — ANTICOAGULATION VISIT (OUTPATIENT)
Dept: PHARMACY | Facility: HOSPITAL | Age: 75
End: 2025-03-27
Payer: MEDICARE

## 2025-03-27 ENCOUNTER — LAB (OUTPATIENT)
Dept: LAB | Facility: HOSPITAL | Age: 75
End: 2025-03-27
Payer: MEDICARE

## 2025-03-27 DIAGNOSIS — N13.8 ENLARGED PROSTATE WITH URINARY OBSTRUCTION: ICD-10-CM

## 2025-03-27 DIAGNOSIS — R33.9 RETENTION OF URINE, UNSPECIFIED: ICD-10-CM

## 2025-03-27 DIAGNOSIS — Z01.812 PRE-OPERATIVE LABORATORY EXAMINATION: ICD-10-CM

## 2025-03-27 DIAGNOSIS — Z01.818 PRE-OP TESTING: ICD-10-CM

## 2025-03-27 DIAGNOSIS — Z79.01 LONG TERM (CURRENT) USE OF ANTICOAGULANTS: ICD-10-CM

## 2025-03-27 DIAGNOSIS — Z01.812 PRE-OPERATIVE LABORATORY EXAMINATION: Primary | ICD-10-CM

## 2025-03-27 DIAGNOSIS — N40.1 ENLARGED PROSTATE WITH URINARY OBSTRUCTION: ICD-10-CM

## 2025-03-27 LAB
ANION GAP SERPL CALCULATED.3IONS-SCNC: 12.9 MMOL/L (ref 5–15)
BASOPHILS # BLD AUTO: 0.07 10*3/MM3 (ref 0–0.2)
BASOPHILS NFR BLD AUTO: 0.8 % (ref 0–1.5)
BUN SERPL-MCNC: 18 MG/DL (ref 8–23)
BUN/CREAT SERPL: 14.8 (ref 7–25)
CALCIUM SPEC-SCNC: 9.5 MG/DL (ref 8.6–10.5)
CHLORIDE SERPL-SCNC: 102 MMOL/L (ref 98–107)
CO2 SERPL-SCNC: 26.1 MMOL/L (ref 22–29)
CREAT SERPL-MCNC: 1.22 MG/DL (ref 0.76–1.27)
DEPRECATED RDW RBC AUTO: 47.2 FL (ref 37–54)
EGFRCR SERPLBLD CKD-EPI 2021: 61.8 ML/MIN/1.73
EOSINOPHIL # BLD AUTO: 0.2 10*3/MM3 (ref 0–0.4)
EOSINOPHIL NFR BLD AUTO: 2.4 % (ref 0.3–6.2)
ERYTHROCYTE [DISTWIDTH] IN BLOOD BY AUTOMATED COUNT: 13.7 % (ref 12.3–15.4)
GLUCOSE SERPL-MCNC: 108 MG/DL (ref 65–99)
HCT VFR BLD AUTO: 46.4 % (ref 37.5–51)
HGB BLD-MCNC: 15.7 G/DL (ref 13–17.7)
IMM GRANULOCYTES # BLD AUTO: 0.11 10*3/MM3 (ref 0–0.05)
IMM GRANULOCYTES NFR BLD AUTO: 1.3 % (ref 0–0.5)
INR PPP: 1.2 (ref 0.8–1.2)
LYMPHOCYTES # BLD AUTO: 1.53 10*3/MM3 (ref 0.7–3.1)
LYMPHOCYTES NFR BLD AUTO: 18.5 % (ref 19.6–45.3)
MCH RBC QN AUTO: 31.5 PG (ref 26.6–33)
MCHC RBC AUTO-ENTMCNC: 33.8 G/DL (ref 31.5–35.7)
MCV RBC AUTO: 93 FL (ref 79–97)
MONOCYTES # BLD AUTO: 0.62 10*3/MM3 (ref 0.1–0.9)
MONOCYTES NFR BLD AUTO: 7.5 % (ref 5–12)
NEUTROPHILS NFR BLD AUTO: 5.72 10*3/MM3 (ref 1.7–7)
NEUTROPHILS NFR BLD AUTO: 69.5 % (ref 42.7–76)
NRBC BLD AUTO-RTO: 0 /100 WBC (ref 0–0.2)
PLATELET # BLD AUTO: 149 10*3/MM3 (ref 140–450)
PMV BLD AUTO: 9.9 FL (ref 6–12)
POTASSIUM SERPL-SCNC: 3.9 MMOL/L (ref 3.5–5.2)
QT INTERVAL: 336 MS
QTC INTERVAL: 440 MS
RBC # BLD AUTO: 4.99 10*6/MM3 (ref 4.14–5.8)
SODIUM SERPL-SCNC: 141 MMOL/L (ref 136–145)
WBC NRBC COR # BLD AUTO: 8.25 10*3/MM3 (ref 3.4–10.8)

## 2025-03-27 PROCEDURE — 93005 ELECTROCARDIOGRAM TRACING: CPT | Performed by: UROLOGY

## 2025-03-27 PROCEDURE — 80048 BASIC METABOLIC PNL TOTAL CA: CPT

## 2025-03-27 PROCEDURE — 36415 COLL VENOUS BLD VENIPUNCTURE: CPT

## 2025-03-27 PROCEDURE — 85610 PROTHROMBIN TIME: CPT

## 2025-03-27 PROCEDURE — 85025 COMPLETE CBC W/AUTO DIFF WBC: CPT

## 2025-03-27 NOTE — PROGRESS NOTES
Anticoagulation Clinic Progress Note    Patient's visit was held in office today.    Anticoagulation Summary  As of 3/27/2025      INR goal:  2.0-3.0   TTR:  58.7% (5.6 y)   INR used for dosin.20 (3/27/2025)   Warfarin maintenance plan:  8 mg (5 mg x 1 and 1 mg x 3) every day   Weekly warfarin total:  56 mg   Plan last modified:  Katie Whitlock RP (3/27/2025)   Next INR check:  4/3/2025   Priority:  Maintenance   Target end date:  --             Anticoagulation Episode Summary       INR check location:  Clinic Lab    Preferred lab:  --    Send INR reminders to:   [facility] CBC ANTICOAG POOL    Comments:  --            Clinic Interview:  Patient Findings     Positives:  Change in alcohol use    Negatives:  Signs/symptoms of thrombosis, Signs/symptoms of bleeding,   Laboratory test error suspected, Change in health, Change in activity,   Upcoming invasive procedure, Emergency department visit, Upcoming dental   procedure, Missed doses, Extra doses, Change in medications, Change in   diet/appetite, Hospital admission, Bruising, Other complaints      Clinical Outcomes     Negatives:  Major bleeding event, Thromboembolic event,   Anticoagulation-related hospital admission, Anticoagulation-related ED   visit, Anticoagulation-related fatality        INR History:      Latest Ref Rng & Units 2025     8:59 AM 2025     9:00 AM 3/13/2025     8:48 AM 3/13/2025     9:00 AM 3/20/2025    10:00 AM 3/27/2025     9:54 AM 3/27/2025    10:00 AM   Anticoagulation Monitoring   INR   3.50  3.60 1.80  1.20   INR Date   2025  3/13/2025 3/20/2025  3/27/2025   INR Goal   2.0-3.0  2.0-3.0 2.0-3.0  2.0-3.0   Trend   Up  Down Same  Up   Last Week Total   65 mg  59.5 mg 45 mg  52.5 mg   Next Week Total   51 mg  45 mg 52.5 mg  56 mg   Sun   8.5 mg  7.5 mg 7.5 mg  8 mg   Mon   8.5 mg  7.5 mg 7.5 mg  8 mg   Tue   8.5 mg  7.5 mg 7.5 mg  8 mg   Wed   8.5 mg  7.5 mg 7.5 mg  8 mg   Thu   Hold (); Otherwise 8.5 mg  Hold (3/13)  7.5 mg  8 mg   Fri   8.5 mg  7.5 mg 7.5 mg  8 mg   Sat   8.5 mg  7.5 mg 7.5 mg  8 mg   Historical INR 0.80 - 1.20 3.50   3.60   1.80  1.20         Plan:  1. INR is Subtherapeutic today- see above in Anticoagulation Summary.After discussion, patient's alcohol consumption has lowered since he has not been working right now.  This could be contributing to swings in INR.   Will instruct Jett Doe to Change their warfarin regimen- see above in Anticoagulation Summary. Will increase to 8 mg daily. (Total weekly dose 56 mg, ~ 7% dose increase). Of note patient was supratherapeutic on 8.5 mg daily.   2. Follow up in 1 week  3. Patient declines warfarin refills.  4. Verbal and written information provided. Patient expresses understanding and has no further questions at this time.    Katie Whitlock [facility]

## 2025-04-03 ENCOUNTER — ANTICOAGULATION VISIT (OUTPATIENT)
Dept: PHARMACY | Facility: HOSPITAL | Age: 75
End: 2025-04-03
Payer: MEDICARE

## 2025-04-03 ENCOUNTER — LAB (OUTPATIENT)
Dept: LAB | Facility: HOSPITAL | Age: 75
End: 2025-04-03
Payer: MEDICARE

## 2025-04-03 DIAGNOSIS — Z79.01 LONG TERM (CURRENT) USE OF ANTICOAGULANTS: ICD-10-CM

## 2025-04-03 DIAGNOSIS — Z79.01 LONG TERM (CURRENT) USE OF ANTICOAGULANTS: Primary | ICD-10-CM

## 2025-04-03 LAB — INR PPP: 2 (ref 0.8–1.2)

## 2025-04-03 PROCEDURE — 85610 PROTHROMBIN TIME: CPT

## 2025-04-03 NOTE — PROGRESS NOTES
Anticoagulation Clinic Progress Note    Patient's visit was held in office today.    Anticoagulation Summary  As of 4/3/2025      INR goal:  2.0-3.0   TTR:  58.5% (5.6 y)   INR used for dosin.00 (4/3/2025)   Warfarin maintenance plan:  8.5 mg (5 mg x 1 and 1 mg x 3.5) every day   Weekly warfarin total:  59.5 mg   Plan last modified:  Katie Whitlock RPH (4/3/2025)   Next INR check:  2025   Priority:  Maintenance   Target end date:  --             Anticoagulation Episode Summary       INR check location:  Clinic Lab    Preferred lab:  --    Send INR reminders to:   [facility] CBC ANTICOAG POOL    Comments:  --            Clinic Interview:  Patient Findings     Positives:  Upcoming invasive procedure        INR History:      Latest Ref Rng & Units 2025     9:00 AM 3/13/2025     8:48 AM 3/13/2025     9:00 AM 3/20/2025    10:00 AM 3/27/2025     9:54 AM 3/27/2025    10:00 AM 4/3/2025    10:00 AM   Anticoagulation Monitoring   INR  3.50  3.60 1.80  1.20 2.00   INR Date  2025  3/13/2025 3/20/2025  3/27/2025 4/3/2025   INR Goal  2.0-3.0  2.0-3.0 2.0-3.0  2.0-3.0 2.0-3.0   Trend  Up  Down Same  Up Up   Last Week Total  65 mg  59.5 mg 45 mg  52.5 mg 59.5 mg   Next Week Total  51 mg  45 mg 52.5 mg  56 mg 17 mg   Sun  8.5 mg  7.5 mg 7.5 mg  8 mg Hold (); Otherwise 8.5 mg   Mon  8.5 mg  7.5 mg 7.5 mg  8 mg Hold (); Otherwise 8.5 mg   Tue  8.5 mg  7.5 mg 7.5 mg  8 mg 8.5 mg   Wed  8.5 mg  7.5 mg 7.5 mg  8 mg 8.5 mg   Thu  Hold (); Otherwise 8.5 mg  Hold (3/13) 7.5 mg  8 mg Hold (4/3); Otherwise 8.5 mg   Fri  8.5 mg  7.5 mg 7.5 mg  8 mg Hold (); Otherwise 8.5 mg   Sat  8.5 mg  7.5 mg 7.5 mg  8 mg Hold (); Otherwise 8.5 mg   Historical INR 0.80 - 1.20  3.60   1.80  1.20   2.00        Plan:  1. INR is Therapeutic today- see above in Anticoagulation Summary. Patient states that he was taking 8.5 mg this past week not 8 mg.   Will instruct Jett Doe to Change their warfarin regimen- see  above in Anticoagulation Summary. Patient has procedure on Tuesday requiring hold, patient to begin hold today. After procedure he is to resume Warfarin at 8.5 mg daily once cleared by surgeon, with plan to recheck in one week post-op.    2. Follow up in 2 weeks  3. Patient declines warfarin refills.  4. Verbal and written information provided. Patient expresses understanding and has no further questions at this time.    Katie Whitlock [facility]

## 2025-04-04 LAB
QT INTERVAL: 336 MS
QTC INTERVAL: 440 MS

## 2025-04-04 NOTE — TELEPHONE ENCOUNTER
Caller: YAMIL    Relationship: [facility]    Best call back number:  [phone]    What is the best time to reach you: ASAP    Who are you requesting to speak with (clinical staff, provider,  specific staff member): CLINICAL        What was the call regarding: CALLER IS CHECKING TO VERIFY IF PT HAS CLEARANCE FOR THIS.  THE PATIENT IS SCHEDULED NEXT TUES 4/8, BUT THEY DO NOT HAVE THE PAPERWORK TO VERIFY THE CLEARANCE, PLEASE ADVISE OR FAX CLEARANCE TO THEM AT  [phone].

## 2025-04-07 ENCOUNTER — TELEPHONE (OUTPATIENT)
Dept: ONCOLOGY | Facility: HOSPITAL | Age: 75
End: 2025-04-07
Payer: MEDICARE

## 2025-04-07 RX ORDER — ENOXAPARIN SODIUM 100 MG/ML
1 INJECTION SUBCUTANEOUS EVERY 12 HOURS SCHEDULED
Qty: 10 ML | Refills: 0 | Status: SHIPPED | OUTPATIENT
Start: 2025-04-07

## 2025-04-07 NOTE — TELEPHONE ENCOUNTER
Located fax that had been sent on 3/10 for clearance.  Pt is scheduled tomorrow for procedure.  Per Katie, pharmacist, she states that pt stated that he had been holding his coumdain since last Thursday 4/3, but was not started on Lovenox bridge.  Spoke w/ Dr. Ayala and she signed clearance letter and states that pt should restart coumadin and lovenox once cleared by urologist after his procedure.  Spoke w/ Katie and she states that she will call pt and discuss instructions and she will send in script for his Lovenox.  Surgery clearance faxed to Bri at [facility] to fax number given [phone] and confirmation received.  Clearance scanned into pt's chart.

## 2025-04-07 NOTE — TELEPHONE ENCOUNTER
[facility] CALLING BACK, THEY HAVE NOT HEARD FROM ANYONE YET AND PATIENTS SURGERY IS TOMORROW

## 2025-04-07 NOTE — TELEPHONE ENCOUNTER
Per Dr. Ayala, patient is to have Lovenox Bridge after procedure tomorrow. Called patient to discuss and sent in RX for Lovenox 100 mg (patient weight 97.3 kg) every 12 hours to patient's preferred pharmacy. Patient confirmed he had give injections before and understood to begin injections on Wednesday April 9  after procedure. Patient scheduled for INR re-check on Monday April 14.     Katie Whitlock, Harleen, BCPS, BCCCP  15:34 EDT

## 2025-04-08 PROCEDURE — 88305 TISSUE EXAM BY PATHOLOGIST: CPT | Performed by: UROLOGY

## 2025-04-09 ENCOUNTER — LAB REQUISITION (OUTPATIENT)
Dept: LAB | Facility: HOSPITAL | Age: 75
End: 2025-04-09
Payer: MEDICARE

## 2025-04-09 DIAGNOSIS — N40.1 BENIGN PROSTATIC HYPERPLASIA WITH LOWER URINARY TRACT SYMPTOMS: ICD-10-CM

## 2025-04-10 LAB
LAB AP CASE REPORT: NORMAL
PATH REPORT.FINAL DX SPEC: NORMAL
PATH REPORT.GROSS SPEC: NORMAL

## 2025-04-14 ENCOUNTER — ANTICOAGULATION VISIT (OUTPATIENT)
Dept: PHARMACY | Facility: HOSPITAL | Age: 75
End: 2025-04-14
Payer: MEDICARE

## 2025-04-14 ENCOUNTER — LAB (OUTPATIENT)
Dept: LAB | Facility: HOSPITAL | Age: 75
End: 2025-04-14
Payer: MEDICARE

## 2025-04-14 DIAGNOSIS — Z79.01 LONG TERM (CURRENT) USE OF ANTICOAGULANTS: ICD-10-CM

## 2025-04-14 LAB — INR PPP: 1 (ref 0.8–1.2)

## 2025-04-14 PROCEDURE — 85610 PROTHROMBIN TIME: CPT

## 2025-04-14 NOTE — PROGRESS NOTES
Anticoagulation Clinic Progress Note    Patient's visit was held in office today.    Anticoagulation Summary  As of 2025      INR goal:  2.0-3.0   TTR:  58.2% (5.7 y)   INR used for dosin.00 (2025)   Warfarin maintenance plan:  8.5 mg (5 mg x 1 and 1 mg x 3.5) every day   Weekly warfarin total:  59.5 mg   Plan last modified:  Katie Whitlock RPH (4/3/2025)   Next INR check:  2025   Priority:  Maintenance   Target end date:  --             Anticoagulation Episode Summary       INR check location:  Clinic Lab    Preferred lab:  --    Send INR reminders to:   [facility] CBC ANTICOAG POOL    Comments:  --            Clinic Interview:  Patient Findings     Negatives:  Signs/symptoms of thrombosis, Signs/symptoms of bleeding,   Laboratory test error suspected, Change in health, Change in alcohol use,   Change in activity, Upcoming invasive procedure, Emergency department   visit, Upcoming dental procedure, Missed doses, Extra doses, Change in   medications, Change in diet/appetite, Hospital admission, Bruising, Other   complaints      Clinical Outcomes     Negatives:  Major bleeding event, Thromboembolic event,   Anticoagulation-related hospital admission, Anticoagulation-related ED   visit, Anticoagulation-related fatality        INR History:      Latest Ref Rng & Units 3/13/2025     9:00 AM 3/20/2025    10:00 AM 3/27/2025     9:54 AM 3/27/2025    10:00 AM 4/3/2025    10:00 AM 2025    10:03 AM 2025    10:15 AM   Anticoagulation Monitoring   INR  3.60 1.80  1.20 2.00  1.00   INR Date  3/13/2025 3/20/2025  3/27/2025 4/3/2025  2025   INR Goal  2.0-3.0 2.0-3.0  2.0-3.0 2.0-3.0  2.0-3.0   Trend  Down Same  Up Up  Same   Last Week Total  59.5 mg 45 mg  52.5 mg 59.5 mg  51 mg   Next Week Total  45 mg 52.5 mg  56 mg 17 mg  54 mg   Sun  7.5 mg 7.5 mg  8 mg Hold (); Otherwise 8.5 mg  8.5 mg   Mon  7.5 mg 7.5 mg  8 mg Hold (); Otherwise 8.5 mg  Hold ()   Tue  7.5 mg 7.5 mg  8 mg 8.5 mg   10 mg (4/15)   Wed  7.5 mg 7.5 mg  8 mg 8.5 mg  10 mg (4/16)   Thu  Hold (3/13) 7.5 mg  8 mg Hold (4/3); Otherwise 8.5 mg  8.5 mg   Fri  7.5 mg 7.5 mg  8 mg Hold (4/4); Otherwise 8.5 mg  8.5 mg   Sat  7.5 mg 7.5 mg  8 mg Hold (4/5); Otherwise 8.5 mg  8.5 mg   Historical INR 0.80 - 1.20  1.80  1.20   2.00  1.00         Plan:  1. INR is Subtherapeutic today- see above in Anticoagulation Summary.    Per patient he was instructed by urology not to resume therapy until 4/15, this differs than the clearance our office received.   Will instruct Jett EMA Doe to Change their warfarin regimen- see above in Anticoagulation Summary. Patient to resume Warfarin tomorrow, instructed him to boost to 10 mg for two nights and then resume 8.5 mg tablets. He is to resume Lovenox injections tomorrow and continue them through Friday.   2. Follow up in 1 week  3. Patient declines warfarin refills.  4. Verbal and written information provided. Patient expresses understanding and has no further questions at this time.    Katie Whitlock [facility]

## 2025-04-21 ENCOUNTER — LAB (OUTPATIENT)
Dept: LAB | Facility: HOSPITAL | Age: 75
End: 2025-04-21
Payer: MEDICARE

## 2025-04-21 ENCOUNTER — ANTICOAGULATION VISIT (OUTPATIENT)
Dept: PHARMACY | Facility: HOSPITAL | Age: 75
End: 2025-04-21
Payer: MEDICARE

## 2025-04-21 DIAGNOSIS — Z79.01 LONG TERM (CURRENT) USE OF ANTICOAGULANTS: ICD-10-CM

## 2025-04-21 DIAGNOSIS — Z79.01 LONG TERM (CURRENT) USE OF ANTICOAGULANTS: Primary | ICD-10-CM

## 2025-04-21 LAB — INR PPP: 1.5 (ref 0.8–1.2)

## 2025-04-21 PROCEDURE — 85610 PROTHROMBIN TIME: CPT

## 2025-04-21 NOTE — PROGRESS NOTES
Anticoagulation Clinic Progress Note    Patient's visit was held in office today.    Anticoagulation Summary  As of 2025      INR goal:  2.0-3.0   TTR:  58.0% (5.7 y)   INR used for dosin.50 (2025)   Warfarin maintenance plan:  8.5 mg (5 mg x 1 and 1 mg x 3.5) every day   Weekly warfarin total:  59.5 mg   Plan last modified:  Katie Whitlock RPH (4/3/2025)   Next INR check:  2025   Priority:  Maintenance   Target end date:  --             Anticoagulation Episode Summary       INR check location:  Clinic Lab    Preferred lab:  --    Send INR reminders to:   [facility] CBC ANTICOAG POOL    Comments:  --            Clinic Interview:      INR History:      Latest Ref Rng & Units 3/27/2025     9:54 AM 3/27/2025    10:00 AM 4/3/2025    10:00 AM 2025    10:03 AM 2025    10:15 AM 2025    10:08 AM 2025    10:15 AM   Anticoagulation Monitoring   INR   1.20 2.00  1.00  1.50   INR Date   3/27/2025 4/3/2025  2025  2025   INR Goal   2.0-3.0 2.0-3.0  2.0-3.0  2.0-3.0   Trend   Up Up  Same  Same   Last Week Total   52.5 mg 59.5 mg  51 mg  45.5 mg   Next Week Total   56 mg 17 mg  54 mg  61 mg   Sun   8 mg Hold (); Otherwise 8.5 mg  8.5 mg  8.5 mg   Mon   8 mg Hold (); Otherwise 8.5 mg  Hold ()  10 mg ()   Tue   8 mg 8.5 mg  10 mg (4/15)  8.5 mg   Wed   8 mg 8.5 mg  10 mg ()  8.5 mg   Thu   8 mg Hold (4/3); Otherwise 8.5 mg  8.5 mg  8.5 mg   Fri   8 mg Hold (); Otherwise 8.5 mg  8.5 mg  8.5 mg   Sat   8 mg Hold (); Otherwise 8.5 mg  8.5 mg  8.5 mg   Historical INR 0.80 - 1.20 1.20   2.00  1.00   1.50         Plan:  1. INR is Subtherapeutic today- see above in Anticoagulation Summary. Patient missed dose last night.   Will instruct Jett Doe to Change their warfarin regimen- see above in Anticoagulation Summary. Boost to 10 mg tonight and then resume  8.5 mg daily.   2. Follow up in 1 week  3. Patient declines warfarin refills.  4. Verbal and written  information provided. Patient expresses understanding and has no further questions at this time.    Katie Whitlock, [facility]

## 2025-04-28 ENCOUNTER — ANTICOAGULATION VISIT (OUTPATIENT)
Dept: PHARMACY | Facility: HOSPITAL | Age: 75
End: 2025-04-28
Payer: MEDICARE

## 2025-04-28 ENCOUNTER — LAB (OUTPATIENT)
Dept: LAB | Facility: HOSPITAL | Age: 75
End: 2025-04-28
Payer: MEDICARE

## 2025-04-28 DIAGNOSIS — Z79.01 LONG TERM (CURRENT) USE OF ANTICOAGULANTS: ICD-10-CM

## 2025-04-28 LAB — INR PPP: 1.4 (ref 0.8–1.2)

## 2025-04-28 PROCEDURE — 85610 PROTHROMBIN TIME: CPT

## 2025-04-28 NOTE — PROGRESS NOTES
Anticoagulation Clinic Progress Note    Patient's visit was held in office today.    Anticoagulation Summary  As of 2025      INR goal:  2.0-3.0   TTR:  57.8% (5.7 y)   INR used for dosin.40 (2025)   Warfarin maintenance plan:  8.5 mg (5 mg x 1 and 1 mg x 3.5) every day   Weekly warfarin total:  59.5 mg   Plan last modified:  Katie Whitlock RPH (4/3/2025)   Next INR check:  2025   Priority:  Maintenance   Target end date:  --             Anticoagulation Episode Summary       INR check location:  Clinic Lab    Preferred lab:  --    Send INR reminders to:   [facility] CBC ANTICOAG POOL    Comments:  --            Clinic Interview:  Patient Findings     Positives:  Missed doses    Negatives:  Signs/symptoms of thrombosis, Signs/symptoms of bleeding,   Laboratory test error suspected, Change in health, Change in alcohol use,   Change in activity, Upcoming invasive procedure, Emergency department   visit, Upcoming dental procedure, Extra doses, Change in medications,   Change in diet/appetite, Hospital admission, Bruising, Other complaints      Clinical Outcomes     Negatives:  Major bleeding event, Thromboembolic event,   Anticoagulation-related hospital admission, Anticoagulation-related ED   visit, Anticoagulation-related fatality        INR History:      Latest Ref Rng & Units 4/3/2025    10:00 AM 2025    10:03 AM 2025    10:15 AM 2025    10:08 AM 2025    10:15 AM 2025    10:15 AM 2025    10:45 AM   Anticoagulation Monitoring   INR  2.00  1.00  1.50 1.40    INR Date  4/3/2025  2025  2025 2025    INR Goal  2.0-3.0  2.0-3.0  2.0-3.0 2.0-3.0    Trend  Up  Same  Same Same    Last Week Total  59.5 mg  51 mg  45.5 mg 52.5 mg    Next Week Total  17 mg  54 mg  61 mg 61 mg    Sun  Hold (); Otherwise 8.5 mg  8.5 mg  8.5 mg 8.5 mg    Mon  Hold (); Otherwise 8.5 mg  Hold ()  10 mg () 10 mg ()    Tue  8.5 mg  10 mg (4/15)  8.5 mg 8.5 mg    Wed   8.5 mg  10 mg (4/16)  8.5 mg 8.5 mg    Thu  Hold (4/3); Otherwise 8.5 mg  8.5 mg  8.5 mg 8.5 mg    Fri  Hold (4/4); Otherwise 8.5 mg  8.5 mg  8.5 mg 8.5 mg    Sat  Hold (4/5); Otherwise 8.5 mg  8.5 mg  8.5 mg 8.5 mg    Historical INR 0.80 - 1.20 2.00  1.00   1.50    1.40        Plan:  1. INR is Subtherapeutic today- see above in Anticoagulation Summary. Patient missed multiple doses in the past week.   Will instruct Jett Doe to Change their warfarin regimen- see above in Anticoagulation Summary. Will boost to 10 mg today and then resume 8.5 mg daily. Patient is going to try to not miss any doses this week.   2. Follow up in 1 week  3. Patient declines warfarin refills.  4. Verbal and written information provided. Patient expresses understanding and has no further questions at this time.    Katie Whitlock [facility]

## 2025-05-05 ENCOUNTER — ANTICOAGULATION VISIT (OUTPATIENT)
Dept: PHARMACY | Facility: HOSPITAL | Age: 75
End: 2025-05-05
Payer: MEDICARE

## 2025-05-05 ENCOUNTER — LAB (OUTPATIENT)
Dept: LAB | Facility: HOSPITAL | Age: 75
End: 2025-05-05
Payer: MEDICARE

## 2025-05-05 DIAGNOSIS — Z79.01 LONG TERM (CURRENT) USE OF ANTICOAGULANTS: Primary | ICD-10-CM

## 2025-05-05 DIAGNOSIS — Z79.01 LONG TERM (CURRENT) USE OF ANTICOAGULANTS: ICD-10-CM

## 2025-05-05 LAB — INR PPP: 2.7 (ref 0.8–1.2)

## 2025-05-05 PROCEDURE — 85610 PROTHROMBIN TIME: CPT

## 2025-05-05 NOTE — PROGRESS NOTES
Anticoagulation Clinic Progress Note    Patient's visit was held in office today.    Anticoagulation Summary  As of 2025      INR goal:  2.0-3.0   TTR:  57.8% (5.7 y)   INR used for dosin.70 (2025)   Warfarin maintenance plan:  8.5 mg (5 mg x 1 and 1 mg x 3.5) every day   Weekly warfarin total:  59.5 mg   No change documented:  Katie Whitlock RPH   Plan last modified:  Katie Whitlock RPH (4/3/2025)   Next INR check:  2025   Priority:  Maintenance   Target end date:  --             Anticoagulation Episode Summary       INR check location:  Clinic Lab    Preferred lab:  --    Send INR reminders to:  [facility] CBC ANTICOAG POOL    Comments:  --            Clinic Interview:  Patient Findings     Negatives:  Signs/symptoms of thrombosis, Signs/symptoms of bleeding,   Laboratory test error suspected, Change in health, Change in alcohol use,   Change in activity, Upcoming invasive procedure, Emergency department   visit, Upcoming dental procedure, Missed doses, Extra doses, Change in   medications, Change in diet/appetite, Hospital admission, Bruising, Other   complaints      Clinical Outcomes     Negatives:  Major bleeding event, Thromboembolic event,   Anticoagulation-related hospital admission, Anticoagulation-related ED   visit, Anticoagulation-related fatality        INR History:      Latest Ref Rng & Units 2025    10:15 AM 2025    10:08 AM 2025    10:15 AM 2025    10:15 AM 2025    10:45 AM 2025    10:30 AM 2025    10:35 AM   Anticoagulation Monitoring   INR  1.00  1.50 1.40  2.70    INR Date  2025    INR Goal  2.0-3.0  2.0-3.0 2.0-3.0  2.0-3.0    Trend  Same  Same Same  Same    Last Week Total  51 mg  45.5 mg 52.5 mg  61 mg    Next Week Total  54 mg  61 mg 61 mg  59.5 mg    Sun  8.5 mg  8.5 mg 8.5 mg  8.5 mg    Mon  Hold ()  10 mg () 10 mg ()  8.5 mg    Tue  10 mg (4/15)  8.5 mg 8.5 mg  8.5 mg    Wed  10 mg  (4/16)  8.5 mg 8.5 mg  8.5 mg    Thu  8.5 mg  8.5 mg 8.5 mg  8.5 mg    Fri  8.5 mg  8.5 mg 8.5 mg  8.5 mg    Sat  8.5 mg  8.5 mg 8.5 mg  8.5 mg    Historical INR 0.80 - 1.20  1.50    1.40   2.70        Plan:  1. INR is Therapeutic today- see above in Anticoagulation Summary. Patient stated that he will be starting an antibiotic. Instructed him to call clinic to let us know which antibiotic   Will instruct Jett Doe to Continue their warfarin regimen- see above in Anticoagulation Summary.  2. Follow up in 2 weeks  3. Patient declines warfarin refills.  4. Verbal and written information provided. Patient expresses understanding and has no further questions at this time.    Katie Whitlock [facility]

## 2025-05-08 RX ORDER — WARFARIN SODIUM 5 MG/1
5 TABLET ORAL NIGHTLY
Qty: 90 TABLET | Refills: 0
Start: 2025-05-08

## 2025-05-08 RX ORDER — WARFARIN SODIUM 1 MG/1
3.5 TABLET ORAL NIGHTLY
Qty: 315 TABLET | Refills: 0 | Status: SHIPPED | OUTPATIENT
Start: 2025-05-08

## 2025-05-13 RX ORDER — WARFARIN SODIUM 5 MG/1
TABLET ORAL
Qty: 90 TABLET | Refills: 0 | Status: SHIPPED | OUTPATIENT
Start: 2025-05-13

## 2025-05-13 RX ORDER — WARFARIN SODIUM 5 MG/1
5 TABLET ORAL NIGHTLY
Qty: 90 TABLET | Refills: 0
Start: 2025-05-13 | End: 2025-05-13 | Stop reason: SDUPTHER

## 2025-05-19 ENCOUNTER — ANTICOAGULATION VISIT (OUTPATIENT)
Dept: PHARMACY | Facility: HOSPITAL | Age: 75
End: 2025-05-19
Payer: MEDICARE

## 2025-05-19 ENCOUNTER — LAB (OUTPATIENT)
Dept: LAB | Facility: HOSPITAL | Age: 75
End: 2025-05-19
Payer: MEDICARE

## 2025-05-19 DIAGNOSIS — Z79.01 LONG TERM (CURRENT) USE OF ANTICOAGULANTS: ICD-10-CM

## 2025-05-19 LAB — INR PPP: 1.9 (ref 0.8–1.2)

## 2025-05-19 PROCEDURE — 85610 PROTHROMBIN TIME: CPT

## 2025-06-16 ENCOUNTER — ANTICOAGULATION VISIT (OUTPATIENT)
Dept: PHARMACY | Facility: HOSPITAL | Age: 75
End: 2025-06-16
Payer: MEDICARE

## 2025-06-16 ENCOUNTER — LAB (OUTPATIENT)
Dept: LAB | Facility: HOSPITAL | Age: 75
End: 2025-06-16
Payer: MEDICARE

## 2025-06-16 DIAGNOSIS — Z79.01 LONG TERM (CURRENT) USE OF ANTICOAGULANTS: ICD-10-CM

## 2025-06-16 LAB — INR PPP: 2.1 (ref 0.8–1.2)

## 2025-06-16 PROCEDURE — 85610 PROTHROMBIN TIME: CPT

## 2025-06-16 NOTE — PROGRESS NOTES
Anticoagulation Clinic Progress Note    Patient's visit was held in office today.    Anticoagulation Summary  As of 2025      INR goal:  2.0-3.0   TTR:  57.9% (5.8 y)   INR used for dosin.10 (2025)   Warfarin maintenance plan:  8.5 mg (5 mg x 1 and 1 mg x 3.5) every day   Weekly warfarin total:  59.5 mg   Plan last modified:  Katie Whitlock RPH (4/3/2025)   Next INR check:  --   Priority:  Maintenance   Target end date:  --             Anticoagulation Episode Summary       INR check location:  Clinic Lab    Preferred lab:  --    Send INR reminders to:   [facility] CBC ANTICOAG POOL    Comments:  --            Clinic Interview:  Patient Findings     Negatives:  Signs/symptoms of thrombosis, Signs/symptoms of bleeding,   Laboratory test error suspected, Change in health, Change in alcohol use,   Change in activity, Upcoming invasive procedure, Emergency department   visit, Upcoming dental procedure, Missed doses, Extra doses, Change in   medications, Change in diet/appetite, Hospital admission, Bruising, Other   complaints      Clinical Outcomes     Negatives:  Major bleeding event, Thromboembolic event,   Anticoagulation-related hospital admission, Anticoagulation-related ED   visit, Anticoagulation-related fatality        INR History:      Latest Ref Rng & Units 2025    10:45 AM 2025    10:30 AM 2025    10:35 AM 2025     9:30 AM 2025     9:31 AM 2025     9:30 AM 2025     9:33 AM   Anticoagulation Monitoring   INR   2.70  1.90  2.10    INR Date   2025    INR Goal   2.0-3.0  2.0-3.0  2.0-3.0    Trend   Same  Same  Same    Last Week Total   61 mg  59.5 mg  59.5 mg    Next Week Total   59.5 mg  59.5 mg  59.5 mg    Sun   8.5 mg  8.5 mg  8.5 mg    Mon   8.5 mg  8.5 mg  8.5 mg    Tue   8.5 mg  8.5 mg  8.5 mg    Wed   8.5 mg  8.5 mg  8.5 mg    Thu   8.5 mg  8.5 mg  8.5 mg    Fri   8.5 mg  8.5 mg  8.5 mg    Sat   8.5 mg  8.5 mg  8.5 mg    Historical  INR 0.80 - 1.20 1.40   2.70   1.90   2.10        Plan:  1. INR is Therapeutic today- see above in Anticoagulation Summary.  Will instruct Jett Doe to Continue their warfarin regimen- see above in Anticoagulation Summary.  2. Follow up in 1 month  3. Patient declines warfarin refills.  4. Verbal and written information provided. Patient expresses understanding and has no further questions at this time.    Katie Whitlock [facility]

## 2025-07-16 ENCOUNTER — LAB (OUTPATIENT)
Dept: LAB | Facility: HOSPITAL | Age: 75
End: 2025-07-16
Payer: MEDICARE

## 2025-07-16 ENCOUNTER — ANTICOAGULATION VISIT (OUTPATIENT)
Dept: PHARMACY | Facility: HOSPITAL | Age: 75
End: 2025-07-16
Payer: MEDICARE

## 2025-07-16 DIAGNOSIS — Z79.01 LONG TERM (CURRENT) USE OF ANTICOAGULANTS: Primary | ICD-10-CM

## 2025-07-16 DIAGNOSIS — Z79.01 LONG TERM (CURRENT) USE OF ANTICOAGULANTS: ICD-10-CM

## 2025-07-16 LAB — INR PPP: 2.5 (ref 0.8–1.2)

## 2025-07-16 PROCEDURE — 85610 PROTHROMBIN TIME: CPT

## 2025-07-16 NOTE — PROGRESS NOTES
Anticoagulation Clinic Progress Note    Patient's visit was held in office today.    Anticoagulation Summary  As of 2025      INR goal:  2.0-3.0   TTR:  58.5% (5.9 y)   INR used for dosin.50 (2025)   Warfarin maintenance plan:  8.5 mg (5 mg x 1 and 1 mg x 3.5) every day   Weekly warfarin total:  59.5 mg   No change documented:  Katie Whitlock RPH   Plan last modified:  Katie Whitlock RPH (4/3/2025)   Next INR check:  2025   Priority:  Maintenance   Target end date:  --             Anticoagulation Episode Summary       INR check location:  Clinic Lab    Preferred lab:  --    Send INR reminders to:  [facility] CBC ANTICOAG POOL    Comments:  --            Clinic Interview:  Patient Findings     Negatives:  Signs/symptoms of thrombosis, Signs/symptoms of bleeding,   Laboratory test error suspected, Change in health, Change in alcohol use,   Change in activity, Upcoming invasive procedure, Emergency department   visit, Upcoming dental procedure, Missed doses, Extra doses, Change in   medications, Change in diet/appetite, Hospital admission, Bruising, Other   complaints      Clinical Outcomes     Negatives:  Major bleeding event, Thromboembolic event,   Anticoagulation-related hospital admission, Anticoagulation-related ED   visit, Anticoagulation-related fatality        INR History:      Latest Ref Rng & Units 2025    10:35 AM 2025     9:30 AM 2025     9:31 AM 2025     9:30 AM 2025     9:33 AM 2025     9:59 AM 2025    10:00 AM   Anticoagulation Monitoring   INR   1.90  2.10   2.50   INR Date   2025   INR Goal   2.0-3.0  2.0-3.0   2.0-3.0   Trend   Same  Same   Same   Last Week Total   59.5 mg  59.5 mg   59.5 mg   Next Week Total   59.5 mg  59.5 mg   59.5 mg   Sun   8.5 mg  8.5 mg   8.5 mg   Mon   8.5 mg  8.5 mg   8.5 mg   Tue   8.5 mg  8.5 mg   8.5 mg   Wed   8.5 mg  8.5 mg   8.5 mg   Thu   8.5 mg  8.5 mg   8.5 mg   Fri   8.5 mg   8.5 mg   8.5 mg   Sat   8.5 mg  8.5 mg   8.5 mg   Historical INR 0.80 - 1.20 2.70   1.90   2.10  2.50         Plan:  1. INR is Therapeutic today- see above in Anticoagulation Summary.  Will instruct Jett Doe to Continue their warfarin regimen- see above in Anticoagulation Summary.  2. Follow up in 1 month  3. Patient declines warfarin refills.  4. Verbal and written information provided. Patient expresses understanding and has no further questions at this time.    Katie Whitlock [facility]

## 2025-07-23 NOTE — PROGRESS NOTES
Hematology/Oncology Outpatient Follow Up    PATIENT NAME:Jett Deo  :1950  MRN: [id number]  PRIMARY CARE PHYSICIAN: Bing Jarrett APRN  REFERRING PHYSICIAN: No ref. provider found    Chief Complaint   Patient presents with    Follow-up     Pulmonary embolism, other, unspecified chronicity, unspecified whether acute cor pulmonale present             HISTORY OF PRESENT ILLNESS:     Bilateral PE, right lower extremity DVT and bilateral SVTs diagnosis established in 2014 and MTHFR C677T heterozygosity diagnosis established and 2014.  The patient presented to [facility] on 2014 where was admitted through 2014 with complaints of chest pain and shortness of air.  He was seen per EMS and had associated diaphoresis as well as some low sternal chest pressure.  His troponin was elevated and cardiology was consulted.  He was started on nitroglycerin drip and heparin drip.  He was given diuresis and admitted to the CBCU.  It was planned for him to undergo a cardiac catheterization.  A VQ scan was done to evaluate for bilateral pulmonary emboli and renal is consulted for his elevated creatinine.  The VQ scan showed multiple abnormalities in both lungs with a high probably for pulmonary emboli.  His chest x-ray showed no acute cardiopulmonary abnormality.  We were consulted for his Thrombopenia and his pulmonology emboli.  His risk factors included obesity, sedentary lifestyle, history of stroke and hyperlipidemia.  He has been on Plavix and aspirin as an outpatient and when his pulmonary emboli were found, his heparin was changed to renal dust Lovenox therapy 1 mg/kg per day.  He was not started on Coumadin initially with the plan that cardiology would perform a cardiac catheterization shortly after admission.  A thrombophilia workup was performed and results were; his AT3 was 89.9% normal, protein C was 101.4% normal and protein S was 101% normal, Fibrinogen 416  normal, factor V Leiden screen is negative, prothrombin gene mutation is negative.  MTHFR showed heterozygosity for C677 T mutation, but negative for [id number] mutation.  Phosphate lipid antibodies were done.  Beta II glycoprotein is negative.  Phosphatidylserine antibody is negative.  Cardiolipin antibodies are all negative.  Bilateral lower extremity Dopplers was performed that showed a right lower extremity DVT and bilateral SVTs in his small saphenous vein.  On 08/20/2014, an IVC filter was placed with plans to hold starting his Coumadinization until after his cardiac catheterization was performed, which was going to be scheduled as an outpatient per Dr. Jurado.  It was noted that he had thrombocytopenia on admission with the platelet count of 125,000.  His platelet count slowly felt to 92,000 and it discharge with 01816.  His LFTs were noted to be abnormal with an AST of 88 and ALT of 103.  His coags were normal with PT of 11.9, INR 1.1 and PTT 35.5.  A thrombocytopenia workup was performed as seen below.  TSH of 1.3, His LFTs are normalized prior to discharge.  Nephrology was consulted for his AMADEO with a creatinine of 2.0 on admission.  Dr. Howard performed some renal studies.  His urinalysis was normal.  His renal ultrasound showed a 2 cm mass extended of the upper pole of the right kidney, which appeared to be solid with recommendations follow up with the CT or an MRI of his abdomen.  There was no hydronephrosis to indicate obstructive uropathy and the urinary bladder was unremarkable.  His creatinine improved with medication adjustments and was 1.4 and discharged with plans to perform an MRI of his afternoon when his creatinine improved to further evaluate his right renal mass.  An echocardiogram was performed that showed 45-55% ejection fraction and no other abnormalities were identified.  8/17/15 - Comprehensive metabolic panel with creatinine 1.6.  9/8/14 - patient was seen in the office as a hospital  follow-up. Homocysteine level 18.4 (H)  9/10/14 - orders written to start Folgard daily due to the elevated homocystine level.  10/7/14 - The patient reports that he has not started taking the Folgard and did not remember having a prescription called into the pharmacy.  Advised the patient again to start taking Folgard daily and gave the patient a prescription today  11/13/14 - homocystine 17.7 (H)  11/19/14 - advised patient to increase Folgard 2 twice a day.  8/17/15 - Homocysteine 16.8 (5-12).   3/8/16 - Patient does not recall taking Folgard. Prescription written for Folgard 1 p.o. q. d.  Patient claims that he has not taken Coumadin for three days as he was out of town. INR today 1.4. Instructed to resume Coumadin at same dose and follow INR protocol.   6/30/16 - Comprehensive metabolic panel with creatinine 1.3 (0.7-1.2). Serum homocysteine 12.2 (5-12).     3/7/17 - INR 2.7 on 8 mg of Warfarin daily. Patient asked to increase Folgard to twice a day.   5/5/17 - INR 2.4, therapeutic. Patient is to continue the INR protocol and continue with Folgard one tablet twice daily as well as Coumadin 8 mg daily. Homocysteine 11.2 normal (5.0-12.0).   8/4/17 - INR today is 1.6. Patient states that he missed his dose of Coumadin Monday and Tuesday of this week. He denied any diet changes. I will leave the patient on his current dose of 8 mg daily and have him return to the office for an INR in one week as he had missed two consecutive doses of Coumadin earlier. He is to continue to follow the INR protocol and he is to continue the Folgard one tablet twice daily as it is controlling his homocysteine level.   11/9/17 - Patient claims to be missing occasional Warfarin doses. Counseled.   5/22/18 - Hemoglobin 16.2, MCV 89.6, hematocrit 45.9. Serum homocysteine 14.5 (<15).    9/18/18 - Discussed possibly stopping Coumadin if workup negative. PT PTT 30.5 and 36.5 with correction of PTT on 1:1 mix with normal plasma. Lupus  anticoagulant absent. Factor VIII activity 125 (). D-dimer >0.22 (<0.45).     9/21/18 - Venous Doppler study bilateral lower extremity with chronic deep venous thrombosis involving the right popliteal vein. Chronic superficial venous thrombosis involving the right small saphenous vein and a lymph node noted in the right groin.   12/18/18 - Discussed venous Doppler results. Recommended continuation of anticoagulation either with Coumadin or maintenance DOAC’s or Aspirin alone, which I did not recommend. Patient decided to continue on Warfarin.   5/2/2019- homocystine 8.5 (N), factor VIII level 133% (H).  7/8/21 Coumadin and Folgard daily.  No missed doses. Factor VIII and Homocysteine level ordered.  11/11/21: Follow up appt. Continue Folgard and Coumadin at 7.5 mg for INR of 2.0. Continue INR checks monthly. No signs of clots or PE    Polycythemia diagnosis established in January 2015.  1/14/15 - Discussed with the patient the need for a bone marrow biopsy along with additional lab work for further evaluation of the thrombocytopenia and polycythemia.  The patient states that he does not wish to proceed with the blood work or the bone marrow biopsy at this time. Hemoglobin 17.0  3/7/17 - WBC 7.17, hemoglobin 17.2, platelet count 120,000. Discussed bone marrow evaluation again with the patient. He will think about it.   12/18/18 - WBC 10.4 with 84% neutrophils, 9% lymphocytes, 6% monocytes, hemoglobin 16.6, hematocrit 47.1, platelet count 121,000. BCR/ABL negative. [employer] NGS JAK2 panel negative.   9/5/2019- hemoglobin 16.1, hematocrit 47.4.  7/8/21 Hgb 16.3, Hct 47.6  11/11/21: Follow up appt. Hgb 16.6, Hct 46.3. Stable    Thrombocytopenia diagnosis established in October 2014  The patient presented to [facility] on August 19, 2014 where was admitted through 08/22/2014 with complaints of chest pain and shortness of air.  It was noted that he had thrombocytopenia on admission with the platelet  count of 125,000.  His platelet count slowly fell to 92,000 and it discharge with 12,000.  PT 11.9, INR 1.1, PTT 35.5.  A thrombocytopenia workup was performed that showed a folate of greater than 24.8 (H), and vitamin B12 331(N), , CMV, IgM was negative and EBV IgM was positive.  TAMIA screen is negative.  Platelet antibody screen is negative.  His PF4 was negative at 0.035.  It was noted that in the past, he has had some slightly low platelet counts in 125-140 range.    9/8/14 - platelet count 265,000.  EBV IgG positive, EBV IgM negative.  10/7/14 - platelet count 101,000.  11/17/14 - reviewed medication list for thrombocytopenia.  Metoprolol was known to cause thrombocytopenia purpura.  Lisinopril was known to rarely cause thrombocytopenia and less than 0.01% of the patients.  Plavix was known to cause thrombotic thrombocytopenia purpura (TTP) and thrombocytopenia and less than 1% of the patients.  Crestor was known to cause TTP and thrombocytopenia.  9/5/2019-patient reports moderate alcohol use-16 ounces beer 3 times per week.  Platelets 116  11/11/21: Follow up appt. Platelets 129, stable. No bleeding    Elevated LFTs and liver mass diagnosis established in September 2014.  9/8/14 - ALT 52 (H), AST 29 (N),   9/10/14 - advised the patient to increase his hydration and we’ll order a CT scan of the abdomen to be done for further evaluation of the elevated LFTs.  9/16/14 - ALT 49 (H), AST 29 (N)  9/23/14 - CT of the abdomen showed no evidence of renal mass.  There was an approximately 2 cm non-cystic appearing hypodense lesion on the posterior medial aspect of the right hepatic lobe.  This was in close proximity to the upper pole of the right kidney.  This could conceivably represent the mass seen on ultrasound study.  However it was definitely raising from the liver and not the upper pole of the right kidney.  10/7/14 - Will order a MRI for evaluation of the liver mass as well as lab work for further  evaluation of the elevated LFTs.  10/7/14 - Hepatitis screening negative, A1AT 133 (N), AFP 9.2 (H), ceruloplasmin 22 (N), ferritin 133.9 (N),   10/13/14 - MRI of the abdomen showed liver lesion within the posterior right lobe of the liver is most consistent with hemangioma   8/17/15 - ALT 38 (N), AST 26 (N), alkaline phosphatase 26 (N). Elevated LFT’s resolved.   3/8/16 - AFP 8 (0-9).    5/5/17 - CMP revealed an AST of 32 normal (15-41), ALT 55 normal (17-63), creatinine 1.4 high (0.7-1.2) and BUN 32 high (8-20). Otherwise within normal limits.      11/9/17 - Patient claims his PCP has recommended evaluation for his high liver enzymes by a liver doctor, but he has not followed up on it.   7/6/21 CMP drawn  11/11/21:  Follow up appt. CMP drawn      3/22/22: Patient here for follow up appt. Patient is currently on Coumadin 8mg po daily with INR of 2.5 today. No bleeding issues.     Past Medical History:   Diagnosis Date    Cardiomyopathy 2012    CVA (cerebral vascular accident) 2012    Hyperlipemia 2012    Hypertension 2012       Past Surgical History:   Procedure Laterality Date    TURP / TRANSURETHRAL INCISION / DRAINAGE PROSTATE  2016         Current Outpatient Medications:     atorvastatin (LIPITOR) 20 MG tablet, ATORVASTATIN CALCIUM 20 MG TABS, Disp: , Rfl:     clopidogrel (PLAVIX) 75 MG tablet, Take 1 tablet by mouth Daily., Disp: , Rfl:     enoxaparin sodium (LOVENOX) 100 MG/ML solution prefilled syringe syringe, Inject 1 mL under the skin into the appropriate area as directed Every 12 (Twelve) Hours., Disp: 10 mL, Rfl: 0    folic acid-pyridoxine-cyanocobalamin (FOLBIC) 2.5-25-2 MG tablet tablet, Take 1 tablet by mouth Daily., Disp: 30 each, Rfl: 5    metoprolol tartrate (LOPRESSOR) 25 MG tablet, METOPROLOL TARTRATE 25 MG TABS, Disp: , Rfl:     tamsulosin (FLOMAX) 0.4 MG capsule 24 hr capsule, Take 1 capsule by mouth Daily., Disp: 30 capsule, Rfl: 1    warfarin (COUMADIN) 1 MG tablet, Take 3.5 tablets by mouth  Every Night. Total daily dose 8.5 mg, Disp: 315 tablet, Rfl: 0    warfarin (COUMADIN) 5 MG tablet, Take 1 tablet by mouth Every Night. Total daily dose 8.5 mg. Indications: Atrial Fibrillation, Disp: 90 tablet, Rfl: 0    No Known Allergies    Family History   Problem Relation Age of Onset    Lung cancer Mother        Cancer-related family history includes Lung cancer in his mother.    Social History     Tobacco Use    Smoking status: Never    Smokeless tobacco: Current     Types: Chew   Vaping Use    Vaping status: Never Used   Substance Use Topics    Alcohol use: Not Currently    Drug use: No       I have reviewed and confirmed the accuracy of the patient's history: Chief complaint, HPI, ROS, and Subjective as entered by the MA/LPN/RN. Shweta Ayala MD 07/24/25       SUBJECTIVE:    Jett is here today for his routine 6-month follow-up.  He reports that he is doing well.  He is followed by the warfarin clinic and states that his INRs are mostly in range.  He denies any signs or symptoms of bleeding.  Denies any signs or symptoms of recurrent clot.  He remains on his Folgard.  He continues to follow with dermatology.    Patient is here for FU. He denies any new issues        REVIEW OF SYSTEMS:    Review of Systems   Constitutional:  Negative for activity change, appetite change, chills, fatigue and fever.   HENT:  Negative for ear pain, mouth sores, nosebleeds, sinus pressure, sinus pain, sneezing, sore throat, tinnitus and trouble swallowing.    Eyes:  Negative for photophobia and visual disturbance.   Respiratory:  Negative for apnea, cough, choking, chest tightness, shortness of breath, wheezing and stridor.    Cardiovascular:  Negative for chest pain, palpitations and leg swelling.   Gastrointestinal:  Negative for abdominal pain, anal bleeding, blood in stool, constipation, diarrhea, nausea and vomiting.   Endocrine: Negative for cold intolerance and heat intolerance.   Genitourinary:  Negative for  "decreased urine volume, dysuria, flank pain, frequency, genital sores, hematuria, penile discharge, penile pain, penile swelling, scrotal swelling, testicular pain and urgency.   Musculoskeletal:  Negative for back pain, gait problem, joint swelling, myalgias, neck pain and neck stiffness.   Skin:  Negative for color change, rash and wound.        Skin lesions on scalp-being treated by dermatology   Allergic/Immunologic: Negative.    Neurological:  Negative for dizziness, tremors, seizures, syncope, facial asymmetry, weakness, light-headedness, numbness and headaches.   Hematological:  Negative for adenopathy. Does not bruise/bleed easily.        No obvious bleeding   Psychiatric/Behavioral:  Negative for agitation, behavioral problems, confusion, decreased concentration, dysphoric mood, hallucinations, self-injury and sleep disturbance. The patient is not nervous/anxious.        OBJECTIVE:    Vitals:    07/24/25 1050   BP: 132/88   Pulse: 58   Resp: 20   Temp: 98 °F (36.7 °C)   TempSrc: Temporal   SpO2: 93%   Weight: 106 kg (233 lb)   Height: 170.2 cm (67\")   PainSc: 0-No pain         ECOG  (0) Fully active, able to carry on all predisease performance without restriction    Physical Exam   Constitutional: He is oriented to person, place, and time. No distress.   HENT:   Head: Normocephalic and atraumatic.   Eyes: Conjunctivae are normal. Right eye exhibits no discharge. Left eye exhibits no discharge. No scleral icterus.   Neck: No thyromegaly present.   Cardiovascular: Normal rate, regular rhythm and normal heart sounds. Exam reveals no gallop and no friction rub.   Pulmonary/Chest: Effort normal. No stridor. No respiratory distress. He has no wheezes.   Abdominal: Soft. Bowel sounds are normal. He exhibits no mass. There is no abdominal tenderness. There is no rebound and no guarding.   Musculoskeletal: Normal range of motion. No tenderness.   Lymphadenopathy:     He has no cervical adenopathy.   Neurological: He " is alert and oriented to person, place, and time. He exhibits normal muscle tone.   Skin: Skin is warm. No rash noted. He is not diaphoretic. No erythema.   Psychiatric: His behavior is normal.   Nursing note and vitals reviewed.      I have reexamined the patient and the results are consistent with the previously documented exam. Shweta Ayala MD         RECENT LABS    WBC   Date Value Ref Range Status   07/24/2025 6.50 3.40 - 10.80 10*3/mm3 Final     RBC   Date Value Ref Range Status   07/24/2025 5.42 4.14 - 5.80 10*6/mm3 Final     Hemoglobin   Date Value Ref Range Status   07/24/2025 15.1 13.0 - 17.7 g/dL Final     Hematocrit   Date Value Ref Range Status   07/24/2025 45.8 37.5 - 51.0 % Final     MCV   Date Value Ref Range Status   07/24/2025 84.5 79.0 - 97.0 fL Final     MCH   Date Value Ref Range Status   07/24/2025 27.9 26.6 - 33.0 pg Final     MCHC   Date Value Ref Range Status   07/24/2025 33.0 31.5 - 35.7 g/dL Final     RDW   Date Value Ref Range Status   07/24/2025 13.4 12.3 - 15.4 % Final     RDW-SD   Date Value Ref Range Status   07/24/2025 41.3 37.0 - 54.0 fl Final     MPV   Date Value Ref Range Status   07/24/2025 9.6 6.0 - 12.0 fL Final     Platelets   Date Value Ref Range Status   07/24/2025 133 (L) 140 - 450 10*3/mm3 Final     Neutrophil %   Date Value Ref Range Status   07/24/2025 65.8 42.7 - 76.0 % Final     Lymphocyte %   Date Value Ref Range Status   07/24/2025 21.1 19.6 - 45.3 % Final     Monocyte %   Date Value Ref Range Status   07/24/2025 9.2 5.0 - 12.0 % Final     Eosinophil %   Date Value Ref Range Status   07/24/2025 2.8 0.3 - 6.2 % Final     Basophil %   Date Value Ref Range Status   07/24/2025 0.6 0.0 - 1.5 % Final     Immature Grans %   Date Value Ref Range Status   07/24/2025 0.5 0.0 - 0.5 % Final     Neutrophils, Absolute   Date Value Ref Range Status   07/24/2025 4.28 1.70 - 7.00 10*3/mm3 Final     Lymphocytes, Absolute   Date Value Ref Range Status   07/24/2025 1.37 0.70 -  3.10 10*3/mm3 Final     Monocytes, Absolute   Date Value Ref Range Status   07/24/2025 0.60 0.10 - 0.90 10*3/mm3 Final     Eosinophils, Absolute   Date Value Ref Range Status   07/24/2025 0.18 0.00 - 0.40 10*3/mm3 Final     Basophils, Absolute   Date Value Ref Range Status   07/24/2025 0.04 0.00 - 0.20 10*3/mm3 Final     Immature Grans, Absolute   Date Value Ref Range Status   07/24/2025 0.03 0.00 - 0.05 10*3/mm3 Final     nRBC   Date Value Ref Range Status   03/27/2025 0.0 0.0 - 0.2 /100 WBC Final       Lab Results   Component Value Date    GLUCOSE 108 (H) 03/27/2025    BUN 18 03/27/2025    CREATININE 1.22 03/27/2025    EGFRIFNONA 66 11/11/2021    BCR 14.8 03/27/2025    K 3.9 03/27/2025    CO2 26.1 03/27/2025    CALCIUM 9.5 03/27/2025    ALBUMIN 3.5 02/03/2025    AST 26 02/03/2025    ALT 27 02/03/2025       ASSESSMENT:    Bilateral Pulmonary emboli: Anticoagulation therapy.  Continues to follow-up in the Coumadin clinic  Chronic DVT of proximal vein of right lower extremity: Good cap refill, no pain, warmth, or skin color changes.  There is mild edema behind the right knee. Patient states that edema has been present since his DVT. Edema has not lessened or changed.  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  Continue Folgard. Will  check homocysteine level  Heterozygous MTHFR mutation  CKD, stage III.  I have requested for his labs from [facility]  Elevated Factor VIII level: Factor VIII reviewed with patient, elevated. Reports no signs of DVT.  No shortness of breath, swelling, changes in skin color, pain or bleeding noted.   Polycythemia: CBC reviewed with patient Normal levels for Hgb and Hct.  CBC reviewed  Thrombocytopenia: Chronic: Stable at low 100s  Melanoma on chest and scalp:  Patient has been seen by Dr. Kapoor but would like to go to [facility].  Patient has multiple scalp lesions in various shapes and sizes that are yellowish and flaky.  Has flat  dime sized macule on right lower leg that is brownish in color.  Patient denies any changes in color of macule. Patient does not know how long macule has been there.  Will consult dermatology, . Educated completed regarding use of hats and sunscreen.   Patient voiced understanding.  Continue to follow with dermatology.      PLANS:    INR reviewed follow-up in the Coumadin clinic  CBC reviewed  Continue Folgard  Follow-up in 6 months with CBC, fasting homocystine level and CMP done a week before the appointment. Continue to follow-up in the Coumadin clinic   FU in 6 months  Fasting homocystine level will be due at the next visit in 6 months. Will be harshil a week before the next apt.  7/24/25  Dermatology consult placed.  Patient has a history of melanoma status post resection and also has a scalp lesion.  Patient is now established with Dr. Murillo at [facility] in dermatology on [facility].  All questions answered, patient verbalized understanding and is agreeable to above plan.        Electronically signed by Swheta Ayala MD, 07/24/25, 4:56 PM EDT.

## 2025-07-24 ENCOUNTER — OFFICE VISIT (OUTPATIENT)
Dept: ONCOLOGY | Facility: CLINIC | Age: 75
End: 2025-07-24
Payer: MEDICARE

## 2025-07-24 ENCOUNTER — LAB (OUTPATIENT)
Dept: LAB | Facility: HOSPITAL | Age: 75
End: 2025-07-24
Payer: MEDICARE

## 2025-07-24 VITALS
SYSTOLIC BLOOD PRESSURE: 132 MMHG | TEMPERATURE: 98 F | OXYGEN SATURATION: 93 % | BODY MASS INDEX: 36.57 KG/M2 | HEIGHT: 67 IN | WEIGHT: 233 LBS | RESPIRATION RATE: 20 BRPM | DIASTOLIC BLOOD PRESSURE: 88 MMHG | HEART RATE: 58 BPM

## 2025-07-24 DIAGNOSIS — Z86.718 PERSONAL HISTORY OF OTHER VENOUS THROMBOSIS AND EMBOLISM: ICD-10-CM

## 2025-07-24 DIAGNOSIS — Z15.89 HETEROZYGOUS MTHFR MUTATION C677T: ICD-10-CM

## 2025-07-24 DIAGNOSIS — I26.99 PULMONARY EMBOLISM, OTHER, UNSPECIFIED CHRONICITY, UNSPECIFIED WHETHER ACUTE COR PULMONALE PRESENT: Primary | ICD-10-CM

## 2025-07-24 DIAGNOSIS — Z79.01 LONG TERM (CURRENT) USE OF ANTICOAGULANTS: ICD-10-CM

## 2025-07-24 LAB
BASOPHILS # BLD AUTO: 0.04 10*3/MM3 (ref 0–0.2)
BASOPHILS NFR BLD AUTO: 0.6 % (ref 0–1.5)
DEPRECATED RDW RBC AUTO: 41.3 FL (ref 37–54)
EOSINOPHIL # BLD AUTO: 0.18 10*3/MM3 (ref 0–0.4)
EOSINOPHIL NFR BLD AUTO: 2.8 % (ref 0.3–6.2)
ERYTHROCYTE [DISTWIDTH] IN BLOOD BY AUTOMATED COUNT: 13.4 % (ref 12.3–15.4)
HCT VFR BLD AUTO: 45.8 % (ref 37.5–51)
HGB BLD-MCNC: 15.1 G/DL (ref 13–17.7)
HOLD SPECIMEN: NORMAL
HOLD SPECIMEN: NORMAL
IMM GRANULOCYTES # BLD AUTO: 0.03 10*3/MM3 (ref 0–0.05)
IMM GRANULOCYTES NFR BLD AUTO: 0.5 % (ref 0–0.5)
LYMPHOCYTES # BLD AUTO: 1.37 10*3/MM3 (ref 0.7–3.1)
LYMPHOCYTES NFR BLD AUTO: 21.1 % (ref 19.6–45.3)
MCH RBC QN AUTO: 27.9 PG (ref 26.6–33)
MCHC RBC AUTO-ENTMCNC: 33 G/DL (ref 31.5–35.7)
MCV RBC AUTO: 84.5 FL (ref 79–97)
MONOCYTES # BLD AUTO: 0.6 10*3/MM3 (ref 0.1–0.9)
MONOCYTES NFR BLD AUTO: 9.2 % (ref 5–12)
NEUTROPHILS NFR BLD AUTO: 4.28 10*3/MM3 (ref 1.7–7)
NEUTROPHILS NFR BLD AUTO: 65.8 % (ref 42.7–76)
PLATELET # BLD AUTO: 133 10*3/MM3 (ref 140–450)
PMV BLD AUTO: 9.6 FL (ref 6–12)
RBC # BLD AUTO: 5.42 10*6/MM3 (ref 4.14–5.8)
WBC NRBC COR # BLD AUTO: 6.5 10*3/MM3 (ref 3.4–10.8)
WHOLE BLOOD HOLD COAG: NORMAL

## 2025-07-24 PROCEDURE — 85025 COMPLETE CBC W/AUTO DIFF WBC: CPT

## 2025-07-24 PROCEDURE — 36415 COLL VENOUS BLD VENIPUNCTURE: CPT

## 2025-08-07 ENCOUNTER — ANTICOAGULATION VISIT (OUTPATIENT)
Dept: PHARMACY | Facility: HOSPITAL | Age: 75
End: 2025-08-07
Payer: MEDICARE

## 2025-08-07 ENCOUNTER — LAB (OUTPATIENT)
Dept: LAB | Facility: HOSPITAL | Age: 75
End: 2025-08-07
Payer: MEDICARE

## 2025-08-07 DIAGNOSIS — Z79.01 LONG TERM (CURRENT) USE OF ANTICOAGULANTS: ICD-10-CM

## 2025-08-07 LAB — INR PPP: 3 (ref 0.8–1.2)

## 2025-08-07 PROCEDURE — 85610 PROTHROMBIN TIME: CPT
